# Patient Record
Sex: MALE | Race: WHITE | Employment: PART TIME | URBAN - METROPOLITAN AREA
[De-identification: names, ages, dates, MRNs, and addresses within clinical notes are randomized per-mention and may not be internally consistent; named-entity substitution may affect disease eponyms.]

---

## 2017-02-02 ENCOUNTER — APPOINTMENT (OUTPATIENT)
Dept: ULTRASOUND IMAGING | Age: 52
DRG: 219 | End: 2017-02-02
Attending: EMERGENCY MEDICINE
Payer: COMMERCIAL

## 2017-02-02 ENCOUNTER — HOSPITAL ENCOUNTER (INPATIENT)
Age: 52
LOS: 25 days | Discharge: NURSING, REHAB OR LONG-TERM CARE | DRG: 219 | End: 2017-02-27
Attending: EMERGENCY MEDICINE | Admitting: THORACIC SURGERY (CARDIOTHORACIC VASCULAR SURGERY)
Payer: COMMERCIAL

## 2017-02-02 ENCOUNTER — ANESTHESIA (OUTPATIENT)
Dept: CARDIOTHORACIC SURGERY | Age: 52
DRG: 219 | End: 2017-02-02
Payer: COMMERCIAL

## 2017-02-02 ENCOUNTER — ANESTHESIA EVENT (OUTPATIENT)
Dept: CARDIOTHORACIC SURGERY | Age: 52
DRG: 219 | End: 2017-02-02
Payer: COMMERCIAL

## 2017-02-02 ENCOUNTER — APPOINTMENT (OUTPATIENT)
Dept: CT IMAGING | Age: 52
DRG: 219 | End: 2017-02-02
Attending: EMERGENCY MEDICINE
Payer: COMMERCIAL

## 2017-02-02 ENCOUNTER — APPOINTMENT (OUTPATIENT)
Dept: GENERAL RADIOLOGY | Age: 52
DRG: 219 | End: 2017-02-02
Attending: EMERGENCY MEDICINE
Payer: COMMERCIAL

## 2017-02-02 DIAGNOSIS — I71.019 AORTIC DISSECTION, THORACIC: Primary | ICD-10-CM

## 2017-02-02 PROBLEM — I71.00 AORTIC DISSECTION (HCC): Status: ACTIVE | Noted: 2017-02-02

## 2017-02-02 LAB
ALBUMIN SERPL BCP-MCNC: 4.1 G/DL (ref 3.5–5)
ALBUMIN/GLOB SERPL: 1.2 {RATIO} (ref 1.1–2.2)
ALP SERPL-CCNC: 82 U/L (ref 45–117)
ALT SERPL-CCNC: 34 U/L (ref 12–78)
ANION GAP BLD CALC-SCNC: 12 MMOL/L (ref 5–15)
AST SERPL W P-5'-P-CCNC: 22 U/L (ref 15–37)
ATRIAL RATE: 68 BPM
BASOPHILS # BLD AUTO: 0 K/UL (ref 0–0.1)
BASOPHILS # BLD: 0 % (ref 0–1)
BILIRUB SERPL-MCNC: 0.3 MG/DL (ref 0.2–1)
BUN SERPL-MCNC: 15 MG/DL (ref 6–20)
BUN/CREAT SERPL: 13 (ref 12–20)
CALCIUM SERPL-MCNC: 8.6 MG/DL (ref 8.5–10.1)
CALCULATED P AXIS, ECG09: 16 DEGREES
CALCULATED R AXIS, ECG10: -21 DEGREES
CALCULATED T AXIS, ECG11: 9 DEGREES
CHLORIDE SERPL-SCNC: 103 MMOL/L (ref 97–108)
CK MB CFR SERPL CALC: 1.3 % (ref 0–2.5)
CK MB CFR SERPL CALC: 1.4 % (ref 0–2.5)
CK MB SERPL-MCNC: 5.5 NG/ML (ref 5–25)
CK MB SERPL-MCNC: 5.8 NG/ML (ref 5–25)
CK SERPL-CCNC: 416 U/L (ref 39–308)
CK SERPL-CCNC: 425 U/L (ref 39–308)
CO2 SERPL-SCNC: 26 MMOL/L (ref 21–32)
CREAT SERPL-MCNC: 1.17 MG/DL (ref 0.7–1.3)
DIAGNOSIS, 93000: NORMAL
EOSINOPHIL # BLD: 0.1 K/UL (ref 0–0.4)
EOSINOPHIL NFR BLD: 1 % (ref 0–7)
ERYTHROCYTE [DISTWIDTH] IN BLOOD BY AUTOMATED COUNT: 14.4 % (ref 11.5–14.5)
GLOBULIN SER CALC-MCNC: 3.5 G/DL (ref 2–4)
GLUCOSE SERPL-MCNC: 199 MG/DL (ref 65–100)
HCT VFR BLD AUTO: 42.9 % (ref 36.6–50.3)
HGB BLD-MCNC: 14.1 G/DL (ref 12.1–17)
LIPASE SERPL-CCNC: 159 U/L (ref 73–393)
LYMPHOCYTES # BLD AUTO: 24 % (ref 12–49)
LYMPHOCYTES # BLD: 2.3 K/UL (ref 0.8–3.5)
MCH RBC QN AUTO: 28.4 PG (ref 26–34)
MCHC RBC AUTO-ENTMCNC: 32.9 G/DL (ref 30–36.5)
MCV RBC AUTO: 86.3 FL (ref 80–99)
MONOCYTES # BLD: 0.6 K/UL (ref 0–1)
MONOCYTES NFR BLD AUTO: 7 % (ref 5–13)
NEUTS SEG # BLD: 6.5 K/UL (ref 1.8–8)
NEUTS SEG NFR BLD AUTO: 68 % (ref 32–75)
P-R INTERVAL, ECG05: 178 MS
PLATELET # BLD AUTO: 214 K/UL (ref 150–400)
POTASSIUM SERPL-SCNC: 4.2 MMOL/L (ref 3.5–5.1)
PROT SERPL-MCNC: 7.6 G/DL (ref 6.4–8.2)
Q-T INTERVAL, ECG07: 418 MS
QRS DURATION, ECG06: 100 MS
QTC CALCULATION (BEZET), ECG08: 444 MS
RBC # BLD AUTO: 4.97 M/UL (ref 4.1–5.7)
SODIUM SERPL-SCNC: 141 MMOL/L (ref 136–145)
TROPONIN I SERPL-MCNC: <0.04 NG/ML
TROPONIN I SERPL-MCNC: <0.04 NG/ML
VENTRICULAR RATE, ECG03: 68 BPM
WBC # BLD AUTO: 9.5 K/UL (ref 4.1–11.1)

## 2017-02-02 PROCEDURE — 77030034420 HC BN PTTY HEMOSORB ABRY -B: Performed by: THORACIC SURGERY (CARDIOTHORACIC VASCULAR SURGERY)

## 2017-02-02 PROCEDURE — 82553 CREATINE MB FRACTION: CPT | Performed by: EMERGENCY MEDICINE

## 2017-02-02 PROCEDURE — 74011250636 HC RX REV CODE- 250/636: Performed by: THORACIC SURGERY (CARDIOTHORACIC VASCULAR SURGERY)

## 2017-02-02 PROCEDURE — 77030020256 HC SOL INJ NACL 0.9%  500ML: Performed by: THORACIC SURGERY (CARDIOTHORACIC VASCULAR SURGERY)

## 2017-02-02 PROCEDURE — 96374 THER/PROPH/DIAG INJ IV PUSH: CPT

## 2017-02-02 PROCEDURE — 76010000116 HC CV SURG 5 TO 5.5 HR: Performed by: THORACIC SURGERY (CARDIOTHORACIC VASCULAR SURGERY)

## 2017-02-02 PROCEDURE — 76705 ECHO EXAM OF ABDOMEN: CPT

## 2017-02-02 PROCEDURE — 85384 FIBRINOGEN ACTIVITY: CPT | Performed by: THORACIC SURGERY (CARDIOTHORACIC VASCULAR SURGERY)

## 2017-02-02 PROCEDURE — 5A1221Z PERFORMANCE OF CARDIAC OUTPUT, CONTINUOUS: ICD-10-PCS | Performed by: THORACIC SURGERY (CARDIOTHORACIC VASCULAR SURGERY)

## 2017-02-02 PROCEDURE — 77030011255 HC DSG AQUACEL AG BMS -A: Performed by: THORACIC SURGERY (CARDIOTHORACIC VASCULAR SURGERY)

## 2017-02-02 PROCEDURE — 74011000258 HC RX REV CODE- 258: Performed by: THORACIC SURGERY (CARDIOTHORACIC VASCULAR SURGERY)

## 2017-02-02 PROCEDURE — 77030006920 HC BLD STRNL SAW STRY -B: Performed by: THORACIC SURGERY (CARDIOTHORACIC VASCULAR SURGERY)

## 2017-02-02 PROCEDURE — 77030003037 HC SUT WRE STRNOTMY AEMC -B: Performed by: THORACIC SURGERY (CARDIOTHORACIC VASCULAR SURGERY)

## 2017-02-02 PROCEDURE — 77030008771 HC TU NG SALEM SUMP -A: Performed by: ANESTHESIOLOGY

## 2017-02-02 PROCEDURE — 77030018846 HC SOL IRR STRL H20 ICUM -A: Performed by: THORACIC SURGERY (CARDIOTHORACIC VASCULAR SURGERY)

## 2017-02-02 PROCEDURE — 82550 ASSAY OF CK (CPK): CPT | Performed by: EMERGENCY MEDICINE

## 2017-02-02 PROCEDURE — 77030011256 HC DRSG MEPILEX <16IN NO BORD MOLN -A: Performed by: THORACIC SURGERY (CARDIOTHORACIC VASCULAR SURGERY)

## 2017-02-02 PROCEDURE — 77030002933 HC SUT MCRYL J&J -A: Performed by: THORACIC SURGERY (CARDIOTHORACIC VASCULAR SURGERY)

## 2017-02-02 PROCEDURE — 77030013798 HC KT TRNSDUC PRSSR EDWD -B: Performed by: THORACIC SURGERY (CARDIOTHORACIC VASCULAR SURGERY)

## 2017-02-02 PROCEDURE — 88313 SPECIAL STAINS GROUP 2: CPT | Performed by: THORACIC SURGERY (CARDIOTHORACIC VASCULAR SURGERY)

## 2017-02-02 PROCEDURE — 77030012390 HC DRN CHST BTL GTNG -B: Performed by: THORACIC SURGERY (CARDIOTHORACIC VASCULAR SURGERY)

## 2017-02-02 PROCEDURE — 77030011640 HC PAD GRND REM COVD -A: Performed by: THORACIC SURGERY (CARDIOTHORACIC VASCULAR SURGERY)

## 2017-02-02 PROCEDURE — 77030020263 HC SOL INJ SOD CL0.9% LFCR 1000ML: Performed by: THORACIC SURGERY (CARDIOTHORACIC VASCULAR SURGERY)

## 2017-02-02 PROCEDURE — 85025 COMPLETE CBC W/AUTO DIFF WBC: CPT | Performed by: EMERGENCY MEDICINE

## 2017-02-02 PROCEDURE — 74011250636 HC RX REV CODE- 250/636

## 2017-02-02 PROCEDURE — P9047 ALBUMIN (HUMAN), 25%, 50ML: HCPCS | Performed by: THORACIC SURGERY (CARDIOTHORACIC VASCULAR SURGERY)

## 2017-02-02 PROCEDURE — 74011000250 HC RX REV CODE- 250: Performed by: THORACIC SURGERY (CARDIOTHORACIC VASCULAR SURGERY)

## 2017-02-02 PROCEDURE — 77030032490 HC SLV COMPR SCD KNE COVD -B

## 2017-02-02 PROCEDURE — 85347 COAGULATION TIME ACTIVATED: CPT | Performed by: THORACIC SURGERY (CARDIOTHORACIC VASCULAR SURGERY)

## 2017-02-02 PROCEDURE — 74011636320 HC RX REV CODE- 636/320: Performed by: EMERGENCY MEDICINE

## 2017-02-02 PROCEDURE — 85390 FIBRINOLYSINS SCREEN I&R: CPT | Performed by: THORACIC SURGERY (CARDIOTHORACIC VASCULAR SURGERY)

## 2017-02-02 PROCEDURE — 85576 BLOOD PLATELET AGGREGATION: CPT | Performed by: THORACIC SURGERY (CARDIOTHORACIC VASCULAR SURGERY)

## 2017-02-02 PROCEDURE — C1768 GRAFT, VASCULAR: HCPCS | Performed by: THORACIC SURGERY (CARDIOTHORACIC VASCULAR SURGERY)

## 2017-02-02 PROCEDURE — 77030018986 HC SUT ETHBND4 J&J -B: Performed by: THORACIC SURGERY (CARDIOTHORACIC VASCULAR SURGERY)

## 2017-02-02 PROCEDURE — 96376 TX/PRO/DX INJ SAME DRUG ADON: CPT

## 2017-02-02 PROCEDURE — 77030027138 HC INCENT SPIROMETER -A

## 2017-02-02 PROCEDURE — 74011250636 HC RX REV CODE- 250/636: Performed by: EMERGENCY MEDICINE

## 2017-02-02 PROCEDURE — 86900 BLOOD TYPING SEROLOGIC ABO: CPT | Performed by: EMERGENCY MEDICINE

## 2017-02-02 PROCEDURE — 30233K1 TRANSFUSION OF NONAUTOLOGOUS FROZEN PLASMA INTO PERIPHERAL VEIN, PERCUTANEOUS APPROACH: ICD-10-PCS | Performed by: THORACIC SURGERY (CARDIOTHORACIC VASCULAR SURGERY)

## 2017-02-02 PROCEDURE — 77030008771 HC TU NG SALEM SUMP -A: Performed by: THORACIC SURGERY (CARDIOTHORACIC VASCULAR SURGERY)

## 2017-02-02 PROCEDURE — 77030010796: Performed by: THORACIC SURGERY (CARDIOTHORACIC VASCULAR SURGERY)

## 2017-02-02 PROCEDURE — 30233R1 TRANSFUSION OF NONAUTOLOGOUS PLATELETS INTO PERIPHERAL VEIN, PERCUTANEOUS APPROACH: ICD-10-PCS | Performed by: THORACIC SURGERY (CARDIOTHORACIC VASCULAR SURGERY)

## 2017-02-02 PROCEDURE — 77030019576 HC CAUT BTRY -A: Performed by: THORACIC SURGERY (CARDIOTHORACIC VASCULAR SURGERY)

## 2017-02-02 PROCEDURE — 71275 CT ANGIOGRAPHY CHEST: CPT

## 2017-02-02 PROCEDURE — 77030020061 HC IV BLD WRMR ADMIN SET 3M -B: Performed by: ANESTHESIOLOGY

## 2017-02-02 PROCEDURE — 80053 COMPREHEN METABOLIC PANEL: CPT | Performed by: EMERGENCY MEDICINE

## 2017-02-02 PROCEDURE — 77030034848: Performed by: THORACIC SURGERY (CARDIOTHORACIC VASCULAR SURGERY)

## 2017-02-02 PROCEDURE — 86920 COMPATIBILITY TEST SPIN: CPT | Performed by: EMERGENCY MEDICINE

## 2017-02-02 PROCEDURE — 77030021678 HC GLIDESCP STAT DISP VERT -B: Performed by: ANESTHESIOLOGY

## 2017-02-02 PROCEDURE — 74011250637 HC RX REV CODE- 250/637: Performed by: EMERGENCY MEDICINE

## 2017-02-02 PROCEDURE — 77030005537 HC CATH URETH BARD -A: Performed by: THORACIC SURGERY (CARDIOTHORACIC VASCULAR SURGERY)

## 2017-02-02 PROCEDURE — 77030018835 HC SOL IRR LR ICUM -A: Performed by: THORACIC SURGERY (CARDIOTHORACIC VASCULAR SURGERY)

## 2017-02-02 PROCEDURE — 4A1239Z MONITORING OF CARDIAC OUTPUT, PERCUTANEOUS APPROACH: ICD-10-PCS | Performed by: THORACIC SURGERY (CARDIOTHORACIC VASCULAR SURGERY)

## 2017-02-02 PROCEDURE — 74011636637 HC RX REV CODE- 636/637: Performed by: THORACIC SURGERY (CARDIOTHORACIC VASCULAR SURGERY)

## 2017-02-02 PROCEDURE — 77030010507 HC ADH SKN DERMBND J&J -B: Performed by: THORACIC SURGERY (CARDIOTHORACIC VASCULAR SURGERY)

## 2017-02-02 PROCEDURE — P9035 PLATELET PHERES LEUKOREDUCED: HCPCS | Performed by: THORACIC SURGERY (CARDIOTHORACIC VASCULAR SURGERY)

## 2017-02-02 PROCEDURE — 77030007016 HC CATH WGDWRE STYL EDWD -B: Performed by: THORACIC SURGERY (CARDIOTHORACIC VASCULAR SURGERY)

## 2017-02-02 PROCEDURE — 88305 TISSUE EXAM BY PATHOLOGIST: CPT | Performed by: THORACIC SURGERY (CARDIOTHORACIC VASCULAR SURGERY)

## 2017-02-02 PROCEDURE — P9059 PLASMA, FRZ BETWEEN 8-24HOUR: HCPCS | Performed by: THORACIC SURGERY (CARDIOTHORACIC VASCULAR SURGERY)

## 2017-02-02 PROCEDURE — 83690 ASSAY OF LIPASE: CPT | Performed by: EMERGENCY MEDICINE

## 2017-02-02 PROCEDURE — 77030002912 HC SUT ETHBND J&J -A: Performed by: THORACIC SURGERY (CARDIOTHORACIC VASCULAR SURGERY)

## 2017-02-02 PROCEDURE — 36415 COLL VENOUS BLD VENIPUNCTURE: CPT | Performed by: EMERGENCY MEDICINE

## 2017-02-02 PROCEDURE — 02RX0JZ REPLACEMENT OF THORACIC AORTA, ASCENDING/ARCH WITH SYNTHETIC SUBSTITUTE, OPEN APPROACH: ICD-10-PCS | Performed by: THORACIC SURGERY (CARDIOTHORACIC VASCULAR SURGERY)

## 2017-02-02 PROCEDURE — 77030018836 HC SOL IRR NACL ICUM -A: Performed by: THORACIC SURGERY (CARDIOTHORACIC VASCULAR SURGERY)

## 2017-02-02 PROCEDURE — 74011000250 HC RX REV CODE- 250

## 2017-02-02 PROCEDURE — 76060000041 HC ANESTHESIA 5 TO 5.5 HR: Performed by: THORACIC SURGERY (CARDIOTHORACIC VASCULAR SURGERY)

## 2017-02-02 PROCEDURE — 74174 CTA ABD&PLVS W/CONTRAST: CPT

## 2017-02-02 PROCEDURE — 77030010822: Performed by: THORACIC SURGERY (CARDIOTHORACIC VASCULAR SURGERY)

## 2017-02-02 PROCEDURE — 77030019579 HC CBL PACE DISP REMG -B: Performed by: THORACIC SURGERY (CARDIOTHORACIC VASCULAR SURGERY)

## 2017-02-02 PROCEDURE — 77030034351 HC SUT DEKLENE MX TELE -A: Performed by: THORACIC SURGERY (CARDIOTHORACIC VASCULAR SURGERY)

## 2017-02-02 PROCEDURE — 99285 EMERGENCY DEPT VISIT HI MDM: CPT

## 2017-02-02 PROCEDURE — 77030026438 HC STYL ET INTUB CARD -A: Performed by: ANESTHESIOLOGY

## 2017-02-02 PROCEDURE — 93005 ELECTROCARDIOGRAM TRACING: CPT

## 2017-02-02 PROCEDURE — 84484 ASSAY OF TROPONIN QUANT: CPT | Performed by: EMERGENCY MEDICINE

## 2017-02-02 PROCEDURE — 71020 XR CHEST PA LAT: CPT

## 2017-02-02 PROCEDURE — 65620000000 HC RM CCU GENERAL

## 2017-02-02 PROCEDURE — P9047 ALBUMIN (HUMAN), 25%, 50ML: HCPCS

## 2017-02-02 PROCEDURE — 96375 TX/PRO/DX INJ NEW DRUG ADDON: CPT

## 2017-02-02 PROCEDURE — 77030010506 HC ADH BIOGLU CRYO -G: Performed by: THORACIC SURGERY (CARDIOTHORACIC VASCULAR SURGERY)

## 2017-02-02 PROCEDURE — 74011000250 HC RX REV CODE- 250: Performed by: EMERGENCY MEDICINE

## 2017-02-02 PROCEDURE — 77030002973 HC SUT PLEDG CV SFT OVL TELE -B: Performed by: THORACIC SURGERY (CARDIOTHORACIC VASCULAR SURGERY)

## 2017-02-02 PROCEDURE — C9113 INJ PANTOPRAZOLE SODIUM, VIA: HCPCS | Performed by: EMERGENCY MEDICINE

## 2017-02-02 PROCEDURE — P9016 RBC LEUKOCYTES REDUCED: HCPCS | Performed by: EMERGENCY MEDICINE

## 2017-02-02 PROCEDURE — 74011000272 HC RX REV CODE- 272: Performed by: THORACIC SURGERY (CARDIOTHORACIC VASCULAR SURGERY)

## 2017-02-02 PROCEDURE — 77030008684 HC TU ET CUF COVD -B: Performed by: ANESTHESIOLOGY

## 2017-02-02 PROCEDURE — 77030020089 HC KT PERC FEM ART MEDT -C: Performed by: THORACIC SURGERY (CARDIOTHORACIC VASCULAR SURGERY)

## 2017-02-02 PROCEDURE — 4A133B3 MONITORING OF ARTERIAL PRESSURE, PULMONARY, PERCUTANEOUS APPROACH: ICD-10-PCS | Performed by: THORACIC SURGERY (CARDIOTHORACIC VASCULAR SURGERY)

## 2017-02-02 PROCEDURE — 74011000272 HC RX REV CODE- 272

## 2017-02-02 DEVICE — FELT SURG W6XL6IN THK1.65MM PTFE FOR THE REP OF SEPT DEFCT: Type: IMPLANTABLE DEVICE | Site: AORTA | Status: FUNCTIONAL

## 2017-02-02 DEVICE — GRAFT VASC PLAT 28MMX30 -- HEMSHLD: Type: IMPLANTABLE DEVICE | Site: AORTA | Status: FUNCTIONAL

## 2017-02-02 DEVICE — PUTTY BONE HEMSTAT ABSORB MTRX 2.0GRAM: Type: IMPLANTABLE DEVICE | Site: AORTA | Status: FUNCTIONAL

## 2017-02-02 RX ORDER — MAGNESIUM SULFATE HEPTAHYDRATE 40 MG/ML
2 INJECTION, SOLUTION INTRAVENOUS ONCE
Status: DISCONTINUED | OUTPATIENT
Start: 2017-02-03 | End: 2017-02-03

## 2017-02-02 RX ORDER — SUFENTANIL CITRATE 50 UG/ML
INJECTION EPIDURAL; INTRAVENOUS
Status: DISCONTINUED | OUTPATIENT
Start: 2017-02-02 | End: 2017-02-03 | Stop reason: HOSPADM

## 2017-02-02 RX ORDER — DEXTROSE 50 % IN WATER (D50W) INTRAVENOUS SYRINGE
AS NEEDED
Status: DISCONTINUED | OUTPATIENT
Start: 2017-02-02 | End: 2017-02-03 | Stop reason: HOSPADM

## 2017-02-02 RX ORDER — SODIUM CHLORIDE 9 MG/ML
250 INJECTION, SOLUTION INTRAVENOUS AS NEEDED
Status: DISCONTINUED | OUTPATIENT
Start: 2017-02-02 | End: 2017-02-03

## 2017-02-02 RX ORDER — PROTAMINE SULFATE 10 MG/ML
INJECTION, SOLUTION INTRAVENOUS AS NEEDED
Status: DISCONTINUED | OUTPATIENT
Start: 2017-02-02 | End: 2017-02-03 | Stop reason: HOSPADM

## 2017-02-02 RX ORDER — FAMOTIDINE 20 MG/1
20 TABLET, FILM COATED ORAL
Status: COMPLETED | OUTPATIENT
Start: 2017-02-02 | End: 2017-02-02

## 2017-02-02 RX ORDER — ONDANSETRON 2 MG/ML
4 INJECTION INTRAMUSCULAR; INTRAVENOUS
Status: COMPLETED | OUTPATIENT
Start: 2017-02-02 | End: 2017-02-02

## 2017-02-02 RX ORDER — CEFAZOLIN SODIUM 1 G/3ML
3 INJECTION, POWDER, FOR SOLUTION INTRAMUSCULAR; INTRAVENOUS ONCE
Status: COMPLETED | OUTPATIENT
Start: 2017-02-02 | End: 2017-02-03

## 2017-02-02 RX ORDER — DOBUTAMINE HYDROCHLORIDE 200 MG/100ML
2.5-1 INJECTION INTRAVENOUS
Status: DISCONTINUED | OUTPATIENT
Start: 2017-02-03 | End: 2017-02-03

## 2017-02-02 RX ORDER — HYDROMORPHONE HYDROCHLORIDE 1 MG/ML
1 INJECTION, SOLUTION INTRAMUSCULAR; INTRAVENOUS; SUBCUTANEOUS
Status: COMPLETED | OUTPATIENT
Start: 2017-02-02 | End: 2017-02-02

## 2017-02-02 RX ORDER — DESMOPRESSIN ACETATE 4 UG/ML
2 INJECTION, SOLUTION INTRAVENOUS; SUBCUTANEOUS ONCE
Status: DISCONTINUED | OUTPATIENT
Start: 2017-02-03 | End: 2017-02-03

## 2017-02-02 RX ORDER — SODIUM BICARBONATE 1 MEQ/ML
SYRINGE (ML) INTRAVENOUS
Status: DISCONTINUED
Start: 2017-02-02 | End: 2017-02-03

## 2017-02-02 RX ORDER — HEPARIN SODIUM 1000 [USP'U]/ML
2000 INJECTION, SOLUTION INTRAVENOUS; SUBCUTANEOUS ONCE
Status: COMPLETED | OUTPATIENT
Start: 2017-02-02 | End: 2017-02-02

## 2017-02-02 RX ORDER — SODIUM CHLORIDE 0.9 % (FLUSH) 0.9 %
10 SYRINGE (ML) INJECTION
Status: COMPLETED | OUTPATIENT
Start: 2017-02-02 | End: 2017-02-02

## 2017-02-02 RX ORDER — PROTAMINE SULFATE 10 MG/ML
250 INJECTION, SOLUTION INTRAVENOUS
Status: DISCONTINUED | OUTPATIENT
Start: 2017-02-03 | End: 2017-02-03

## 2017-02-02 RX ORDER — ROCURONIUM BROMIDE 10 MG/ML
INJECTION, SOLUTION INTRAVENOUS AS NEEDED
Status: DISCONTINUED | OUTPATIENT
Start: 2017-02-02 | End: 2017-02-03 | Stop reason: HOSPADM

## 2017-02-02 RX ORDER — SUFENTANIL CITRATE 50 UG/ML
INJECTION EPIDURAL; INTRAVENOUS AS NEEDED
Status: DISCONTINUED | OUTPATIENT
Start: 2017-02-02 | End: 2017-02-03 | Stop reason: HOSPADM

## 2017-02-02 RX ORDER — SODIUM CHLORIDE 9 MG/ML
INJECTION, SOLUTION INTRAVENOUS
Status: DISCONTINUED | OUTPATIENT
Start: 2017-02-02 | End: 2017-02-03 | Stop reason: HOSPADM

## 2017-02-02 RX ORDER — DOPAMINE HYDROCHLORIDE 320 MG/100ML
5-20 INJECTION, SOLUTION INTRAVENOUS
Status: DISCONTINUED | OUTPATIENT
Start: 2017-02-03 | End: 2017-02-03

## 2017-02-02 RX ORDER — HEPARIN SODIUM 1000 [USP'U]/ML
INJECTION, SOLUTION INTRAVENOUS; SUBCUTANEOUS AS NEEDED
Status: DISCONTINUED | OUTPATIENT
Start: 2017-02-02 | End: 2017-02-03 | Stop reason: HOSPADM

## 2017-02-02 RX ORDER — SODIUM CHLORIDE 9 MG/ML
50 INJECTION, SOLUTION INTRAVENOUS
Status: COMPLETED | OUTPATIENT
Start: 2017-02-02 | End: 2017-02-02

## 2017-02-02 RX ORDER — CEFAZOLIN SODIUM IN 0.9 % NACL 2 G/100 ML
PLASTIC BAG, INJECTION (ML) INTRAVENOUS AS NEEDED
Status: DISCONTINUED | OUTPATIENT
Start: 2017-02-02 | End: 2017-02-03 | Stop reason: HOSPADM

## 2017-02-02 RX ORDER — NITROGLYCERIN 20 MG/100ML
5-200 INJECTION INTRAVENOUS
Status: DISCONTINUED | OUTPATIENT
Start: 2017-02-03 | End: 2017-02-03

## 2017-02-02 RX ORDER — POTASSIUM CHLORIDE 29.8 MG/ML
20 INJECTION INTRAVENOUS ONCE
Status: DISCONTINUED | OUTPATIENT
Start: 2017-02-03 | End: 2017-02-03

## 2017-02-02 RX ORDER — PROPOFOL 10 MG/ML
INJECTION, EMULSION INTRAVENOUS AS NEEDED
Status: DISCONTINUED | OUTPATIENT
Start: 2017-02-02 | End: 2017-02-03 | Stop reason: HOSPADM

## 2017-02-02 RX ORDER — SUCCINYLCHOLINE CHLORIDE 20 MG/ML
INJECTION INTRAMUSCULAR; INTRAVENOUS AS NEEDED
Status: DISCONTINUED | OUTPATIENT
Start: 2017-02-02 | End: 2017-02-03 | Stop reason: HOSPADM

## 2017-02-02 RX ORDER — MIDAZOLAM HYDROCHLORIDE 1 MG/ML
INJECTION, SOLUTION INTRAMUSCULAR; INTRAVENOUS AS NEEDED
Status: DISCONTINUED | OUTPATIENT
Start: 2017-02-02 | End: 2017-02-03 | Stop reason: HOSPADM

## 2017-02-02 RX ORDER — LABETALOL HCL 20 MG/4 ML
10 SYRINGE (ML) INTRAVENOUS
Status: COMPLETED | OUTPATIENT
Start: 2017-02-02 | End: 2017-02-02

## 2017-02-02 RX ADMIN — Medication 2 G: at 20:23

## 2017-02-02 RX ADMIN — SODIUM CHLORIDE 40 MG: 9 INJECTION INTRAMUSCULAR; INTRAVENOUS; SUBCUTANEOUS at 11:50

## 2017-02-02 RX ADMIN — BACITRACIN: 50000 INJECTION, POWDER, FOR SOLUTION INTRAMUSCULAR at 21:00

## 2017-02-02 RX ADMIN — PHENYLEPHRINE HYDROCHLORIDE 60 MCG/MIN: 10 INJECTION INTRAVENOUS at 20:03

## 2017-02-02 RX ADMIN — LABETALOL HYDROCHLORIDE 10 MG: 5 INJECTION, SOLUTION INTRAVENOUS at 19:05

## 2017-02-02 RX ADMIN — ONDANSETRON HYDROCHLORIDE 4 MG: 2 INJECTION, SOLUTION INTRAMUSCULAR; INTRAVENOUS at 16:57

## 2017-02-02 RX ADMIN — ROCURONIUM BROMIDE 50 MG: 10 INJECTION, SOLUTION INTRAVENOUS at 21:34

## 2017-02-02 RX ADMIN — SUFENTANIL CITRATE 0.3 MCG/KG/HR: 50 INJECTION EPIDURAL; INTRAVENOUS at 20:00

## 2017-02-02 RX ADMIN — ALUMINUM HYDROXIDE AND MAGNESIUM HYDROXIDE 30 ML: 200; 200 SUSPENSION ORAL at 11:54

## 2017-02-02 RX ADMIN — SUFENTANIL CITRATE 10 MCG: 50 INJECTION EPIDURAL; INTRAVENOUS at 20:39

## 2017-02-02 RX ADMIN — IOPAMIDOL 100 ML: 755 INJECTION, SOLUTION INTRAVENOUS at 17:47

## 2017-02-02 RX ADMIN — ROCURONIUM BROMIDE 40 MG: 10 INJECTION, SOLUTION INTRAVENOUS at 20:02

## 2017-02-02 RX ADMIN — DEXTROSE 50 % IN WATER (D50W) INTRAVENOUS SYRINGE 25 G: at 22:50

## 2017-02-02 RX ADMIN — Medication 10 ML: at 17:48

## 2017-02-02 RX ADMIN — MIDAZOLAM HYDROCHLORIDE 2 MG: 1 INJECTION, SOLUTION INTRAMUSCULAR; INTRAVENOUS at 19:54

## 2017-02-02 RX ADMIN — HEPARIN SODIUM 50000 UNITS: 1000 INJECTION, SOLUTION INTRAVENOUS; SUBCUTANEOUS at 21:19

## 2017-02-02 RX ADMIN — ONDANSETRON HYDROCHLORIDE 4 MG: 2 INJECTION, SOLUTION INTRAMUSCULAR; INTRAVENOUS at 13:27

## 2017-02-02 RX ADMIN — AMIODARONE HYDROCHLORIDE 1 MG/MIN: 50 INJECTION, SOLUTION INTRAVENOUS at 23:15

## 2017-02-02 RX ADMIN — SUCCINYLCHOLINE CHLORIDE 180 MG: 20 INJECTION INTRAMUSCULAR; INTRAVENOUS at 19:51

## 2017-02-02 RX ADMIN — HYDROMORPHONE HYDROCHLORIDE 1 MG: 1 INJECTION, SOLUTION INTRAMUSCULAR; INTRAVENOUS; SUBCUTANEOUS at 16:48

## 2017-02-02 RX ADMIN — PROPOFOL 200 MG: 10 INJECTION, EMULSION INTRAVENOUS at 19:51

## 2017-02-02 RX ADMIN — MIDAZOLAM HYDROCHLORIDE 2 MG: 1 INJECTION, SOLUTION INTRAMUSCULAR; INTRAVENOUS at 19:51

## 2017-02-02 RX ADMIN — SODIUM CHLORIDE: 9 INJECTION, SOLUTION INTRAVENOUS at 20:25

## 2017-02-02 RX ADMIN — Medication 2 G: at 23:50

## 2017-02-02 RX ADMIN — FAMOTIDINE 20 MG: 20 TABLET ORAL at 11:50

## 2017-02-02 RX ADMIN — SUFENTANIL CITRATE 10 MCG: 50 INJECTION EPIDURAL; INTRAVENOUS at 20:46

## 2017-02-02 RX ADMIN — PROTAMINE SULFATE 450 MG: 10 INJECTION, SOLUTION INTRAVENOUS at 23:40

## 2017-02-02 RX ADMIN — SODIUM CHLORIDE 50 ML/HR: 900 INJECTION, SOLUTION INTRAVENOUS at 17:48

## 2017-02-02 RX ADMIN — SODIUM CHLORIDE 0.5 MCG/KG/HR: 900 INJECTION, SOLUTION INTRAVENOUS at 23:07

## 2017-02-02 RX ADMIN — HEPARIN SODIUM 2000 UNITS: 1000 INJECTION, SOLUTION INTRAVENOUS; SUBCUTANEOUS at 21:00

## 2017-02-02 RX ADMIN — ROCURONIUM BROMIDE 10 MG: 10 INJECTION, SOLUTION INTRAVENOUS at 19:51

## 2017-02-02 RX ADMIN — SUFENTANIL CITRATE 30 MCG: 50 INJECTION EPIDURAL; INTRAVENOUS at 19:51

## 2017-02-02 RX ADMIN — MIDAZOLAM HYDROCHLORIDE 0.5 MG: 1 INJECTION, SOLUTION INTRAMUSCULAR; INTRAVENOUS at 19:47

## 2017-02-02 NOTE — IP AVS SNAPSHOT
Current Discharge Medication List  
  
Take these medications at their scheduled times Dose & Instructions Dispensing Information Comments Morning Noon Evening Bedtime  
 amLODIPine 10 mg tablet Commonly known as:  [place] Your next dose is: Today, Tomorrow Other:  ____________ Dose:  10 mg Take 1 Tab by mouth daily for 60 days. Quantity:  30 Tab Refills:  1  
     
   
   
   
  
 aspirin 81 mg chewable tablet Your next dose is: Today, Tomorrow Other:  ____________ Dose:  81 mg Take 1 Tab by mouth daily. Quantity:  365 Tab Refills:  0  
     
   
   
   
  
 atorvastatin 20 mg tablet Commonly known as:  LIPITOR Your next dose is: Today, Tomorrow Other:  ____________ Dose:  20 mg Take 1 Tab by mouth every evening. Quantity:  30 Tab Refills:  1  
     
   
   
   
  
 bumetanide 1 mg tablet Commonly known as:  [place] Your next dose is: Today, Tomorrow Other:  ____________ Dose:  3 mg Take 3 Tabs by mouth two (2) times a day for 30 days. Quantity:  90 Tab Refills:  0  
     
   
   
   
  
 cloNIDine 0.3 mg/24 hr  
Commonly known as:  CATAPRES Your next dose is: Today, Tomorrow Other:  ____________ Dose:  1 Patch 1 Patch by TransDERmal route every seven (7) days. Quantity:  4 Patch Refills:  1  
     
   
   
   
  
 hydrALAZINE 100 mg tablet Commonly known as:  APRESOLINE Your next dose is: Today, Tomorrow Other:  ____________ Dose:  100 mg Take 1 Tab by mouth every six (6) hours. Quantity:  120 Tab Refills:  1  
     
   
   
   
  
 linagliptin 5 mg tablet Commonly known as:  Neita [place] Your next dose is: Today, Tomorrow Other:  ____________ Dose:  5 mg Take 1 Tab by mouth Daily (before breakfast). Quantity:  30 Tab Refills:  1 metoprolol tartrate 50 mg tablet Commonly known as:  LOPRESSOR Your next dose is: Today, Tomorrow Other:  ____________ Dose:  50 mg Take 1 Tab by mouth every twelve (12) hours. Quantity:  60 Tab Refills:  1  
     
   
   
   
  
 minoxidil 10 mg tablet Commonly known as:  Vonzell Sales Your next dose is: Today, Tomorrow Other:  ____________ Dose:  10 mg Take 1 Tab by mouth daily. Quantity:  30 Tab Refills:  1  
     
   
   
   
  
 pantoprazole 40 mg tablet Commonly known as:  PROTONIX Your next dose is: Today, Tomorrow Other:  ____________ Dose:  40 mg Take 1 Tab by mouth daily for 60 days. Quantity:  30 Tab Refills:  1  
     
   
   
   
  
 potassium chloride 20 mEq tablet Commonly known as:  K-DUR, KLOR-CON Your next dose is: Today, Tomorrow Other:  ____________ Dose:  40 mEq Take 2 Tabs by mouth three (3) times daily. Quantity:  90 Tab Refills:  0 Take these medications as needed Dose & Instructions Dispensing Information Comments Morning Noon Evening Bedtime  
 oxyCODONE-acetaminophen 5-325 mg per tablet Commonly known as:  PERCOCET Your next dose is: Today, Tomorrow Other:  ____________ Dose:  1 Tab Take 1 Tab by mouth every four (4) hours as needed. Max Daily Amount: 6 Tabs. Quantity:  60 Tab Refills:  0 Where to Get Your Medications Information about where to get these medications is not yet available ! Ask your nurse or doctor about these medications  
  amLODIPine 10 mg tablet  
 aspirin 81 mg chewable tablet  
 atorvastatin 20 mg tablet  
 bumetanide 1 mg tablet  
 cloNIDine 0.3 mg/24 hr  
 hydrALAZINE 100 mg tablet  
 linagliptin 5 mg tablet  
 metoprolol tartrate 50 mg tablet  
 minoxidil 10 mg tablet  
 oxyCODONE-acetaminophen 5-325 mg per tablet pantoprazole 40 mg tablet  
 potassium chloride 20 mEq tablet

## 2017-02-02 NOTE — ED NOTES
Hourly rounds made, pt reports no relief from chest pain after meds given. Pain remains 5/10. Remains on monitor, family at bedside.

## 2017-02-02 NOTE — ED PROVIDER NOTES
HPI Comments: Micah Griffiths, 45 y.o. male, presents via EMS to [facility] ED with cc of 5/10 intermittent episodes of dull pressure non-radiating central chest pain that started 1 hour PTA. The patient states that his chest pain is non-pleuritic, and his pain onset at rest. The patient also c/o associated diaphoresis and a headache. Per EMS, the patient took one dose of ASA PTA and received one dose of nitroglycerin en route with no relief. The patient notes a family history significant for an MI. The patient denies a history of GERD, recent PCP follow up, or history of similar symptoms. The patient specifically denies nausea, vomiting, abdominal pain, arm pain, jaw pain, leg swelling, SOB, or other acute complaints at this time. PCP: PROVIDER UNKNOWN    Social history significant for: - Tobacco (former), - EtOH    There are no other complaints, changes, or physical findings at this time. Written by JULIANNA Mendes, as dictated by Jamir England MD.      The history is provided by the patient. No  was used. History reviewed. No pertinent past medical history. History reviewed. No pertinent past surgical history. History reviewed. No pertinent family history. Social History     Social History    Marital status: SINGLE     Spouse name: N/A    Number of children: N/A    Years of education: N/A     Occupational History    Not on file. Social History Main Topics    Smoking status: Former Smoker    Smokeless tobacco: Not on file    Alcohol use No    Drug use: Not on file    Sexual activity: Not on file     Other Topics Concern    Not on file     Social History Narrative    No narrative on file         ALLERGIES: Review of patient's allergies indicates no known allergies. Review of Systems   Constitutional: Positive for diaphoresis. Negative for chills and fever. HENT: Negative for congestion. (-) Jaw pain   Eyes: Negative for visual disturbance. Respiratory: Negative for chest tightness. Cardiovascular: Positive for chest pain. Negative for leg swelling. Gastrointestinal: Negative for abdominal pain, nausea and vomiting. Endocrine: Negative for polyuria. Genitourinary: Negative for dysuria and frequency. Musculoskeletal: Negative for myalgias. Skin: Negative for color change. Allergic/Immunologic: Negative for immunocompromised state. Neurological: Positive for headaches. Negative for numbness. Patient Vitals for the past 12 hrs:   Temp Pulse Resp BP SpO2   02/02/17 1905 - 74 - 149/83 -   02/02/17 1530 - 69 15 135/69 99 %   02/02/17 1330 - 66 14 132/65 97 %   02/02/17 1313 - 69 20 - 99 %   02/02/17 1300 - 65 21 136/69 97 %   02/02/17 1245 - 66 16 141/71 99 %   02/02/17 1230 - 65 12 142/71 99 %   02/02/17 1215 - 61 12 154/73 98 %   02/02/17 1200 - 62 12 134/69 98 %   02/02/17 1154 - 63 16 - 98 %   02/02/17 1145 - 64 16 133/77 98 %   02/02/17 1140 - 64 11 - 97 %   02/02/17 1139 - - - 133/80 -   02/02/17 1115 - 65 14 151/73 99 %   02/02/17 1107 - 68 23 150/81 100 %   02/02/17 1106 98.1 °F (36.7 °C) 68 16 150/81 99 %       Physical Exam   Nursing note and vitals reviewed. General appearance: Alert, non-toxic, no acute distress. Eyes: Pupils equal, round and reactive to light, conjunctiva normal, anicteric sclera. HEENT: Mucous membranes moist, oropharynx is benign. Neck: No JVD. Pulmonary: Clear to auscultation bilaterally. Mild anterior chest wall TTP. Cardiac: Normal rate and regular rhythm, no murmurs, gallops, or rubs. 2+ radial pulses. 2+ DP pulses. Abdomen: Soft, nontender, nondistended, bowel sounds present. No CVA tenderness. MSK: 1+ BL LE edema. Skin: Capillary refill < 3 seconds. Neuro: Alert, answers questions appropriately, extremities NVI x4.    Written by Mechelle Vargas, ED Scribe, as dictated by Jae Schroeder MD.    MDM  Number of Diagnoses or Management Options  Aortic dissection, thoracic [facility]):   Diagnosis management comments:   DDx: Musculoskeletal chest pain, ACS, gastritis, GERD; low suspicion for PE, TAD, or mild pericarditis. Pt developed upper abd pain with radiation to back while in ED. +FHx of AAA, CT imaging pursued and in fact + for [place] Type A dissection. Thoracic surgery consulted, labetalol ordered, and plan for immediate OR for repair. Defer esmolol until central line in place; labetalol push doses for now. Amount and/or Complexity of Data Reviewed  Clinical lab tests: reviewed and ordered  Tests in the radiology section of CPT®: ordered and reviewed  Tests in the medicine section of CPT®: ordered and reviewed  Review and summarize past medical records: yes  Discuss the patient with other providers: yes (Thoracic Surgery)  Independent visualization of images, tracings, or specimens: yes    Critical Care  Total time providing critical care: 30-74 minutes    Patient Progress  Patient progress: stable    ED Course       Procedures    EKG interpretation: (Preliminary) 10:51 AM  Rhythm: normal sinus rhythm; and regular . Rate (approx.): 68; Axis: left axis deviation; P wave: normal; QRS interval: normal ; ST/T wave: Nonspecific ST and T wave; no VAMSHI or STD; TX interval 178 ms, QRS interval 100 ms,  ms, QTc 444 ms. Written by JULIANNA Rosa, as dictated by Power Braun MD.    Progress Note:  1:00 PM  The patient was re-evaluated and had one episode of vomiting. The patient states that his pain is a 2-3/10. Written by JULIANNA Garza, as dictated by Power Braun MD.    Progress Note:  3:47 PM  The patient was PO challenged without difficulty. Written by JULIANNA Garza, as dictated by Power Braun MD.    Consult Note:  6:33 PM  Power Braun MD spoke with Enrike Marks MD,  Specialty: Thoracic Surgery  Discussed pt's hx, disposition, and available diagnostic and imaging results. Reviewed care plans. Consultant agrees with plans as outlined.  Dr. Obie Almazan will evaluate the patient at bedside. Written by JULIANNA Tavarez, as dictated by Мария Quesada MD.    Progress Note:  6:47 PM  The patient's lab and imaging were discussed with the patient and his mother via phone. The patient is in understanding of the results. The patient was also informed of the plan of care and is in agreement. Written by JULIANNA Tavarez, as dictated by Мария Quesada MD.    CRITICAL CARE NOTE :  6:58 PM    IMPENDING DETERIORATION -Cardiovascular  ASSOCIATED RISK FACTORS - Bleeding and Vascular Compromise  MANAGEMENT- Bedside Assessment  INTERPRETATION -  Xrays, CT Scan, ECG and Blood Pressure  INTERVENTIONS - hemodynamic mngmt  CASE REVIEW - Medical Sub-Specialist, Nursing and Family  TREATMENT RESPONSE -Stable  PERFORMED BY - Physician    NOTES   :  I have spent 30 minutes of critical care time involved in lab review, consultations with specialist, family decision- making, bedside attention and documentation. During this entire length of time I was immediately available to the patient .   Written by JULIANNA Tavarez, as dictated by Мария Quesada MD.    LABORATORY TESTS:  Recent Results (from the past 12 hour(s))   EKG, 12 LEAD, INITIAL    Collection Time: 02/02/17 10:51 AM   Result Value Ref Range    Ventricular Rate 68 BPM    Atrial Rate 68 BPM    P-R Interval 178 ms    QRS Duration 100 ms    Q-T Interval 418 ms    QTC Calculation (Bezet) 444 ms    Calculated P Axis 16 degrees    Calculated R Axis -21 degrees    Calculated T Axis 9 degrees    Diagnosis       Normal sinus rhythm  Voltage criteria for left ventricular hypertrophy  No previous ECGs available  Confirmed by Arturo Borja ([id number]) on 2/2/2017 12:08:14 PM     CBC WITH AUTOMATED DIFF    Collection Time: 02/02/17 10:56 AM   Result Value Ref Range    WBC 9.5 4.1 - 11.1 K/uL    RBC 4.97 4.10 - 5.70 M/uL    HGB 14.1 12.1 - 17.0 g/dL    HCT 42.9 36.6 - 50.3 %    MCV 86.3 80.0 - 99.0 FL    MCH 28.4 26.0 - 34.0 PG    MCHC 32.9 30.0 - 36.5 g/dL    RDW 14.4 11.5 - 14.5 %    PLATELET 332 548 - 049 K/uL    NEUTROPHILS 68 32 - 75 %    LYMPHOCYTES 24 12 - 49 %    MONOCYTES 7 5 - 13 %    EOSINOPHILS 1 0 - 7 %    BASOPHILS 0 0 - 1 %    ABS. NEUTROPHILS 6.5 1.8 - 8.0 K/UL    ABS. LYMPHOCYTES 2.3 0.8 - 3.5 K/UL    ABS. MONOCYTES 0.6 0.0 - 1.0 K/UL    ABS. EOSINOPHILS 0.1 0.0 - 0.4 K/UL    ABS. BASOPHILS 0.0 0.0 - 0.1 K/UL   METABOLIC PANEL, COMPREHENSIVE    Collection Time: 02/02/17 10:56 AM   Result Value Ref Range    Sodium 141 136 - 145 mmol/L    Potassium 4.2 3.5 - 5.1 mmol/L    Chloride 103 97 - 108 mmol/L    CO2 26 21 - 32 mmol/L    Anion gap 12 5 - 15 mmol/L    Glucose 199 (H) 65 - 100 mg/dL    BUN 15 6 - 20 MG/DL    Creatinine 1.17 0.70 - 1.30 MG/DL    BUN/Creatinine ratio 13 12 - 20      GFR est AA >60 >60 ml/min/1.73m2    GFR est non-AA >60 >60 ml/min/1.73m2    Calcium 8.6 8.5 - 10.1 MG/DL    Bilirubin, total 0.3 0.2 - 1.0 MG/DL    ALT (SGPT) 34 12 - 78 U/L    AST (SGOT) 22 15 - 37 U/L    Alk. phosphatase 82 45 - 117 U/L    Protein, total 7.6 6.4 - 8.2 g/dL    Albumin 4.1 3.5 - 5.0 g/dL    Globulin 3.5 2.0 - 4.0 g/dL    A-G Ratio 1.2 1.1 - 2.2     CK W/ REFLX CKMB    Collection Time: 02/02/17 10:56 AM   Result Value Ref Range     (H) 39 - 308 U/L   TROPONIN I    Collection Time: 02/02/17 10:56 AM   Result Value Ref Range    Troponin-I, Qt. <0.04 <0.05 ng/mL   LIPASE    Collection Time: 02/02/17 10:56 AM   Result Value Ref Range    Lipase 159 73 - 393 U/L   CK-MB,QUANT. Collection Time: 02/02/17 10:56 AM   Result Value Ref Range    CK - MB 5.5 (H) <3.6 NG/ML    CK-MB Index 1.3 0 - 2.5     TROPONIN I    Collection Time: 02/02/17  3:06 PM   Result Value Ref Range    Troponin-I, Qt. <0.04 <0.05 ng/mL   CK W/ REFLX CKMB    Collection Time: 02/02/17  3:06 PM   Result Value Ref Range     (H) 39 - 308 U/L   CK-MB,QUANT.     Collection Time: 02/02/17  3:06 PM   Result Value Ref Range    CK - MB 5.8 (H) <3.6 NG/ML CK-MB Index 1.4 0 - 2.5         IMAGING RESULTS:  CT Results  (Last 48 hours)               02/02/17 1748  CTA CHEST W WO CONT Final result    Impression:  IMPRESSION: [place] type A aortic dissection. Consult by thoracic surgery   recommended. Narrative:  History: Chest and abdominal pain, radiating to back        CTA of the chest, abdomen, and pelvis was performed. 100 mL Optiray-350 were   injected and scanning was performed during the arterial phase of contrast   administration. Post processing was performed. 3D reconstructions were   performed. CT dose reduction was achieved through use of a standardized   protocol tailored for this examination and automatic exposure control for dose   modulation. There is an aortic dissection which extends from the aortic root into the common   iliac arteries bilaterally. There is no evidence for aortic dilatation. The   celiac artery, superior mesenteric artery, inferior mesenteric artery, and right   renal artery arise from the true lumen of the left renal artery arises from the   false lumen. The heart and pulmonary arteries appear normal. There is no significant   lymphadenopathy. A pericardial effusion is not seen. The lungs are clear. Evaluation of the cyst solid organs is limited due to phase of contrast   administration. The liver, gallbladder, spleen, adrenal glands, pancreas, and   kidneys are unremarkable. There is a right renal cyst. The bowel appears   unremarkable. The appendix appears normal. The bladder is fluid-filled. 02/02/17 1748  CTA ABDOMEN PELV W CONT Final result    Impression:  IMPRESSION: [place] type A aortic dissection. Consult by thoracic surgery   recommended. Narrative:  History: Chest and abdominal pain, radiating to back        CTA of the chest, abdomen, and pelvis was performed.   100 mL Optiray-350 were   injected and scanning was performed during the arterial phase of contrast   administration. Post processing was performed. 3D reconstructions were   performed. CT dose reduction was achieved through use of a standardized   protocol tailored for this examination and automatic exposure control for dose   modulation. There is an aortic dissection which extends from the aortic root into the common   iliac arteries bilaterally. There is no evidence for aortic dilatation. The   celiac artery, superior mesenteric artery, inferior mesenteric artery, and right   renal artery arise from the true lumen of the left renal artery arises from the   false lumen. The heart and pulmonary arteries appear normal. There is no significant   lymphadenopathy. A pericardial effusion is not seen. The lungs are clear. Evaluation of the cyst solid organs is limited due to phase of contrast   administration. The liver, gallbladder, spleen, adrenal glands, pancreas, and   kidneys are unremarkable. There is a right renal cyst. The bowel appears   unremarkable. The appendix appears normal. The bladder is fluid-filled. CXR Results  (Last 48 hours)               02/02/17 1313  XR CHEST PA LAT Final result    Impression:  Impression: No acute process. Narrative:  Exam:  2 view chest       Indication: Substernal chest pain for one hour       PA and lateral views demonstrate normal heart size. There is no acute process in   the lung fields. The osseous structures are unremarkable. History: Nausea and vomiting.     Ultrasonography of the right upper quadrant demonstrates that the liver is  echogenic. Portal vein flow is antegrade. The gallbladder is fluid filled. There  is no cholelithiasis or pericholecystic fluid collection. The common bile duct  measures 6.6 mm. There is no intrahepatic duct dilatation or mass. The right  kidney measures 10.7 cm there is normal in echotexture without hydronephrosis or  mass.  The pancreas is obscured by bowel gas.     IMPRESSION  IMPRESSION: Echogenic liver compatible with fatty infiltration or fibrosis.   Otherwise unremarkable.           MEDICATIONS GIVEN:  Medications   DOBUTamine (DOBUTREX) 500 mg/250 mL (2,000 mcg/mL) infusion (not administered)   DOPamine (INTROPIN) 800 mg/250 mL (3,200 mcg/mL) infusion (not administered)   magnesium sulfate 2 g/50 ml IVPB (premix or compounded) (not administered)   nitroglycerin (Tridil) 200 mcg/ml infusion (not administered)   potassium chloride 20 mEq in 50 ml IVPB (not administered)   aminocaproic acid (AMICAR) 15 g in 0.9% sodium chloride 150 mL infusion (not administered)   amiodarone (CORDARONE) 450 mg in dextrose 5% 250 mL infusion (not administered)   dexmedetomidine (PRECEDEX) 400 mcg in 0.9% sodium chloride 100 mL infusion (not administered)   insulin regular (NOVOLIN R, HUMULIN R) 100 Units in 0.9% sodium chloride 100 mL infusion (not administered)   PHENYLephrine (NEOSYNEPHRINE) 10,000 mcg in 0.9% sodium chloride 250 mL infusion (not administered)   PHENYLephrine (NEOSYNEPHRINE) 20,000 mcg in 0.9% sodium chloride 250 mL infusion (not administered)   PHENYLephrine (NEOSYNEPHRINE) 30,000 mcg in 0.9% sodium chloride 250 mL infusion (not administered)   niCARdipine (CARDENE) 25 mg in 0.9% sodium chloride 250 mL infusion (not administered)   amiodarone (CORDARONE) 150 mg in dextrose 5% 100 mL bolus infusion (not administered)   EPINEPHrine (ADRENALIN) 2,000 mcg in 0.9% sodium chloride 250 mL infusion (not administered)   NOREPINephrine (LEVOPHED) 4,000 mcg in dextrose 5% 250 mL infusion (not administered)   protamine injection 250 mg (not administered)   desmopressin (DDAVP) 4 mcg/mL injection 2 mcg (not administered)   cardioplegia infusion (not administered)   famotidine (PEPCID) tablet 20 mg (20 mg Oral Given 2/2/17 1150)   pantoprazole (PROTONIX) 40 mg in sodium chloride 0.9 % 10 mL injection (40 mg IntraVENous Given 2/2/17 1150)   aluminum-magnesium hydroxide (MAALOX) oral suspension 30 mL (30 mL Oral Given 2/2/17 1154)   ondansetron (ZOFRAN) injection 4 mg (4 mg IntraVENous Given 2/2/17 1327)   HYDROmorphone (PF) (DILAUDID) injection 1 mg (1 mg IntraVENous Given 2/2/17 1648)   0.9% sodium chloride infusion (0 mL/hr IntraVENous IV Completed 2/2/17 1857)   iopamidol (ISOVUE-370) 76 % injection 100 mL (100 mL IntraVENous Given 2/2/17 1747)   sodium chloride (NS) flush 10 mL (10 mL IntraVENous Given 2/2/17 1748)   ondansetron (ZOFRAN) injection 4 mg (4 mg IntraVENous Given 2/2/17 1657)       IMPRESSION:  1. Aortic dissection, thoracic ([facility] [address]        PLAN:  1. Admit to Thoracic Surgery    Admit Note:  6:58 PM  Pt is being admitted by Bindu Sharma MD. The results of their tests and reason(s) for their admission have been discussed with pt and/or available family. They convey agreement and understanding for the need to be admitted and for admission diagnosis. This note is prepared by Vikas Waters, acting as a Scribe for James Chang MD.    James Chang MD: The scribe's documentation has been prepared under my direction and personally reviewed by me in its entirety. I confirm that the notes above accurately reflects all work, treatment, procedures, and medical decision making performed by me.

## 2017-02-02 NOTE — IP AVS SNAPSHOT
[address] [facility] 
[phone] Patient: [employer] MRN: [id number] SMT:9/5/2863 You are allergic to the following No active allergies Immunizations Administered for This Admission Name Date Influenza Vaccine (Quad) PF  Deferred () Recent Documentation Height  
  
  
  
  
  
 1.829 m Unresulted Labs Order Current Status BLOOD GAS, ARTERIAL In process URINALYSIS W/MICROSCOPIC Preliminary result Emergency Contacts Name Discharge Info Relation Home Work Mobile Apple Schaefer  Other Relative [6] [phone] About your hospitalization You were admitted on:  February 2, 2017 You last received care in the:  [facility] 2 PROGRESSIVE CARE You were discharged on:  February 27, 2017 Unit phone number:  [phone] Why you were hospitalized Your primary diagnosis was:  Not on File Your diagnoses also included: Aortic Dissection (Hcc), S/P Ascending Aortic Replacement Providers Seen During Your Hospitalizations Provider Role Specialty Primary office phone Harsha Bravo. Darren Hdz MD Attending Provider Emergency Medicine [phone] Dennis Lea MD Attending Provider Cardiothoracic Surgery [phone] Your Primary Care Physician (PCP) Primary Care Physician Office Phone Office Fax Dirk Mclaughlinmelvin [phone] Follow-up Information Follow up With Details Comments Contact Info Andrea Grace MD  please call to schedule follow up appointment after discharge from [address] [place] [address] 
[phone] Current Discharge Medication List  
  
START taking these medications Dose & Instructions Dispensing Information Comments Morning Noon Evening Bedtime  
 amLODIPine 10 mg tablet Commonly known as:  Mills Del Your next dose is: Today, Tomorrow Other:  _________ Dose:  10 mg Take 1 Tab by mouth daily for 60 days. Quantity:  30 Tab Refills:  1  
     
   
   
   
  
 aspirin 81 mg chewable tablet Your next dose is: Today, Tomorrow Other:  _________ Dose:  81 mg Take 1 Tab by mouth daily. Quantity:  365 Tab Refills:  0  
     
   
   
   
  
 atorvastatin 20 mg tablet Commonly known as:  LIPITOR Your next dose is: Today, Tomorrow Other:  _________ Dose:  20 mg Take 1 Tab by mouth every evening. Quantity:  30 Tab Refills:  1  
     
   
   
   
  
 bumetanide 1 mg tablet Commonly known as:  Cosme Lm Your next dose is: Today, Tomorrow Other:  _________ Dose:  3 mg Take 3 Tabs by mouth two (2) times a day for 30 days. Quantity:  90 Tab Refills:  0  
     
   
   
   
  
 cloNIDine 0.3 mg/24 hr  
Commonly known as:  CATAPRES Your next dose is: Today, Tomorrow Other:  _________ Dose:  1 Patch 1 Patch by TransDERmal route every seven (7) days. Quantity:  4 Patch Refills:  1  
     
   
   
   
  
 hydrALAZINE 100 mg tablet Commonly known as:  APRESOLINE Your next dose is: Today, Tomorrow Other:  _________ Dose:  100 mg Take 1 Tab by mouth every six (6) hours. Quantity:  120 Tab Refills:  1  
     
   
   
   
  
 linagliptin 5 mg tablet Commonly known as:  Greenwood Talat Your next dose is: Today, Tomorrow Other:  _________ Dose:  5 mg Take 1 Tab by mouth Daily (before breakfast). Quantity:  30 Tab Refills:  1  
     
   
   
   
  
 metoprolol tartrate 50 mg tablet Commonly known as:  LOPRESSOR Your next dose is: Today, Tomorrow Other:  _________ Dose:  50 mg Take 1 Tab by mouth every twelve (12) hours. Quantity:  60 Tab Refills:  1  
     
   
   
   
  
 minoxidil 10 mg tablet Commonly known as:  Carmen Sanchez Your next dose is: Today, Tomorrow Other:  _________ Dose:  10 mg Take 1 Tab by mouth daily. Quantity:  30 Tab Refills:  1  
     
   
   
   
  
 oxyCODONE-acetaminophen 5-325 mg per tablet Commonly known as:  PERCOCET Your next dose is: Today, Tomorrow Other:  _________ Dose:  1 Tab Take 1 Tab by mouth every four (4) hours as needed. Max Daily Amount: 6 Tabs. Quantity:  60 Tab Refills:  0  
     
   
   
   
  
 pantoprazole 40 mg tablet Commonly known as:  PROTONIX Your next dose is: Today, Tomorrow Other:  _________ Dose:  40 mg Take 1 Tab by mouth daily for 60 days. Quantity:  30 Tab Refills:  1  
     
   
   
   
  
 potassium chloride 20 mEq tablet Commonly known as:  K-DUR, KLOR-CON Your next dose is: Today, Tomorrow Other:  _________ Dose:  40 mEq Take 2 Tabs by mouth three (3) times daily. Quantity:  90 Tab Refills:  0 Where to Get Your Medications Information on where to get these meds will be given to you by the nurse or doctor. ! Ask your nurse or doctor about these medications  
  amLODIPine 10 mg tablet  
 aspirin 81 mg chewable tablet  
 atorvastatin 20 mg tablet  
 bumetanide 1 mg tablet  
 cloNIDine 0.3 mg/24 hr  
 hydrALAZINE 100 mg tablet  
 linagliptin 5 mg tablet  
 metoprolol tartrate 50 mg tablet  
 minoxidil 10 mg tablet  
 oxyCODONE-acetaminophen 5-325 mg per tablet  
 pantoprazole 40 mg tablet  
 potassium chloride 20 mEq tablet Discharge Instructions Cardiac Surgery Specialist 
 
[address] [place] [address] [address] [facility] Office- [phone]  Fax- [phone]       Office- [phone]  Fax- [phone] 
_____________________________________________________________ [facility]   Bri Regaladoman CSA         BJ Homero Diaz PA-C Name:Luis Alberto Rowley Active Problems: Aortic dissection ([address] (2/2/2017) S/P ascending aortic replacement (2/3/2017) Overview: EMERGENT ASCENDING AORTIC DISSECTION REPAIR Right Femoral Artery Cannulation Discharge Date: 2/27/2017 Discharge to: [facility] INSTRUCTIONS: 
NO SMOKING OR TOBACCO PRODUCTS Follow all the instructions in your discharge book You may shower. Wash all incisions twice daily with mild soap and water. No lotions, ointments or powder. Call the office immediately for any redness, swelling, or drainage from your incision. Take your temperature daily and call for a temperature of 101 degrees or higher or for any symptoms that make you think you have and infection. Weigh yourself each morning. Call if you gain more than 5 pounds in 48 hours. Use the incentive spirometer 6-8 times a day-10 breaths each time. Use a pillow or your bear to splint your breastbone when coughing or sneezing. If you feel your breast bone clicking or popping, notify the office immediately. Walk several hundred feet several times daily. DIET 
2000 Kcal ADA diet. Check your blood sugars  And keep a log of your results. ACTIVITY 
NO DRIVINGyou will be evaluated to drive at your follow up visit. Increase your activity by walking several times a day. Stay out of bed most of the day. When sitting, keep your legs elevated. You may ride in a car, but you must get out every hour and walk around.   If you ride in a car with an airbag that can not be switched off, put the seat ALL the way back or ride in the back seat. NO LIFTING MORE THAN 5 POUND FOR 1 MONTH, THEN YOU CAN INCREASE TO NO MORE THAN 10 LBS FOR THE NEXT 2 MONTHS. AFTER 3 MONTHS, YOU WILL NO LONGER HAVE ANY LIFTING RESTRICTIONS. FOLLOW UP 
 
       4. Please call our office at [phone] appointment when discharged from [facility]. 5.  Our  will make an appointment with your cardiologist for you after your one month appointment with your surgeon. 6.  Consult you primary care physician regarding your influenza &  
pneumovax vaccines. 7.   PLEASE bring your medication bottles to each appointment. 8. Please call Cardiac Rehab at [phone] if you do not already have an appointment. 9. Please sign up for My Chart. CALL [phone] FOR ANY QUESTIONS OR PROBLEMS. Signature:___________________________________________________ Discharge Orders None [employer] Announcement We are excited to announce that we are making your provider's discharge notes available to you in [employer]. You will see these notes when they are completed and signed by the physician that discharged you from your recent hospital stay. If you have any questions or concerns about any information you see in [employer], please call the Health Information Department where you were seen or reach out to your Primary Care Provider for more information about your plan of care. Introducing [facility] SERVICES! Lizzie Blackwell introduces [employer] patient portal. Now you can access parts of your medical record, email your doctor's office, and request medication refills online. 1. In your internet browser, go to [email or web address] 2. Click on the First Time User? Click Here link in the Sign In box. You will see the New Member Sign Up page. 3. Enter your [employer] Access Code exactly as it appears below.  You will not need to use this code after youve completed the sign-up process. If you do not sign up before the expiration date, you must request a new code. · [employer] Access Code: [email or web address] Expires: 5/3/2017 11:09 AM 
 
4. Enter the last four digits of your Social Security Number (xxxx) and Date of Birth (mm/dd/yyyy) as indicated and click Submit. You will be taken to the next sign-up page. 5. Create a [employer] ID. This will be your [employer] login ID and cannot be changed, so think of one that is secure and easy to remember. 6. Create a [employer] password. You can change your password at any time. 7. Enter your Password Reset Question and Answer. This can be used at a later time if you forget your password. 8. Enter your e-mail address. You will receive e-mail notification when new information is available in [address] 9. Click Sign Up. You can now view and download portions of your medical record. 10. Click the Download Summary menu link to download a portable copy of your medical information. If you have questions, please visit the Frequently Asked Questions section of the [employer] website. Remember, [employer] is NOT to be used for urgent needs. For medical emergencies, dial 911. Now available from your iPhone and Android! General Information Please provide this summary of care documentation to your next provider. Patient Signature:  ____________________________________________________________ Date:  ____________________________________________________________  
  
[facility] Provider Signature:  ____________________________________________________________ Date:  ____________________________________________________________

## 2017-02-02 NOTE — ED NOTES
Patient resting comfortably, side rails up, call bell w/in reach, no further needs expressed at this time, aware of POC. Family at the bedside.

## 2017-02-02 NOTE — ED NOTES
Large amount of projectile vomiting noted. MD aware and to bedside. Pt assisted to clean gown and linens. Housekeeping in to clean floor. Pt reports pressure in abd feels better, now rated 3/10.

## 2017-02-02 NOTE — ED NOTES
Pt removed clothing and it is at the bedside, tech is placing an AC line at this time, primary RN aware.

## 2017-02-02 NOTE — ED NOTES
Bedside shift change report given to [address] (oncoming nurse) by Liam Dumont (offgoing nurse). Report included the following information ED Summary and MAR.

## 2017-02-03 ENCOUNTER — APPOINTMENT (OUTPATIENT)
Dept: GENERAL RADIOLOGY | Age: 52
DRG: 219 | End: 2017-02-03
Attending: PHYSICIAN ASSISTANT
Payer: COMMERCIAL

## 2017-02-03 PROBLEM — Z95.828 S/P ASCENDING AORTIC REPLACEMENT: Status: ACTIVE | Noted: 2017-02-03

## 2017-02-03 LAB
ALBUMIN SERPL BCP-MCNC: 3.1 G/DL (ref 3.5–5)
ALBUMIN SERPL BCP-MCNC: 3.4 G/DL (ref 3.5–5)
ALBUMIN/GLOB SERPL: 1.3 {RATIO} (ref 1.1–2.2)
ALBUMIN/GLOB SERPL: 1.3 {RATIO} (ref 1.1–2.2)
ALP SERPL-CCNC: 50 U/L (ref 45–117)
ALP SERPL-CCNC: 58 U/L (ref 45–117)
ALT SERPL-CCNC: 35 U/L (ref 12–78)
ALT SERPL-CCNC: 45 U/L (ref 12–78)
ANION GAP BLD CALC-SCNC: 14 MMOL/L (ref 5–15)
ANION GAP BLD CALC-SCNC: 15 MMOL/L (ref 5–15)
APTT PPP: 27.4 SEC (ref 22.1–32.5)
APTT PPP: 32.7 SEC (ref 22.1–32.5)
ARTERIAL PATENCY WRIST A: ABNORMAL
AST SERPL W P-5'-P-CCNC: 57 U/L (ref 15–37)
AST SERPL W P-5'-P-CCNC: 87 U/L (ref 15–37)
BASE DEFICIT BLDA-SCNC: 2.5 MMOL/L
BASE DEFICIT BLDV-SCNC: 5 MMOL/L
BASOPHILS # BLD AUTO: 0 K/UL (ref 0–0.1)
BASOPHILS # BLD AUTO: 0 K/UL (ref 0–0.1)
BASOPHILS # BLD: 0 % (ref 0–1)
BASOPHILS # BLD: 0 % (ref 0–1)
BDY SITE: ABNORMAL
BDY SITE: ABNORMAL
BILIRUB SERPL-MCNC: 0.4 MG/DL (ref 0.2–1)
BILIRUB SERPL-MCNC: 0.7 MG/DL (ref 0.2–1)
BLD PROD TYP BPU: NORMAL
BPU ID: NORMAL
BUN SERPL-MCNC: 22 MG/DL (ref 6–20)
BUN SERPL-MCNC: 26 MG/DL (ref 6–20)
BUN/CREAT SERPL: 13 (ref 12–20)
BUN/CREAT SERPL: 15 (ref 12–20)
CALCIUM SERPL-MCNC: 8.2 MG/DL (ref 8.5–10.1)
CALCIUM SERPL-MCNC: 8.2 MG/DL (ref 8.5–10.1)
CHLORIDE SERPL-SCNC: 105 MMOL/L (ref 97–108)
CHLORIDE SERPL-SCNC: 108 MMOL/L (ref 97–108)
CO2 SERPL-SCNC: 23 MMOL/L (ref 21–32)
CO2 SERPL-SCNC: 24 MMOL/L (ref 21–32)
CREAT SERPL-MCNC: 1.67 MG/DL (ref 0.7–1.3)
CREAT SERPL-MCNC: 1.79 MG/DL (ref 0.7–1.3)
EOSINOPHIL # BLD: 0 K/UL (ref 0–0.4)
EOSINOPHIL # BLD: 0 K/UL (ref 0–0.4)
EOSINOPHIL NFR BLD: 0 % (ref 0–7)
EOSINOPHIL NFR BLD: 0 % (ref 0–7)
EPAP/CPAP/PEEP, PAPEEP: 5
EPAP/CPAP/PEEP, PAPEEP: 5
ERYTHROCYTE [DISTWIDTH] IN BLOOD BY AUTOMATED COUNT: 14.6 % (ref 11.5–14.5)
ERYTHROCYTE [DISTWIDTH] IN BLOOD BY AUTOMATED COUNT: 14.6 % (ref 11.5–14.5)
FIO2 ON VENT: 50 %
FIO2 ON VENT: 80 %
GAS FLOW.O2 SETTING OXYMISER: 14 L/MIN
GLOBULIN SER CALC-MCNC: 2.3 G/DL (ref 2–4)
GLOBULIN SER CALC-MCNC: 2.7 G/DL (ref 2–4)
GLUCOSE BLD STRIP.AUTO-MCNC: 106 MG/DL (ref 65–100)
GLUCOSE BLD STRIP.AUTO-MCNC: 109 MG/DL (ref 65–100)
GLUCOSE BLD STRIP.AUTO-MCNC: 109 MG/DL (ref 65–100)
GLUCOSE BLD STRIP.AUTO-MCNC: 111 MG/DL (ref 65–100)
GLUCOSE BLD STRIP.AUTO-MCNC: 111 MG/DL (ref 65–100)
GLUCOSE BLD STRIP.AUTO-MCNC: 124 MG/DL (ref 65–100)
GLUCOSE BLD STRIP.AUTO-MCNC: 125 MG/DL (ref 65–100)
GLUCOSE BLD STRIP.AUTO-MCNC: 131 MG/DL (ref 65–100)
GLUCOSE BLD STRIP.AUTO-MCNC: 131 MG/DL (ref 65–100)
GLUCOSE BLD STRIP.AUTO-MCNC: 136 MG/DL (ref 65–100)
GLUCOSE BLD STRIP.AUTO-MCNC: 140 MG/DL (ref 65–100)
GLUCOSE BLD STRIP.AUTO-MCNC: 141 MG/DL (ref 65–100)
GLUCOSE BLD STRIP.AUTO-MCNC: 142 MG/DL (ref 65–100)
GLUCOSE BLD STRIP.AUTO-MCNC: 153 MG/DL (ref 65–100)
GLUCOSE BLD STRIP.AUTO-MCNC: 165 MG/DL (ref 65–100)
GLUCOSE BLD STRIP.AUTO-MCNC: 214 MG/DL (ref 65–100)
GLUCOSE BLD STRIP.AUTO-MCNC: 217 MG/DL (ref 65–100)
GLUCOSE BLD STRIP.AUTO-MCNC: 252 MG/DL (ref 65–100)
GLUCOSE BLD STRIP.AUTO-MCNC: 260 MG/DL (ref 65–100)
GLUCOSE BLD STRIP.AUTO-MCNC: 97 MG/DL (ref 65–100)
GLUCOSE BLD STRIP.AUTO-MCNC: 99 MG/DL (ref 65–100)
GLUCOSE SERPL-MCNC: 181 MG/DL (ref 65–100)
GLUCOSE SERPL-MCNC: 210 MG/DL (ref 65–100)
HCO3 BLDA-SCNC: 23 MMOL/L (ref 22–26)
HCO3 BLDV-SCNC: 22 MMOL/L (ref 23–28)
HCT VFR BLD AUTO: 31.2 % (ref 36.6–50.3)
HCT VFR BLD AUTO: 33.4 % (ref 36.6–50.3)
HCT VFR BLD AUTO: 34.4 % (ref 36.6–50.3)
HGB BLD-MCNC: 10.6 G/DL (ref 12.1–17)
HGB BLD-MCNC: 11.1 G/DL (ref 12.1–17)
HGB BLD-MCNC: 11.5 G/DL (ref 12.1–17)
LYMPHOCYTES # BLD AUTO: 6 % (ref 12–49)
LYMPHOCYTES # BLD AUTO: 8 % (ref 12–49)
LYMPHOCYTES # BLD: 1 K/UL (ref 0.8–3.5)
LYMPHOCYTES # BLD: 1.6 K/UL (ref 0.8–3.5)
MAGNESIUM SERPL-MCNC: 2.3 MG/DL (ref 1.6–2.4)
MCH RBC QN AUTO: 28.3 PG (ref 26–34)
MCH RBC QN AUTO: 28.6 PG (ref 26–34)
MCHC RBC AUTO-ENTMCNC: 33.2 G/DL (ref 30–36.5)
MCHC RBC AUTO-ENTMCNC: 33.4 G/DL (ref 30–36.5)
MCV RBC AUTO: 84.5 FL (ref 80–99)
MCV RBC AUTO: 86.1 FL (ref 80–99)
MONOCYTES # BLD: 0.9 K/UL (ref 0–1)
MONOCYTES # BLD: 1.2 K/UL (ref 0–1)
MONOCYTES NFR BLD AUTO: 5 % (ref 5–13)
MONOCYTES NFR BLD AUTO: 6 % (ref 5–13)
NEUTS SEG # BLD: 15.6 K/UL (ref 1.8–8)
NEUTS SEG # BLD: 17.5 K/UL (ref 1.8–8)
NEUTS SEG NFR BLD AUTO: 86 % (ref 32–75)
NEUTS SEG NFR BLD AUTO: 89 % (ref 32–75)
PCO2 BLDA: 43 MMHG (ref 35–45)
PCO2 BLDV: 45 MMHG (ref 41–51)
PH BLDA: 7.35 [PH] (ref 7.35–7.45)
PH BLDV: 7.3 [PH] (ref 7.32–7.42)
PHOSPHATE SERPL-MCNC: 1.9 MG/DL (ref 2.6–4.7)
PLATELET # BLD AUTO: 169 K/UL (ref 150–400)
PLATELET # BLD AUTO: 185 K/UL (ref 150–400)
PO2 BLDA: 66 MMHG (ref 80–100)
PO2 BLDV: 83 MMHG (ref 25–40)
POTASSIUM SERPL-SCNC: 4.1 MMOL/L (ref 3.5–5.1)
POTASSIUM SERPL-SCNC: 4.5 MMOL/L (ref 3.5–5.1)
PRESSURE SUPPORT SETTING VENT: 5 CM[H2O]
PROT SERPL-MCNC: 5.4 G/DL (ref 6.4–8.2)
PROT SERPL-MCNC: 6.1 G/DL (ref 6.4–8.2)
RBC # BLD AUTO: 3.88 M/UL (ref 4.1–5.7)
RBC # BLD AUTO: 4.07 M/UL (ref 4.1–5.7)
SAO2 % BLD: 92 % (ref 92–97)
SAO2 % BLDV: 95 % (ref 65–88)
SAO2% DEVICE SAO2% SENSOR NAME: ABNORMAL
SAO2% DEVICE SAO2% SENSOR NAME: ABNORMAL
SERVICE CMNT-IMP: ABNORMAL
SERVICE CMNT-IMP: NORMAL
SERVICE CMNT-IMP: NORMAL
SODIUM SERPL-SCNC: 144 MMOL/L (ref 136–145)
SODIUM SERPL-SCNC: 145 MMOL/L (ref 136–145)
SPECIMEN SITE: ABNORMAL
SPECIMEN SITE: ABNORMAL
STATUS OF UNIT,%ST: NORMAL
THERAPEUTIC RANGE,PTTT: ABNORMAL SECS (ref 58–77)
THERAPEUTIC RANGE,PTTT: NORMAL SECS (ref 58–77)
UNIT DIVISION, %UDIV: 0
VENTILATION MODE VENT: ABNORMAL
VENTILATION MODE VENT: ABNORMAL
VT SETTING VENT: 650 ML
WBC # BLD AUTO: 17.5 K/UL (ref 4.1–11.1)
WBC # BLD AUTO: 20.3 K/UL (ref 4.1–11.1)

## 2017-02-03 PROCEDURE — G8987 SELF CARE CURRENT STATUS: HCPCS

## 2017-02-03 PROCEDURE — 74011000258 HC RX REV CODE- 258: Performed by: PHYSICIAN ASSISTANT

## 2017-02-03 PROCEDURE — 97165 OT EVAL LOW COMPLEX 30 MIN: CPT

## 2017-02-03 PROCEDURE — 74011000258 HC RX REV CODE- 258

## 2017-02-03 PROCEDURE — 74011000250 HC RX REV CODE- 250: Performed by: PHYSICIAN ASSISTANT

## 2017-02-03 PROCEDURE — 74011250637 HC RX REV CODE- 250/637: Performed by: PHYSICIAN ASSISTANT

## 2017-02-03 PROCEDURE — 82803 BLOOD GASES ANY COMBINATION: CPT | Performed by: PHYSICIAN ASSISTANT

## 2017-02-03 PROCEDURE — G8988 SELF CARE GOAL STATUS: HCPCS

## 2017-02-03 PROCEDURE — 74011000258 HC RX REV CODE- 258: Performed by: THORACIC SURGERY (CARDIOTHORACIC VASCULAR SURGERY)

## 2017-02-03 PROCEDURE — 71010 XR CHEST PORT: CPT

## 2017-02-03 PROCEDURE — 83735 ASSAY OF MAGNESIUM: CPT | Performed by: THORACIC SURGERY (CARDIOTHORACIC VASCULAR SURGERY)

## 2017-02-03 PROCEDURE — 74011250636 HC RX REV CODE- 250/636: Performed by: PHYSICIAN ASSISTANT

## 2017-02-03 PROCEDURE — 85025 COMPLETE CBC W/AUTO DIFF WBC: CPT | Performed by: PHYSICIAN ASSISTANT

## 2017-02-03 PROCEDURE — G8978 MOBILITY CURRENT STATUS: HCPCS

## 2017-02-03 PROCEDURE — 84100 ASSAY OF PHOSPHORUS: CPT | Performed by: THORACIC SURGERY (CARDIOTHORACIC VASCULAR SURGERY)

## 2017-02-03 PROCEDURE — 74011250636 HC RX REV CODE- 250/636: Performed by: THORACIC SURGERY (CARDIOTHORACIC VASCULAR SURGERY)

## 2017-02-03 PROCEDURE — 74011000250 HC RX REV CODE- 250: Performed by: THORACIC SURGERY (CARDIOTHORACIC VASCULAR SURGERY)

## 2017-02-03 PROCEDURE — 74011636637 HC RX REV CODE- 636/637

## 2017-02-03 PROCEDURE — 74011636637 HC RX REV CODE- 636/637: Performed by: PHYSICIAN ASSISTANT

## 2017-02-03 PROCEDURE — 65620000000 HC RM CCU GENERAL

## 2017-02-03 PROCEDURE — 94002 VENT MGMT INPAT INIT DAY: CPT

## 2017-02-03 PROCEDURE — 80053 COMPREHEN METABOLIC PANEL: CPT | Performed by: THORACIC SURGERY (CARDIOTHORACIC VASCULAR SURGERY)

## 2017-02-03 PROCEDURE — 82962 GLUCOSE BLOOD TEST: CPT

## 2017-02-03 PROCEDURE — 74011000272 HC RX REV CODE- 272: Performed by: THORACIC SURGERY (CARDIOTHORACIC VASCULAR SURGERY)

## 2017-02-03 PROCEDURE — 74011000250 HC RX REV CODE- 250

## 2017-02-03 PROCEDURE — 74011250636 HC RX REV CODE- 250/636

## 2017-02-03 PROCEDURE — 97161 PT EVAL LOW COMPLEX 20 MIN: CPT

## 2017-02-03 PROCEDURE — 85730 THROMBOPLASTIN TIME PARTIAL: CPT | Performed by: PHYSICIAN ASSISTANT

## 2017-02-03 PROCEDURE — G8979 MOBILITY GOAL STATUS: HCPCS

## 2017-02-03 PROCEDURE — 36415 COLL VENOUS BLD VENIPUNCTURE: CPT | Performed by: PHYSICIAN ASSISTANT

## 2017-02-03 PROCEDURE — 85018 HEMOGLOBIN: CPT | Performed by: INTERNAL MEDICINE

## 2017-02-03 RX ORDER — SODIUM CHLORIDE 0.9 % (FLUSH) 0.9 %
5-10 SYRINGE (ML) INJECTION AS NEEDED
Status: DISCONTINUED | OUTPATIENT
Start: 2017-02-03 | End: 2017-02-05

## 2017-02-03 RX ORDER — MAGNESIUM SULFATE 100 %
4 CRYSTALS MISCELLANEOUS AS NEEDED
Status: DISCONTINUED | OUTPATIENT
Start: 2017-02-03 | End: 2017-02-27 | Stop reason: HOSPADM

## 2017-02-03 RX ORDER — INSULIN LISPRO 100 [IU]/ML
INJECTION, SOLUTION INTRAVENOUS; SUBCUTANEOUS
Status: DISCONTINUED | OUTPATIENT
Start: 2017-02-03 | End: 2017-02-27 | Stop reason: HOSPADM

## 2017-02-03 RX ORDER — GUAIFENESIN 100 MG/5ML
81 LIQUID (ML) ORAL DAILY
Status: DISCONTINUED | OUTPATIENT
Start: 2017-02-03 | End: 2017-02-27 | Stop reason: HOSPADM

## 2017-02-03 RX ORDER — OXYCODONE AND ACETAMINOPHEN 5; 325 MG/1; MG/1
2 TABLET ORAL
Status: DISCONTINUED | OUTPATIENT
Start: 2017-02-03 | End: 2017-02-11 | Stop reason: SDUPTHER

## 2017-02-03 RX ORDER — CALCIUM CHLORIDE INJECTION 100 MG/ML
INJECTION, SOLUTION INTRAVENOUS AS NEEDED
Status: DISCONTINUED | OUTPATIENT
Start: 2017-02-03 | End: 2017-02-03 | Stop reason: HOSPADM

## 2017-02-03 RX ORDER — CHLORHEXIDINE GLUCONATE 1.2 MG/ML
15 RINSE ORAL EVERY 12 HOURS
Status: DISCONTINUED | OUTPATIENT
Start: 2017-02-03 | End: 2017-02-09

## 2017-02-03 RX ORDER — MUPIROCIN 20 MG/G
OINTMENT TOPICAL 2 TIMES DAILY
Status: COMPLETED | OUTPATIENT
Start: 2017-02-03 | End: 2017-02-07

## 2017-02-03 RX ORDER — FUROSEMIDE 40 MG/1
40 TABLET ORAL DAILY
Status: CANCELLED | OUTPATIENT
Start: 2017-02-04

## 2017-02-03 RX ORDER — MIDAZOLAM HYDROCHLORIDE 1 MG/ML
1 INJECTION, SOLUTION INTRAMUSCULAR; INTRAVENOUS
Status: DISCONTINUED | OUTPATIENT
Start: 2017-02-03 | End: 2017-02-05

## 2017-02-03 RX ORDER — DEXTROSE 50 % IN WATER (D50W) INTRAVENOUS SYRINGE
12.5-25 AS NEEDED
Status: DISCONTINUED | OUTPATIENT
Start: 2017-02-03 | End: 2017-02-10

## 2017-02-03 RX ORDER — SODIUM CHLORIDE 0.9 % (FLUSH) 0.9 %
5-10 SYRINGE (ML) INJECTION EVERY 8 HOURS
Status: DISCONTINUED | OUTPATIENT
Start: 2017-02-03 | End: 2017-02-10

## 2017-02-03 RX ORDER — METOPROLOL TARTRATE 25 MG/1
12.5 TABLET, FILM COATED ORAL EVERY 12 HOURS
Status: CANCELLED | OUTPATIENT
Start: 2017-02-03

## 2017-02-03 RX ORDER — ACETAMINOPHEN 325 MG/1
650 TABLET ORAL
Status: DISCONTINUED | OUTPATIENT
Start: 2017-02-03 | End: 2017-02-10

## 2017-02-03 RX ORDER — NICARDIPINE HYDROCHLORIDE 0.2 MG/ML
INJECTION INTRAVENOUS
Status: DISCONTINUED | OUTPATIENT
Start: 2017-02-03 | End: 2017-02-03 | Stop reason: HOSPADM

## 2017-02-03 RX ORDER — MORPHINE SULFATE 2 MG/ML
2 INJECTION, SOLUTION INTRAMUSCULAR; INTRAVENOUS
Status: DISCONTINUED | OUTPATIENT
Start: 2017-02-03 | End: 2017-02-10

## 2017-02-03 RX ORDER — AMIODARONE HYDROCHLORIDE 200 MG/1
400 TABLET ORAL EVERY 12 HOURS
Status: DISCONTINUED | OUTPATIENT
Start: 2017-02-04 | End: 2017-02-10

## 2017-02-03 RX ORDER — SODIUM CHLORIDE 450 MG/100ML
10 INJECTION, SOLUTION INTRAVENOUS CONTINUOUS
Status: DISCONTINUED | OUTPATIENT
Start: 2017-02-03 | End: 2017-02-05

## 2017-02-03 RX ORDER — SODIUM CHLORIDE 9 MG/ML
100 INJECTION, SOLUTION INTRAVENOUS CONTINUOUS
Status: DISCONTINUED | OUTPATIENT
Start: 2017-02-03 | End: 2017-02-05

## 2017-02-03 RX ORDER — POTASSIUM CHLORIDE 750 MG/1
20 TABLET, FILM COATED, EXTENDED RELEASE ORAL DAILY
Status: CANCELLED | OUTPATIENT
Start: 2017-02-04

## 2017-02-03 RX ORDER — ALBUMIN HUMAN 50 G/1000ML
12.5 SOLUTION INTRAVENOUS
Status: DISCONTINUED | OUTPATIENT
Start: 2017-02-03 | End: 2017-02-10

## 2017-02-03 RX ORDER — ALBUTEROL SULFATE 0.83 MG/ML
1.25-2.5 SOLUTION RESPIRATORY (INHALATION)
Status: DISCONTINUED | OUTPATIENT
Start: 2017-02-03 | End: 2017-02-27 | Stop reason: HOSPADM

## 2017-02-03 RX ORDER — INSULIN LISPRO 100 [IU]/ML
INJECTION, SOLUTION INTRAVENOUS; SUBCUTANEOUS
Status: DISCONTINUED | OUTPATIENT
Start: 2017-02-03 | End: 2017-02-06

## 2017-02-03 RX ADMIN — CHLORHEXIDINE GLUCONATE 15 ML: 1.2 RINSE ORAL at 04:25

## 2017-02-03 RX ADMIN — OXYCODONE HYDROCHLORIDE AND ACETAMINOPHEN 2 TABLET: 5; 325 TABLET ORAL at 23:08

## 2017-02-03 RX ADMIN — Medication 10 ML: at 23:08

## 2017-02-03 RX ADMIN — SODIUM CHLORIDE 8.3 UNITS/HR: 900 INJECTION, SOLUTION INTRAVENOUS at 21:15

## 2017-02-03 RX ADMIN — SODIUM CHLORIDE 10 ML: 450 INJECTION, SOLUTION INTRAVENOUS at 01:40

## 2017-02-03 RX ADMIN — AMIODARONE HYDROCHLORIDE 0.5 MG/MIN: 50 INJECTION, SOLUTION INTRAVENOUS at 20:51

## 2017-02-03 RX ADMIN — ASPIRIN 81 MG 81 MG: 81 TABLET ORAL at 08:00

## 2017-02-03 RX ADMIN — OXYCODONE HYDROCHLORIDE AND ACETAMINOPHEN 2 TABLET: 5; 325 TABLET ORAL at 07:47

## 2017-02-03 RX ADMIN — MIDAZOLAM HYDROCHLORIDE 1 MG: 1 INJECTION, SOLUTION INTRAMUSCULAR; INTRAVENOUS at 01:40

## 2017-02-03 RX ADMIN — CEFAZOLIN 3 G: 1 INJECTION, POWDER, FOR SOLUTION INTRAMUSCULAR; INTRAVENOUS; PARENTERAL at 00:00

## 2017-02-03 RX ADMIN — SODIUM CHLORIDE 5.9 UNITS/HR: 900 INJECTION, SOLUTION INTRAVENOUS at 14:13

## 2017-02-03 RX ADMIN — NICARDIPINE HYDROCHLORIDE 2.5 MG/HR: 0.2 INJECTION INTRAVENOUS at 00:14

## 2017-02-03 RX ADMIN — CEFAZOLIN 3 G: 1 INJECTION, POWDER, FOR SOLUTION INTRAMUSCULAR; INTRAVENOUS; PARENTERAL at 14:53

## 2017-02-03 RX ADMIN — CEFAZOLIN 3 G: 1 INJECTION, POWDER, FOR SOLUTION INTRAMUSCULAR; INTRAVENOUS; PARENTERAL at 07:34

## 2017-02-03 RX ADMIN — Medication 2 MG: at 01:41

## 2017-02-03 RX ADMIN — OXYCODONE HYDROCHLORIDE AND ACETAMINOPHEN 2 TABLET: 5; 325 TABLET ORAL at 14:07

## 2017-02-03 RX ADMIN — OXYCODONE HYDROCHLORIDE AND ACETAMINOPHEN 2 TABLET: 5; 325 TABLET ORAL at 19:11

## 2017-02-03 RX ADMIN — SODIUM CHLORIDE 7.8 UNITS/HR: 900 INJECTION, SOLUTION INTRAVENOUS at 08:35

## 2017-02-03 RX ADMIN — Medication 10 MG/HR: at 02:46

## 2017-02-03 RX ADMIN — Medication 10 ML: at 16:15

## 2017-02-03 RX ADMIN — SODIUM CHLORIDE 100 ML/HR: 900 INJECTION, SOLUTION INTRAVENOUS at 11:00

## 2017-02-03 RX ADMIN — MUPIROCIN: 20 OINTMENT TOPICAL at 09:05

## 2017-02-03 RX ADMIN — Medication 2 MG: at 10:02

## 2017-02-03 RX ADMIN — Medication 10 ML: at 06:27

## 2017-02-03 RX ADMIN — Medication 7.5 MG/HR: at 21:16

## 2017-02-03 RX ADMIN — SODIUM CHLORIDE 10.9 UNITS/HR: 900 INJECTION, SOLUTION INTRAVENOUS at 19:01

## 2017-02-03 RX ADMIN — CALCIUM CHLORIDE INJECTION 500 MG: 100 INJECTION, SOLUTION INTRAVENOUS at 00:00

## 2017-02-03 RX ADMIN — CEFAZOLIN 3 G: 1 INJECTION, POWDER, FOR SOLUTION INTRAMUSCULAR; INTRAVENOUS; PARENTERAL at 20:35

## 2017-02-03 RX ADMIN — MUPIROCIN 0.25 G: 20 OINTMENT TOPICAL at 17:48

## 2017-02-03 RX ADMIN — CHLORHEXIDINE GLUCONATE 15 ML: 1.2 RINSE ORAL at 14:19

## 2017-02-03 RX ADMIN — AMIODARONE HYDROCHLORIDE 0.5 MG/MIN: 50 INJECTION, SOLUTION INTRAVENOUS at 05:30

## 2017-02-03 RX ADMIN — SODIUM CHLORIDE 14.1 UNITS/HR: 900 INJECTION, SOLUTION INTRAVENOUS at 04:17

## 2017-02-03 RX ADMIN — Medication 1 PAD: at 00:00

## 2017-02-03 RX ADMIN — Medication 5 MG/HR: at 19:35

## 2017-02-03 RX ADMIN — SODIUM CHLORIDE 100 ML/HR: 900 INJECTION, SOLUTION INTRAVENOUS at 15:56

## 2017-02-03 NOTE — INTERDISCIPLINARY ROUNDS
Interdisciplinary team rounds were held 2/3/2017 with the following team members:Care Management, Diabetes Treatment Specialist, Nursing, Nutrition, Palliative Care, Physical Therapy, Physician and Respiratory Therapy. Plan of care discussed. See clinical pathway and/or care plan for interventions and desired outcomes.

## 2017-02-03 NOTE — PROGRESS NOTES
1600 remained in chair, pt doing incentive every 2 hrs/up to 1700 ml now, with multiple visitors in and out, want to be in chair till dinner  1700 pt used urinal and only void 50 ml seun, will bladder scan after tray  1830 pt stood up and able to urinate 200 ml of yellow urine  1900  Handoff report ,sbar, pt status ,bedside report given to Mars Barbara, pt medicated with 2 percocet with incisional pain and endorsed

## 2017-02-03 NOTE — PROGRESS NOTES
Pressure Ulcer Prevention In basket Alert Received for Zi < 14 (moderate risk).      Suggested Care Plan/Interventions for Nursing  1. Complete Zi Pressure Ulcer Risk Scale and use sub scores to identify appropriate interventions. 2. Perform Assessment: skin, changes in LOC, visual cues for pain, monitor skin under medical devices  3. Respond to Reduced Sensory Perception: changes in LOC, check visual cues for pain, float heels, suspension boots, pressure redistribution bed/mattress/chair cushion, turning and reposition approximately every 2 hours (pillows & wedges), pad between skin to skin, turn & reposition  4. Manage Moisture: absorbent under pads, internal / external urinary device, internal /  external fecal device, minimize layers, contain wound drainage, access need for specialty bed, limit adult briefs, maintain skin hydration (lotion/cream), moisture barrier, offer toileting every hour  5. Promote Activity: increase time out of bed, chair cushion, PT/OT evaluation, trapeze to reposition, pressure redistribution bed/mattress/chair  6. Address Reduced Mobility: float heels / suspension boot, HOB 30 degrees or less, pressure redistribution bed/mattress/cushion, PT / OT evaluation, turn and reposition approximately every 2 hours (pillows & wedges)  7. Promote Nutrition: document food / fluid / supplement intake, encourage/assist with meals as needed  8. Reduce Friction and Shear: transferring/repositioning devices (lift/draw sheet), lift team/ patient mobility team, feet elevated on foot rest, minimize layers, foam dressing / transparent film / skin sealants, protective barrier creams and emollients, transfer aides (board, Michael lift, ceiling lift, stand assist), HOB 30 degrees or less, trapeze to reposition.   Wound Care Team

## 2017-02-03 NOTE — PROGRESS NOTES
0720 recd bedside report from [facility].  Patient drowsy easily aroused with verbal stimuli, engaging with bedside reporting, 100 % APaced in monitor, R radial delisa with good waveform correlates with manual cuff, map 60 to 65 with systolic 409 to 037, pt complain of pain 6/10 medicated with 2 percocet, tolerated sips of water  0800 pt instructed to used IS, did 5 x up to 1250  1000 pt remain in bed more alert tolerated clear liquid, passing flatus, pain addressed  1200 hemodynamic stable, nsr 60s to 70s,dcd lines as charted, gardner dcd, monitor closely sites  1400 oob in chair by pt, tolerated, diet advanced to cardiac, lunch setup

## 2017-02-03 NOTE — OP NOTES
[address], [address]   OP NOTE       Name:  Renay Kerns   MR#:  [id number]   :  1965   Account #:  [de-identified]    Surgery Date:  2017   Date of Adm:  2017       PREOPERATIVE DIAGNOSIS: Acute Stokes A, DeBakey type 2   ascending aortic dissection extending down to the level of the   aortic bifurcation. POSTOPERATIVE DIAGNOSIS: Acute Tyrel A, DeBakey type 2   ascending aortic dissection extending down to the level of the   aortic bifurcation. PROCEDURES PERFORMED:     1. Emergent ascending aortic dissection repair. 2. Replacement of ascending aorta with a 28 mm Hemashield tube   graft (CPT CODE: 72128). SURGEON: Joselin Roy MD    ASSISTANT: Branden Lewis PA-C    ESTIMATED BLOOD LOSS: 200 mL. SPECIMENS REMOVED: Dissection. COMPLICATIONS: None. ANESTHESIA:  General endotracheal anesthesia. TOTAL CARDIOPULMONARY BYPASS TIME: 110 minutes. TOTAL CROSS-CLAMP TIME: 93 minutes. DESCRIPTION OF PROCEDURE: The patient crackles, very pleasant,   58-year-old gentleman who presented to the emergency room with   chest pain. He eventually at CT scan, which showed an acute Stokes   A, DeBakey type 2 ascending aortic dissection that extended all the   way down to the level of the aortic bifurcation. He was then brought to   the operating room for ascending aortic dissection repair. The patient   was brought to the operating room and had a right radial A line placed   without complication. Had a [place]-Anastasiya catheter placed without   complication. Underwent general endotracheal anesthesia without   complications. His chest, abdomen and lower extremities prepped and   draped in the usual sterile fashion. A midline incision made over the patient's sternum. Cautery   dissection was used to dissect down to the sternal bone.  The sternal   bone was opened with a sternal saw and the left pleural space was   entered. Next, we lifted up the pericardium, identified a very obvious   dissection. We did a right groin dissection, heparinized and cannulated   with a 21-Faroese peripheral arterial cannula and hooked up to   cardiopulmonary bypass circuit. I then placed a 2-stage venous   cannula, a retrograde cardioplegia catheter and an LV vent after we   went on bypass. After going on bypass, we placed a cross-clamp at   the most distal extent in the ascending aorta, brought the pump flow   down, placed a cross-clamp pump, brought the pump flow back up   again and gave 1000 mL of retrograde cardioplegia. The heart went   quiescent at approximately 400 mL. I then resected the dissected aorta, reconstructed the aortic valve   using glue and kiran, sized it to a 28 mm Hemashield graft using a 4-0   Prolene suture to tie this in, added glutaraldehyde glue similarly at the   distal anastomosis. I used felt and glue to reconstruct the aorta and   tied it using a 4-0 Prolene suture and again applied glue. I then brought   the head of bed down, brought the pump down, removed the cross-  clamp, brought the pump flow back up again. Once the temperature   reached 36.2, we successfully came off cardiopulmonary bypass,   placed A and V wires as well as Cyril drains. Wires were used to   close the sternum and Vicryl sutures used to close the subcutaneous   tissue. Overall, the patient tolerated the procedure well. The patient was   transported to the intensive care unit in stable condition. I was present   for the entire procedure.         MD MALCOM Senior / Maxi.Adela   D:  02/03/2017   14:46   T:  02/03/2017   15:16   Job #:  [id number]

## 2017-02-03 NOTE — DIABETES MGMT
DTC Cardiac Surgery Progress Note    Recommendations/ Comments:  Pt arrived to CCU at 0047 on 2/3/17. Consider continuing insulin gtt for at least 48hrs post-op and eating 50% solid foods then,  1) transition off gtt per [employer] Protocol   2) continue accu-checks and humalog correctional insulin ac & hs until pt has 3 consecutive BG <150mg/dl off gtt then discontinue  Insulin gtt should not be stopped until after 0047 on 2/5/17 to complete 48hr post-op time frame. Pt could receive transition as early as 2247 on 2/4/17 if all criteria met per protocol. However, although protocol states insulin gtt may be stopped 48hr post-op, may consider continuing insulin gtt until morning and not stopping overnight. Chart reviewed on [employer]. Patient is 45 y.o. male s/p Cardiac Surgery. POD 0. No known Hx Type 2 Diabetes per H&P. A1c:   No results found for: HBA1C, HGBE8, DMT6KCFK      Recent Glucose Results:   Lab Results   Component Value Date/Time     (H) 02/03/2017 04:39 AM     (H) 02/03/2017 01:01 AM    GLUCPOC 97 02/03/2017 12:59 PM    GLUCPOC 131 (H) 02/03/2017 11:53 AM    GLUCPOC 136 (H) 02/03/2017 10:48 AM        Lab Results   Component Value Date/Time    Creatinine 1.79 02/03/2017 04:39 AM       Active Orders   Diet    DIET CARDIAC Regular; 2 GM NA (House Low NA)        PO intake: No data found. Currently on insulin gtt. Will continue to follow as needed. Thank you.     Guerline Lopez, [facility]  Office: [phone]

## 2017-02-03 NOTE — BRIEF OP NOTE
BRIEF OP NOTE  Pre-Op Diagnosis: aneursym    Post-Op Diagnosis: ascending aortic dissection      Procedure: EMERGENT ASCENDING AORTIC DISSECTION REPAIR             Right Femoral Artery Cannulation    Surgeon: Glen Patino MD    Assistant(s): George Chavez PA-C    Anesthesia: General     Infusions: Amiodarone, precedex, insulin, cardene     Estimated Blood Loss: 1000cc    Cell Saver:  1125cc    Specimens:   ID Type Source Tests Collected by Time Destination   1 : aorta Preservative Aorta  Man Bustillo MD 2/2/2017 2223 Pathology       Drains and pacing wires: 2 atrial wires, 1 bipolar ventricular wire, 2 damion drains    Complications: none    Findings: See full operative note. Implants:   Implant Name Type Inv.  Item Serial No.  Lot No. LRB No. Used Action   GRAFT VASC PLAT [id number] -- [id number] - SN/A   GRAFT VASC PLAT [id number] -- [facility] N/A CARLOS PERKINS [employer] CARDIOVASCLR [id number] N/A 1 Implanted

## 2017-02-03 NOTE — PROGRESS NOTES
Problem: Self Care Deficits Care Plan (Adult)  Goal: *Acute Goals and Plan of Care (Insert Text)  Occupational Therapy Goals  Initiated 2/3/2017  1. Patient will perform ADLs standing 5 mins without fatigue or LOB with independence within 7 day(s). 2. Patient will perform lower body ADLs with independence within 7 day(s). 3. Patient will perform bathing with independence within 7 day(s). 4. Patient will perform toilet transfers with independence within 7 day(s). 5. Patient will perform all aspects of toileting with independence within 7 day(s). 6. Patient will participate in cardiac/sternal upper extremity therapeutic exercise/activities to increase independence with ADLs with independence for 5 minutes within 7 day(s). OCCUPATIONAL THERAPY EVALUATION  Patient: Katharine Babcock (42 y.o. male)  Date: 2/3/2017  Primary Diagnosis: aneursym  Aortic dissection (HCC)  Procedure(s) (LRB):  EMERGENT ASCENDING AORTIC DISSECTION REPAIR, WITH EXTRACORPOREAL CIRCULATION. CATRINA DONE BY DR Arlene Johnson (N/A) 1 Day Post-Op   Precautions:   Sternal      ASSESSMENT :  Based on the objective data described below, the patient presents with slightly impaired functional mobility due to lines/leaqs, decreased strength and decrease independence ADL s/p emergent aortic dissection repair. Pt educated on 3/3 sternal precautions and pt with excellent adherence throughout session. Pt was CGA for bed mobility and transfer to chair, verbal cues for safety with stand to sit. Pt is limited by lines/leads but expect him to progress quickly. Likely will only need OP cardiac rehab at discharge. All VSS post activity with pt weaned to room air. Patient will benefit from skilled intervention to address the above impairments.   Patients rehabilitation potential is considered to be Good  Factors which may influence rehabilitation potential include:   [X]             None noted  [ ]             Mental ability/status  [ ]             Medical condition  [ ]             Home/family situation and support systems  [ ]             Safety awareness  [ ]             Pain tolerance/management  [ ]             Other:        PLAN :  Recommendations and Planned Interventions:  [X]               Self Care Training                  [X]        Therapeutic Activities  [X]               Functional Mobility Training    [ ]        Cognitive Retraining  [X]               Therapeutic Exercises           [X]        Endurance Activities  [X]               Balance Training                   [ ]        Neuromuscular Re-Education  [ ]               Visual/Perceptual Training     [X]   Home Safety Training  [X]               Patient Education                 [X]        Family Training/Education  [ ]               Other (comment):     Frequency/Duration: Patient will be followed by occupational therapy 5 times a week to address goals. 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       OBJECTIVE DATA SUMMARY:   HISTORY:   Past Medical History   Diagnosis Date    Ill-defined condition         sleep apnea   History reviewed. No pertinent past surgical history. 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    Expanded or extensive additional review of patient history:      Home Situation  Home Environment: Private residence  # Steps to Enter: 5  Rails to Enter: Yes  Hand Rails : Right  One/Two Story Residence: One story  Living Alone: No (mother)  Support Systems: Family member(s)  Patient Expects to be Discharged to[de-identified] Private residence  Current DME Used/Available at Home: CPAP  [X]  Right hand dominant             [ ]  Left hand dominant     EXAMINATION OF PERFORMANCE DEFICITS:  Cognitive/Behavioral Status:  Neurologic State: Drowsy  Orientation Level: Oriented X4  Cognition: Appropriate decision making; Follows commands; Appropriate for age attention/concentration; Appropriate safety awareness           Skin: all lines/leads intat  Edema: none noted  Vision/Perceptual:       Intact                            Range of Motion:     AROM: Generally decreased, functional                       Strength:     Strength: Generally decreased, functional              Coordination:  Coordination: Within functional limits  Fine Motor Skills-Upper: Left Intact; Right Intact    Gross Motor Skills-Upper: Left Intact; Right Intact  Tone & Sensation:     Tone: Normal  Sensation: Intact                       Balance:  Sitting: Intact  Standing: Impaired  Standing - Static: Good  Standing - Dynamic : Fair     Functional Mobility and Transfers for ADLs:  Bed Mobility:  Rolling: Contact guard assistance  Supine to Sit: Contact guard assistance  Scooting: Contact guard assistance     Transfers:  Sit to Stand: Contact guard assistance  Stand to Sit: Contact guard assistance  Bed to Chair: Contact guard assistance;Assist x2     ADL Assessment:  Feeding: Independent     Oral Facial Hygiene/Grooming: Contact guard assistance     Bathing: Moderate assistance     Upper Body Dressing: Minimum assistance     Lower Body Dressing: Moderate assistance     Toileting: Total assistance                 ADL Intervention and task modifications:           Pt educated on role of OT and required verbal cues for safety and proper hand placement during ADLs/functional transfers. Educated pt on 3/3 sternal precautions with pt demonstrating good adherence with transfers.                      Functional Measure:  Barthel Index:      Bathin  Bladder: 5  Bowels: 5  Groomin  Dressin  Feeding: 10  Mobility: 0  Stairs: 0  Toilet Use: 5  Transfer (Bed to Chair and Back): 10  Total: 45         Barthel and G-code impairment scale:  Percentage of impairment CH  0% CI  1-19% CJ  20-39% CK  40-59% CL  60-79% CM  80-99% CN  100%   Barthel Score 0-100 100 99-80 79-60 59-40 20-39 1-19    0   Barthel Score 0-20 20 17-19 13-16 9-12 5-8 1-4 0      The Barthel ADL Index: Guidelines  1. The index should be used as a record of what a patient does, not as a record of what a patient could do. 2. The main aim is to establish degree of independence from any help, physical or verbal, however minor and for whatever reason. 3. The need for supervision renders the patient not independent. 4. A patient's performance should be established using the best available evidence. Asking the patient, friends/relatives and nurses are the usual sources, but direct observation and common sense are also important. However direct testing is not needed. 5. Usually the patient's performance over the preceding 24-48 hours is important, but occasionally longer periods will be relevant. 6. Middle categories imply that the patient supplies over 50 per cent of the effort. 7. Use of aids to be independent is allowed. Deangelo Brian., Barthel, D.W. (9649). Functional evaluation: the Barthel Index. [address] [facility] (14)2. Niru Drake javier MOSES Novak, Henri Negron., Leonard Ricardo., [place], [address] [facility] (1999). Measuring the change indisability after inpatient rehabilitation; comparison of the responsiveness of the Barthel Index and Functional [place] Measure. Journal of Neurology, Neurosurgery, and Psychiatry, 66(4), 013-120. Deric Snowden, N.J.A, JENNIFER Chan.J.JUSTIN, & Darryl Mckeon MKATHIE. (2004.) Assessment of post-stroke quality of life in cost-effectiveness studies: The usefulness of the Barthel Index and the EuroQoL-5D. Quality of Life Research, 13, 509-95            G codes: In compliance with CMSs Claims Based Outcome Reporting, the following G-code set was chosen for this patient based on their primary functional limitation being treated:      The outcome measure chosen to determine the severity of the functional limitation was the barthel index with a score of 45/100 which was correlated with the impairment scale. · Self Care:               - CURRENT STATUS:    CK - 40%-59% impaired, limited or restricted               - GOAL STATUS:           CI - 1%-19% impaired, limited or restricted               - D/C STATUS:                       ---------------To be determined---------------      Occupational Therapy Evaluation Charge Determination   History Examination Decision-Making   LOW Complexity : Brief history review  LOW Complexity : 1-3 performance deficits relating to physical, cognitive , or psychosocial skils that result in activity limitations and / or participation restrictions  LOW Complexity : No comorbidities that affect functional and no verbal or physical assistance needed to complete eval tasks       Based on the above components, the patient evaluation is determined to be of the following complexity level: LOW   Pain:  Pain Scale 1: (P) Numeric (0 - 10)  Pain Intensity 1: (P) 2  Pain Location 1: Incisional     Pain Description 1: Aching  Pain Intervention(s) 1: Position  Activity Tolerance:   VSS on RA  Please refer to the flowsheet for vital signs taken during this treatment. After treatment:   [X] Patient left in no apparent distress sitting up in chair  [ ] Patient left in no apparent distress in bed  [X] Call bell left within reach  [X] Nursing notified  [ ] Caregiver present  [ ] Bed alarm activated      COMMUNICATION/EDUCATION:   The patients plan of care was discussed with: Physical Therapist and Registered Nurse. [ ] Home safety education was provided and the patient/caregiver indicated understanding. [X] Patient/family have participated as able in goal setting and plan of care. [X] Patient/family agree to work toward stated goals and plan of care. [ ] Patient understands intent and goals of therapy, but is neutral about his/her participation. [ ] Patient is unable to participate in goal setting and plan of care.   This patients plan of care is appropriate for delegation to BE.      Thank you for this referral.  Lucas Clifton, OT  Time Calculation: 13 mins

## 2017-02-03 NOTE — PROGRESS NOTES
Attended Interdisciplinary rounds in Critical Care Unit, where patient care was discussed. Visit by: Romie Jaime.  Lucrecia Diamond MA, [address]

## 2017-02-03 NOTE — ANESTHESIA PREPROCEDURE EVALUATION
Anesthetic History   No history of anesthetic complications            Review of Systems / Medical History  Patient summary reviewed, nursing notes reviewed and pertinent labs reviewed    Pulmonary        Sleep apnea: CPAP           Neuro/Psych   Within defined limits           Cardiovascular    Hypertension              Exercise tolerance: >4 METS  Comments: Type A aortic dissection   GI/Hepatic/Renal  Within defined limits              Endo/Other        Morbid obesity     Other Findings              Physical Exam    Airway  Mallampati: III  TM Distance: 4 - 6 cm  Neck ROM: normal range of motion   Mouth opening: Normal     Cardiovascular    Rhythm: regular  Rate: normal         Dental    Dentition: Lower dentition intact and Upper dentition intact     Pulmonary  Breath sounds clear to auscultation               Abdominal  GI exam deferred       Other Findings            Anesthetic Plan    ASA: 4, emergent  Anesthesia type: general    Monitoring Plan: Arterial line, BIS, CVP, [place] and CATIRNA    Post procedure ventilation   Induction: Intravenous  Anesthetic plan and risks discussed with: Patient

## 2017-02-03 NOTE — PROGRESS NOTES
Cardiac Surgery ICU Progress Note    Admit Date: 2017    POD: 1 Day Post-Op      Problems:  Active Problems: Aortic dissection (HCC) (2017)      S/P ascending aortic replacement (2/3/2017)      Overview: EMERGENT ASCENDING AORTIC DISSECTION REPAIR       Right Femoral Artery Cannulation        Procedure:  Procedure(s):  EMERGENT ASCENDING AORTIC DISSECTION REPAIR, WITH EXTRACORPOREAL CIRCULATION. CATRINA DONE BY DR SCHNEIDER      Summary:     Stable hemodynamics in sinus rhythm without ectopy on no support. Minimal discomfort. Cr 1.8 baseline 1.1 UOP 500cc/8 hrs. Plan/Recommendations/Medical Decision Makin.DC central lines and gardner  2. OOB to chair  3. Keep in unit today  4. NS 100cc/hr  Meds: aspirin/statin  B-Blocker held for bradycardia  ACE held for hypotension  Anticipated acute blood loss anemia  Increase activity  Increase I/S effort and frequency  Sternal precautions  Stimulate bowel movement  Patient seen and reviewed with  Dr. Blue Whittaker MD       Objective:     Vitals:    Visit Vitals    /59    Pulse 68    Temp 99.3 °F (37.4 °C)    Resp 16    Ht 6' (1.829 m)    Wt 270 lb (122.5 kg)    SpO2 96%    BMI 36.62 kg/m2       Temp (24hrs), Av.3 °F (36.8 °C), Min:96.4 °F (35.8 °C), Max:99.6 °F (37.6 °C)      Hemodynamics:   CO: CO (l/min): 5.7 l/min   CI: CI (l/min/m2): 2.4 l/min/m2   CVP: CVP (mmHg): 17 mmHg (17 1100)   SVR:     PAP Systolic: PAP Systolic: 35 (83/64/35 4287)   PAP Diastolic: PAP Diastolic: 20 (/ 9125)   PVR:     SV02: SVO2 (%): 55 % (17 1000)   SCV02:      CXR Results  (Last 48 hours)               17 0121  XR CHEST PORT Final result    Impression:  IMPRESSION:    Appropriately positioned lines and tubes. Shallow volumes but grossly clear   lungs. Narrative:  PORTABLE CHEST RADIOGRAPH/S: 2/3/2017 1:21 AM       INDICATION: Postop ascending aortic replacement. COMPARISON: 2017. CTA chest 2017.        TECHNIQUE: Portable frontal supine radiograph/s of the chest.       FINDINGS:    An ET tube, NG tube, right IJ pulmonary arterial catheter, right IJ central line   are in appropriate position. The lungs are hypoinflated, but grossly clear. 02/02/17 1313  XR CHEST PA LAT Final result    Impression:  Impression: No acute process. Narrative:  Exam:  2 view chest       Indication: Substernal chest pain for one hour       PA and lateral views demonstrate normal heart size. There is no acute process in   the lung fields. The osseous structures are unremarkable. CT Output: 300/8 hrs    Labs: Recent Labs      02/03/17   1048   02/03/17   0439   WBC   --    --   17.5*   HGB   --    --   11.5*   HCT   --    --   34.4*   PLT   --    --   185   NA   --    --   145   K   --    --   4.1   BUN   --    --   26*   CREA   --    --   1.79*   GLU   --    --   181*   GLUCPOC  136*   < >   --     < > = values in this interval not displayed.        Extubation Date / Time: 2/3 0510    Ventilator:  Ventilator Volumes  Vt Set (ml): 650 ml (02/03/17 0102)  Vt Exhaled (Machine Breath) (ml): 674 ml (02/03/17 0102)  Vt Spont (ml): 780 ml (02/03/17 0420)  Ve Observed (l/min): 9.14 l/min (02/03/17 0420)    Oxygen Therapy:  Oxygen Therapy  O2 Sat (%): 96 % (02/03/17 1100)  Pulse via Oximetry: 67 beats per minute (02/03/17 1100)  O2 Device: Nasal cannula (02/03/17 0730)  O2 Flow Rate (L/min): 6 l/min (02/03/17 0730)  FIO2 (%): 50 % (02/03/17 0420)      Admission Weight: Last Weight   Weight: 270 lb (122.5 kg) Weight: 270 lb (122.5 kg)     Intake / Output / Drain:  Current Shift: 02/03 0701 - 02/03 1900  In: 2849 [P.O.:1480; I.V.:50]  Out: 225 [Urine:125; Drains:100]  Last 24 hrs.:   Intake/Output Summary (Last 24 hours) at 02/03/17 1145  Last data filed at 02/03/17 1100   Gross per 24 hour   Intake          5286.49 ml   Output             2421 ml   Net          2865.49 ml         EXAM:      General: Feels OK      Chest:  Stable sternum      Incisions: dry and intact    Lungs:    Rhonchi bilaterally. Heart:  Regular rate and rhythm, S1, S2 normal, no murmur, no click,      Abdomen:   Soft, non-tender. Bowel sounds present. Extremities:  mild edema     Neurologic:  Gross motor and sensory apparatus intact.        Signed By: GAETANO Colon

## 2017-02-03 NOTE — PROGRESS NOTES
0015 Report received from Shania Gravely on Mr Jacques Solitario, s/p repair of Type A dissection. 0045 Patient arrived on unit placed on monitor. 0104 Initial chest X-ray done. 0109 MVO2 drawn for calibration of SVO2 awaiting results. 0130 OGT @ 52 at lip hooked to low intermittent suction. 0141 1 mg versed for extreme agitation, 2 RNs having to hold patient down while RN's trying to turn patient and remove leads from patient's back. 2 mg morphine for pain. 1 [place] in RA attempted to reposition patient and jiggle catheter, without success, Anesthesia Dr. Jeovany Gonsalves notified. 0230 [place] repositioned by Dr. Jeovany Gonsalves (anesthesia). 12 Patient's brother and sister-in-law at the bedside. 8257 Patient FiO2 decreased to .50 placed on spontaneous. 0450 ABG drawn and sent. 0510 Patient extubated to 6 L NC.    0700  Bedside report given to Greg Pérez (oncoming nurse) by Savana Oh (offgoing nurse). Report included the following information SBAR, Kardex, OR Summary, Procedure Summary, Intake/Output, MAR, Accordion, Recent Results, Med Rec Status, Cardiac Rhythm A Paced 70 and Alarm Parameters .

## 2017-02-03 NOTE — PROGRESS NOTES
Problem: Mobility Impaired (Adult and Pediatric)  Goal: *Acute Goals and Plan of Care (Insert Text)  Physical Therapy Goals  Initiated 2/3/2017  1. Patient will move from supine to sit and sit to supine , scoot up and down and roll side to side in bed with modified independence within 7 days. 2. Patient will perform sit to/from stand with modified independence within 7 days. 3. Patient will ambulate 250 feet with least restrictive assistive device and modified independence within 7 days. 4. Patient will ascend/descend 5 stairs with 1 handrail(s) with modified independence within 7 days. 5. Patient will perform cardiac exercises per protocol with independence within 7 days. 6. Patient will verbally and functionally recall 3/3 sternal precautions within 7 days. PHYSICAL THERAPY EVALUATION  Patient: Jeanne Enriquez (82 y.o. male)  Date: 2/3/2017  Primary Diagnosis: aneursym  Aortic dissection (HCC)  Procedure(s) (LRB):  EMERGENT ASCENDING AORTIC DISSECTION REPAIR, WITH EXTRACORPOREAL CIRCULATION. CATRINA DONE BY DR Presley Meeks (N/A) 1 Day Post-Op   Precautions:   Sternal      ASSESSMENT :  Based on the objective data described below, the patient presents with impaired functional mobility secondary to generalized weakness, sternal precautions, impaired gait mechanics, and overall decreased activity tolerance/endurance on POD 1 following AA dissection repair. Prior to admission, pt reports independence w/ ambulation, ADLs, and denies history of falls. Today, educated pt regarding sternal precautions with pt voicing understanding and demonstrating excellent adherence throughout. Pt assumed seated position EOB w/ CGA, exhibiting intact sitting balance once EOB. Pt sit<>stand w/ CGA and required CGA during initial static stance secondary to minor posterior LOB (CGA for balance check). Pt ambulated 2ft from EOB to bedside chair w/ CGAx2. Pt with lack of arm swing throughout ambulation, clenching cardiac bear.  Slow, shuffled gait overall. All VSS on 2 LPM O2 with O2 sats 99% at completion of ambulation therefore removed supplemental O2 with O2 sats remaining 95% at seated rest on RA. Ambulation trial limited this date secondary to lines/leads/attachments however will plan to continue progress ambulation and overall functional mobility during next therapy session. Recommend pt return home at discharge with likely no further therapy needs. Patient will benefit from skilled intervention to address the above impairments. Patients rehabilitation potential is considered to be Good  Factors which may influence rehabilitation potential include:   [X]         None noted  [ ]         Mental ability/status  [ ]         Medical condition  [ ]         Home/family situation and support systems  [ ]         Safety awareness  [ ]         Pain tolerance/management  [ ]         Other:        PLAN :  Recommendations and Planned Interventions:  [X]           Bed Mobility Training             [ ]    Neuromuscular Re-Education  [X]           Transfer Training                   [ ]    Orthotic/Prosthetic Training  [X]           Gait Training                         [ ]    Modalities  [X]           Therapeutic Exercises           [ ]    Edema Management/Control  [X]           Therapeutic Activities            [X]    Patient and Family Training/Education  [X]           Other (comment): stair climbing      Frequency/Duration: Patient will be followed by physical therapy  daily to address goals. Discharge Recommendations: None  Further Equipment Recommendations for Discharge: None       SUBJECTIVE:   Patient stated I am ready to get up. I'll feel better in the chair.       OBJECTIVE DATA SUMMARY:   HISTORY:    Past Medical History   Diagnosis Date    Ill-defined condition         sleep apnea   History reviewed. No pertinent past surgical history. Prior Level of Function/Home Situation: Independent w/ ambulation, ADLs, and denies history of falls. Denies home O2 use. 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 Personal factors and/or comorbidities impacting plan of care:      Home Situation  Home Environment: Private residence  # Steps to Enter: 5  Rails to Enter: Yes  Hand Rails : Right  One/Two Story Residence: One story  Living Alone: No (mother)  Support Systems: Family member(s)  Patient Expects to be Discharged to[de-identified] Private residence  Current DME Used/Available at Home: CPAP     EXAMINATION/PRESENTATION/DECISION MAKING:   Critical Behavior:  Neurologic State: Drowsy  Orientation Level: Oriented X4  Cognition: Appropriate decision making, Follows commands, Appropriate for age attention/concentration, Appropriate safety awareness     Skin:  Dressing clean, dry, intact   Strength:    Strength: Generally decreased, functional                    Tone & Sensation:   Tone: Normal              Sensation: Intact               Range Of Motion:  AROM: Generally decreased, functional                       Coordination:  Coordination: Within functional limits     Functional Mobility:  Bed Mobility:  Rolling: Contact guard assistance  Supine to Sit: Contact guard assistance     Scooting: Contact guard assistance  Transfers:  Sit to Stand: Contact guard assistance  Stand to Sit: Contact guard assistance        Bed to Chair: Contact guard assistance;Assist x2              Balance:   Sitting: Intact  Standing: Impaired  Standing - Static: Good  Standing - Dynamic : Fair  Ambulation/Gait Training:  Distance (ft): 2 Feet (ft)  Assistive Device: Gait belt  Ambulation - Level of Assistance: Contact guard assistance;Assist x2        Gait Abnormalities: Decreased step clearance        Base of Support: Widened     Speed/Alva: Pace decreased (<100 feet/min)  Step Length: Left shortened;Right shortened              Pt ambulated 2ft from EOB to bedside chair w/ CGAx2, exhibiting lack of arm swing with pt clenching cardiac bear throughout mobility.  Slow, shuffled gait overall. Functional Measure:  Barthel Index:      Bathin  Bladder: 5  Bowels: 5  Groomin  Dressin  Feeding: 10  Mobility: 0  Stairs: 0  Toilet Use: 5  Transfer (Bed to Chair and Back): 10  Total: 45         Barthel and G-code impairment scale:  Percentage of impairment CH  0% CI  1-19% CJ  20-39% CK  40-59% CL  60-79% CM  80-99% CN  100%   Barthel Score 0-100 100 99-80 79-60 59-40 20-39 1-19    0   Barthel Score 0-20 20 17-19 13-16 9-12 5-8 1-4 0      The Barthel ADL Index: Guidelines  1. The index should be used as a record of what a patient does, not as a record of what a patient could do. 2. The main aim is to establish degree of independence from any help, physical or verbal, however minor and for whatever reason. 3. The need for supervision renders the patient not independent. 4. A patient's performance should be established using the best available evidence. Asking the patient, friends/relatives and nurses are the usual sources, but direct observation and common sense are also important. However direct testing is not needed. 5. Usually the patient's performance over the preceding 24-48 hours is important, but occasionally longer periods will be relevant. 6. Middle categories imply that the patient supplies over 50 per cent of the effort. 7. Use of aids to be independent is allowed. Eev Fang., Barthel, D.W. ([id number]). Functional evaluation: the Barthel Index. [address] [facility] (14)2. Sandria Cogan [place], J.JLaura BOUDREAUX Marilyn Grain.[facility], [address] [place] [address] (). Measuring the change indisability after inpatient rehabilitation; comparison of the responsiveness of the Barthel Index and Functional [place] Measure. Journal of Neurology, Neurosurgery, and Psychiatry, 66(4), 401-354. Matilda Tom, N.[place].TESSIE, ANN Chan, & Ruth Ag, M.A. (2004.) Assessment of post-stroke quality of life in cost-effectiveness studies: The usefulness of the Barthel Index and the EuroQoL-5D. Quality of Life Research, 12, 645-47         G codes: In compliance with CMSs Claims Based Outcome Reporting, the following G-code set was chosen for this patient based on their primary functional limitation being treated: The outcome measure chosen to determine the severity of the functional limitation was the Barthel Index with a score of 45/100 which was correlated with the impairment scale. · Mobility - Walking and Moving Around:               - CURRENT STATUS:    CK - 40%-59% impaired, limited or restricted               - GOAL STATUS:           CI - 1%-19% impaired, limited or restricted               - D/C STATUS:                       ---------------To be determined---------------      Physical Therapy Evaluation Charge Determination   History Examination Presentation Decision-Making   MEDIUM  Complexity : 1-2 comorbidities / personal factors will impact the outcome/ POC  MEDIUM Complexity : 3 Standardized tests and measures addressing body structure, function, activity limitation and / or participation in recreation  MEDIUM Complexity : Evolving with changing characteristics  MEDIUM Complexity : FOTO score of 26-74      Based on the above components, the patient evaluation is determined to be of the following complexity level: MEDIUM     Pain:  Pain Scale 1: Numeric (0 - 10)  Pain Intensity 1: 5  Pain Location 1: Incisional     Pain Description 1: Aching  Pain Intervention(s) 1: Position  Activity Tolerance:   VSS throughout on 2 LPM O2 via NC  Please refer to the flowsheet for vital signs taken during this treatment.   After treatment:   [X]         Patient left in no apparent distress sitting up in chair  [ ]         Patient left in no apparent distress in bed  [X]         Call bell left within reach  [X]         Nursing notified  [ ]         Caregiver present  [ ]         Bed alarm activated      COMMUNICATION/EDUCATION:   The patients plan of care was discussed with: Occupational Therapist and Registered Nurse.  [X]         Fall prevention education was provided and the patient/caregiver indicated understanding. [X]         Patient/family have participated as able in goal setting and plan of care. [X]         Patient/family agree to work toward stated goals and plan of care. [ ]         Patient understands intent and goals of therapy, but is neutral about his/her participation. [ ]         Patient is unable to participate in goal setting and plan of care.      Thank you for this referral.  Janeth Tubbs, PT, DPT   Time Calculation: 15 mins

## 2017-02-03 NOTE — ANESTHESIA PROCEDURE NOTES
Central Line and Pulmonary Artery Catheter Placement    Start time: 2/2/2017 8:12 PM  End time: 2/2/2017 8:21 PM  Performed by: Daphne Garcia  Authorized by: Tracy WILLAMS     Indications: vascular access, central pressure monitoring and need for vasopressors  Preanesthetic Checklist: patient identified, risks and benefits discussed, anesthesia consent, site marked, patient being monitored and timeout performed      Pre-procedure: All elements of maximal sterile barrier technique followed?  Yes    Maximal barrier precautions followed, 2% Chlorhexidine for cutaneous antisepsis, Hand hygiene performed prior to catheter insertion and Ultrasound guidance    Sterile Ultrasound Technique followed?: Yes          Procedure:   Prep:  Chlorhexidine  Location:  Internal jugular  Orientation:  Right  Patient position:  Flat  Catheter type:  Triple lumen  Catheter size:  7 Fr  Catheter length:  16 cm  Number of attempts:  1  Successful placement: Yes      Assessment:   Post-procedure:  Catheter secured and sterile dressing applied  Assessment:  Blood return through all ports and free fluid flow  Insertion:  Uncomplicated  Patient tolerance:  Patient tolerated the procedure well with no immediate complications  9 Fr Cordis and 8 Fr CCO PAC, and 7 Fr triple lumen

## 2017-02-03 NOTE — H&P
CSS   History and Physical    Subjective:      Fabiola Mcrae is a 45 y.o. male who was referred from the ED for Acute Ascending Dissection. Pts chief complaint is chest pain. Patient describes 5/10 mid sternal non radiating chest pain that started approx 10 AM. There was associated diaphoresis and headache. He denies associated back pain. The patients father had a history of AAA and CAD so patient called EMS and while in ED CT Scan showed acute Type I Aortic dissection. Cardiac Testing  CTA Chest  CTA of the chest, abdomen, and pelvis was performed. 100 mL Optiray-350 were  injected and scanning was performed during the arterial phase of contrast  administration. Post processing was performed. 3D reconstructions were  performed. CT dose reduction was achieved through use of a standardized  protocol tailored for this examination and automatic exposure control for dose  modulation.      There is an aortic dissection which extends from the aortic root into the common  iliac arteries bilaterally. There is no evidence for aortic dilatation. The  celiac artery, superior mesenteric artery, inferior mesenteric artery, and right  renal artery arise from the true lumen of the left renal artery arises from the  false lumen.     The heart and pulmonary arteries appear normal. There is no significant  lymphadenopathy. A pericardial effusion is not seen. The lungs are clear.     Evaluation of the cyst solid organs is limited due to phase of contrast  administration. The liver, gallbladder, spleen, adrenal glands, pancreas, and  kidneys are unremarkable. There is a right renal cyst. The bowel appears  unremarkable. The appendix appears normal. The bladder is fluid-filled.     IMPRESSION  IMPRESSION: Tyrel type A aortic dissection. Consult by thoracic surgery  recommended. Past Medical History   Diagnosis Date    Ill-defined condition      sleep apnea     History reviewed. No pertinent past surgical history.    Social History   Substance Use Topics    Smoking status: Former Smoker    Smokeless tobacco: Not on file    Alcohol use No      History reviewed. No pertinent family history. Prior to Admission medications    Not on File       No Known Allergies    Non drinker    Review of Systems:   Consititutional: Denies fever or chills. Eyes:  Denies use of glasses or vision problems(cataracts). ENT:  Denies hearing or swallowing difficulty. CV: Denies CP, claudication, +HTN. Resp: Denies dyspnea, productive cough, +ARLEN with CPAP. : Denies dialysis or kidney problems. GI: Denies ulcers, esophageal strictures, liver problems, +GERD. M/S: Denies joint or bone problems, or implanted artificial hardware. Skin: Denies varicose veins, edema. Neuro: Denies strokes, or TIAs. Psych: Denies anxiety or depression. Endocrine: Denies thyroid problems or diabetes. Heme/Lymphatic: Denies easy bruising or lymphedema. Objective:     VS: 155/84    Physical Exam:    General appearance: alert, cooperative, no distress  Head: normocephalic, without obvious abnormality; atraumatic  Eyes: conjunctivae/corneas clear; EOM's intact. Nose: nares normal; no drainage. Neck: no carotid bruit and no JVD  Lungs: clear to auscultation bilaterally  Heart: regular rate and rhythm; no murmur  Abdomen: soft, non-tender; bowel sounds normal  Extremities: moves all extremities; no weakness. Skin: Skin color normal; No varicose veins or edema.   Neurologic: Grossly normal      Labs:   Recent Labs      02/02/17   1056   WBC  9.5   HGB  14.1   HCT  42.9   PLT  214   NA  141   K  4.2   BUN  15   CREA  1.17   GLU  199*       Assessment:     Acute Type I Aortic Dissection    Plan:     Emergent Type I Aortic Dissection Repair, [place]    Signed By: Blaine Henriquez PA-C     February 2, 2017    Saw patient, agree with above  Risk of morbidity and mortality - high  Medical decision making - high complexity

## 2017-02-03 NOTE — ANESTHESIA POSTPROCEDURE EVALUATION
Post-Anesthesia Evaluation and Assessment    Patient: Adam Huang MRN: [id number]  SSN: [id number]    YOB: 1965  Age: 45 y.o. Sex: male       Cardiovascular Function/Vital Signs  Visit Vitals    /59    Pulse 68    Temp 37.4 °C (99.3 °F)    Resp 16    Ht 6' (1.829 m)    Wt 122.5 kg (270 lb)    SpO2 96%    BMI 36.62 kg/m2       Patient is status post general anesthesia for Procedure(s):  EMERGENT ASCENDING AORTIC DISSECTION REPAIR, WITH EXTRACORPOREAL CIRCULATION. CATRINA DONE BY DR SCHNEIDER. Nausea/Vomiting: None    Postoperative hydration reviewed and adequate. Pain:  Pain Scale 1: Numeric (0 - 10) (02/03/17 0900)  Pain Intensity 1: 5 (02/03/17 0900)   Managed    Neurological Status:   Neuro  Neurologic State: Drowsy (02/03/17 0730)  Orientation Level: Oriented X4 (02/03/17 0730)  Cognition: Appropriate decision making; Follows commands; Appropriate for age attention/concentration; Appropriate safety awareness (02/03/17 0730)  Speech: Clear (02/03/17 0730)  Assessment L Pupil: Round;Sluggish (02/03/17 0130)  Size L Pupil (mm): 2 (02/03/17 0130)  Assessment R Pupil: Round;Sluggish (02/03/17 0130)  Size R Pupil (mm): 2 (02/03/17 0130)  LUE Motor Response: Spontaneous  (02/03/17 0730)  LLE Motor Response: Spontaneous  (02/03/17 0730)  RUE Motor Response: Spontaneous  (02/03/17 0730)  RLE Motor Response: Spontaneous  (02/03/17 0730)   At baseline    Mental Status and Level of Consciousness: Arousable    Pulmonary Status:   O2 Device: Nasal cannula (02/03/17 0730)   Adequate oxygenation and airway patent    Complications related to anesthesia: None    Post-anesthesia assessment completed.  No concerns    Signed By: Anabela Peraza MD     February 3, 2017

## 2017-02-03 NOTE — CARDIO/PULMONARY
C/P rehab note- chart reviewed. S/P Emergent Ascending Aortic Dissection Repair day 1. Former smoker. Met with pt sitting up in chair-nurse attending to his IV. Given theCardiac Surgery Post Operative Instructions\" booklet. Reviewed use of the incentive spirometer, cough and deep breathing while splinting the incision and leg exercises. Encouragement provided to use the incentive spirometer regularly(hourly) and to increase effort. Also encouraged to read the post-op booklet. Will continue to review the post op booklet as pt progresses in the recovery process. Verbalized understanding. Pt very talkative and had been medicated for pain-did remember no push pull and no lifting over 5 pounds per his physical therapy instruction. Will continue to follow for teaching on post op instructions ans sternal precautions.

## 2017-02-03 NOTE — PROGRESS NOTES
[facility]  Pulmonary, Critical Care, and Sleep Medicine    Name: Daren Lee MRN: [id number]   : 1965 Hospital: [facility]   Date: 2/3/2017        Critical Care Initial Patient Consult    IMPRESSION:   · S/p repair of ascending aortic dissection, had RFA cannulation. · Presented with chest pain yesterday  · Mild renal insufficiency with Cr of 1.79  · Anemia  · ARLEN  · Obesity  · Ex Smoker  · Occasional Etoh      RECOMMENDATIONS:   · Wean oxygen  · Monitor Cr  · Per cardiac surgery  · Incentive spirometry  · Will assist while in the ICU     Subjective/History: This patient has been seen and evaluated at the request of Dr. Susana Alvarez for above. Patient is a 45 y.o. male has mid post op chest pain. No abdominal pain, no leg pain. No other acute complaints. Feels breathing is comfortable. Started having symptoms yesterday. He denies any other acute complaints. Works as a . Events in ER and post er were reviewed. ROS of 10 systems is otherwise negative. Past Medical History   Diagnosis Date    Ill-defined condition      sleep apnea      History reviewed. No pertinent past surgical history.    Prior to Admission medications    Not on File     Current Facility-Administered Medications   Medication Dose Route Frequency    sodium chloride (NS) flush 5-10 mL  5-10 mL IntraVENous Q8H    mupirocin (BACTROBAN) 2 % ointment   Both Nostrils BID    ceFAZolin (ANCEF) 3 g in 0.9%  ml IVPB  3 g IntraVENous Q6H    [START ON 2017] amiodarone (CORDARONE) tablet 400 mg  400 mg Oral Q12H    niCARdipine (CARDENE) 25 mg in 0.9% sodium chloride 250 mL infusion  5-15 mg/hr IntraVENous TITRATE    PHENYLephrine (NEOSYNEPHRINE) 30,000 mcg in 0.9% sodium chloride 250 mL infusion   mcg/min IntraVENous TITRATE    aspirin chewable tablet 81 mg  81 mg Oral DAILY    insulin regular (NOVOLIN R, HUMULIN R) 100 Units in 0.9% sodium chloride 100 mL infusion  1-50 Units/hr IntraVENous TITRATE    insulin lispro (HUMALOG) injection   SubCUTAneous TIDAC    insulin lispro (HUMALOG) injection   SubCUTAneous AC&HS    0.45% sodium chloride infusion 10 mL  10 mL IntraVENous CONTINUOUS    chlorhexidine (PERIDEX) 0.12 % mouthwash 15 mL  15 mL Oral Q12H    amiodarone (CORDARONE) 450 mg in dextrose 5% 250 mL infusion  0.5-1 mg/min IntraVENous TITRATE    dexmedetomidine (PRECEDEX) 400 mcg in 0.9% sodium chloride 100 mL infusion  0.2-0.7 mcg/kg/hr IntraVENous TITRATE    insulin regular (NOVOLIN R, HUMULIN R) 100 Units in 0.9% sodium chloride 100 mL infusion  1-10 Units/hr IntraVENous TITRATE     No Known Allergies   Social History   Substance Use Topics    Smoking status: Former Smoker    Smokeless tobacco: Not on file    Alcohol use No      History reviewed. No pertinent family history. Review of Systems:  A comprehensive review of systems was negative.     Objective:   Vital Signs:    Visit Vitals    /50    Pulse 74    Temp 99.3 °F (37.4 °C)    Resp 18    Ht 6' (1.829 m)    Wt 122.5 kg (270 lb)    SpO2 95%    BMI 36.62 kg/m2       O2 Device: Nasal cannula   O2 Flow Rate (L/min): 6 l/min   Temp (24hrs), Av.1 °F (36.7 °C), Min:96.4 °F (35.8 °C), Max:99.5 °F (37.5 °C)       Intake/Output:   Last shift:      701 - 1900  In: -   Out: 110 [Urine:50; Drains:60]  Last 3 shifts: 1901 -  07  In: 3756.5 [I.V.:1469.5]  Out: 2196 [Urine:473; Drains:298]    Intake/Output Summary (Last 24 hours) at 17 08  Last data filed at 17 0800   Gross per 24 hour   Intake          3756.49 ml   Output             2306 ml   Net          1450.49 ml     Hemodynamics:   PAP: PAP Systolic: 29 (25/91/33 8202) CO: CO (l/min): 6.5 l/min (17 0803)   Wedge:   CI: CI (l/min/m2): 2.8 l/min/m2 (17 0803)   CVP:  CVP (mmHg): 10 mmHg (17 0700) SVR:       PVR:       Ventilator Settings:  Mode Rate Tidal Volume Pressure FiO2 PEEP   Spontaneous   650 ml  5 cm H2O 50 % 5 cm H20     Peak airway pressure: 11 cm H2O    Minute ventilation: 9.14 l/min      Physical Exam:    General:  Alert, cooperative, no distress, appears stated age. Obese gentleman. Head:  Normocephalic, without obvious abnormality, atraumatic. Eyes:  Conjunctivae/corneas clear. PERRL, EOMs intact. Nose: Nares normal. Septum midline. Mucosa normal. No drainage or sinus tenderness. Throat: Lips, mucosa, and tongue normal. Teeth and gums normal.   Neck: Supple, symmetrical, trachea midline, no adenopathy, thyroid: no enlargment/tenderness/nodules, no carotid bruit and no JVD. Back:   Symmetric, no curvature. ROM normal.   Lungs:   Clear to auscultation bilaterally. Chest wall:  No tenderness or deformity. Sternal dressing is intact. Heart:  Regular rate and rhythm, S1, S2 normal, no murmur, click, rub or gallop. Abdomen:   Soft, non-tender. Bowel sounds normal. No masses,  No organomegaly. Extremities: Extremities normal, atraumatic, has chronic venous insufficiency changes. Pulses: 2+ and symmetric all extremities.    Skin: Skin color, texture, turgor normal. No rashes or lesions   Lymph nodes: Cervical, supraclavicular, and axillary nodes normal.   Neurologic: Grossly nonfocal       Data:     Recent Results (from the past 24 hour(s))   EKG, 12 LEAD, INITIAL    Collection Time: 02/02/17 10:51 AM   Result Value Ref Range    Ventricular Rate 68 BPM    Atrial Rate 68 BPM    P-R Interval 178 ms    QRS Duration 100 ms    Q-T Interval 418 ms    QTC Calculation (Bezet) 444 ms    Calculated P Axis 16 degrees    Calculated R Axis -21 degrees    Calculated T Axis 9 degrees    Diagnosis       Normal sinus rhythm  Voltage criteria for left ventricular hypertrophy  No previous ECGs available  Confirmed by [place] ([id number]) on 2/2/2017 12:08:14 PM     CBC WITH AUTOMATED DIFF    Collection Time: 02/02/17 10:56 AM   Result Value Ref Range    WBC 9.5 4.1 - 11.1 K/uL    RBC 4.97 4.10 - 5.70 M/uL    HGB 14.1 12.1 - 17.0 g/dL    HCT 42.9 36.6 - 50.3 %    MCV 86.3 80.0 - 99.0 FL    MCH 28.4 26.0 - 34.0 PG    MCHC 32.9 30.0 - 36.5 g/dL    RDW 14.4 11.5 - 14.5 %    PLATELET 107 998 - 861 K/uL    NEUTROPHILS 68 32 - 75 %    LYMPHOCYTES 24 12 - 49 %    MONOCYTES 7 5 - 13 %    EOSINOPHILS 1 0 - 7 %    BASOPHILS 0 0 - 1 %    ABS. NEUTROPHILS 6.5 1.8 - 8.0 K/UL    ABS. LYMPHOCYTES 2.3 0.8 - 3.5 K/UL    ABS. MONOCYTES 0.6 0.0 - 1.0 K/UL    ABS. EOSINOPHILS 0.1 0.0 - 0.4 K/UL    ABS. BASOPHILS 0.0 0.0 - 0.1 K/UL   METABOLIC PANEL, COMPREHENSIVE    Collection Time: 02/02/17 10:56 AM   Result Value Ref Range    Sodium 141 136 - 145 mmol/L    Potassium 4.2 3.5 - 5.1 mmol/L    Chloride 103 97 - 108 mmol/L    CO2 26 21 - 32 mmol/L    Anion gap 12 5 - 15 mmol/L    Glucose 199 (H) 65 - 100 mg/dL    BUN 15 6 - 20 MG/DL    Creatinine 1.17 0.70 - 1.30 MG/DL    BUN/Creatinine ratio 13 12 - 20      GFR est AA >60 >60 ml/min/1.73m2    GFR est non-AA >60 >60 ml/min/1.73m2    Calcium 8.6 8.5 - 10.1 MG/DL    Bilirubin, total 0.3 0.2 - 1.0 MG/DL    ALT (SGPT) 34 12 - 78 U/L    AST (SGOT) 22 15 - 37 U/L    Alk. phosphatase 82 45 - 117 U/L    Protein, total 7.6 6.4 - 8.2 g/dL    Albumin 4.1 3.5 - 5.0 g/dL    Globulin 3.5 2.0 - 4.0 g/dL    A-G Ratio 1.2 1.1 - 2.2     CK W/ REFLX CKMB    Collection Time: 02/02/17 10:56 AM   Result Value Ref Range     (H) 39 - 308 U/L   TROPONIN I    Collection Time: 02/02/17 10:56 AM   Result Value Ref Range    Troponin-I, Qt. <0.04 <0.05 ng/mL   LIPASE    Collection Time: 02/02/17 10:56 AM   Result Value Ref Range    Lipase 159 73 - 393 U/L   CK-MB,QUANT.     Collection Time: 02/02/17 10:56 AM   Result Value Ref Range    CK - MB 5.5 (H) <3.6 NG/ML    CK-MB Index 1.3 0 - 2.5     TROPONIN I    Collection Time: 02/02/17  3:06 PM   Result Value Ref Range    Troponin-I, Qt. <0.04 <0.05 ng/mL   CK W/ REFLX CKMB    Collection Time: 02/02/17  3:06 PM   Result Value Ref Range  (H) 39 - 308 U/L   CK-MB,QUANT.     Collection Time: 02/02/17  3:06 PM   Result Value Ref Range    CK - MB 5.8 (H) <3.6 NG/ML    CK-MB Index 1.4 0 - 2.5     TYPE & SCREEN    Collection Time: 02/02/17  6:50 PM   Result Value Ref Range    Crossmatch Expiration 02/05/2017     ABO/Rh(D) A POSITIVE     Antibody screen NEG     Unit number [id number]     Blood component type RC LR AS1     Unit division 00     Status of unit ALLOCATED     Crossmatch result Compatible     Unit number [id number]     Blood component type RC LR AS1     Unit division 00     Status of unit ALLOCATED     Crossmatch result Compatible     Unit number [id number]     Blood component type RC LR AS1     Unit division 00     Status of unit ALLOCATED     Crossmatch result Compatible     Unit number [id number]     Blood component type RC LR AS1     Unit division 00     Status of unit ALLOCATED     Crossmatch result Compatible    PLATELETS, ALLOCATE    Collection Time: 02/02/17  8:30 PM   Result Value Ref Range    Unit number [id number]     Blood component type PLTPH LR 3     Unit division 00     Status of unit ISSUED     Unit number [id number]     Blood component type PLTPH LR,1     Unit division 00     Status of unit ISSUED    PLATELETS, ALLOCATE    Collection Time: 02/02/17  9:45 PM   Result Value Ref Range    Unit number [id number]     Blood component type PLTPH LR,2     Unit division 00     Status of unit ALLOCATED     Unit number [id number]     Blood component type PLTPH LR,2     Unit division 00     Status of unit ALLOCATED    FFP, ALLOCATE    Collection Time: 02/02/17 11:15 PM   Result Value Ref Range    Unit number [id number]     Blood component type FP24H,THAW     Unit division 00     Status of unit ISSUED     Unit number [id number]     Blood component type FP24H,THAW     Unit division 00     Status of unit ISSUED    CBC WITH AUTOMATED DIFF    Collection Time: 02/03/17 12:59 AM   Result Value Ref Range    WBC 20.3 (H) 4.1 - 11.1 K/uL    RBC 3.88 (L) 4.10 - 5.70 M/uL    HGB 11.1 (L) 12.1 - 17.0 g/dL    HCT 33.4 (L) 36.6 - 50.3 %    MCV 86.1 80.0 - 99.0 FL    MCH 28.6 26.0 - 34.0 PG    MCHC 33.2 30.0 - 36.5 g/dL    RDW 14.6 (H) 11.5 - 14.5 %    PLATELET 724 726 - 726 K/uL    NEUTROPHILS 86 (H) 32 - 75 %    LYMPHOCYTES 8 (L) 12 - 49 %    MONOCYTES 6 5 - 13 %    EOSINOPHILS 0 0 - 7 %    BASOPHILS 0 0 - 1 %    ABS. NEUTROPHILS 17.5 (H) 1.8 - 8.0 K/UL    ABS. LYMPHOCYTES 1.6 0.8 - 3.5 K/UL    ABS. MONOCYTES 1.2 (H) 0.0 - 1.0 K/UL    ABS. EOSINOPHILS 0.0 0.0 - 0.4 K/UL    ABS. BASOPHILS 0.0 0.0 - 0.1 K/UL   PTT    Collection Time: 02/03/17 12:59 AM   Result Value Ref Range    aPTT 27.4 22.1 - 32.5 sec    aPTT, therapeutic range     58.0 - 64.8 SECS   METABOLIC PANEL, COMPREHENSIVE    Collection Time: 02/03/17  1:01 AM   Result Value Ref Range    Sodium 144 136 - 145 mmol/L    Potassium 4.5 3.5 - 5.1 mmol/L    Chloride 105 97 - 108 mmol/L    CO2 24 21 - 32 mmol/L    Anion gap 15 5 - 15 mmol/L    Glucose 210 (H) 65 - 100 mg/dL    BUN 22 (H) 6 - 20 MG/DL    Creatinine 1.67 (H) 0.70 - 1.30 MG/DL    BUN/Creatinine ratio 13 12 - 20      GFR est AA 53 (L) >60 ml/min/1.73m2    GFR est non-AA 43 (L) >60 ml/min/1.73m2    Calcium 8.2 (L) 8.5 - 10.1 MG/DL    Bilirubin, total 0.4 0.2 - 1.0 MG/DL    ALT (SGPT) 35 12 - 78 U/L    AST (SGOT) 57 (H) 15 - 37 U/L    Alk.  phosphatase 50 45 - 117 U/L    Protein, total 5.4 (L) 6.4 - 8.2 g/dL    Albumin 3.1 (L) 3.5 - 5.0 g/dL    Globulin 2.3 2.0 - 4.0 g/dL    A-G Ratio 1.3 1.1 - 2.2     GLUCOSE, POC    Collection Time: 02/03/17  1:06 AM   Result Value Ref Range    Glucose (POC) 252 (H) 65 - 100 mg/dL    Performed by Le Daniel    VENOUS BLOOD GAS    Collection Time: 02/03/17  1:09 AM   Result Value Ref Range    VENOUS PH 7.30 (L) 7.32 - 7.42      VENOUS PCO2 45 41 - 51 mmHg    VENOUS PO2 83 (H) 25 - 40 mmHg    VENOUS O2 SATURATION 95 (H) 65 - 88 %    VENOUS BICARBONATE 22 (L) 23 - 28 mmol/L VENOUS BASE DEFICIT 5.0 mmol/L    O2 METHOD VENTILATOR      FIO2 80 %    MODE SIMV      Tidal volume 650      SET RATE 14      EPAP/CPAP/PEEP 5.0      Sample source ARTERIAL      SITE DRAWN FROM ARTERIAL LINE     GLUCOSE, POC    Collection Time: 02/03/17  2:06 AM   Result Value Ref Range    Glucose (POC) 214 (H) 65 - 100 mg/dL    Performed by Lester Barrett    GLUCOSE, POC    Collection Time: 02/03/17  3:12 AM   Result Value Ref Range    Glucose (POC) 260 (H) 65 - 100 mg/dL    Performed by Lester Barrett    GLUCOSE, POC    Collection Time: 02/03/17  4:16 AM   Result Value Ref Range    Glucose (POC) 217 (H) 65 - 100 mg/dL    Performed by Lester Barrett    PTT    Collection Time: 02/03/17  4:36 AM   Result Value Ref Range    aPTT 32.7 (H) 22.1 - 32.5 sec    aPTT, therapeutic range     58.0 - 13.9 SECS   METABOLIC PANEL, COMPREHENSIVE    Collection Time: 02/03/17  4:39 AM   Result Value Ref Range    Sodium 145 136 - 145 mmol/L    Potassium 4.1 3.5 - 5.1 mmol/L    Chloride 108 97 - 108 mmol/L    CO2 23 21 - 32 mmol/L    Anion gap 14 5 - 15 mmol/L    Glucose 181 (H) 65 - 100 mg/dL    BUN 26 (H) 6 - 20 MG/DL    Creatinine 1.79 (H) 0.70 - 1.30 MG/DL    BUN/Creatinine ratio 15 12 - 20      GFR est AA 49 (L) >60 ml/min/1.73m2    GFR est non-AA 40 (L) >60 ml/min/1.73m2    Calcium 8.2 (L) 8.5 - 10.1 MG/DL    Bilirubin, total 0.7 0.2 - 1.0 MG/DL    ALT (SGPT) 45 12 - 78 U/L    AST (SGOT) 87 (H) 15 - 37 U/L    Alk.  phosphatase 58 45 - 117 U/L    Protein, total 6.1 (L) 6.4 - 8.2 g/dL    Albumin 3.4 (L) 3.5 - 5.0 g/dL    Globulin 2.7 2.0 - 4.0 g/dL    A-G Ratio 1.3 1.1 - 2.2     CBC WITH AUTOMATED DIFF    Collection Time: 02/03/17  4:39 AM   Result Value Ref Range    WBC 17.5 (H) 4.1 - 11.1 K/uL    RBC 4.07 (L) 4.10 - 5.70 M/uL    HGB 11.5 (L) 12.1 - 17.0 g/dL    HCT 34.4 (L) 36.6 - 50.3 %    MCV 84.5 80.0 - 99.0 FL    MCH 28.3 26.0 - 34.0 PG    MCHC 33.4 30.0 - 36.5 g/dL    RDW 14.6 (H) 11.5 - 14.5 %    PLATELET 086 583  235 K/uL    NEUTROPHILS 89 (H) 32 - 75 %    LYMPHOCYTES 6 (L) 12 - 49 %    MONOCYTES 5 5 - 13 %    EOSINOPHILS 0 0 - 7 %    BASOPHILS 0 0 - 1 %    ABS. NEUTROPHILS 15.6 (H) 1.8 - 8.0 K/UL    ABS. LYMPHOCYTES 1.0 0.8 - 3.5 K/UL    ABS. MONOCYTES 0.9 0.0 - 1.0 K/UL    ABS. EOSINOPHILS 0.0 0.0 - 0.4 K/UL    ABS. BASOPHILS 0.0 0.0 - 0.1 K/UL   MAGNESIUM    Collection Time: 02/03/17  4:39 AM   Result Value Ref Range    Magnesium 2.3 1.6 - 2.4 mg/dL   PHOSPHORUS    Collection Time: 02/03/17  4:39 AM   Result Value Ref Range    Phosphorus 1.9 (L) 2.6 - 4.7 MG/DL   BLOOD GAS, ARTERIAL    Collection Time: 02/03/17  4:50 AM   Result Value Ref Range    pH 7.35 7.35 - 7.45      PCO2 43 35.0 - 45.0 mmHg    PO2 66 (L) 80 - 100 mmHg    O2 SAT 92 92 - 97 %    BICARBONATE 23 22 - 26 mmol/L    BASE DEFICIT 2.5 mmol/L    O2 METHOD VENTILATOR      FIO2 50 %    MODE CPAP      PRESSURE SUPPORT 5.0      EPAP/CPAP/PEEP 5.0      Sample source ARTERIAL      SITE DRAWN FROM ARTERIAL LINE      LICHA'S TEST N/A     GLUCOSE, POC    Collection Time: 02/03/17  5:20 AM   Result Value Ref Range    Glucose (POC) 165 (H) 65 - 100 mg/dL    Performed by Tuan Hernandez    GLUCOSE, POC    Collection Time: 02/03/17  6:24 AM   Result Value Ref Range    Glucose (POC) 141 (H) 65 - 100 mg/dL    Performed by Tuan Hernandez    GLUCOSE, POC    Collection Time: 02/03/17  7:26 AM   Result Value Ref Range    Glucose (POC) 131 (H) 65 - 100 mg/dL    Performed by Kadeem [facility]              Telemetry:Paced    Imaging:  I have personally reviewed the patients radiographs and have reviewed the reports:  2-3-17: FINDINGS:   An ET tube, NG tube, right IJ pulmonary arterial catheter, right IJ central line  are in appropriate position. The lungs are hypoinflated, but grossly clear.      IMPRESSION:   Appropriately positioned lines and tubes. Shallow volumes but grossly clear  lungs.        Lester Gomez MD

## 2017-02-03 NOTE — PERIOP NOTES
TRANSFER - OUT REPORT:    Verbal report given to MARIO [facility] RN on [employer]  being transferred to CCU for routine post - op       Report consisted of patients Situation, Background, Assessment and   Recommendations(SBAR). Information from the following report(s) SBAR, OR Summary, Procedure Summary and MAR was reviewed with the receiving nurse. Lines:   Triple Lumen 02/02/17 Right Internal jugular (Active)       Pack Broody Triple 02/02/17 Right Neck (Active)       Peripheral IV 02/02/17 Left Antecubital (Active)   Site Assessment Clean, dry, & intact 2/2/2017 10:56 AM   Phlebitis Assessment 0 2/2/2017 10:56 AM   Infiltration Assessment 0 2/2/2017 10:56 AM   Dressing Status Clean, dry, & intact 2/2/2017 10:56 AM   Dressing Type Transparent 2/2/2017 10:56 AM   Hub Color/Line Status Pink;Flushed 2/2/2017 10:56 AM       Peripheral IV 02/02/17 Right Antecubital (Active)   Site Assessment Clean, dry, & intact 2/2/2017  6:58 PM   Phlebitis Assessment 0 2/2/2017  6:58 PM   Infiltration Assessment 0 2/2/2017  6:58 PM   Dressing Status Clean, dry, & intact 2/2/2017  6:58 PM   Dressing Type Transparent 2/2/2017  6:58 PM   Hub Color/Line Status Green;Flushed 2/2/2017  6:58 PM   Action Taken Dressing reinforced;Blood drawn 2/2/2017  6:58 PM       Arterial Line 02/02/17 Right Radial artery (Active)        Opportunity for questions and clarification was provided.       Patient transported with:   Monitor  O2 @ 10 liters  Registered Nurse   CRNA  Perfussion  PA

## 2017-02-03 NOTE — ANESTHESIA PROCEDURE NOTES
Arterial Line Placement    Start time: 2/2/2017 7:33 PM  End time: 2/2/2017 7:37 PM  Performed by: Daphne Garcia  Authorized by: Tracy WILLAMS     Pre-Procedure  Indications:  Arterial pressure monitoring  Preanesthetic Checklist: patient identified, risks and benefits discussed, patient being monitored and patient being monitored      Procedure:   Prep:  Chlorhexidine  Seldinger Technique?: Yes    Orientation:  Right  Location:  Radial artery  Catheter size:  20 G  Number of attempts:  1  Cont Cardiac Output Sensor: No      Assessment:   Post-procedure:  Line secured and sterile dressing applied  Patient Tolerance:  Patient tolerated the procedure well with no immediate complications

## 2017-02-04 ENCOUNTER — APPOINTMENT (OUTPATIENT)
Dept: GENERAL RADIOLOGY | Age: 52
DRG: 219 | End: 2017-02-04
Attending: PHYSICIAN ASSISTANT
Payer: COMMERCIAL

## 2017-02-04 LAB
ALBUMIN SERPL BCP-MCNC: 3.1 G/DL (ref 3.5–5)
ALBUMIN/GLOB SERPL: 1.1 {RATIO} (ref 1.1–2.2)
ALP SERPL-CCNC: 50 U/L (ref 45–117)
ALT SERPL-CCNC: 34 U/L (ref 12–78)
ANION GAP BLD CALC-SCNC: 10 MMOL/L (ref 5–15)
APTT PPP: 30.6 SEC (ref 22.1–32.5)
AST SERPL W P-5'-P-CCNC: 62 U/L (ref 15–37)
BASOPHILS # BLD AUTO: 0 K/UL
BASOPHILS # BLD: 0 %
BILIRUB SERPL-MCNC: 0.3 MG/DL (ref 0.2–1)
BUN SERPL-MCNC: 42 MG/DL (ref 6–20)
BUN/CREAT SERPL: 21 (ref 12–20)
CALCIUM SERPL-MCNC: 8 MG/DL (ref 8.5–10.1)
CHLORIDE SERPL-SCNC: 104 MMOL/L (ref 97–108)
CO2 SERPL-SCNC: 25 MMOL/L (ref 21–32)
CREAT SERPL-MCNC: 1.98 MG/DL (ref 0.7–1.3)
EOSINOPHIL # BLD: 0 K/UL
EOSINOPHIL NFR BLD: 0 %
ERYTHROCYTE [DISTWIDTH] IN BLOOD BY AUTOMATED COUNT: 14.7 % (ref 11.5–14.5)
GLOBULIN SER CALC-MCNC: 2.9 G/DL (ref 2–4)
GLUCOSE BLD STRIP.AUTO-MCNC: 103 MG/DL (ref 65–100)
GLUCOSE BLD STRIP.AUTO-MCNC: 107 MG/DL (ref 65–100)
GLUCOSE BLD STRIP.AUTO-MCNC: 108 MG/DL (ref 65–100)
GLUCOSE BLD STRIP.AUTO-MCNC: 109 MG/DL (ref 65–100)
GLUCOSE BLD STRIP.AUTO-MCNC: 111 MG/DL (ref 65–100)
GLUCOSE BLD STRIP.AUTO-MCNC: 121 MG/DL (ref 65–100)
GLUCOSE BLD STRIP.AUTO-MCNC: 127 MG/DL (ref 65–100)
GLUCOSE BLD STRIP.AUTO-MCNC: 141 MG/DL (ref 65–100)
GLUCOSE BLD STRIP.AUTO-MCNC: 154 MG/DL (ref 65–100)
GLUCOSE BLD STRIP.AUTO-MCNC: 160 MG/DL (ref 65–100)
GLUCOSE BLD STRIP.AUTO-MCNC: 167 MG/DL (ref 65–100)
GLUCOSE BLD STRIP.AUTO-MCNC: 184 MG/DL (ref 65–100)
GLUCOSE BLD STRIP.AUTO-MCNC: 187 MG/DL (ref 65–100)
GLUCOSE BLD STRIP.AUTO-MCNC: 73 MG/DL (ref 65–100)
GLUCOSE BLD STRIP.AUTO-MCNC: 86 MG/DL (ref 65–100)
GLUCOSE BLD STRIP.AUTO-MCNC: 92 MG/DL (ref 65–100)
GLUCOSE BLD STRIP.AUTO-MCNC: 94 MG/DL (ref 65–100)
GLUCOSE BLD STRIP.AUTO-MCNC: 95 MG/DL (ref 65–100)
GLUCOSE SERPL-MCNC: 95 MG/DL (ref 65–100)
HCT VFR BLD AUTO: 30.4 % (ref 36.6–50.3)
HGB BLD-MCNC: 10.3 G/DL (ref 12.1–17)
INR PPP: 1.1 (ref 0.9–1.1)
LYMPHOCYTES # BLD AUTO: 10 %
LYMPHOCYTES # BLD: 2.2 K/UL
MAGNESIUM SERPL-MCNC: 2.4 MG/DL (ref 1.6–2.4)
MCH RBC QN AUTO: 28.7 PG (ref 26–34)
MCHC RBC AUTO-ENTMCNC: 33.9 G/DL (ref 30–36.5)
MCV RBC AUTO: 84.7 FL (ref 80–99)
MONOCYTES # BLD: 1.3 K/UL
MONOCYTES NFR BLD AUTO: 6 %
NEUTS BAND NFR BLD MANUAL: 4 %
NEUTS SEG # BLD: 18 K/UL
NEUTS SEG NFR BLD AUTO: 80 %
PLATELET # BLD AUTO: 167 K/UL (ref 150–400)
POTASSIUM SERPL-SCNC: 3.7 MMOL/L (ref 3.5–5.1)
PROT SERPL-MCNC: 6 G/DL (ref 6.4–8.2)
PROTHROMBIN TIME: 11.4 SEC (ref 9–11.1)
RBC # BLD AUTO: 3.59 M/UL (ref 4.1–5.7)
RBC MORPH BLD: ABNORMAL
SERVICE CMNT-IMP: ABNORMAL
SERVICE CMNT-IMP: NORMAL
SODIUM SERPL-SCNC: 139 MMOL/L (ref 136–145)
THERAPEUTIC RANGE,PTTT: NORMAL SECS (ref 58–77)
WBC # BLD AUTO: 21.5 K/UL (ref 4.1–11.1)

## 2017-02-04 PROCEDURE — 74011000258 HC RX REV CODE- 258: Performed by: PHYSICIAN ASSISTANT

## 2017-02-04 PROCEDURE — C9113 INJ PANTOPRAZOLE SODIUM, VIA: HCPCS | Performed by: SPECIALIST

## 2017-02-04 PROCEDURE — 74011250636 HC RX REV CODE- 250/636: Performed by: PHYSICIAN ASSISTANT

## 2017-02-04 PROCEDURE — 74011000250 HC RX REV CODE- 250: Performed by: PHYSICIAN ASSISTANT

## 2017-02-04 PROCEDURE — 74011000258 HC RX REV CODE- 258: Performed by: THORACIC SURGERY (CARDIOTHORACIC VASCULAR SURGERY)

## 2017-02-04 PROCEDURE — 97116 GAIT TRAINING THERAPY: CPT

## 2017-02-04 PROCEDURE — 65620000000 HC RM CCU GENERAL

## 2017-02-04 PROCEDURE — 85025 COMPLETE CBC W/AUTO DIFF WBC: CPT | Performed by: PHYSICIAN ASSISTANT

## 2017-02-04 PROCEDURE — 85610 PROTHROMBIN TIME: CPT | Performed by: PHYSICIAN ASSISTANT

## 2017-02-04 PROCEDURE — 74011000250 HC RX REV CODE- 250: Performed by: THORACIC SURGERY (CARDIOTHORACIC VASCULAR SURGERY)

## 2017-02-04 PROCEDURE — 85730 THROMBOPLASTIN TIME PARTIAL: CPT | Performed by: PHYSICIAN ASSISTANT

## 2017-02-04 PROCEDURE — 51798 US URINE CAPACITY MEASURE: CPT

## 2017-02-04 PROCEDURE — 74011250636 HC RX REV CODE- 250/636: Performed by: THORACIC SURGERY (CARDIOTHORACIC VASCULAR SURGERY)

## 2017-02-04 PROCEDURE — 74011250637 HC RX REV CODE- 250/637: Performed by: PHYSICIAN ASSISTANT

## 2017-02-04 PROCEDURE — 74011000258 HC RX REV CODE- 258: Performed by: SPECIALIST

## 2017-02-04 PROCEDURE — 71010 XR CHEST PORT: CPT

## 2017-02-04 PROCEDURE — 74011250636 HC RX REV CODE- 250/636: Performed by: SPECIALIST

## 2017-02-04 PROCEDURE — 77030011943

## 2017-02-04 PROCEDURE — 36415 COLL VENOUS BLD VENIPUNCTURE: CPT | Performed by: PHYSICIAN ASSISTANT

## 2017-02-04 PROCEDURE — 77010033678 HC OXYGEN DAILY

## 2017-02-04 PROCEDURE — 74011000250 HC RX REV CODE- 250: Performed by: SPECIALIST

## 2017-02-04 PROCEDURE — 83735 ASSAY OF MAGNESIUM: CPT | Performed by: PHYSICIAN ASSISTANT

## 2017-02-04 PROCEDURE — 80053 COMPREHEN METABOLIC PANEL: CPT | Performed by: PHYSICIAN ASSISTANT

## 2017-02-04 PROCEDURE — 82962 GLUCOSE BLOOD TEST: CPT

## 2017-02-04 PROCEDURE — 74011636637 HC RX REV CODE- 636/637: Performed by: PHYSICIAN ASSISTANT

## 2017-02-04 RX ORDER — AMLODIPINE BESYLATE 5 MG/1
10 TABLET ORAL DAILY
Status: DISCONTINUED | OUTPATIENT
Start: 2017-02-04 | End: 2017-02-05

## 2017-02-04 RX ORDER — HYDRALAZINE HYDROCHLORIDE 10 MG/1
10 TABLET, FILM COATED ORAL EVERY 6 HOURS
Status: DISCONTINUED | OUTPATIENT
Start: 2017-02-04 | End: 2017-02-05

## 2017-02-04 RX ORDER — HYDRALAZINE HYDROCHLORIDE 20 MG/ML
10 INJECTION INTRAMUSCULAR; INTRAVENOUS
Status: DISCONTINUED | OUTPATIENT
Start: 2017-02-04 | End: 2017-02-10

## 2017-02-04 RX ORDER — POTASSIUM CHLORIDE 29.8 MG/ML
20 INJECTION INTRAVENOUS
Status: COMPLETED | OUTPATIENT
Start: 2017-02-04 | End: 2017-02-15

## 2017-02-04 RX ORDER — HYDRALAZINE HYDROCHLORIDE 10 MG/1
10 TABLET, FILM COATED ORAL 4 TIMES DAILY
Status: DISCONTINUED | OUTPATIENT
Start: 2017-02-04 | End: 2017-02-04

## 2017-02-04 RX ORDER — METOPROLOL TARTRATE 25 MG/1
25 TABLET, FILM COATED ORAL EVERY 12 HOURS
Status: DISCONTINUED | OUTPATIENT
Start: 2017-02-04 | End: 2017-02-06

## 2017-02-04 RX ADMIN — Medication 10 ML: at 13:54

## 2017-02-04 RX ADMIN — Medication 7.5 MG/HR: at 00:04

## 2017-02-04 RX ADMIN — HYDRALAZINE HYDROCHLORIDE 10 MG: 20 INJECTION INTRAMUSCULAR; INTRAVENOUS at 21:11

## 2017-02-04 RX ADMIN — POTASSIUM CHLORIDE 20 MEQ: 400 INJECTION, SOLUTION INTRAVENOUS at 07:41

## 2017-02-04 RX ADMIN — MUPIROCIN: 20 OINTMENT TOPICAL at 21:05

## 2017-02-04 RX ADMIN — CEFAZOLIN 3 G: 1 INJECTION, POWDER, FOR SOLUTION INTRAMUSCULAR; INTRAVENOUS; PARENTERAL at 13:54

## 2017-02-04 RX ADMIN — DEXTROSE MONOHYDRATE 5.5 G: 25 INJECTION, SOLUTION INTRAVENOUS at 21:39

## 2017-02-04 RX ADMIN — ASPIRIN 81 MG 81 MG: 81 TABLET ORAL at 08:40

## 2017-02-04 RX ADMIN — SODIUM CHLORIDE 20 UNITS/HR: 900 INJECTION, SOLUTION INTRAVENOUS at 19:08

## 2017-02-04 RX ADMIN — Medication 10 ML: at 05:30

## 2017-02-04 RX ADMIN — SODIUM CHLORIDE 8 MG/HR: 900 INJECTION, SOLUTION INTRAVENOUS at 01:41

## 2017-02-04 RX ADMIN — Medication 13 MG/HR: at 16:11

## 2017-02-04 RX ADMIN — CEFAZOLIN 3 G: 1 INJECTION, POWDER, FOR SOLUTION INTRAMUSCULAR; INTRAVENOUS; PARENTERAL at 02:57

## 2017-02-04 RX ADMIN — CHLORHEXIDINE GLUCONATE 15 ML: 1.2 RINSE ORAL at 00:30

## 2017-02-04 RX ADMIN — Medication 15 MG/HR: at 08:59

## 2017-02-04 RX ADMIN — SODIUM CHLORIDE 100 ML/HR: 900 INJECTION, SOLUTION INTRAVENOUS at 10:18

## 2017-02-04 RX ADMIN — CHLORHEXIDINE GLUCONATE 15 ML: 1.2 RINSE ORAL at 12:11

## 2017-02-04 RX ADMIN — MUPIROCIN: 20 OINTMENT TOPICAL at 09:01

## 2017-02-04 RX ADMIN — AMIODARONE HYDROCHLORIDE 400 MG: 200 TABLET ORAL at 08:40

## 2017-02-04 RX ADMIN — Medication 10 ML: at 22:19

## 2017-02-04 RX ADMIN — METOPROLOL TARTRATE 25 MG: 25 TABLET ORAL at 12:03

## 2017-02-04 RX ADMIN — OXYCODONE HYDROCHLORIDE AND ACETAMINOPHEN 1 TABLET: 5; 325 TABLET ORAL at 17:43

## 2017-02-04 RX ADMIN — SODIUM CHLORIDE 8 MG/HR: 900 INJECTION, SOLUTION INTRAVENOUS at 19:58

## 2017-02-04 RX ADMIN — Medication 15 MG/HR: at 20:01

## 2017-02-04 RX ADMIN — AMLODIPINE BESYLATE 10 MG: 5 TABLET ORAL at 12:02

## 2017-02-04 RX ADMIN — SODIUM CHLORIDE 10 ML/HR: 900 INJECTION, SOLUTION INTRAVENOUS at 00:06

## 2017-02-04 RX ADMIN — SODIUM CHLORIDE 8 MG/HR: 900 INJECTION, SOLUTION INTRAVENOUS at 15:21

## 2017-02-04 RX ADMIN — SODIUM CHLORIDE 40 MG: 9 INJECTION INTRAMUSCULAR; INTRAVENOUS; SUBCUTANEOUS at 01:40

## 2017-02-04 RX ADMIN — Medication 2 MG: at 02:54

## 2017-02-04 RX ADMIN — HYDRALAZINE HYDROCHLORIDE 10 MG: 20 INJECTION INTRAMUSCULAR; INTRAVENOUS at 08:53

## 2017-02-04 RX ADMIN — Medication 15 MG/HR: at 12:00

## 2017-02-04 RX ADMIN — Medication 15 MG/HR: at 01:55

## 2017-02-04 RX ADMIN — CEFAZOLIN 3 G: 1 INJECTION, POWDER, FOR SOLUTION INTRAMUSCULAR; INTRAVENOUS; PARENTERAL at 08:38

## 2017-02-04 RX ADMIN — SODIUM CHLORIDE 8 MG/HR: 900 INJECTION, SOLUTION INTRAVENOUS at 09:59

## 2017-02-04 RX ADMIN — OXYCODONE HYDROCHLORIDE AND ACETAMINOPHEN 2 TABLET: 5; 325 TABLET ORAL at 12:02

## 2017-02-04 RX ADMIN — POTASSIUM CHLORIDE 20 MEQ: 400 INJECTION, SOLUTION INTRAVENOUS at 08:55

## 2017-02-04 RX ADMIN — HYDRALAZINE HYDROCHLORIDE 10 MG: 20 INJECTION INTRAMUSCULAR; INTRAVENOUS at 06:34

## 2017-02-04 RX ADMIN — Medication 2 MG: at 04:39

## 2017-02-04 RX ADMIN — Medication 15 MG/HR: at 04:40

## 2017-02-04 RX ADMIN — Medication 13 MG/HR: at 23:38

## 2017-02-04 RX ADMIN — METOPROLOL TARTRATE 25 MG: 25 TABLET ORAL at 21:04

## 2017-02-04 RX ADMIN — AMIODARONE HYDROCHLORIDE 400 MG: 200 TABLET ORAL at 21:03

## 2017-02-04 RX ADMIN — HYDRALAZINE HYDROCHLORIDE 10 MG: 10 TABLET, FILM COATED ORAL at 17:12

## 2017-02-04 RX ADMIN — SODIUM CHLORIDE 8 MG/HR: 900 INJECTION, SOLUTION INTRAVENOUS at 04:50

## 2017-02-04 RX ADMIN — HYDRALAZINE HYDROCHLORIDE 10 MG: 20 INJECTION INTRAMUSCULAR; INTRAVENOUS at 11:06

## 2017-02-04 RX ADMIN — SODIUM CHLORIDE 22.3 UNITS/HR: 900 INJECTION, SOLUTION INTRAVENOUS at 12:08

## 2017-02-04 RX ADMIN — HYDRALAZINE HYDROCHLORIDE 10 MG: 10 TABLET, FILM COATED ORAL at 12:03

## 2017-02-04 RX ADMIN — Medication 2 MG: at 09:20

## 2017-02-04 RX ADMIN — SODIUM CHLORIDE 100 ML/HR: 900 INJECTION, SOLUTION INTRAVENOUS at 19:43

## 2017-02-04 NOTE — PROGRESS NOTES
[facility]  Pulmonary, Critical Care, and Sleep Medicine    Name: Jw Machado MRN: [id number]   : 1965 Hospital: [facility]   Date: 2017        Critical Care Patient Consult    IMPRESSION:   · S/p repair of ascending aortic dissection, had RFA cannulation. · Presented with GI bleed in ER, now had additional bleeding from lower gi tract. · Presented with chest pain yesterday  · Mild renal insufficiency with Cr of 1.79  · Anemia  · ARLEN  · Obesity  · Ex Smoker  · Occasional Etoh      RECOMMENDATIONS:   · Wean oxygen  · Monitor Cr  · Per cardiac surgery  · Incentive spirometry  · Will assist while in the ICU     Subjective/History:   He is feeling better. No acute complaints. No back pain, has expected chest pain. This patient has been seen and evaluated at the request of Dr. Jade Liu for above. Patient is a 45 y.o. male has mid post op chest pain. No abdominal pain, no leg pain. No other acute complaints. Feels breathing is comfortable. Started having symptoms yesterday. He denies any other acute complaints. Works as a . Events in ER and post er were reviewed. ROS of 10 systems is otherwise negative. Past Medical History   Diagnosis Date    Ill-defined condition      sleep apnea      History reviewed. No pertinent past surgical history.    Prior to Admission medications    Not on File     Current Facility-Administered Medications   Medication Dose Route Frequency    niCARdipine (CARDENE) 50 mg in 0.9% sodium chloride 250 mL infusion  0-15 mg/hr IntraVENous TITRATE    metoprolol tartrate (LOPRESSOR) tablet 25 mg  25 mg Oral Q12H    amLODIPine (NORVASC) tablet 10 mg  10 mg Oral DAILY    hydrALAZINE (APRESOLINE) tablet 10 mg  10 mg Oral Q6H    sodium chloride (NS) flush 5-10 mL  5-10 mL IntraVENous Q8H    mupirocin (BACTROBAN) 2 % ointment   Both Nostrils BID    ceFAZolin (ANCEF) 3 g in 0.9%  ml IVPB  3 g IntraVENous Q6H    amiodarone (CORDARONE) tablet 400 mg  400 mg Oral Q12H    aspirin chewable tablet 81 mg  81 mg Oral DAILY    insulin regular (NOVOLIN R, HUMULIN R) 100 Units in 0.9% sodium chloride 100 mL infusion  1-50 Units/hr IntraVENous TITRATE    insulin lispro (HUMALOG) injection   SubCUTAneous TIDAC    insulin lispro (HUMALOG) injection   SubCUTAneous AC&HS    0.45% sodium chloride infusion 10 mL  10 mL IntraVENous CONTINUOUS    chlorhexidine (PERIDEX) 0.12 % mouthwash 15 mL  15 mL Oral Q12H    0.9% sodium chloride infusion  100 mL/hr IntraVENous CONTINUOUS    pantoprazole (PROTONIX) 40 mg in 0.9% sodium chloride (MBP/ADV) 50 mL  8 mg/hr IntraVENous CONTINUOUS    amiodarone (CORDARONE) 450 mg in dextrose 5% 250 mL infusion  0.5-1 mg/min IntraVENous TITRATE     No Known Allergies   Social History   Substance Use Topics    Smoking status: Former Smoker    Smokeless tobacco: Not on file    Alcohol use No      History reviewed. No pertinent family history. Review of Systems:  A comprehensive review of systems was negative. Objective:   Vital Signs:    Visit Vitals    BP (!) 120/31    Pulse 74    Temp 97.7 °F (36.5 °C)    Resp 15    Ht 6' (1.829 m)    Wt 122.5 kg (270 lb)    SpO2 95%    BMI 36.62 kg/m2       O2 Device: Nasal cannula   O2 Flow Rate (L/min): 2 l/min   Temp (24hrs), Av.3 °F (36.8 °C), Min:97.7 °F (36.5 °C), Max:98.9 °F (37.2 °C)       Intake/Output:   Last shift:      701 - 1900  In: 1687.9 [P.O.:800;  I.V.:887.9]  Out: 150 [Drains:150]  Last 3 shifts: 1901 -  0700  In: 9659.6 [P.O.:2280; I.V.:5092.6]  Out: 2360 [Urine:1743; Drains:888]    Intake/Output Summary (Last 24 hours) at 17 1333  Last data filed at 17 1302   Gross per 24 hour   Intake          5624.05 ml   Output             1685 ml   Net          3939.05 ml     Hemodynamics:   PAP: PAP Systolic: 32 ( 7156) CO: CO (l/min): 6 l/min (17 1200)   Wedge:   CI: CI (l/min/m2): 2.5 l/min/m2 (02/03/17 1200)   CVP:  CVP (mmHg): 17 mmHg (02/03/17 1200) SVR:       PVR:       Ventilator Settings:  Mode Rate Tidal Volume Pressure FiO2 PEEP   Spontaneous   650 ml  5 cm H2O 50 % 5 cm H20     Peak airway pressure: 11 cm H2O    Minute ventilation: 9.14 l/min      Physical Exam:    General:  Alert, cooperative, no distress, appears stated age. Obese gentleman. Sitting up in chair. No distress. Head:  Normocephalic, without obvious abnormality, atraumatic. Eyes:  Conjunctivae/corneas clear. PERRL, EOMs intact. Nose: Nares normal. Septum midline. Mucosa normal. No drainage or sinus tenderness. Throat: Lips, mucosa, and tongue normal. Teeth and gums normal.   Neck: Supple, symmetrical, trachea midline, no adenopathy, thyroid: no enlargment/tenderness/nodules, no carotid bruit and no JVD. Back:   Symmetric, no curvature. ROM normal.   Lungs:   Clear to auscultation bilaterally. Decreased BS in bases. Chest wall:  No tenderness or deformity. Sternal dressing is intact. Heart:  Regular rate and rhythm, S1, S2 normal, no murmur, click, rub or gallop. Abdomen:   Soft, non-tender. Bowel sounds normal. No masses,  No organomegaly. Extremities: Extremities normal, atraumatic, has chronic venous insufficiency changes. Pulses: 2+ and symmetric all extremities.    Skin: Skin color, texture, turgor normal. No rashes or lesions   Lymph nodes: Cervical, supraclavicular, and axillary nodes normal.   Neurologic: Grossly nonfocal       Data:     Recent Results (from the past 24 hour(s))   GLUCOSE, POC    Collection Time: 02/03/17  2:11 PM   Result Value Ref Range    Glucose (POC) 99 65 - 100 mg/dL    Performed by Livan Mcqueen, POC    Collection Time: 02/03/17  3:22 PM   Result Value Ref Range    Glucose (POC) 109 (H) 65 - 100 mg/dL    Performed by Richy Jacobs    GLUCOSE, POC    Collection Time: 02/03/17  4:30 PM   Result Value Ref Range    Glucose (POC) 140 (H) 65 - 100 mg/dL    Performed by Alexia Duarte    GLUCOSE, POC    Collection Time: 02/03/17  5:44 PM   Result Value Ref Range    Glucose (POC) 142 (H) 65 - 100 mg/dL    Performed by Uche Jacobs    GLUCOSE, POC    Collection Time: 02/03/17  6:56 PM   Result Value Ref Range    Glucose (POC) 124 (H) 65 - 100 mg/dL    Performed by Uche Jacobs    GLUCOSE, POC    Collection Time: 02/03/17  8:03 PM   Result Value Ref Range    Glucose (POC) 111 (H) 65 - 100 mg/dL    Performed by [employer]    GLUCOSE, POC    Collection Time: 02/03/17  9:14 PM   Result Value Ref Range    Glucose (POC) 109 (H) 65 - 100 mg/dL    Performed by Sharan Master    HGB & HCT    Collection Time: 02/03/17  9:21 PM   Result Value Ref Range    HGB 10.6 (L) 12.1 - 17.0 g/dL    HCT 31.2 (L) 36.6 - 50.3 %   GLUCOSE, POC    Collection Time: 02/03/17 10:17 PM   Result Value Ref Range    Glucose (POC) 111 (H) 65 - 100 mg/dL    Performed by Soraida Lobo    GLUCOSE, POC    Collection Time: 02/03/17 11:16 PM   Result Value Ref Range    Glucose (POC) 106 (H) 65 - 100 mg/dL    Performed by [employer]    GLUCOSE, POC    Collection Time: 02/04/17  1:15 AM   Result Value Ref Range    Glucose (POC) 107 (H) 65 - 100 mg/dL    Performed by [employer]    GLUCOSE, POC    Collection Time: 02/04/17  3:12 AM   Result Value Ref Range    Glucose (POC) 127 (H) 65 - 100 mg/dL    Performed by Sharan Master    METABOLIC PANEL, COMPREHENSIVE    Collection Time: 02/04/17  5:03 AM   Result Value Ref Range    Sodium 139 136 - 145 mmol/L    Potassium 3.7 3.5 - 5.1 mmol/L    Chloride 104 97 - 108 mmol/L    CO2 25 21 - 32 mmol/L    Anion gap 10 5 - 15 mmol/L    Glucose 95 65 - 100 mg/dL    BUN 42 (H) 6 - 20 MG/DL    Creatinine 1.98 (H) 0.70 - 1.30 MG/DL    BUN/Creatinine ratio 21 (H) 12 - 20      GFR est AA 43 (L) >60 ml/min/1.73m2    GFR est non-AA 36 (L) >60 ml/min/1.73m2    Calcium 8.0 (L) 8.5 - 10.1 MG/DL    Bilirubin, total 0.3 0.2 - 1.0 MG/DL    ALT (SGPT) 34 12 - 78 U/L    AST (SGOT) 62 (H) 15 - 37 U/L    Alk. phosphatase 50 45 - 117 U/L    Protein, total 6.0 (L) 6.4 - 8.2 g/dL    Albumin 3.1 (L) 3.5 - 5.0 g/dL    Globulin 2.9 2.0 - 4.0 g/dL    A-G Ratio 1.1 1.1 - 2.2     MAGNESIUM    Collection Time: 02/04/17  5:03 AM   Result Value Ref Range    Magnesium 2.4 1.6 - 2.4 mg/dL   CBC WITH AUTOMATED DIFF    Collection Time: 02/04/17  5:03 AM   Result Value Ref Range    WBC 21.5 (H) 4.1 - 11.1 K/uL    RBC 3.59 (L) 4.10 - 5.70 M/uL    HGB 10.3 (L) 12.1 - 17.0 g/dL    HCT 30.4 (L) 36.6 - 50.3 %    MCV 84.7 80.0 - 99.0 FL    MCH 28.7 26.0 - 34.0 PG    MCHC 33.9 30.0 - 36.5 g/dL    RDW 14.7 (H) 11.5 - 14.5 %    PLATELET 467 972 - 411 K/uL    NEUTROPHILS 80 %    BAND NEUTROPHILS 4 %    LYMPHOCYTES 10 %    MONOCYTES 6 %    EOSINOPHILS 0 %    BASOPHILS 0 %    ABS. NEUTROPHILS 18.0 K/UL    ABS. LYMPHOCYTES 2.2 K/UL    ABS. MONOCYTES 1.3 K/UL    ABS. EOSINOPHILS 0.0 K/UL    ABS.  BASOPHILS 0.0 K/UL    RBC COMMENTS NORMOCYTIC, NORMOCHROMIC     PROTHROMBIN TIME + INR    Collection Time: 02/04/17  5:03 AM   Result Value Ref Range    INR 1.1 0.9 - 1.1      Prothrombin time 11.4 (H) 9.0 - 11.1 sec   PTT    Collection Time: 02/04/17  5:03 AM   Result Value Ref Range    aPTT 30.6 22.1 - 32.5 sec    aPTT, therapeutic range     58.0 - 77.0 SECS   GLUCOSE, POC    Collection Time: 02/04/17  5:16 AM   Result Value Ref Range    Glucose (POC) 92 65 - 100 mg/dL    Performed by Sharan Capellan    GLUCOSE, POC    Collection Time: 02/04/17  6:23 AM   Result Value Ref Range    Glucose (POC) 95 65 - 100 mg/dL    Performed by Sharan Capellan    GLUCOSE, POC    Collection Time: 02/04/17  7:40 AM   Result Value Ref Range    Glucose (POC) 121 (H) 65 - 100 mg/dL    Performed by Sharan Capellan    GLUCOSE, POC    Collection Time: 02/04/17  8:44 AM   Result Value Ref Range    Glucose (POC) 94 65 - 100 mg/dL    Performed by Leighann Peacock, POC    Collection Time: 02/04/17 9:52 AM   Result Value Ref Range    Glucose (POC) 141 (H) 65 - 100 mg/dL    Performed by [address] [place] [address], POC    Collection Time: 02/04/17 11:01 AM   Result Value Ref Range    Glucose (POC) 187 (H) 65 - 100 mg/dL    Performed by Rosmery Coleman, POC    Collection Time: 02/04/17 12:07 PM   Result Value Ref Range    Glucose (POC) 184 (H) 65 - 100 mg/dL    Performed by Rosmery Coleman, POC    Collection Time: 02/04/17  1:06 PM   Result Value Ref Range    Glucose (POC) 154 (H) 65 - 100 mg/dL    Performed by Gianna Coughlin              Telemetry:Paced    Imaging:  I have personally reviewed the patients radiographs and have reviewed the reports:  2-3-17: FINDINGS:   An ET tube, NG tube, right IJ pulmonary arterial catheter, right IJ central line  are in appropriate position. The lungs are hypoinflated, but grossly clear.      IMPRESSION:   Appropriately positioned lines and tubes. Shallow volumes but grossly clear  lungs.        Naila Small MD

## 2017-02-04 NOTE — CONSULTS
[address]   [address] [place] [address]       Name:  Jesus Manuel Pop   MR#:  [id number]   :  1965   Account #:  [de-identified]    Date of Consultation:  2017   Date of Adm:  2017       CHIEF COMPLAINT: Melena and rectal bleeding without pain, status   post repair of aortic dissection type A. HISTORY OF PRESENT ILLNESS: The patient was admitted 2 days   ago hypertensive with chest pain, and was determined to have   extensive dissection of the aorta, with involvement from the root of the   aorta down to the aortic bifurcation. The patient underwent repair early   in the a.m. yesterday, was stable until last evening, when he had a   unknown amount of melena and blood. There was no significant drop   in his hemoglobin. The BUN/creatinine ratio is slightly elevated at 21. There was no compromise of his vital signs. He denied any nausea,   vomiting or abdominal pain. The patient did state that at the time of his   admission 2 days ago, he had diarrhea with blood, diarrhea is unusual   for him to have. The patient took 1 aspirin 2 days ago, but does not   take any NSAIDs, or for that matter, any medications with any   regularity. The patient has never had any peptic ulcer disease. He has   never had a colonoscopy. There is no family history of colon cancer or   colon polyps. Since his bleeding episode last night, he has had breakfast and he   continues to have no abdominal, nausea or vomiting. His vital signs   have remained stable. PAST MEDICAL HISTORY: Positive for sleep apnea, possibly   undiagnosed diabetes and aortic dissection. SOCIAL HISTORY: He is a former smoker, rare use of alcohol. He is   single. SURGERIES: None. FAMILY HISTORY: No history of pancreatic cancer, colon cancer or   colon polyps. REVIEW OF SYSTEMS: Complete review of systems is as noted   above, otherwise is negative.      PHYSICAL EXAMINATION   VITAL SIGNS: Temperature is 98.7, pulse is 87, respirations 22, blood   pressure is 161/51. GENERAL: The patient is moderate to morbidly obese, oriented x3,   sitting in chair, quite comfortable, reading his cell phone. HEAD, EYES,   EARS, NOSE AND THROAT: Sclerae are anicteric. Conjunctivae are   pale. Moist mucous membranes. NECK: Supple. There is no adenopathy in the neck or the groin. CHEST: Clear to auscultation and percussion. HEART: Reveals a regular rate and rhythm. ABDOMEN: Obese, nontender, no hepatosplenomegaly. EXTREMITIES: Obese, with some questionable peripheral erythema. LABORATORY DATA: WBC is increased at 21,500, with a normal   differential, platelets 554,908, hemoglobin is 10.3. The PT/INR is 1.1. CMP reveals a BUN of 42, creatinine of 1.98, BUN/creatinine ratio 21,   calcium is decreased at 8.0. GFR is 36. Liver function tests are normal.   Albumin is reduced at 3.1, globulin is 2.9. AST is mildly elevated to 62. Lipase on admission was 159, CO2 was 25. DIAGNOSTIC DATA: A CT scan prior to his surgery demonstrated his   aortic dissection. Celiac artery, superior mesenteric artery, superior   mesenteric artery and right renal artery all arise from the true lumen. The left renal arises from the false lumen. Evaluation of the solid   organs was limited due to the face of contrast administration. The liver   appeared to be unremarkable. Ultrasound done earlier suggested a   fatty liver or fibrosis, bile duct was normal.    IMPRESSION AND PLAN   1. Aortic dissection, now status post repair. 2. Gastrointestinal bleeding. Uncertain as to the significance. Certainly   one concern is mesenteric vascular compromise, although his lack of   symptoms with pain is against this. Another concern is that this might represent   coincidental peptic ulcer disease.  As discussed, the easiest thing to do   is go ahead and rule out ulcer disease with an EGD, and since he may   not tolerate a full colonoscopy prep, we could give him several enemas   and an attempt to do a flexible sigmoidoscopy to rule out any evidence   of vascular compromise in the lower colon. Plan to proceed with   endoscopy in the .. MD Niya Gonzalez / Igor Lundy   D:  02/04/2017   11:28   T:  02/04/2017   12:10   Job #:  [id number]

## 2017-02-04 NOTE — PROGRESS NOTES
1900 Report received from Brissa Brown, [address] Patient BP >120 notified Dr Royal Marin to obtain orders for prn antihypertensives, to restart nicardipine gtt. 2022 Pulmonary on call paged for CPAP orders   2027 Patient states that he uses home CPAP of 20. Notified Dr Giuseppe Kidd. Per Dr Giuseppe Kidd keep patient on O2 overnight, keep sats >88. If patient becomes bradycardic notify MD for orders. Have patient's family bring in his CPAP to verify in AM.  2051 Patient up with assistance of 1 to [facility]. Patient had large liquid melena mixed with bright red blood stool. Dr Giuseppe Kidd paged. 2103 Dr Giuseppe Kidd returned page to get stat H & H, call GI and place GI consult. 2324 Dr J Luis Marie from GI returned page, to start protonix gtt at 8cc/h, give 40mg protonix bolus, to notify CT surgery of issue. Give CT Surgeon his number if there are any questions or issues Dr Tha Argueta [phone].  9950 Protonix gtt and bolus started per order. 0522 Patient straight cathed 825 of urine out. 0530 Patient up OOB to chair. 3354 Patient  BP increasing. Dr Royal Marin paged. 0600 Per Dr Royal Marin hydralazine 2mg ivp every 2 hours as needed  0700 Bedshift change report given to Lindsey Escalera (oncoming nurse) by Shira Mireles (offgoing nurse). Report included the following information SBAR, Kardex, ED Summary, OR Summary, Procedure Summary, Intake/Output, MAR, Accordion, Recent Results, Med Rec Status, Cardiac Rhythm SR and Alarm Parameters .

## 2017-02-04 NOTE — PROGRESS NOTES
CM was informed by RN staff that patient will probably not be able to discharge until next week. Patient is currently on CCU. The [facility] contact is [place] [phone].      Thanks,  JAILENE Thomas Weekend  Ex [phone]

## 2017-02-04 NOTE — PROGRESS NOTES
0700  Bedside and verbal report from MARIO Hernández RN.  0800  Patient up in the chair; denies complaints. On cardene at max rate 15 mg/hr for BP control, but BP remains 140-150/systolic.  8512  Hydralazine 10 mg IV given for BP per prn order. 0900  Took diet well. Grunting at intervals, but denies complaints of pain when questioned. 0920  BP remains 150-160/systolic. Morphine 2 mg IV given for comfort. 1000  Dr. Augusto Peña and [place]. Allison Clark, [address] here to see patient. Will start po antihypertensives. 1100  Dr. Vinnie Kimble here to see patient; MD also discussed patient's case with Dr. Augusto Peña and [place]. Allison Clark. PA. Will put patient on clear liquid diet for the rest of today. Plan for EGD and possible limited colonoscopy in A.M.  1106  Medicated with hydralazine 10 mg IV for BP per prn order. Patient continues up in the chair. Denies complaints at this time. 1202  Medicated with percocet 5-325 mg x 2 tabs po for pain per patient request.  1300  Continues up in the chair. Took liquid diet well. Continues to require 17-23 units/hr of insulin per glucostabilizer protocol. 1400  Stood by chair and attempted to void, but was unsuccessful. Will bladder scan when he is back in the bed. 0  Walked in hallway with PT, then back to bed. Bladder scan reveals >433 ml.   1500  Dr. Augusto Peña notified that patient unable to void. Order to straight cath patient now. 1530  Straight cath performed - 450 ml seun urine returned. 1600  Resting at intervals. Continue to titrate cardene for BP control. 1743  Medicated with percocet 5-325 mg x 1 tab po per patient request.   3615  BP remains 130-140/systolic. Cardene increased to 15 mg/hr. Patient eating liquid dinner. States pain is \"better. \"  1900  Bedside and Verbal shift change report given to Chinyere Kebede RN (oncoming nurse) by Prince Dov RN (offgoing nurse).  Report included the following information SBAR, Kardex, Procedure Summary, Intake/Output, MAR, Accordion, Recent Results, Med Rec Status, Cardiac Rhythm NSR and Alarm Parameters .

## 2017-02-04 NOTE — PROGRESS NOTES
Cardiac Surgery ICU Progress Note    Admit Date: 2017    POD: 2 Days Post-Op      Problems:  Active Problems: Aortic dissection (HCC) (2017)      S/P ascending aortic replacement (2/3/2017)      Overview: EMERGENT ASCENDING AORTIC DISSECTION REPAIR       Right Femoral Artery Cannulation        Procedure:  Procedure(s):  EMERGENT ASCENDING AORTIC DISSECTION REPAIR, WITH EXTRACORPOREAL CIRCULATION. CATRINA DONE BY DR SCHNEIDER      Summary:     Stable hemodynamics in sinus rhythm without ectopy on no support. Cardene 15. IV Hydralazine ordered. Melena liquid stool last night with bright red blood x 1. Patient states he noted bright red blood in stool before surgery. Protonix infusion per GI. INR 1.1. PT 11.4 PTT 30. . Not NPO. Cr 1.98 BUN 42 up from 1.8 BUN 26. UOP 50cc/hr. OOB to chair. Feels well. Plan/Recommendations/Medical Decision Makin.GI to see today  2. Fluid support  3. Adjust HTN meds  5. Clear liquids/ EGD tomorrow  6. PO HTN meds  Anticipated acute blood loss anemia  Increase activity  Increase I/S effort and frequency  Sternal precautions  Stimulate bowel movement  Patient seen and reviewed with  Dr. Blue Whittaker MD       Objective:     Vitals:    Visit Vitals    /61    Pulse 79    Temp 98 °F (36.7 °C)    Resp 14    Ht 6' (1.829 m)    Wt 270 lb (122.5 kg)    SpO2 96%    BMI 36.62 kg/m2       Temp (24hrs), Av.8 °F (37.1 °C), Min:98 °F (36.7 °C), Max:99.6 °F (37.6 °C)      Hemodynamics:   CO: CO (l/min): 6 l/min   CI: CI (l/min/m2): 2.5 l/min/m2   CVP: CVP (mmHg): 17 mmHg (17 1200)   SVR:     PAP Systolic: PAP Systolic: 32 (22/06 1331)   PAP Diastolic: PAP Diastolic: 19 (58/12/06 0427)   PVR:     SV02: SVO2 (%): 53 % (17 1200)   SCV02:      CXR Results  (Last 48 hours)               17 0456  XR CHEST PORT Final result    Impression:  IMPRESSION: Interval line and tube removal as above. Otherwise, stable chest   x-ray appearance.                Narrative: EXAM:  XR CHEST PORT       INDICATION:  post op       COMPARISON:  2/3/2017       FINDINGS: A portable AP radiograph of the chest was obtained at 0430 hours. There is interval removal of the endotracheal tube and NG tube. The PA line has   been removed in the interval. A right IJ line is again shown terminating in the   superior vena cava. Median sternotomy wires are again demonstrated. There is   no consolidation or pulmonary edema demonstrated. The cardiac and mediastinal   contours are stable. 02/03/17 0121  XR CHEST PORT Final result    Impression:  IMPRESSION:    Appropriately positioned lines and tubes. Shallow volumes but grossly clear   lungs. Narrative:  PORTABLE CHEST RADIOGRAPH/S: 2/3/2017 1:21 AM       INDICATION: Postop ascending aortic replacement. COMPARISON: 2/2/2017. CTA chest 2/2/2017. TECHNIQUE: Portable frontal supine radiograph/s of the chest.       FINDINGS:    An ET tube, NG tube, right IJ pulmonary arterial catheter, right IJ central line   are in appropriate position. The lungs are hypoinflated, but grossly clear. 02/02/17 1313  XR CHEST PA LAT Final result    Impression:  Impression: No acute process. Narrative:  Exam:  2 view chest       Indication: Substernal chest pain for one hour       PA and lateral views demonstrate normal heart size. There is no acute process in   the lung fields. The osseous structures are unremarkable. CT Output: 400/12 hrs    Labs: Recent Labs      02/04/17   0740   02/04/17   0503   WBC   --    --   21.5*   HGB   --    --   10.3*   HCT   --    --   30.4*   PLT   --    --   167   NA   --    --   139   K   --    --   3.7   BUN   --    --   42*   CREA   --    --   1.98*   GLU   --    --   95   GLUCPOC  121*   < >   --    INR   --    --   1.1    < > = values in this interval not displayed.        Ventilator:  Ventilator Volumes  Vt Set (ml): 650 ml (02/03/17 0102)  Vt Exhaled (Machine Breath) (ml): 674 ml (02/03/17 0102)  Vt Spont (ml): 780 ml (02/03/17 0420)  Ve Observed (l/min): 9.14 l/min (02/03/17 0420)    Oxygen Therapy:  Oxygen Therapy  O2 Sat (%): 96 % (02/04/17 0800)  Pulse via Oximetry: 79 beats per minute (02/04/17 0800)  O2 Device: Nasal cannula (02/04/17 0600)  O2 Flow Rate (L/min): 2 l/min (02/04/17 0600)  FIO2 (%): 50 % (02/03/17 0420)      Admission Weight: Last Weight   Weight: 270 lb (122.5 kg) Weight: 270 lb (122.5 kg)     Intake / Output / Drain:  Current Shift: 02/04 0701 - 02/04 1900  In: -   Out: 100 [Drains:100]  Last 24 hrs.:   Intake/Output Summary (Last 24 hours) at 02/04/17 0851  Last data filed at 02/04/17 8121   Gross per 24 hour   Intake          5729.66 ml   Output             1850 ml   Net          3879.66 ml         EXAM:      General: OOB to chair, feels good      Chest:  Stable sternum      Incisions: dry and intact    Lungs:    Rhonchi bilaterally. Heart:  Regular rate and rhythm, S1, S2 normal, no murmur, no click,      Abdomen:   Soft, non-tender. Bowel sounds present. Extremities:   edema     Neurologic:  Gross motor and sensory apparatus intact.        Signed By: GAETANO Carcamo

## 2017-02-04 NOTE — PROGRESS NOTES
Problem: Mobility Impaired (Adult and Pediatric)  Goal: *Acute Goals and Plan of Care (Insert Text)  Physical Therapy Goals  Initiated 2/3/2017  1. Patient will move from supine to sit and sit to supine , scoot up and down and roll side to side in bed with modified independence within 7 days. 2. Patient will perform sit to/from stand with modified independence within 7 days. 3. Patient will ambulate 250 feet with least restrictive assistive device and modified independence within 7 days. 4. Patient will ascend/descend 5 stairs with 1 handrail(s) with modified independence within 7 days. 5. Patient will perform cardiac exercises per protocol with independence within 7 days. 6. Patient will verbally and functionally recall 3/3 sternal precautions within 7 days. PHYSICAL THERAPY TREATMENT  Patient: Mika Andrade (89 y.o. male)  Date: 2/4/2017  Diagnosis: aneursym  Aortic dissection (HCC) <principal problem not specified>  Procedure(s) (LRB):  EMERGENT ASCENDING AORTIC DISSECTION REPAIR, WITH EXTRACORPOREAL CIRCULATION. CATRINA DONE BY DR SCHNEIDER (N/A) 2 Days Post-Op  Precautions: Sternal      ASSESSMENT:  Pt received seated in bedside chair on 2 LPM O2 via NC and agreeable to participation with therapy. Pt able to verbalize and adhere to all sternal precautions throughout mobility. Pt required stand-by assist during sit>>stand transfer and ambulated 220ft total. Initial ambulation trial occurred w/ cardiac cart and stand-by assist with pt exhibiting decreased gait speed with steady gait overall. Progressed pt to ambulation without support of AD/cardiac cart with pt requiring CGA secondary to shuffled gait with increased trunk sway. Fair gait stability overall without support of cardiac cart. O2 sats 91-92% on 2 LPM O2 via NC during ambulation trial with all other VSS.  Continue to recommend pt return home w/ no further therapy needs however eventual OP Cardiac Rehab, per MD.     Progression toward goals:  [X] Improving appropriately and progressing toward goals  [ ]    Improving slowly and progressing toward goals  [ ]    Not making progress toward goals and plan of care will be adjusted       PLAN:  Patient continues to benefit from skilled intervention to address the above impairments. Continue treatment per established plan of care. Discharge Recommendations:  None  Further Equipment Recommendations for Discharge:  None       SUBJECTIVE:   Patient stated I've been up for awhile.       OBJECTIVE DATA SUMMARY:   Critical Behavior:  Neurologic State: Alert  Orientation Level: Oriented X4  Cognition: Appropriate for age attention/concentration, Follows commands     Functional Mobility Training:  Bed Mobility:        Sit to Supine: Minimum assistance  Scooting: Supervision        Transfers:  Sit to Stand: Stand-by asssistance  Stand to Sit: Stand-by asssistance                             Balance:  Sitting: Intact  Standing: Impaired  Standing - Static: Good  Standing - Dynamic : Fair  Ambulation/Gait Training:  Distance (ft): 220 Feet (ft)  Assistive Device: Gait belt  Ambulation - Level of Assistance: Contact guard assistance        Gait Abnormalities: Decreased step clearance;Shuffling gait;Trunk sway increased        Base of Support: Widened     Speed/Alva: Shuffled  Step Length: Left shortened;Right shortened                             Pt ambulated 110ft w/ cardiac cart and stand-by assist, exhibiting decreased gait speed however steady gait overall. Progressed pt to ambulation of 110ft w/o AD use and CGA, with pt exhibiting shuffled gait pattern with increase in trunk sway. Pain:  Pain Scale 1: Numeric (0 - 10)  Pain Intensity 1: 0  Pain Location 1: Chest  Pain Orientation 1: Mid  Pain Description 1: Aching; Sore  Pain Intervention(s) 1: Medication (see MAR)  Activity Tolerance:   VSS throughout on 2 LPM O2 via NC   Please refer to the flowsheet for vital signs taken during this treatment.   After treatment: [ ]    Patient left in no apparent distress sitting up in chair  [X]    Patient left in no apparent distress in bed  [X]    Call bell left within reach  [X]    Nursing notified  [ ]    Caregiver present  [ ]    Bed alarm activated      COMMUNICATION/COLLABORATION:   The patients plan of care was discussed with: Registered Nurse     Janeth Tubbs, PT, DPT   Time Calculation: 14 mins

## 2017-02-05 LAB
ANION GAP BLD CALC-SCNC: 11 MMOL/L (ref 5–15)
BUN SERPL-MCNC: 56 MG/DL (ref 6–20)
BUN/CREAT SERPL: 32 (ref 12–20)
CALCIUM SERPL-MCNC: 7.5 MG/DL (ref 8.5–10.1)
CHLORIDE SERPL-SCNC: 106 MMOL/L (ref 97–108)
CO2 SERPL-SCNC: 22 MMOL/L (ref 21–32)
CREAT SERPL-MCNC: 1.74 MG/DL (ref 0.7–1.3)
ERYTHROCYTE [DISTWIDTH] IN BLOOD BY AUTOMATED COUNT: 15.2 % (ref 11.5–14.5)
GLUCOSE BLD STRIP.AUTO-MCNC: 101 MG/DL (ref 65–100)
GLUCOSE BLD STRIP.AUTO-MCNC: 103 MG/DL (ref 65–100)
GLUCOSE BLD STRIP.AUTO-MCNC: 106 MG/DL (ref 65–100)
GLUCOSE BLD STRIP.AUTO-MCNC: 106 MG/DL (ref 65–100)
GLUCOSE BLD STRIP.AUTO-MCNC: 109 MG/DL (ref 65–100)
GLUCOSE BLD STRIP.AUTO-MCNC: 158 MG/DL (ref 65–100)
GLUCOSE BLD STRIP.AUTO-MCNC: 84 MG/DL (ref 65–100)
GLUCOSE BLD STRIP.AUTO-MCNC: 90 MG/DL (ref 65–100)
GLUCOSE BLD STRIP.AUTO-MCNC: 91 MG/DL (ref 65–100)
GLUCOSE BLD STRIP.AUTO-MCNC: 93 MG/DL (ref 65–100)
GLUCOSE BLD STRIP.AUTO-MCNC: 94 MG/DL (ref 65–100)
GLUCOSE BLD STRIP.AUTO-MCNC: 95 MG/DL (ref 65–100)
GLUCOSE BLD STRIP.AUTO-MCNC: 96 MG/DL (ref 65–100)
GLUCOSE BLD STRIP.AUTO-MCNC: 96 MG/DL (ref 65–100)
GLUCOSE BLD STRIP.AUTO-MCNC: 99 MG/DL (ref 65–100)
GLUCOSE BLD STRIP.AUTO-MCNC: 99 MG/DL (ref 65–100)
GLUCOSE SERPL-MCNC: 95 MG/DL (ref 65–100)
HCT VFR BLD AUTO: 27.1 % (ref 36.6–50.3)
HGB BLD-MCNC: 9.1 G/DL (ref 12.1–17)
MAGNESIUM SERPL-MCNC: 2.3 MG/DL (ref 1.6–2.4)
MCH RBC QN AUTO: 28.2 PG (ref 26–34)
MCHC RBC AUTO-ENTMCNC: 33.6 G/DL (ref 30–36.5)
MCV RBC AUTO: 83.9 FL (ref 80–99)
PLATELET # BLD AUTO: 138 K/UL (ref 150–400)
POTASSIUM SERPL-SCNC: 4 MMOL/L (ref 3.5–5.1)
RBC # BLD AUTO: 3.23 M/UL (ref 4.1–5.7)
SERVICE CMNT-IMP: ABNORMAL
SERVICE CMNT-IMP: NORMAL
SODIUM SERPL-SCNC: 139 MMOL/L (ref 136–145)
WBC # BLD AUTO: 18.1 K/UL (ref 4.1–11.1)

## 2017-02-05 PROCEDURE — 74011250637 HC RX REV CODE- 250/637: Performed by: THORACIC SURGERY (CARDIOTHORACIC VASCULAR SURGERY)

## 2017-02-05 PROCEDURE — 97116 GAIT TRAINING THERAPY: CPT | Performed by: PHYSICAL THERAPIST

## 2017-02-05 PROCEDURE — 0DB68ZX EXCISION OF STOMACH, VIA NATURAL OR ARTIFICIAL OPENING ENDOSCOPIC, DIAGNOSTIC: ICD-10-PCS | Performed by: SPECIALIST

## 2017-02-05 PROCEDURE — 74011636637 HC RX REV CODE- 636/637: Performed by: THORACIC SURGERY (CARDIOTHORACIC VASCULAR SURGERY)

## 2017-02-05 PROCEDURE — 77030019988 HC FCPS ENDOSC DISP BSC -B: Performed by: SPECIALIST

## 2017-02-05 PROCEDURE — 76040000019: Performed by: SPECIALIST

## 2017-02-05 PROCEDURE — 74011250636 HC RX REV CODE- 250/636: Performed by: THORACIC SURGERY (CARDIOTHORACIC VASCULAR SURGERY)

## 2017-02-05 PROCEDURE — 82962 GLUCOSE BLOOD TEST: CPT

## 2017-02-05 PROCEDURE — 80048 BASIC METABOLIC PNL TOTAL CA: CPT | Performed by: THORACIC SURGERY (CARDIOTHORACIC VASCULAR SURGERY)

## 2017-02-05 PROCEDURE — 88342 IMHCHEM/IMCYTCHM 1ST ANTB: CPT | Performed by: SPECIALIST

## 2017-02-05 PROCEDURE — C9113 INJ PANTOPRAZOLE SODIUM, VIA: HCPCS | Performed by: SPECIALIST

## 2017-02-05 PROCEDURE — 77010033678 HC OXYGEN DAILY

## 2017-02-05 PROCEDURE — 88305 TISSUE EXAM BY PATHOLOGIST: CPT | Performed by: SPECIALIST

## 2017-02-05 PROCEDURE — 74011000250 HC RX REV CODE- 250: Performed by: THORACIC SURGERY (CARDIOTHORACIC VASCULAR SURGERY)

## 2017-02-05 PROCEDURE — 74011250636 HC RX REV CODE- 250/636: Performed by: SPECIALIST

## 2017-02-05 PROCEDURE — 74011636637 HC RX REV CODE- 636/637: Performed by: PHYSICIAN ASSISTANT

## 2017-02-05 PROCEDURE — 74011000258 HC RX REV CODE- 258: Performed by: SPECIALIST

## 2017-02-05 PROCEDURE — 74011250637 HC RX REV CODE- 250/637: Performed by: PHYSICIAN ASSISTANT

## 2017-02-05 PROCEDURE — 74011250636 HC RX REV CODE- 250/636

## 2017-02-05 PROCEDURE — 65620000000 HC RM CCU GENERAL

## 2017-02-05 PROCEDURE — 74011000258 HC RX REV CODE- 258: Performed by: PHYSICIAN ASSISTANT

## 2017-02-05 PROCEDURE — 88312 SPECIAL STAINS GROUP 1: CPT | Performed by: SPECIALIST

## 2017-02-05 PROCEDURE — 51798 US URINE CAPACITY MEASURE: CPT

## 2017-02-05 PROCEDURE — 0DJD8ZZ INSPECTION OF LOWER INTESTINAL TRACT, VIA NATURAL OR ARTIFICIAL OPENING ENDOSCOPIC: ICD-10-PCS | Performed by: SPECIALIST

## 2017-02-05 PROCEDURE — 83735 ASSAY OF MAGNESIUM: CPT | Performed by: THORACIC SURGERY (CARDIOTHORACIC VASCULAR SURGERY)

## 2017-02-05 PROCEDURE — 76060000031 HC ANESTHESIA FIRST 0.5 HR: Performed by: SPECIALIST

## 2017-02-05 PROCEDURE — 36415 COLL VENOUS BLD VENIPUNCTURE: CPT | Performed by: THORACIC SURGERY (CARDIOTHORACIC VASCULAR SURGERY)

## 2017-02-05 PROCEDURE — 85027 COMPLETE CBC AUTOMATED: CPT | Performed by: THORACIC SURGERY (CARDIOTHORACIC VASCULAR SURGERY)

## 2017-02-05 PROCEDURE — 0DB58ZX EXCISION OF ESOPHAGUS, VIA NATURAL OR ARTIFICIAL OPENING ENDOSCOPIC, DIAGNOSTIC: ICD-10-PCS | Performed by: SPECIALIST

## 2017-02-05 PROCEDURE — 74011000258 HC RX REV CODE- 258: Performed by: THORACIC SURGERY (CARDIOTHORACIC VASCULAR SURGERY)

## 2017-02-05 RX ORDER — NALOXONE HYDROCHLORIDE 0.4 MG/ML
0.4 INJECTION, SOLUTION INTRAMUSCULAR; INTRAVENOUS; SUBCUTANEOUS
Status: ACTIVE | OUTPATIENT
Start: 2017-02-05 | End: 2017-02-05

## 2017-02-05 RX ORDER — MIDAZOLAM HYDROCHLORIDE 1 MG/ML
.25-1 INJECTION, SOLUTION INTRAMUSCULAR; INTRAVENOUS
Status: DISCONTINUED | OUTPATIENT
Start: 2017-02-05 | End: 2017-02-05

## 2017-02-05 RX ORDER — AMLODIPINE BESYLATE 5 MG/1
10 TABLET ORAL EVERY 12 HOURS
Status: DISCONTINUED | OUTPATIENT
Start: 2017-02-05 | End: 2017-02-06

## 2017-02-05 RX ORDER — FENTANYL CITRATE 50 UG/ML
INJECTION, SOLUTION INTRAMUSCULAR; INTRAVENOUS
Status: DISCONTINUED
Start: 2017-02-05 | End: 2017-02-05

## 2017-02-05 RX ORDER — INSULIN GLARGINE 100 [IU]/ML
33 INJECTION, SOLUTION SUBCUTANEOUS
Status: COMPLETED | OUTPATIENT
Start: 2017-02-05 | End: 2017-02-05

## 2017-02-05 RX ORDER — EPINEPHRINE 0.1 MG/ML
1 INJECTION INTRACARDIAC; INTRAVENOUS
Status: ACTIVE | OUTPATIENT
Start: 2017-02-05 | End: 2017-02-05

## 2017-02-05 RX ORDER — SODIUM CHLORIDE 0.9 % (FLUSH) 0.9 %
5-10 SYRINGE (ML) INJECTION AS NEEDED
Status: DISCONTINUED | OUTPATIENT
Start: 2017-02-05 | End: 2017-02-05

## 2017-02-05 RX ORDER — LORAZEPAM 2 MG/ML
2 INJECTION INTRAMUSCULAR AS NEEDED
Status: DISCONTINUED | OUTPATIENT
Start: 2017-02-05 | End: 2017-02-05

## 2017-02-05 RX ORDER — SODIUM CHLORIDE 9 MG/ML
50 INJECTION, SOLUTION INTRAVENOUS CONTINUOUS
Status: DISCONTINUED | OUTPATIENT
Start: 2017-02-05 | End: 2017-02-05

## 2017-02-05 RX ORDER — ATROPINE SULFATE 0.1 MG/ML
0.5 INJECTION INTRAVENOUS
Status: ACTIVE | OUTPATIENT
Start: 2017-02-05 | End: 2017-02-05

## 2017-02-05 RX ORDER — MIDAZOLAM HYDROCHLORIDE 1 MG/ML
INJECTION, SOLUTION INTRAMUSCULAR; INTRAVENOUS
Status: DISCONTINUED
Start: 2017-02-05 | End: 2017-02-05

## 2017-02-05 RX ORDER — SODIUM CHLORIDE 0.9 % (FLUSH) 0.9 %
5-10 SYRINGE (ML) INJECTION EVERY 8 HOURS
Status: COMPLETED | OUTPATIENT
Start: 2017-02-05 | End: 2017-02-05

## 2017-02-05 RX ORDER — DEXTROMETHORPHAN/PSEUDOEPHED 2.5-7.5/.8
1.2 DROPS ORAL
Status: DISCONTINUED | OUTPATIENT
Start: 2017-02-05 | End: 2017-02-05 | Stop reason: ALTCHOICE

## 2017-02-05 RX ORDER — HYDRALAZINE HYDROCHLORIDE 25 MG/1
25 TABLET, FILM COATED ORAL EVERY 6 HOURS
Status: DISCONTINUED | OUTPATIENT
Start: 2017-02-05 | End: 2017-02-06

## 2017-02-05 RX ORDER — FENTANYL CITRATE 50 UG/ML
200 INJECTION, SOLUTION INTRAMUSCULAR; INTRAVENOUS
Status: ACTIVE | OUTPATIENT
Start: 2017-02-05 | End: 2017-02-05

## 2017-02-05 RX ORDER — FLUMAZENIL 0.1 MG/ML
0.2 INJECTION INTRAVENOUS
Status: ACTIVE | OUTPATIENT
Start: 2017-02-05 | End: 2017-02-05

## 2017-02-05 RX ADMIN — SODIUM CHLORIDE 50 ML/HR: 900 INJECTION, SOLUTION INTRAVENOUS at 10:40

## 2017-02-05 RX ADMIN — ASPIRIN 81 MG 81 MG: 81 TABLET ORAL at 08:54

## 2017-02-05 RX ADMIN — MIDAZOLAM HYDROCHLORIDE 2 MG: 1 INJECTION, SOLUTION INTRAMUSCULAR; INTRAVENOUS at 09:42

## 2017-02-05 RX ADMIN — HYDRALAZINE HYDROCHLORIDE 10 MG: 20 INJECTION INTRAMUSCULAR; INTRAVENOUS at 07:38

## 2017-02-05 RX ADMIN — INSULIN LISPRO 4 UNITS: 100 INJECTION, SOLUTION INTRAVENOUS; SUBCUTANEOUS at 21:00

## 2017-02-05 RX ADMIN — FENTANYL CITRATE 50 MCG: 50 INJECTION, SOLUTION INTRAMUSCULAR; INTRAVENOUS at 09:42

## 2017-02-05 RX ADMIN — HYDRALAZINE HYDROCHLORIDE 10 MG: 20 INJECTION INTRAMUSCULAR; INTRAVENOUS at 15:24

## 2017-02-05 RX ADMIN — HYDRALAZINE HYDROCHLORIDE 10 MG: 10 TABLET, FILM COATED ORAL at 06:20

## 2017-02-05 RX ADMIN — METOPROLOL TARTRATE 25 MG: 25 TABLET ORAL at 19:29

## 2017-02-05 RX ADMIN — HYDRALAZINE HYDROCHLORIDE 10 MG: 20 INJECTION INTRAMUSCULAR; INTRAVENOUS at 04:41

## 2017-02-05 RX ADMIN — CHLORHEXIDINE GLUCONATE 15 ML: 1.2 RINSE ORAL at 01:01

## 2017-02-05 RX ADMIN — MUPIROCIN 1 G: 20 OINTMENT TOPICAL at 22:08

## 2017-02-05 RX ADMIN — HYDRALAZINE HYDROCHLORIDE 10 MG: 20 INJECTION INTRAMUSCULAR; INTRAVENOUS at 21:01

## 2017-02-05 RX ADMIN — Medication 12 MG/HR: at 04:11

## 2017-02-05 RX ADMIN — Medication 10 MG/HR: at 22:15

## 2017-02-05 RX ADMIN — MIDAZOLAM HYDROCHLORIDE 1 MG: 1 INJECTION, SOLUTION INTRAMUSCULAR; INTRAVENOUS at 09:45

## 2017-02-05 RX ADMIN — MUPIROCIN: 20 OINTMENT TOPICAL at 08:53

## 2017-02-05 RX ADMIN — Medication 10 ML: at 22:09

## 2017-02-05 RX ADMIN — CHLORHEXIDINE GLUCONATE 15 ML: 1.2 RINSE ORAL at 14:10

## 2017-02-05 RX ADMIN — SODIUM CHLORIDE 8 MG/HR: 900 INJECTION, SOLUTION INTRAVENOUS at 22:32

## 2017-02-05 RX ADMIN — SODIUM CHLORIDE 8 MG/HR: 900 INJECTION, SOLUTION INTRAVENOUS at 01:05

## 2017-02-05 RX ADMIN — Medication 10 ML: at 10:40

## 2017-02-05 RX ADMIN — Medication 15 MG/HR: at 08:47

## 2017-02-05 RX ADMIN — SODIUM CHLORIDE 500 ML: 450 INJECTION, SOLUTION INTRAVENOUS at 10:03

## 2017-02-05 RX ADMIN — AMLODIPINE BESYLATE 10 MG: 5 TABLET ORAL at 08:54

## 2017-02-05 RX ADMIN — SODIUM CHLORIDE 8 MG/HR: 900 INJECTION, SOLUTION INTRAVENOUS at 10:39

## 2017-02-05 RX ADMIN — HYDRALAZINE HYDROCHLORIDE 25 MG: 25 TABLET, FILM COATED ORAL at 17:46

## 2017-02-05 RX ADMIN — Medication 10 ML: at 05:39

## 2017-02-05 RX ADMIN — SODIUM CHLORIDE 8 MG/HR: 900 INJECTION, SOLUTION INTRAVENOUS at 15:17

## 2017-02-05 RX ADMIN — AMLODIPINE BESYLATE 10 MG: 5 TABLET ORAL at 19:28

## 2017-02-05 RX ADMIN — AMIODARONE HYDROCHLORIDE 400 MG: 200 TABLET ORAL at 19:29

## 2017-02-05 RX ADMIN — HYDRALAZINE HYDROCHLORIDE 10 MG: 10 TABLET, FILM COATED ORAL at 01:01

## 2017-02-05 RX ADMIN — Medication 15 MG/HR: at 12:13

## 2017-02-05 RX ADMIN — FENTANYL CITRATE 25 MCG: 50 INJECTION, SOLUTION INTRAMUSCULAR; INTRAVENOUS at 09:51

## 2017-02-05 RX ADMIN — AMIODARONE HYDROCHLORIDE 400 MG: 200 TABLET ORAL at 08:53

## 2017-02-05 RX ADMIN — HYDRALAZINE HYDROCHLORIDE 10 MG: 10 TABLET, FILM COATED ORAL at 11:56

## 2017-02-05 RX ADMIN — METOPROLOL TARTRATE 25 MG: 25 TABLET ORAL at 08:54

## 2017-02-05 RX ADMIN — INSULIN GLARGINE 33 UNITS: 100 INJECTION, SOLUTION SUBCUTANEOUS at 15:59

## 2017-02-05 RX ADMIN — Medication 12 MG/HR: at 15:56

## 2017-02-05 RX ADMIN — Medication 10 ML: at 14:10

## 2017-02-05 RX ADMIN — SODIUM CHLORIDE 8 MG/HR: 900 INJECTION, SOLUTION INTRAVENOUS at 06:19

## 2017-02-05 NOTE — ROUTINE PROCESS
sbarTRANSFER - OUT REPORT:    Verbal report given to Saadia on Rondel Leyden  being transferred to [phone] for routine progression of care       Report consisted of patients Situation, Background, Assessment and   Recommendations(SBAR). Information from the following report(s) Procedure Summary was reviewed with the receiving nurse. Opportunity for questions and clarification was provided.       Patient transported with:

## 2017-02-05 NOTE — PROCEDURES
EGD Procedure Note    Indications:  Melena/hematochezia   Referring Physician: PROVIDER UNKNOWN   Anesthesia/Sedation:Versed 3mg and Fentanyl 75 mcg  Endoscopist:  Dr. Madie Wakefield  Assistant:  Endoscopy Technician-1: Angela Angel  Endoscopy RN-1: Antione Sutherland    Preoperative diagnosis: melana    Postoperative diagnosis: hiatial hernia. kedar lesions,  schatzki's ring with erosions, diverticulosis      Procedure in Detail:  Informed consent was obtained for the procedure, including sedation. Risks of perforation, hemorrhage, adverse drug reaction, and aspiration were discussed. The patient was placed in the left lateral decubitus position. Based on the pre-procedure assessment, including review of the patient's medical history, medications, allergies, and review of systems, he had been deemed to be an appropriate candidate for moderate sedation; he was therefore sedated with the medications listed above. The patient was monitored continuously with ECG tracing, pulse oximetry, blood pressure monitoring, and direct observations. An Olympus video endoscope was inserted into the mouth and advanced under direct vision to into the esophagus, then stomach and duodenum. A careful inspection was made as the gastroscope was withdrawn, including a retroflexed view of the proximal stomach; findings and interventions are described below. Findings:No blood in UGI tract   Esophagus:Schatzki's ring- wide caliber not dilated;  - several associated tiny  Erosions - Bx  Stomach: Small hiatal hernia with prominent Kedar lesions - bx and stain for H.pylori  Duodenum/jejunum: normal    Therapies:  See above    Specimens: see above           EBL: None    Complications:   None; patient tolerated the procedure well.            Recommendations:  Protonix, Carafate - not convinced this is source of bleeding    Adriana Mireles MD                 Flexible Sigmoidoscopy Procedure Note    Indications: Hematochezia/melena  Referring Physician: PROVIDER UNKNOWN   Anesthesia/Sedation:same as above  Endoscopist:  Dr. Jose Martin Pickering  Assistant:  Endoscopy Technician-1: Sheela Goldstein  Endoscopy RN-1: Huseyin Mathur    Preoperative diagnosis: melana    Postoperative diagnosis: hiatial hernia. rodney lesions,  schatzki's ring with erosions, diverticulosis, external hemorrhoids - nonbleeding      Procedure in Detail:  Informed consent was obtained for the procedure, including sedation. Risks of perforation, hemorrhage, adverse drug reaction, and aspiration were discussed. The patient was placed in the left lateral decubitus position. Based on the pre-procedure assessment, including review of the patient's medical history, medications, allergies, and review of systems, he had been deemed to be an appropriate candidate for moderate sedation; he was therefore sedated with the medications listed above. The patient was monitored continuously with ECG tracing, pulse oximetry, blood pressure monitoring, and direct observations. A rectal examination was performed. The [id number] was inserted into the rectum and advanced under direct vision to the distal sigmoid colon. The quality of the colonic preparation was poor. A careful inspection was made as the colonoscope was withdrawn; findings and interventions are described below. Findings:small scattered streaks of blood amongst brown stool  Rectum: Grade 1 external hemorrhoid(s); Sigmoid: advanced only to distal sigmoid further advance prevented by formed stool, (+) diverticula    Specimens:     none    EBL: None    Complications: None; patient tolerated the procedure well. Recommendations:     - Repeat colonoscopy this admission once recovered from surgery    - Source of bleed not clear, will follow, favor LGI bleeding source such as mild ischemia, diverticular, neoplasia or AVM.  Bleeding scan rather than CTA for further significant bleeding due to renal disease    - Protonix, Carafate    Signed By: Fox Sampson MD                        February 5, 2017

## 2017-02-05 NOTE — PROGRESS NOTES
0730  Received verbal report from Jermaine Alevism, assumed care of patient. Labs and orders reviewed close observation maintained. Radha Puente 0740  Hydralazine 10 mg adminsitered IV,  Cardene drip increased to 15. BP in the 160's Close observation main tained. 0845  Endoscopy team at bedside. Patient assisted back into bed with two person assist.  Patient tolerated well and signed consent for EGD and Partial colonoscopy. 0930  Gastroenterologist at bedside for procedure. 1200  Patient resting comfortably, no signs of acute distress noted. Dr. Royal Marin at bedside and updated on patients condition. Orders received. 1525  Hydralazine 10 mg administered IV, Cardne decreased to 12 mg,  Close observation maintained. BP at 144/42    1800  Patient continues in chair resting comfortably no signs of acute distress noted vss.

## 2017-02-05 NOTE — PROGRESS NOTES
Problem: Mobility Impaired (Adult and Pediatric)  Goal: *Acute Goals and Plan of Care (Insert Text)  Physical Therapy Goals  Initiated 2/3/2017  1. Patient will move from supine to sit and sit to supine , scoot up and down and roll side to side in bed with modified independence within 7 days. 2. Patient will perform sit to/from stand with modified independence within 7 days. 3. Patient will ambulate 250 feet with least restrictive assistive device and modified independence within 7 days. 4. Patient will ascend/descend 5 stairs with 1 handrail(s) with modified independence within 7 days. 5. Patient will perform cardiac exercises per protocol with independence within 7 days. 6. Patient will verbally and functionally recall 3/3 sternal precautions within 7 days. PHYSICAL THERAPY TREATMENT  Patient: Frederick Sheehan (53 y.o. male)  Date: 2/5/2017  Diagnosis: aneursym  Aortic dissection (HCC)  melana <principal problem not specified>  Procedure(s) (LRB):  ESOPHAGOGASTRODUODENOSCOPY (EGD) (N/A)  SIGMOIDOSCOPY FLEXIBLE (N/A)  ESOPHAGOGASTRODUODENAL (EGD) BIOPSY (N/A) Day of Surgery  Precautions: Sternal      ASSESSMENT:  Patient continues to make progress with mobility, activity tolerance. Bed mobility with CGA. Sit to stand with CGA. Patient ambulated 80' with cardiac cart and CGA. Patient ambulates slowly, but is steady. Patient up in chair after ambulating. Patient able to recall 3/3 sternal precautions. Recommend hhPT at discharge. Progression toward goals:  [X]    Improving appropriately and progressing toward goals  [ ]    Improving slowly and progressing toward goals  [ ]    Not making progress toward goals and plan of care will be adjusted       PLAN:  Patient continues to benefit from skilled intervention to address the above impairments. Continue treatment per established plan of care.   Discharge Recommendations:  Home Health  Further Equipment Recommendations for Discharge:  Likely none SUBJECTIVE:   Patient stated I can walk.       OBJECTIVE DATA SUMMARY:   Critical Behavior:  Neurologic State: Alert  Orientation Level: Oriented X4  Cognition: Appropriate decision making, Appropriate for age attention/concentration, Appropriate safety awareness     Functional Mobility Training:  Bed Mobility:  Rolling: Contact guard assistance  Supine to Sit: Contact guard assistance     Scooting: Supervision        Transfers:  Sit to Stand: Contact guard assistance  Stand to Sit: Stand-by asssistance        Bed to Chair: Contact guard assistance                    Balance:  Sitting: Intact  Standing: Impaired  Standing - Static: Good  Standing - Dynamic : Fair  Ambulation/Gait Training:  Distance (ft): 160 Feet (ft)  Assistive Device: Gait belt (cardiac cart)  Ambulation - Level of Assistance: Contact guard assistance        Gait Abnormalities: Decreased step clearance              Speed/Alva: Pace decreased (<100 feet/min)  Step Length: Left shortened;Right shortened                                         Pain:  Pain Scale 1: Numeric (0 - 10)  Pain Intensity 1: 0  Pain Location 1: Chest  Pain Orientation 1: Mid  Pain Description 1: Aching; Sore  Pain Intervention(s) 1: Medication (see MAR)  Activity Tolerance:   improving  Please refer to the flowsheet for vital signs taken during this treatment.   After treatment:   [X]    Patient left in no apparent distress sitting up in chair  [ ]    Patient left in no apparent distress in bed  [X]    Call bell left within reach  [X]    Nursing notified  [X]    Caregiver present  [ ]    Bed alarm activated      COMMUNICATION/COLLABORATION:   The patients plan of care was discussed with: Registered Nurse     Ayah Parson, PT   Time Calculation: 21 mins

## 2017-02-05 NOTE — PROGRESS NOTES
1915:  Bedside report from [facility]. VSS. No complaints of pain or SOB. Family in [address] at this time. 11cc D50 given and insulin drip on standby. 2200:   after D50  Requiring Insulin rate to return to 8. 2u/hr. 0530:  First enema done with deep red output in large amount. 0620:  Second enema finished. Not output yet. 6289:  Pt sitting up on BSC, having large amount deep red output with clots. No complaint of pain in abd. BP up to 160's sys. Cardene gtt increased to 15mg. 0700: Bedside report given to [address] [address] [address] [address].

## 2017-02-05 NOTE — PROGRESS NOTES
[facility]  Pulmonary, Critical Care, and Sleep Medicine    Name: Saray Almazan MRN: [id number]   : 1965 Hospital: [facility]   Date: 2017        Critical Care Patient Consult  Seen about 730am.     IMPRESSION:   · S/p repair of ascending aortic dissection, had RFA cannulation. · Presented with GI bleed in ER, now had additional bleeding from lower gi tract, When seen this am was being prepped for Endoscopy. · Presented with chest pain  · Mild renal insufficiency with Cr of 1.74.  · Anemia, has bee pretty stable. · ARLEN  · Obesity  · Ex Smoker  · Occasional Etoh      RECOMMENDATIONS:   · Wean oxygen  · Monitor Cr  · Per cardiac surgery  · Incentive spirometry  · Will assist while in the ICU     Subjective/History:   No acute changes noted. Was sitting up this am, on NC oxygen. He is feeling better. No acute complaints. No back pain, has expected chest pain. This patient has been seen and evaluated at the request of Dr. Keenan Mcclendon for above. Patient is a 45 y.o. male has mid post op chest pain. No abdominal pain, no leg pain. No other acute complaints. Feels breathing is comfortable. Started having symptoms yesterday. He denies any other acute complaints. Works as a . Events in ER and post er were reviewed. ROS of 10 systems is otherwise negative. Past Medical History   Diagnosis Date    Ill-defined condition      sleep apnea      History reviewed. No pertinent past surgical history.    Prior to Admission medications    Not on File     Current Facility-Administered Medications   Medication Dose Route Frequency    fentaNYL citrate (PF) 50 mcg/mL injection        midazolam (VERSED) 1 mg/mL injection        0.9% sodium chloride infusion  50 mL/hr IntraVENous CONTINUOUS    sodium chloride (NS) flush 5-10 mL  5-10 mL IntraVENous Q8H    niCARdipine (CARDENE) 50 mg in 0.9% sodium chloride 250 mL infusion  0-15 mg/hr IntraVENous TITRATE    metoprolol tartrate (LOPRESSOR) tablet 25 mg  25 mg Oral Q12H    amLODIPine (NORVASC) tablet 10 mg  10 mg Oral DAILY    hydrALAZINE (APRESOLINE) tablet 10 mg  10 mg Oral Q6H    sodium chloride (NS) flush 5-10 mL  5-10 mL IntraVENous Q8H    mupirocin (BACTROBAN) 2 % ointment   Both Nostrils BID    amiodarone (CORDARONE) tablet 400 mg  400 mg Oral Q12H    aspirin chewable tablet 81 mg  81 mg Oral DAILY    insulin regular (NOVOLIN R, HUMULIN R) 100 Units in 0.9% sodium chloride 100 mL infusion  1-50 Units/hr IntraVENous TITRATE    insulin lispro (HUMALOG) injection   SubCUTAneous TIDAC    insulin lispro (HUMALOG) injection   SubCUTAneous AC&HS    0.45% sodium chloride infusion 10 mL  10 mL IntraVENous CONTINUOUS    chlorhexidine (PERIDEX) 0.12 % mouthwash 15 mL  15 mL Oral Q12H    0.9% sodium chloride infusion  100 mL/hr IntraVENous CONTINUOUS    pantoprazole (PROTONIX) 40 mg in 0.9% sodium chloride (MBP/ADV) 50 mL  8 mg/hr IntraVENous CONTINUOUS    amiodarone (CORDARONE) 450 mg in dextrose 5% 250 mL infusion  0.5-1 mg/min IntraVENous TITRATE     No Known Allergies   Social History   Substance Use Topics    Smoking status: Former Smoker    Smokeless tobacco: Not on file    Alcohol use No      History reviewed. No pertinent family history. Review of Systems:  A comprehensive review of systems was negative. Objective:   Vital Signs:    Visit Vitals    /59    Pulse 80    Temp 97.7 °F (36.5 °C)    Resp 21    Ht 6' (1.829 m)    Wt 122.5 kg (270 lb)    SpO2 94%    BMI 36.62 kg/m2       O2 Device: Nasal cannula   O2 Flow Rate (L/min): 2 l/min   Temp (24hrs), Av.9 °F (36.6 °C), Min:97.6 °F (36.4 °C), Max:98.6 °F (37 °C)       Intake/Output:   Last shift:      701 - 1900  In: 389.7 [I.V.:389.7]  Out: 250 [Urine:250]  Last 3 shifts: 1901 -  0700  In: 9723.3 [P.O.:2000;  I.V.:7723.3]  Out: 6969 [XMWQI:9915; Drains:850]    Intake/Output Summary (Last 24 hours) at 02/05/17 0958  Last data filed at 02/05/17 0900   Gross per 24 hour   Intake          6243.59 ml   Output             1350 ml   Net          4893.59 ml     Hemodynamics:   PAP: PAP Systolic: 32 (81/22/87 4262) CO: CO (l/min): 6 l/min (02/03/17 1200)   Wedge:   CI: CI (l/min/m2): 2.5 l/min/m2 (02/03/17 1200)   CVP:  CVP (mmHg): 17 mmHg (02/03/17 1200) SVR:       PVR:       Ventilator Settings:  Mode Rate Tidal Volume Pressure FiO2 PEEP   Spontaneous   650 ml  5 cm H2O 50 % 5 cm H20     Peak airway pressure: 11 cm H2O    Minute ventilation: 9.14 l/min      Physical Exam:    General:  Alert, cooperative, no distress, appears stated age. Obese gentleman. Sitting up in chair. No distress. Head:  Normocephalic, without obvious abnormality, atraumatic. Eyes:  Conjunctivae/corneas clear. PERRL, EOMs intact. Nose: Nares normal. Septum midline. Mucosa normal. No drainage or sinus tenderness. Throat: Lips, mucosa, and tongue normal. Teeth and gums normal.   Neck: Supple, symmetrical, trachea midline, no adenopathy, thyroid: no enlargment/tenderness/nodules, no carotid bruit and no JVD. Back:   Symmetric, no curvature. ROM normal.   Lungs:   Clear to auscultation bilaterally. Decreased BS in bases. Chest wall:  No tenderness or deformity. Sternal dressing is intact. Heart:  Regular rate and rhythm, S1, S2 normal, no murmur, click, rub or gallop. Abdomen:   Soft, non-tender. Bowel sounds normal. No masses,  No organomegaly. Extremities: Extremities normal, atraumatic, has chronic venous insufficiency changes. Pulses: 2+ and symmetric all extremities.    Skin: Skin color, texture, turgor normal. No rashes or lesions   Lymph nodes: Cervical, supraclavicular, and axillary nodes normal.   Neurologic: Grossly nonfocal       Data:     Recent Results (from the past 24 hour(s))   GLUCOSE, POC    Collection Time: 02/04/17 11:01 AM   Result Value Ref Range    Glucose (POC) 187 (H) 65 - 100 mg/dL    Performed by Rosmery Coleman, POC    Collection Time: 02/04/17 12:07 PM   Result Value Ref Range    Glucose (POC) 184 (H) 65 - 100 mg/dL    Performed by Rosmery Coleman, POC    Collection Time: 02/04/17  1:06 PM   Result Value Ref Range    Glucose (POC) 154 (H) 65 - 100 mg/dL    Performed by Rosmery Coleman, POC    Collection Time: 02/04/17  2:07 PM   Result Value Ref Range    Glucose (POC) 167 (H) 65 - 100 mg/dL    Performed by Rosmery Coleman, POC    Collection Time: 02/04/17  3:19 PM   Result Value Ref Range    Glucose (POC) 108 (H) 65 - 100 mg/dL    Performed by Rosmery Coleman, POC    Collection Time: 02/04/17  4:16 PM   Result Value Ref Range    Glucose (POC) 95 65 - 100 mg/dL    Performed by Rosmery Coleman, POC    Collection Time: 02/04/17  5:08 PM   Result Value Ref Range    Glucose (POC) 86 65 - 100 mg/dL    Performed by Rosmery Coleman, POC    Collection Time: 02/04/17  6:04 PM   Result Value Ref Range    Glucose (POC) 109 (H) 65 - 100 mg/dL    Performed by Rosmery Coleman, POC    Collection Time: 02/04/17  7:08 PM   Result Value Ref Range    Glucose (POC) 160 (H) 65 - 100 mg/dL    Performed by Rosmery Coleman, POC    Collection Time: 02/04/17  8:24 PM   Result Value Ref Range    Glucose (POC) 111 (H) 65 - 100 mg/dL    Performed by [address] [place] [address], POC    Collection Time: 02/04/17  9:36 PM   Result Value Ref Range    Glucose (POC) 73 65 - 100 mg/dL    Performed by [address] [place] [address], POC    Collection Time: 02/04/17  9:55 PM   Result Value Ref Range    Glucose (POC) 103 (H) 65 - 100 mg/dL    Performed by [address] [place] [address], POC    Collection Time: 02/04/17 11:00 PM   Result Value Ref Range    Glucose (POC) 95 65 - 100 mg/dL    Performed by Artemio Nelson    GLUCOSE, POC    Collection Time: 02/05/17 12:08 AM   Result Value Ref Range    Glucose (POC) 84 65 - 100 mg/dL Performed by Heavenly Kohler    GLUCOSE, POC    Collection Time: 02/05/17  1:11 AM   Result Value Ref Range    Glucose (POC) 91 65 - 100 mg/dL    Performed by [address] [place] [address], POC    Collection Time: 02/05/17  2:15 AM   Result Value Ref Range    Glucose (POC) 94 65 - 100 mg/dL    Performed by Seferino Reis    GLUCOSE, POC    Collection Time: 02/05/17  3:13 AM   Result Value Ref Range    Glucose (POC) 93 65 - 100 mg/dL    Performed by [address] [place] [address], POC    Collection Time: 02/05/17  4:18 AM   Result Value Ref Range    Glucose (POC) 96 65 - 100 mg/dL    Performed by Heavenly Kohler    CBC W/O DIFF    Collection Time: 02/05/17  4:38 AM   Result Value Ref Range    WBC 18.1 (H) 4.1 - 11.1 K/uL    RBC 3.23 (L) 4.10 - 5.70 M/uL    HGB 9.1 (L) 12.1 - 17.0 g/dL    HCT 27.1 (L) 36.6 - 50.3 %    MCV 83.9 80.0 - 99.0 FL    MCH 28.2 26.0 - 34.0 PG    MCHC 33.6 30.0 - 36.5 g/dL    RDW 15.2 (H) 11.5 - 14.5 %    PLATELET 908 (L) 214 - 368 K/uL   METABOLIC PANEL, BASIC    Collection Time: 02/05/17  4:38 AM   Result Value Ref Range    Sodium 139 136 - 145 mmol/L    Potassium 4.0 3.5 - 5.1 mmol/L    Chloride 106 97 - 108 mmol/L    CO2 22 21 - 32 mmol/L    Anion gap 11 5 - 15 mmol/L    Glucose 95 65 - 100 mg/dL    BUN 56 (H) 6 - 20 MG/DL    Creatinine 1.74 (H) 0.70 - 1.30 MG/DL    BUN/Creatinine ratio 32 (H) 12 - 20      GFR est AA 50 (L) >60 ml/min/1.73m2    GFR est non-AA 41 (L) >60 ml/min/1.73m2    Calcium 7.5 (L) 8.5 - 10.1 MG/DL   MAGNESIUM    Collection Time: 02/05/17  4:38 AM   Result Value Ref Range    Magnesium 2.3 1.6 - 2.4 mg/dL   GLUCOSE, POC    Collection Time: 02/05/17  5:23 AM   Result Value Ref Range    Glucose (POC) 99 65 - 100 mg/dL    Performed by [facility], POC    Collection Time: 02/05/17  6:26 AM   Result Value Ref Range    Glucose (POC) 109 (H) 65 - 100 mg/dL    Performed by Seferino Reis    GLUCOSE, POC    Collection Time: 02/05/17  7:31 AM   Result Value Ref Range Glucose (POC) 96 65 - 100 mg/dL    Performed by Devendra Posey    GLUCOSE, POC    Collection Time: 02/05/17  8:51 AM   Result Value Ref Range    Glucose (POC) 106 (H) 65 - 100 mg/dL    Performed by Devendra Posey              Telemetry:Paced    Imaging:  I have personally reviewed the patients radiographs and have reviewed the reports:  2-3-17: FINDINGS:   An ET tube, NG tube, right IJ pulmonary arterial catheter, right IJ central line  are in appropriate position. The lungs are hypoinflated, but grossly clear.      IMPRESSION:   Appropriately positioned lines and tubes. Shallow volumes but grossly clear  lungs.        Swapnil Gentile MD

## 2017-02-05 NOTE — PROGRESS NOTES
Endoscopy shows no gut ischemia though exam limited by incomplete prep in colon. He's hungry. /42. Sinus rhythm 81/min. Skin sat 94%. Still has considerable peripheral edema. No rhonchi, no rales. Periphery is warm. Pulses intact. Hct 27. WBC 18.  Plat 138. K 4.0.  BUN 56. Creat 1.7. Chest tubes drained 320 ml/24h. Clear liquid diet. Increase oral antihypertensives.

## 2017-02-06 ENCOUNTER — APPOINTMENT (OUTPATIENT)
Dept: GENERAL RADIOLOGY | Age: 52
DRG: 219 | End: 2017-02-06
Attending: THORACIC SURGERY (CARDIOTHORACIC VASCULAR SURGERY)
Payer: COMMERCIAL

## 2017-02-06 ENCOUNTER — APPOINTMENT (OUTPATIENT)
Dept: ULTRASOUND IMAGING | Age: 52
DRG: 219 | End: 2017-02-06
Attending: INTERNAL MEDICINE
Payer: COMMERCIAL

## 2017-02-06 LAB
ABO + RH BLD: NORMAL
ALBUMIN SERPL BCP-MCNC: 2.9 G/DL (ref 3.5–5)
ALBUMIN/GLOB SERPL: 1 {RATIO} (ref 1.1–2.2)
ALP SERPL-CCNC: 63 U/L (ref 45–117)
ALT SERPL-CCNC: 15 U/L (ref 12–78)
ANION GAP BLD CALC-SCNC: 12 MMOL/L (ref 5–15)
ANION GAP BLD CALC-SCNC: 18 MMOL/L (ref 5–15)
APPEARANCE UR: CLEAR
APPEARANCE UR: NORMAL
ARTERIAL PATENCY WRIST A: ABNORMAL
ARTERIAL PATENCY WRIST A: YES
AST SERPL W P-5'-P-CCNC: 49 U/L (ref 15–37)
BACTERIA URNS QL MICRO: NEGATIVE /HPF
BACTERIA URNS QL MICRO: NORMAL /HPF
BASE DEFICIT BLDA-SCNC: 10.6 MMOL/L
BASE DEFICIT BLDA-SCNC: 4.7 MMOL/L
BDY SITE: ABNORMAL
BDY SITE: ABNORMAL
BILIRUB SERPL-MCNC: 0.8 MG/DL (ref 0.2–1)
BILIRUB UR QL CFM: NEGATIVE
BILIRUB UR QL: NORMAL
BLD PROD TYP BPU: NORMAL
BLOOD GROUP ANTIBODIES SERPL: NORMAL
BPU ID: NORMAL
BREATHS.SPONTANEOUS ON VENT: 18
BUN SERPL-MCNC: 80 MG/DL (ref 6–20)
BUN SERPL-MCNC: 89 MG/DL (ref 6–20)
BUN/CREAT SERPL: 24 (ref 12–20)
BUN/CREAT SERPL: 34 (ref 12–20)
CALCIUM SERPL-MCNC: 7.8 MG/DL (ref 8.5–10.1)
CALCIUM SERPL-MCNC: 8 MG/DL (ref 8.5–10.1)
CHLORIDE SERPL-SCNC: 102 MMOL/L (ref 97–108)
CHLORIDE SERPL-SCNC: 102 MMOL/L (ref 97–108)
CK SERPL-CCNC: 795 U/L (ref 39–308)
CO2 SERPL-SCNC: 14 MMOL/L (ref 21–32)
CO2 SERPL-SCNC: 19 MMOL/L (ref 21–32)
COLOR UR: NORMAL
COLOR UR: NORMAL
CREAT SERPL-MCNC: 2.33 MG/DL (ref 0.7–1.3)
CREAT SERPL-MCNC: 3.64 MG/DL (ref 0.7–1.3)
CREAT UR-MCNC: 308 MG/DL
CROSSMATCH RESULT,%XM: NORMAL
EOSINOPHIL #/AREA URNS HPF: NEGATIVE /[HPF]
EPITH CASTS URNS QL MICRO: NORMAL /LPF
EPITH CASTS URNS QL MICRO: NORMAL /LPF
EST. AVERAGE GLUCOSE BLD GHB EST-MCNC: 151 MG/DL
GAS FLOW.O2 O2 DELIVERY SYS: 4 L/MIN
GAS FLOW.O2 O2 DELIVERY SYS: 4 L/MIN
GLOBULIN SER CALC-MCNC: 3 G/DL (ref 2–4)
GLUCOSE BLD STRIP.AUTO-MCNC: 166 MG/DL (ref 65–100)
GLUCOSE BLD STRIP.AUTO-MCNC: 194 MG/DL (ref 65–100)
GLUCOSE BLD STRIP.AUTO-MCNC: 293 MG/DL (ref 65–100)
GLUCOSE BLD STRIP.AUTO-MCNC: 296 MG/DL (ref 65–100)
GLUCOSE SERPL-MCNC: 216 MG/DL (ref 65–100)
GLUCOSE SERPL-MCNC: 240 MG/DL (ref 65–100)
GLUCOSE UR STRIP.AUTO-MCNC: NEGATIVE MG/DL
GLUCOSE UR STRIP.AUTO-MCNC: NORMAL MG/DL
HBA1C MFR BLD: 6.9 % (ref 4.2–6.3)
HCO3 BLDA-SCNC: 13 MMOL/L (ref 22–26)
HCO3 BLDA-SCNC: 18 MMOL/L (ref 22–26)
HGB UR QL STRIP: NEGATIVE
HGB UR QL STRIP: NORMAL
KETONES UR QL STRIP.AUTO: NEGATIVE MG/DL
KETONES UR QL STRIP.AUTO: NORMAL MG/DL
LACTATE SERPL-SCNC: 6.3 MMOL/L (ref 0.4–2)
LEUKOCYTE ESTERASE UR QL STRIP.AUTO: NEGATIVE
LEUKOCYTE ESTERASE UR QL STRIP.AUTO: NORMAL
MAGNESIUM SERPL-MCNC: 3 MG/DL (ref 1.6–2.4)
NITRITE UR QL STRIP.AUTO: NEGATIVE
NITRITE UR QL STRIP.AUTO: NORMAL
PCO2 BLDA: 23 MMHG (ref 35–45)
PCO2 BLDA: 27 MMHG (ref 35–45)
PH BLDA: 7.35 [PH] (ref 7.35–7.45)
PH BLDA: 7.44 [PH] (ref 7.35–7.45)
PH UR STRIP: 5 [PH] (ref 5–8)
PH UR STRIP: NORMAL [PH]
PO2 BLDA: 75 MMHG (ref 80–100)
PO2 BLDA: 78 MMHG (ref 80–100)
POTASSIUM SERPL-SCNC: 4.4 MMOL/L (ref 3.5–5.1)
POTASSIUM SERPL-SCNC: 4.8 MMOL/L (ref 3.5–5.1)
PROT SERPL-MCNC: 5.9 G/DL (ref 6.4–8.2)
PROT UR STRIP-MCNC: NEGATIVE MG/DL
PROT UR STRIP-MCNC: NORMAL MG/DL
PROT UR-MCNC: 33 MG/DL (ref 0–11.9)
PROT/CREAT UR-RTO: 0.1
RBC #/AREA URNS HPF: NORMAL /HPF
RBC #/AREA URNS HPF: NORMAL /HPF (ref 0–5)
SAO2 % BLD: 95 % (ref 92–97)
SAO2 % BLD: 96 % (ref 92–97)
SAO2% DEVICE SAO2% SENSOR NAME: ABNORMAL
SAO2% DEVICE SAO2% SENSOR NAME: ABNORMAL
SERVICE CMNT-IMP: ABNORMAL
SODIUM SERPL-SCNC: 133 MMOL/L (ref 136–145)
SODIUM SERPL-SCNC: 134 MMOL/L (ref 136–145)
SODIUM UR-SCNC: 10 MMOL/L
SP GR UR REFRACTOMETRY: 1.02 (ref 1–1.03)
SP GR UR REFRACTOMETRY: NORMAL
SP GR UR REFRACTOMETRY: NORMAL
SPECIMEN EXP DATE BLD: NORMAL
SPECIMEN SITE: ABNORMAL
SPECIMEN SITE: ABNORMAL
STATUS OF UNIT,%ST: NORMAL
UNIT DIVISION, %UDIV: 0
UROBILINOGEN UR QL STRIP.AUTO: 0.2 EU/DL (ref 0.2–1)
UROBILINOGEN UR QL STRIP.AUTO: NORMAL EU/DL
WBC URNS QL MICRO: NORMAL /HPF
WBC URNS QL MICRO: NORMAL /HPF (ref 0–4)

## 2017-02-06 PROCEDURE — 83036 HEMOGLOBIN GLYCOSYLATED A1C: CPT | Performed by: INTERNAL MEDICINE

## 2017-02-06 PROCEDURE — 97110 THERAPEUTIC EXERCISES: CPT

## 2017-02-06 PROCEDURE — 74011250637 HC RX REV CODE- 250/637: Performed by: THORACIC SURGERY (CARDIOTHORACIC VASCULAR SURGERY)

## 2017-02-06 PROCEDURE — 71010 XR CHEST PORT: CPT

## 2017-02-06 PROCEDURE — 97116 GAIT TRAINING THERAPY: CPT

## 2017-02-06 PROCEDURE — 83735 ASSAY OF MAGNESIUM: CPT | Performed by: THORACIC SURGERY (CARDIOTHORACIC VASCULAR SURGERY)

## 2017-02-06 PROCEDURE — 74011250636 HC RX REV CODE- 250/636: Performed by: SPECIALIST

## 2017-02-06 PROCEDURE — 74011000258 HC RX REV CODE- 258: Performed by: THORACIC SURGERY (CARDIOTHORACIC VASCULAR SURGERY)

## 2017-02-06 PROCEDURE — 74011636637 HC RX REV CODE- 636/637: Performed by: PHYSICIAN ASSISTANT

## 2017-02-06 PROCEDURE — 36600 WITHDRAWAL OF ARTERIAL BLOOD: CPT | Performed by: THORACIC SURGERY (CARDIOTHORACIC VASCULAR SURGERY)

## 2017-02-06 PROCEDURE — 74011636637 HC RX REV CODE- 636/637: Performed by: INTERNAL MEDICINE

## 2017-02-06 PROCEDURE — 74011000250 HC RX REV CODE- 250: Performed by: INTERNAL MEDICINE

## 2017-02-06 PROCEDURE — 93308 TTE F-UP OR LMTD: CPT

## 2017-02-06 PROCEDURE — 74011000250 HC RX REV CODE- 250: Performed by: THORACIC SURGERY (CARDIOTHORACIC VASCULAR SURGERY)

## 2017-02-06 PROCEDURE — C9113 INJ PANTOPRAZOLE SODIUM, VIA: HCPCS | Performed by: INTERNAL MEDICINE

## 2017-02-06 PROCEDURE — 81001 URINALYSIS AUTO W/SCOPE: CPT | Performed by: INTERNAL MEDICINE

## 2017-02-06 PROCEDURE — 74011250637 HC RX REV CODE- 250/637: Performed by: INTERNAL MEDICINE

## 2017-02-06 PROCEDURE — 74011000250 HC RX REV CODE- 250

## 2017-02-06 PROCEDURE — 36600 WITHDRAWAL OF ARTERIAL BLOOD: CPT | Performed by: INTERNAL MEDICINE

## 2017-02-06 PROCEDURE — 74011250636 HC RX REV CODE- 250/636: Performed by: INTERNAL MEDICINE

## 2017-02-06 PROCEDURE — 83605 ASSAY OF LACTIC ACID: CPT | Performed by: THORACIC SURGERY (CARDIOTHORACIC VASCULAR SURGERY)

## 2017-02-06 PROCEDURE — 74011250637 HC RX REV CODE- 250/637: Performed by: PHYSICIAN ASSISTANT

## 2017-02-06 PROCEDURE — 65620000000 HC RM CCU GENERAL

## 2017-02-06 PROCEDURE — 74011000258 HC RX REV CODE- 258: Performed by: SPECIALIST

## 2017-02-06 PROCEDURE — 84156 ASSAY OF PROTEIN URINE: CPT | Performed by: INTERNAL MEDICINE

## 2017-02-06 PROCEDURE — 84300 ASSAY OF URINE SODIUM: CPT | Performed by: INTERNAL MEDICINE

## 2017-02-06 PROCEDURE — 80048 BASIC METABOLIC PNL TOTAL CA: CPT | Performed by: THORACIC SURGERY (CARDIOTHORACIC VASCULAR SURGERY)

## 2017-02-06 PROCEDURE — 36415 COLL VENOUS BLD VENIPUNCTURE: CPT | Performed by: THORACIC SURGERY (CARDIOTHORACIC VASCULAR SURGERY)

## 2017-02-06 PROCEDURE — 82962 GLUCOSE BLOOD TEST: CPT

## 2017-02-06 PROCEDURE — 87205 SMEAR GRAM STAIN: CPT | Performed by: INTERNAL MEDICINE

## 2017-02-06 PROCEDURE — 76770 US EXAM ABDO BACK WALL COMP: CPT

## 2017-02-06 PROCEDURE — 82803 BLOOD GASES ANY COMBINATION: CPT | Performed by: THORACIC SURGERY (CARDIOTHORACIC VASCULAR SURGERY)

## 2017-02-06 PROCEDURE — 82550 ASSAY OF CK (CPK): CPT | Performed by: INTERNAL MEDICINE

## 2017-02-06 PROCEDURE — C9113 INJ PANTOPRAZOLE SODIUM, VIA: HCPCS | Performed by: SPECIALIST

## 2017-02-06 PROCEDURE — 74011250636 HC RX REV CODE- 250/636: Performed by: THORACIC SURGERY (CARDIOTHORACIC VASCULAR SURGERY)

## 2017-02-06 PROCEDURE — 80053 COMPREHEN METABOLIC PANEL: CPT | Performed by: THORACIC SURGERY (CARDIOTHORACIC VASCULAR SURGERY)

## 2017-02-06 RX ORDER — AMLODIPINE BESYLATE 5 MG/1
5 TABLET ORAL EVERY 12 HOURS
Status: DISCONTINUED | OUTPATIENT
Start: 2017-02-06 | End: 2017-02-07

## 2017-02-06 RX ORDER — SODIUM BICARBONATE 1 MEQ/ML
SYRINGE (ML) INTRAVENOUS
Status: COMPLETED
Start: 2017-02-06 | End: 2017-02-06

## 2017-02-06 RX ORDER — BUMETANIDE 0.25 MG/ML
4 INJECTION INTRAMUSCULAR; INTRAVENOUS ONCE
Status: COMPLETED | OUTPATIENT
Start: 2017-02-06 | End: 2017-02-06

## 2017-02-06 RX ORDER — BUMETANIDE 0.25 MG/ML
2 INJECTION INTRAMUSCULAR; INTRAVENOUS ONCE
Status: COMPLETED | OUTPATIENT
Start: 2017-02-06 | End: 2017-02-06

## 2017-02-06 RX ORDER — METOPROLOL TARTRATE 50 MG/1
50 TABLET ORAL EVERY 12 HOURS
Status: DISCONTINUED | OUTPATIENT
Start: 2017-02-06 | End: 2017-02-08

## 2017-02-06 RX ORDER — SODIUM BICARBONATE 1 MEQ/ML
100 SYRINGE (ML) INTRAVENOUS ONCE
Status: COMPLETED | OUTPATIENT
Start: 2017-02-06 | End: 2017-02-06

## 2017-02-06 RX ORDER — HYDRALAZINE HYDROCHLORIDE 25 MG/1
50 TABLET, FILM COATED ORAL EVERY 6 HOURS
Status: DISCONTINUED | OUTPATIENT
Start: 2017-02-06 | End: 2017-02-08

## 2017-02-06 RX ORDER — INSULIN GLARGINE 100 [IU]/ML
40 INJECTION, SOLUTION SUBCUTANEOUS DAILY
Status: DISCONTINUED | OUTPATIENT
Start: 2017-02-06 | End: 2017-02-09 | Stop reason: CLARIF

## 2017-02-06 RX ADMIN — MUPIROCIN: 20 OINTMENT TOPICAL at 20:20

## 2017-02-06 RX ADMIN — Medication 6 MG/HR: at 23:38

## 2017-02-06 RX ADMIN — MUPIROCIN: 20 OINTMENT TOPICAL at 08:51

## 2017-02-06 RX ADMIN — HYDRALAZINE HYDROCHLORIDE 50 MG: 25 TABLET, FILM COATED ORAL at 11:33

## 2017-02-06 RX ADMIN — AMIODARONE HYDROCHLORIDE 400 MG: 200 TABLET ORAL at 20:13

## 2017-02-06 RX ADMIN — OXYCODONE HYDROCHLORIDE AND ACETAMINOPHEN 2 TABLET: 5; 325 TABLET ORAL at 17:14

## 2017-02-06 RX ADMIN — HYDRALAZINE HYDROCHLORIDE 25 MG: 25 TABLET, FILM COATED ORAL at 00:03

## 2017-02-06 RX ADMIN — Medication 10 ML: at 22:15

## 2017-02-06 RX ADMIN — HYDRALAZINE HYDROCHLORIDE 50 MG: 25 TABLET, FILM COATED ORAL at 17:12

## 2017-02-06 RX ADMIN — CHLORHEXIDINE GLUCONATE 15 ML: 1.2 RINSE ORAL at 13:31

## 2017-02-06 RX ADMIN — Medication 10 MG/HR: at 02:54

## 2017-02-06 RX ADMIN — HYDRALAZINE HYDROCHLORIDE 25 MG: 25 TABLET, FILM COATED ORAL at 06:00

## 2017-02-06 RX ADMIN — INSULIN LISPRO 9 UNITS: 100 INJECTION, SOLUTION INTRAVENOUS; SUBCUTANEOUS at 11:16

## 2017-02-06 RX ADMIN — SODIUM BICARBONATE 50 MEQ: 84 INJECTION, SOLUTION INTRAVENOUS at 13:32

## 2017-02-06 RX ADMIN — BUMETANIDE 4 MG: 0.25 INJECTION INTRAMUSCULAR; INTRAVENOUS at 13:49

## 2017-02-06 RX ADMIN — SODIUM CHLORIDE 40 MG: 9 INJECTION INTRAMUSCULAR; INTRAVENOUS; SUBCUTANEOUS at 20:16

## 2017-02-06 RX ADMIN — SODIUM CHLORIDE 8 MG/HR: 900 INJECTION, SOLUTION INTRAVENOUS at 02:38

## 2017-02-06 RX ADMIN — CHLORHEXIDINE GLUCONATE 15 ML: 1.2 RINSE ORAL at 00:06

## 2017-02-06 RX ADMIN — Medication 15 MG/HR: at 11:30

## 2017-02-06 RX ADMIN — INSULIN GLARGINE 40 UNITS: 100 INJECTION, SOLUTION SUBCUTANEOUS at 10:42

## 2017-02-06 RX ADMIN — METOPROLOL TARTRATE 50 MG: 50 TABLET ORAL at 08:04

## 2017-02-06 RX ADMIN — HYDRALAZINE HYDROCHLORIDE 10 MG: 20 INJECTION INTRAMUSCULAR; INTRAVENOUS at 02:45

## 2017-02-06 RX ADMIN — INSULIN LISPRO 9 UNITS: 100 INJECTION, SOLUTION INTRAVENOUS; SUBCUTANEOUS at 08:09

## 2017-02-06 RX ADMIN — ASPIRIN 81 MG 81 MG: 81 TABLET ORAL at 08:55

## 2017-02-06 RX ADMIN — Medication 10 ML: at 06:30

## 2017-02-06 RX ADMIN — AMLODIPINE BESYLATE 5 MG: 5 TABLET ORAL at 20:13

## 2017-02-06 RX ADMIN — METOPROLOL TARTRATE 50 MG: 50 TABLET ORAL at 20:13

## 2017-02-06 RX ADMIN — AMLODIPINE BESYLATE 10 MG: 5 TABLET ORAL at 08:05

## 2017-02-06 RX ADMIN — INSULIN LISPRO 4 UNITS: 100 INJECTION, SOLUTION INTRAVENOUS; SUBCUTANEOUS at 16:08

## 2017-02-06 RX ADMIN — Medication 15 MG/HR: at 07:21

## 2017-02-06 RX ADMIN — BUMETANIDE 2 MG: 0.25 INJECTION INTRAMUSCULAR; INTRAVENOUS at 10:43

## 2017-02-06 RX ADMIN — AMIODARONE HYDROCHLORIDE 400 MG: 200 TABLET ORAL at 08:05

## 2017-02-06 RX ADMIN — Medication 50 MEQ: at 13:32

## 2017-02-06 RX ADMIN — HYDRALAZINE HYDROCHLORIDE 10 MG: 20 INJECTION INTRAMUSCULAR; INTRAVENOUS at 06:58

## 2017-02-06 RX ADMIN — SODIUM CHLORIDE 8 MG/HR: 900 INJECTION, SOLUTION INTRAVENOUS at 07:23

## 2017-02-06 RX ADMIN — INSULIN LISPRO 4 UNITS: 100 INJECTION, SOLUTION INTRAVENOUS; SUBCUTANEOUS at 22:21

## 2017-02-06 RX ADMIN — Medication 10 ML: at 13:34

## 2017-02-06 RX ADMIN — WATER: 1000 INJECTION, SOLUTION INTRAVENOUS at 15:55

## 2017-02-06 NOTE — CONSULTS
Consultation Note    NAME: Mika Andrade   :  1965   MRN:  [id number]     Date/Time:  2017 10:12 AM    I have been asked to see this patient by Dr. Júnior Medina  for advice/opinion re: FELICE. Assessment :    Plan:  FELICE  HTN  Anemia  Mild hyponatremia  Mild metabolic acidosis  ascending aortic dissection /3 Creatinine on the rise (1.7 to 2.3); , CTA on ; net positive by 14 liters; no post op hypotension    Will check FeNa, urinalysis, urine eos; recheck cpk    Still on Cardene, on amlodipine 5 bid, hydralazine 50 qid, metoprolol 50 bid    I think he would benefit from an attempt at diuresis (will assess response to bumex)        Subjective:   CHIEF COMPLAINT:  felice    HISTORY OF PRESENT ILLNESS:     Mulugeta Palma is a 45 y.o.   male who has a history of obesity and sleep apnea. No known dm or htn (I suspect he had both prior to admission). He is s/p ascending aortic dissection. Currently oob in chair. Denies complaint. Not very talkative. Denied ONTIVEROS. Not sob. No cp. No n/v/d. Tolerating fluids. Legs quite swollen. Past Medical History   Diagnosis Date    Ill-defined condition      sleep apnea      History reviewed. No pertinent past surgical history. Social History   Substance Use Topics    Smoking status: Former Smoker    Smokeless tobacco: Not on file    Alcohol use No      History reviewed. No pertinent family history.    No Known Allergies   Prior to Admission medications    Not on File     REVIEW OF SYSTEMS:     []  Unable to obtain reliable ROS due to  [] mental status  [] sedated   [] intubated   [x] Total of 12 systems reviewed as follows:  Constitutional: negative fever, negative chills, negative weight loss  Eyes:   negative visual changes  ENT:   negative sore throat, tongue or lip swelling  Respiratory:  negative cough, negative dyspnea  Cards:  negative for chest pain, palpitations, +++ lower extremity edema  GI:   negative for nausea, vomiting, diarrhea, and abdominal pain  :  negative for frequency, dysuria  Integument:  negative for rash and pruritus  Heme:  negative for easy bruising and gum/nose bleeding  Musculoskel: negative for myalgias,  back pain and muscle weakness  Neuro:  negative for headaches, dizziness, vertigo  Psych:  negative for feelings of anxiety, depression   Travel?: none    Objective:   VITALS:    Visit Vitals    /41 (BP 1 Location: Right arm, BP Patient Position: At rest)    Pulse (!) 58    Temp 97.7 °F (36.5 °C)    Resp 21    Ht 6' (1.829 m)    Wt 122.5 kg (270 lb)    SpO2 93%    BMI 36.62 kg/m2     PHYSICAL EXAM:  Gen:  []  WD []  WN  [] cachectic []  thin [x]  obese []  disheveled             [x]  ill apearing  []   Critical  []   Chronic    [x]  No acute distress    HEENT:   [x] NC/AT/PERRLA/EOMI    [] pink conjunctivae      [] pale conjunctivae                  PERRL  [] yes  [] no      [] moist mucosa    [] dry mucosa    hearing intact to voice [x] yes  [] No                 NECK:   supple [x] yes  [] no        masses [] yes  [x] No               thyroid  []  non tender  []  tender    RESP:   [x] CTA bilaterally/no wheezing/rhonchi/a few basilar rales/crackles    [] rhonchi bilaterally - no dullness  [] wheezing   [] rhonchi   [] crackles     use of accessory muscles [] yes [] no    CARD:   [x]  regular rate and rhythm/No murmurs/rubs/gallops    murmur  [] yes ()  [] no      Rubs  [] yes  [] no       Gallops [] yes  [] no    Rate []  regular  []  irregular        carotid bruits  [] Right  []  Left                 LE edema +++ [x] yes  [] no           JVP  []  yes   [x]  no    ABD:    [x] soft/non distended/non tender/+bowel sounds/no HSM    []  Rigid    tenderness [] yes [] no   Liver enlargement  []  yes []  no                Spleen enlargement  []  yes []  no     distended []  yes [] no     bowel sound  [] hypoactive   [] hyperactive    LYMPH:    Neck []  yes [x]  no       Axillae []  yes []  no    SKIN:   Rashes []  yes   [x]  no Ulcers []  yes   []  no               [] tight to palpitation    skin turgor [x]  good  [] poor  [] decreased               Cyanosis/clubbing []  yes []  no    NEUR:   [x] cranial nerves II-XII grossly intact       [] Cranial nerves deficit                 []  facial droop    []  slurred speech   [] aphasic     [] Strength normal     []  weakness  []  LUE  []   RUE/ []  LLE  []   RLE    follows commands  [x]  yes []  no           PSYCH:   insight [] poor [x] good   Alert and Oriented to  [x] person  [x] place  [x]  time                    [] depressed [] anxious [] agitated  [] lethargic [] stuporous  [] sedated     LAB DATA REVIEWED:    Recent Labs      02/05/17   0438  02/04/17   0503   WBC  18.1*  21.5*   HGB  9.1*  10.3*   HCT  27.1*  30.4*   PLT  138*  167     Recent Labs      02/06/17   0341 02/05/17   0438  02/04/17   0503   NA  133*  139  139   K  4.4  4.0  3.7   CL  102  106  104   CO2  19*  22  25   BUN  80*  56*  42*   CREA  2.33*  1.74*  1.98*   GLU  240*  95  95   CA  8.0*  7.5*  8.0*   MG   --   2.3  2.4     Recent Labs      02/06/17   0341  02/04/17   0503   SGOT  49*  62*   ALT  15  34   AP  63  50   TBILI  0.8  0.3   ALB  2.9*  3.1*   GLOB  3.0  2.9     Recent Labs      02/04/17   0503   INR  1.1   PTP  11.4*   APTT  30.6      No results for input(s): FE, TIBC, PSAT, FERR in the last 72 hours. No results for input(s): PH, PCO2, PO2 in the last 72 hours. No results for input(s): CPK, CKMB in the last 72 hours. No lab exists for component: TROPONINI  Lab Results   Component Value Date/Time    Glucose (POC) 293 02/06/2017 08:05 AM    Glucose (POC) 158 02/05/2017 08:53 PM    Glucose (POC) 101 02/05/2017 05:42 PM    Glucose (POC) 106 02/05/2017 04:20 PM    Glucose (POC) 99 02/05/2017 03:13 PM       Procedures: see electronic medical records for all procedures/Xrays and details which were not copied into this note but were reviewed prior to creation of Plan. ________________________________________________________________________       ___________________________________________________  Consulting Physician:  Sueanne Rias, MD

## 2017-02-06 NOTE — PROGRESS NOTES
..Primary Nurse Nena Castro RN and Willie Hopson RN performed a dual skin assessment on this patient. Skin is intact with the exception of sternal incision. Zi score is 17.

## 2017-02-06 NOTE — DIABETES MGMT
[facility] Cardiac Surgery Progress Note    Recommendations/ Comments:  Pt off insulin gtt yesterday- transitioned with lantus 33units. BG this am 293. Pt reviewed in rounds. Plan to check an a1c, add lantus 40units daily. Pt may also benefit from prandial humalog. Will monitor po intake and BG. Chart reviewed on [employer]. Patient is 45 y.o. male s/p Cardiac Surgery. POD 3. No known Hx Type 2 Diabetes per H&P. A1c:   No results found for: HBA1C, HGBE8, DBK0AVWW, HKR9MYTG      Recent Glucose Results:   Lab Results   Component Value Date/Time     (H) 02/06/2017 01:27 PM     (H) 02/06/2017 03:41 AM    GLUCPOC 296 (H) 02/06/2017 11:15 AM    GLUCPOC 293 (H) 02/06/2017 08:05 AM    GLUCPOC 158 (H) 02/05/2017 08:53 PM        Lab Results   Component Value Date/Time    Creatinine 3.64 02/06/2017 01:27 PM       Active Orders   Diet    DIET CLEAR LIQUID        PO intake:   Patient Vitals for the past 72 hrs:   % Diet Eaten   02/04/17 1800 100 %   02/04/17 1302 90 %   02/04/17 0923 90 %   02/03/17 1830 100 %       Current DM medications. humalog correction    Will continue to follow as needed. Thank you.   Roberto Arenas RN, [address]

## 2017-02-06 NOTE — INTERDISCIPLINARY ROUNDS
Interdisciplinary team rounds were held with the following team members:Case Management, Diabetes Treatment Specialist, Nursing, Nutrition, Occupational Therapy, Physical Therapy,Pharmacy, Physician and Respiratory Therapy. Plan of care discussed. See clinical pathway and/or care plan for interventions and desired outcomes.     Goals: check A1c, adjust insulin dosage, continue cardene gtt

## 2017-02-06 NOTE — PROGRESS NOTES
Gastroenterology Progress Note    2/6/2017    Admit Date: 2/2/2017    Subjective: Follow up for:  1)  GIB        Patient is on Clear Liquid. Pain: Patient complains of abdominal pain yes. The pain is located throughout the abd. Not affected by liquid diet. No N/V. Started this AM. The pain is described aching, and is mild in intensity. Bowel Movements: None  +Flatus    There is no bleeding x yesterday    Has more pulmonary edema and renal insufficiency.     Current Facility-Administered Medications   Medication Dose Route Frequency    metoprolol tartrate (LOPRESSOR) tablet 50 mg  50 mg Oral Q12H    hydrALAZINE (APRESOLINE) tablet 50 mg  50 mg Oral Q6H    amLODIPine (NORVASC) tablet 5 mg  5 mg Oral Q12H    hydrALAZINE (APRESOLINE) 20 mg/mL injection 10 mg  10 mg IntraVENous Q2H PRN    niCARdipine (CARDENE) 50 mg in 0.9% sodium chloride 250 mL infusion  0-15 mg/hr IntraVENous TITRATE    sodium chloride (NS) flush 5-10 mL  5-10 mL IntraVENous Q8H    acetaminophen (TYLENOL) tablet 650 mg  650 mg Oral Q4H PRN    oxyCODONE-acetaminophen (PERCOCET) 5-325 mg per tablet 2 Tab  2 Tab Oral Q4H PRN    morphine injection 2 mg  2 mg IntraVENous Q2H PRN    mupirocin (BACTROBAN) 2 % ointment   Both Nostrils BID    amiodarone (CORDARONE) tablet 400 mg  400 mg Oral Q12H    albuterol (PROVENTIL VENTOLIN) nebulizer solution 1.25-2.5 mg  1.25-2.5 mg Nebulization Q4H PRN    aspirin chewable tablet 81 mg  81 mg Oral DAILY    ELECTROLYTE REPLACEMENT PROTOCOL  1 Each Other PRN    insulin regular (NOVOLIN R, HUMULIN R) 100 Units in 0.9% sodium chloride 100 mL infusion  1-50 Units/hr IntraVENous TITRATE    glucose chewable tablet 16 g  4 Tab Oral PRN    dextrose (D50W) injection syrg 12.5-25 g  12.5-25 g IntraVENous PRN    glucagon (GLUCAGEN) injection 1 mg  1 mg IntraMUSCular PRN    insulin lispro (HUMALOG) injection   SubCUTAneous TIDAC    insulin lispro (HUMALOG) injection   SubCUTAneous AC&HS  albumin human 5% (BUMINATE) solution 12.5 g  12.5 g IntraVENous Q1H PRN    calcium gluconate 2 g in 0.9% sodium chloride 100 mL IVPB  2 g IntraVENous PRN    chlorhexidine (PERIDEX) 0.12 % mouthwash 15 mL  15 mL Oral Q12H    pantoprazole (PROTONIX) 40 mg in 0.9% sodium chloride (MBP/ADV) 50 mL  8 mg/hr IntraVENous CONTINUOUS        Objective:     Blood pressure 130/41, pulse (!) 58, temperature 97.7 °F (36.5 °C), resp. rate 21, height 6' (1.829 m), weight 122.5 kg (270 lb), SpO2 93 %. 02/06 0701 - 02/06 1900  In: 255 [I.V.:255]  Out: -     02/04 1901 - 02/06 0700  In: 6296.3 [P.O.:1700;  I.V.:4596.3]  Out: 1610 [Urine:1250; Drains:360]    EXAM:  GENERAL: obese, sitting in chair, dosing off to sleep, easily arousable, NAD, HEENT: NCAT, HEART: RRR LUNGS NC in place, faint bilateral crackles, decreased breath sounds, ABDOMEN:  Post operative, obese, hypoactive bowel sounds, non tender NEURO:A&Ox3, answers questions appropriatesly and EXTREMITY:3+ pitting edema    Data Review    Recent Results (from the past 24 hour(s))   GLUCOSE, POC    Collection Time: 02/05/17 10:51 AM   Result Value Ref Range    Glucose (POC) 103 (H) 65 - 100 mg/dL    Performed by [facility]    GLUCOSE, POC    Collection Time: 02/05/17 12:54 PM   Result Value Ref Range    Glucose (POC) 90 65 - 100 mg/dL    Performed by [employer]    GLUCOSE, POC    Collection Time: 02/05/17  2:08 PM   Result Value Ref Range    Glucose (POC) 95 65 - 100 mg/dL    Performed by [employer]    GLUCOSE, POC    Collection Time: 02/05/17  3:13 PM   Result Value Ref Range    Glucose (POC) 99 65 - 100 mg/dL    Performed by [employer]    GLUCOSE, POC    Collection Time: 02/05/17  4:20 PM   Result Value Ref Range    Glucose (POC) 106 (H) 65 - 100 mg/dL    Performed by Marin Garcia    GLUCOSE, POC    Collection Time: 02/05/17  5:42 PM   Result Value Ref Range    Glucose (POC) 101 (H) 65 - 100 mg/dL    Performed by Beau Hernandez, POC    Collection Time: 02/05/17  8:53 PM   Result Value Ref Range    Glucose (POC) 158 (H) 65 - 100 mg/dL    Performed by Finesse Diaz    METABOLIC PANEL, COMPREHENSIVE    Collection Time: 02/06/17  3:41 AM   Result Value Ref Range    Sodium 133 (L) 136 - 145 mmol/L    Potassium 4.4 3.5 - 5.1 mmol/L    Chloride 102 97 - 108 mmol/L    CO2 19 (L) 21 - 32 mmol/L    Anion gap 12 5 - 15 mmol/L    Glucose 240 (H) 65 - 100 mg/dL    BUN 80 (H) 6 - 20 MG/DL    Creatinine 2.33 (H) 0.70 - 1.30 MG/DL    BUN/Creatinine ratio 34 (H) 12 - 20      GFR est AA 36 (L) >60 ml/min/1.73m2    GFR est non-AA 30 (L) >60 ml/min/1.73m2    Calcium 8.0 (L) 8.5 - 10.1 MG/DL    Bilirubin, total 0.8 0.2 - 1.0 MG/DL    ALT (SGPT) 15 12 - 78 U/L    AST (SGOT) 49 (H) 15 - 37 U/L    Alk. phosphatase 63 45 - 117 U/L    Protein, total 5.9 (L) 6.4 - 8.2 g/dL    Albumin 2.9 (L) 3.5 - 5.0 g/dL    Globulin 3.0 2.0 - 4.0 g/dL    A-G Ratio 1.0 (L) 1.1 - 2.2     GLUCOSE, POC    Collection Time: 02/06/17  8:05 AM   Result Value Ref Range    Glucose (POC) 293 (H) 65 - 100 mg/dL    Performed by Finesse Diaz      Recent Labs      02/05/17   0438  02/04/17   0503   WBC  18.1*  21.5*   HGB  9.1*  10.3*   HCT  27.1*  30.4*   PLT  138*  167     Recent Labs      02/06/17   0341  02/05/17   0438  02/04/17   0503   NA  133*  139  139   K  4.4  4.0  3.7   CL  102  106  104   CO2  19*  22  25   BUN  80*  56*  42*   CREA  2.33*  1.74*  1.98*   GLU  240*  95  95   CA  8.0*  7.5*  8.0*   MG   --   2.3  2.4     Recent Labs      02/06/17   0341  02/04/17   0503   SGOT  49*  62*   ALT  15  34   AP  63  50   TBILI  0.8  0.3   TP  5.9*  6.0*   ALB  2.9*  3.1*   GLOB  3.0  2.9     Recent Labs      02/04/17   0503   INR  1.1   PTP  11.4*   APTT  30.6      No results for input(s): FE, TIBC, PSAT, FERR in the last 72 hours. No results found for: FOL, RBCF   No results for input(s): PH, PCO2, PO2 in the last 72 hours. No results for input(s): CPK, CKNDX, TROIQ in the last 72 hours.     No lab exists for component: CPKMB  No results found for: CHOL, CHOLX, CHLST, CHOLV, HDL, LDL, DLDL, LDLC, DLDLP, TGL, TGLX, TRIGL, TRIGP, CHHD, CHHDX  Lab Results   Component Value Date/Time    Glucose (POC) 293 02/06/2017 08:05 AM    Glucose (POC) 158 02/05/2017 08:53 PM    Glucose (POC) 101 02/05/2017 05:42 PM    Glucose (POC) 106 02/05/2017 04:20 PM    Glucose (POC) 99 02/05/2017 03:13 PM     No results found for: COLOR, APPRN, SPGRU, REFSG, KACIE, PROTU, GLUCU, KETU, BILU, UROU, KIRBY, LEUKU, GLUKE, EPSU, BACTU, WBCU, RBCU, CASTS, UCRY        Assessment:     Active Problems: Aortic dissection (HCC) (2/2/2017)      S/P ascending aortic replacement (2/3/2017)      Overview: EMERGENT ASCENDING AORTIC DISSECTION REPAIR       Right Femoral Artery Cannulation        Plan: Will need a repeat colonoscopy this admission. No active bleeding. Prefer to wait until pulmonary status improves. Continue clear liquids. Check daily CBC. If rebleeds, recommend STAT bleeding scan.       GAETANO Faith  02/06/17.  9:56 AM

## 2017-02-06 NOTE — PROGRESS NOTES
Spiritual Care Assessment/Progress Notes    Saray Almazan [id number]  [id number]    1965  45 y.o.  male    Patient Telephone Number: [phone] (home)   Yazdanism Affiliation: No Moravian   Language: English   Extended Emergency Contact Information  Primary Emergency Contact: [address] Phone: [phone]  Relation: Other Relative   Patient Active Problem List    Diagnosis Date Noted    S/P ascending aortic replacement 02/03/2017    Aortic dissection ([facility] [address] 02/02/2017        Date: 2/6/2017       Level of Yazdanism/Spiritual Activity:  []         Involved in cheyanne tradition/spiritual practice    []         Not involved in cheyanne tradition/spiritual practice  []         Spiritually oriented    []         Claims no spiritual orientation    []         seeking spiritual identity  []         Feels alienated from Uatsdin practice/tradition  []         Feels angry about Uatsdin practice/tradition  []         Spirituality/Uatsdin traditiona resource for coping at this time.   [x]         Not able to assess due to medical condition    Services Provided Today:  []         crisis intervention    []         reading Scriptures  []         spiritual assessment    []         prayer  []         empathic listening/emotional support  []         rites and rituals (cite in comments)  []         life review     []         Uatsdin support  []         theological development   []         advocacy  []         ethical dialog     []         blessing  []         bereavement support    []         support to family  []         anticipatory grief support   []         help with AMD  []         spiritual guidance    []         meditation      Spiritual Care Needs  []         Emotional Support  []         Spiritual/Yazdanism Care  []         Loss/Adjustment  []         Advocacy/Referral                /Ethics  []         No needs expressed at               this time  [x]         Other: (note in comments)  Spiritual Care Plan  []         Follow up visits with               pt/family  []         Provide materials  []         Schedule sacraments  []         Contact Community               Clergy  []         Follow up as needed  [x]         Other: (note in               comments)     Comments:  attempted a spiritual assessment. Pt was in an out of sleep even during the initial conversation.  will return when it is more appropriate. Advised of  availability. Lacy Green M.S.   Spiritual Care Department  If need arise please call AGUSTÍN ([phone])

## 2017-02-06 NOTE — PROGRESS NOTES
9433  Received verbal report from off going shift assumed care of patient labs and orders reviewed close observation maintained. Patient sitting up in chair, slightly drowsy,  Remains on cardene drip at 15 mg per hour. 2410  Interdisciplinary rounds done, orders received. Rafael Rizo at bedside, orders received. 1230  Patient became extremely weak and and could not help PT to get himself back to the chair,  Dr. Larisa Rodriguez was called to the bedside and assisted patient to the chair. Saturation at 91%. The ED called to assist patient to the chair. Patient VSS but very lethargic. Heart rate in the 50's and BP at 114/34,  Cardene decreased to 7 mg, ABG ordered    [address] and notified , orders received to chris LOZOYA at [de-identified], PA will be on his way to see patient

## 2017-02-06 NOTE — PROGRESS NOTES
Problem: Mobility Impaired (Adult and Pediatric)  Goal: *Acute Goals and Plan of Care (Insert Text)  Physical Therapy Goals  Initiated 2/3/2017  1. Patient will move from supine to sit and sit to supine , scoot up and down and roll side to side in bed with modified independence within 7 days. 2. Patient will perform sit to/from stand with modified independence within 7 days. 3. Patient will ambulate 250 feet with least restrictive assistive device and modified independence within 7 days. 4. Patient will ascend/descend 5 stairs with 1 handrail(s) with modified independence within 7 days. 5. Patient will perform cardiac exercises per protocol with independence within 7 days. 6. Patient will verbally and functionally recall 3/3 sternal precautions within 7 days. PHYSICAL THERAPY TREATMENT  Patient: [employer] (89 y.o. male)  Date: 2/6/2017  Diagnosis: aneursym  Aortic dissection (HCC)  melana <principal problem not specified>  Procedure(s) (LRB):  ESOPHAGOGASTRODUODENOSCOPY (EGD) (N/A)  SIGMOIDOSCOPY FLEXIBLE (N/A)  ESOPHAGOGASTRODUODENAL (EGD) BIOPSY (N/A) 1 Day Post-Op  Precautions: Sternal      ASSESSMENT:  Patient received sitting in bedside chair and agreeable to PT intervention. Patient cleared by nursing for mobility VSS on 2 L/min O2 prior to mobility. Patient reporting fatigue, but agreeable to mobilize. Patient performed cardiac exercises as described below with instruction and VCs for proper performance. Patient performed sit<>stand from bedside chair with min-CGA x 1-2, requiring minimal VCs for adherence to sternal precautions. Patient reporting dizziness in standing with VSS, however requesting to sit. Patient returned to sitting and agreeable to mobilize following short rest period. Patient ambulated 61' total with initially CGA x 1 while pushing cardiac cart.  Patient demonstrated slow gait speed, widened YOBANI, and increased trunk sway throughout gait, requiring min A x 1 for management of cardiac cart. Patient with BLE knee buckling ~ 8' from EOB, requiring total A and additional assist from Dr. Callum Young and nursing staff. Patient was transferred into bedside chair with total A x 3. VSS. Patient unable to assist with chair>bed transfer (lift team not available), therefore additional nursing staff present to transfer patient with total A x 3 back to bed. Patient was left supine in bed with all needs met and nursing present. Patient with good progress in acute PT up until this point, therefore if patient able to continue to progress mobility, recommend HHPT. Progression toward goals:  [ ]      Improving appropriately and progressing toward goals  [X]      Improving slowly and progressing toward goals  [ ]      Not making progress toward goals and plan of care will be adjusted       PLAN:  Patient continues to benefit from skilled intervention to address the above impairments. Continue treatment per established plan of care. Discharge Recommendations:  Home Health and To Be Determined  Further Equipment Recommendations for Discharge:  TBD       SUBJECTIVE:   Patient stated Not good.    The patient stated 3/3 sternal precautions. Reviewed all 3 with patient. OBJECTIVE DATA SUMMARY:   Patient mobilized on continuous portable monitor/telemetry. Critical Behavior:  Neurologic State: Drowsy  Orientation Level: Oriented X4  Cognition: Appropriate decision making, Appropriate for age attention/concentration, Appropriate safety awareness     Functional Mobility Training:  Bed Mobility:  Log       Sit to Supine:  Total assistance  Scooting: Stand-by asssistance (to scoot bottom towards edge of chair; total A in bed)        Transfers:  Sit to Stand: Minimum assistance;Contact guard assistance  Stand to Sit: Contact guard assistance;Minimum assistance (with initial sit<>stand; total A post-ambulation)        Bed to Chair: Total assistance (post-ambulation)                    Balance:  Sitting: Intact  Standing: Impaired  Standing - Static: Good (initially; poor during latter half of ambulation)  Standing - Dynamic : Fair (initially; poor during latter half of ambulation)  Ambulation/Gait Training:  Distance (ft): 60 Feet (ft)  Assistive Device: Gait belt (cardiac cart)  Ambulation - Level of Assistance: Contact guard assistance (initially; max-total A during latter half of ambulation)        Gait Abnormalities: Decreased step clearance;Trunk sway increased (BLE knee buckling with increased distance ambulated)        Base of Support: Widened     Speed/Alva: Pace decreased (<100 feet/min)  Step Length: Left shortened;Right shortened  Therapeutic Exercises:   Patient instructed on the benefits and demonstrated cardiac exercises while sitting with VCs/SBA. Instructed and indicated understanding on how to progress reps and sets against gravity, working up to 5 lbs and so on based on surgeon clearance for more weight in prep for functional activity. Can use household items for weights.      CARDIAC  EXERCISE   Sets   Reps   Active Active Assist   Passive Self ROM   Comments   Shoulder flexion 1 5 [X]                                            [ ]                                            [ ]                                            [ ]                                            BUE   Shoulder abduction 1  5 [X]                                            [ ]                                            [ ]                                            [ ]                                            BUE   Scapular elevation 1 5 [X]                                            [ ]                                            [ ]                                            [ ]                                            BUE   Scapular retraction 1 5 [X]                                            [ ]                                            [ ]                                            [ ] BUE   Trunk rotation     [ ]                                            [ ]                                            [ ]                                            [ ]                                                Trunk sidebending 1 5 [X]                                            [ ]                                            [ ]                                            [ ]                                            [facility]         [ ]                                            [ ]                                            [ ]                                            [ ]                                                      Pain:  Pain Scale 1: Numeric (0 - 10)  Pain Intensity 1: 0              Activity Tolerance:   Poor - pt with significant weakness in LEs during gait; VSS prior to and post-activity on 2 L/min O2  Please refer to the flowsheet for vital signs taken during this treatment.   After treatment:   [ ] Patient left in no apparent distress sitting up in chair  [X] Patient left in no apparent distress in bed  [X] Call bell left within reach  [X] Nursing notified  [X] Caregiver present  [ ] Bed alarm activated      COMMUNICATION/COLLABORATION:   The patients plan of care was discussed with: Physical Therapist, Occupational Therapist, Registered Nurse and Physician     Brandon Piedra, PT, DPT   Time Calculation: 35 mins

## 2017-02-06 NOTE — PROGRESS NOTES
[facility]  Pulmonary, Critical Care, and Sleep Medicine    Name: Apurva Meza MRN: [id number]   : 1965 Hospital: [facility]   Date: 2017        Critical Care Patient Consult      IMPRESSION:   · S/p repair of ascending aortic dissection, had RFA cannulation. · FELICE/ CKD  · Abnormal CXR- look like more edema on right, normal WBC  · Presented with GI bleed in ER, now had additional bleeding from lower gi tract, 2/5 incomplete prep but colonoscopy without gut ischemia  · Presented with chest pain  · Mild renal insufficiency with Cr of 1.74.  · Anemia, has bee pretty stable. · ARLEN  · Obesity  · Ex Smoker  · Occasional Etoh      RECOMMENDATIONS:   · Pulmonary toilet  · CXR in AM  · Adjust meds  · Renal consult  · Renal ultrasound  · Wean oxygen  · Monitor Cr  · Diet per cardiac surgery  · Incentive spirometry  · Will assist while in the ICU     Subjective/History:   No acute changes noted. Was sitting up this am, on NC oxygen. Hungry. No gross bleeeding He is feeling better. No acute complaints. No back pain, has expected chest pain. This patient has been seen and evaluated at the request of Dr. Zion Valenzuela for above. Patient is a 45 y.o. male has mid post op chest pain. No abdominal pain, no leg pain. No other acute complaints. Feels breathing is comfortable. Started having symptoms yesterday. He denies any other acute complaints. Works as a . Events in ER and post er were reviewed. ROS of 10 systems is otherwise negative. Past Medical History   Diagnosis Date    Ill-defined condition      sleep apnea      History reviewed. No pertinent past surgical history.    Prior to Admission medications    Not on File     Current Facility-Administered Medications   Medication Dose Route Frequency    metoprolol tartrate (LOPRESSOR) tablet 50 mg  50 mg Oral Q12H    amLODIPine (NORVASC) tablet 10 mg  10 mg Oral Q12H    hydrALAZINE (APRESOLINE) tablet 25 mg  25 mg Oral Q6H    niCARdipine (CARDENE) 50 mg in 0.9% sodium chloride 250 mL infusion  0-15 mg/hr IntraVENous TITRATE    sodium chloride (NS) flush 5-10 mL  5-10 mL IntraVENous Q8H    mupirocin (BACTROBAN) 2 % ointment   Both Nostrils BID    amiodarone (CORDARONE) tablet 400 mg  400 mg Oral Q12H    aspirin chewable tablet 81 mg  81 mg Oral DAILY    insulin regular (NOVOLIN R, HUMULIN R) 100 Units in 0.9% sodium chloride 100 mL infusion  1-50 Units/hr IntraVENous TITRATE    insulin lispro (HUMALOG) injection   SubCUTAneous TIDAC    insulin lispro (HUMALOG) injection   SubCUTAneous AC&HS    chlorhexidine (PERIDEX) 0.12 % mouthwash 15 mL  15 mL Oral Q12H    pantoprazole (PROTONIX) 40 mg in 0.9% sodium chloride (MBP/ADV) 50 mL  8 mg/hr IntraVENous CONTINUOUS     No Known Allergies   Social History   Substance Use Topics    Smoking status: Former Smoker    Smokeless tobacco: Not on file    Alcohol use No      History reviewed. No pertinent family history. Review of Systems:  A comprehensive review of systems was negative. Objective:   Vital Signs:    Visit Vitals    /49    Pulse 73    Temp 97.7 °F (36.5 °C)    Resp 18    Ht 6' (1.829 m)    Wt 122.5 kg (270 lb)    SpO2 93%    BMI 36.62 kg/m2       O2 Device: Nasal cannula   O2 Flow Rate (L/min): 2 l/min   Temp (24hrs), Av.2 °F (36.8 °C), Min:97.7 °F (36.5 °C), Max:98.7 °F (37.1 °C)       Intake/Output:   Last shift:         Last 3 shifts:  1901 -  0700  In: 6296.3 [P.O.:1700;  I.V.:4596.3]  Out: 1610 [Urine:1250; Drains:360]    Intake/Output Summary (Last 24 hours) at 17 0830  Last data filed at 17 0600   Gross per 24 hour   Intake          3252.99 ml   Output              830 ml   Net          2422.99 ml     Hemodynamics:   PAP: PAP Systolic: 32 (71/97/82 4990) CO: CO (l/min): 6 l/min (17 1200)   Wedge:   CI: CI (l/min/m2): 2.5 l/min/m2 (17 1200)   CVP:  CVP (mmHg): 17 mmHg (02/03/17 1200) SVR:       PVR:       Ventilator Settings:  Mode Rate Tidal Volume Pressure FiO2 PEEP   Spontaneous   650 ml  5 cm H2O 50 % 5 cm H20     Peak airway pressure: 11 cm H2O    Minute ventilation: 9.14 l/min      Physical Exam:    General:  Alert, cooperative, no distress, appears stated age. Obese gentleman. Sitting up in chair. No distress. Head:  Normocephalic, without obvious abnormality, atraumatic. Eyes:  Conjunctivae/corneas clear. PERRL, EOMs intact. Nose: Nares normal. Septum midline. Mucosa normal. No drainage or sinus tenderness. Throat: Lips, mucosa, and tongue normal. Teeth and gums normal.   Neck: Supple, symmetrical, trachea midline, no adenopathy, thyroid: no enlargment/tenderness/nodules, no carotid bruit and no JVD. Back:   Symmetric, no curvature. ROM normal.   Lungs:   Clear to auscultation bilaterally. Decreased BS in bases. Chest wall:  No tenderness or deformity. Sternal dressing is intact. Heart:  Regular rate and rhythm, S1, S2 normal, no murmur, click, rub or gallop. Abdomen:   Soft, non-tender. Bowel sounds normal. No masses,  No organomegaly. Extremities: Extremities normal, atraumatic, has chronic venous insufficiency changes. Pulses: 2+ and symmetric all extremities.    Skin: Skin color, texture, turgor normal. No rashes or lesions   Lymph nodes: Cervical, supraclavicular, and axillary nodes normal.   Neurologic: Grossly nonfocal       Data:     Recent Results (from the past 24 hour(s))   GLUCOSE, POC    Collection Time: 02/05/17  8:51 AM   Result Value Ref Range    Glucose (POC) 106 (H) 65 - 100 mg/dL    Performed by Valeria Victor    GLUCOSE, POC    Collection Time: 02/05/17 10:51 AM   Result Value Ref Range    Glucose (POC) 103 (H) 65 - 100 mg/dL    Performed by Valeria Feeling    GLUCOSE, POC    Collection Time: 02/05/17 12:54 PM   Result Value Ref Range    Glucose (POC) 90 65 - 100 mg/dL    Performed by Bo Hernandez 71, POC    Collection Time: 02/05/17  2:08 PM   Result Value Ref Range    Glucose (POC) 95 65 - 100 mg/dL    Performed by Devendra Posey    GLUCOSE, POC    Collection Time: 02/05/17  3:13 PM   Result Value Ref Range    Glucose (POC) 99 65 - 100 mg/dL    Performed by Devendra Posey    GLUCOSE, POC    Collection Time: 02/05/17  4:20 PM   Result Value Ref Range    Glucose (POC) 106 (H) 65 - 100 mg/dL    Performed by Devendra Posey    GLUCOSE, POC    Collection Time: 02/05/17  5:42 PM   Result Value Ref Range    Glucose (POC) 101 (H) 65 - 100 mg/dL    Performed by Devendra Posey    GLUCOSE, POC    Collection Time: 02/05/17  8:53 PM   Result Value Ref Range    Glucose (POC) 158 (H) 65 - 100 mg/dL    Performed by Lester Barrett    METABOLIC PANEL, COMPREHENSIVE    Collection Time: 02/06/17  3:41 AM   Result Value Ref Range    Sodium 133 (L) 136 - 145 mmol/L    Potassium 4.4 3.5 - 5.1 mmol/L    Chloride 102 97 - 108 mmol/L    CO2 19 (L) 21 - 32 mmol/L    Anion gap 12 5 - 15 mmol/L    Glucose 240 (H) 65 - 100 mg/dL    BUN 80 (H) 6 - 20 MG/DL    Creatinine 2.33 (H) 0.70 - 1.30 MG/DL    BUN/Creatinine ratio 34 (H) 12 - 20      GFR est AA 36 (L) >60 ml/min/1.73m2    GFR est non-AA 30 (L) >60 ml/min/1.73m2    Calcium 8.0 (L) 8.5 - 10.1 MG/DL    Bilirubin, total 0.8 0.2 - 1.0 MG/DL    ALT (SGPT) 15 12 - 78 U/L    AST (SGOT) 49 (H) 15 - 37 U/L    Alk. phosphatase 63 45 - 117 U/L    Protein, total 5.9 (L) 6.4 - 8.2 g/dL    Albumin 2.9 (L) 3.5 - 5.0 g/dL    Globulin 3.0 2.0 - 4.0 g/dL    A-G Ratio 1.0 (L) 1.1 - 2.2     GLUCOSE, POC    Collection Time: 02/06/17  8:05 AM   Result Value Ref Range    Glucose (POC) 293 (H) 65 - 100 mg/dL    Performed by Lester Barrett              Telemetry:Paced    Imaging:  I have personally reviewed the patients radiographs and have reviewed the reports:  2-3-17: FINDINGS:   An ET tube, NG tube, right IJ pulmonary arterial catheter, right IJ central line  are in appropriate position. The lungs are hypoinflated, but grossly clear.    IMPRESSION:   Appropriately positioned lines and tubes. Shallow volumes but grossly clear  lungs.        Maxwell Quiroga MD

## 2017-02-06 NOTE — PROGRESS NOTES
Cardiac Surgery ICU Progress Note    Admit Date: 2017    POD: 1 Day Post-Op      Problems:  Active Problems: Aortic dissection (HCC) (2017)      S/P ascending aortic replacement (2/3/2017)      Overview: EMERGENT ASCENDING AORTIC DISSECTION REPAIR       Right Femoral Artery Cannulation        Procedure:  Procedure(s):  ESOPHAGOGASTRODUODENOSCOPY (EGD)  SIGMOIDOSCOPY FLEXIBLE  ESOPHAGOGASTRODUODENAL (EGD) BIOPSY      Summary:     Stable hemodynamics in sinus rhythm without ectopy on Cardene/ lopressor and hydralazine. OOB to chair. Eating better. Lungs: rhonchi. New infiltrates on CXR. Wet. AFEB. WBC 18 down going on the 5th. Moving everything. Cr up 2.3 BUN 80 /24 hrs with 3440cc intake. Plan/Recommendations/Medical Decision Makin. Cr 2.3 from 1.7. BUN 80. Extension of dissection? No pain. Fluid support  2. Renal Consult  3. CXR: new right infiltrates?, wet. Labs. 4. Increase Hydralazine and Lopressor for HTN  5. DC CTs  6. Renal consult: Renal ultrasound needed? ACE held for renal  Anticipated acute blood loss anemia  Increase activity  Increase I/S effort and frequency  Sternal precautions  Stimulate bowel movement  Patient seen and reviewed with  Dr. Favio David MD       Objective:     Vitals:    Visit Vitals    /49    Pulse 73    Temp 97.7 °F (36.5 °C)    Resp 18    Ht 6' (1.829 m)    Wt 270 lb (122.5 kg)    SpO2 93%    BMI 36.62 kg/m2       Temp (24hrs), Av.2 °F (36.8 °C), Min:97.7 °F (36.5 °C), Max:98.7 °F (37.1 °C)      Hemodynamics:   CO: CO (l/min): 6 l/min   CI: CI (l/min/m2): 2.5 l/min/m2   CVP: CVP (mmHg): 17 mmHg (17 1200)   SVR:     PAP Systolic: PAP Systolic: 32 (49/24/43 2668)   PAP Diastolic: PAP Diastolic: 19 (10/96/ 5655)   PVR:     SV02: SVO2 (%): 53 % (17)   SCV02:      CXR Results  (Last 48 hours)               02/06/17 0517  XR CHEST PORT Final result    Impression:  IMPRESSION: New right lung infiltrates. Narrative:  EXAM: Portable CXR.  0503 hours. COMPARISON: February 4, 2017. INDICATION: Chest tube. Aortic dissection repair. The lungs show new right upper and lower infiltrates . Heart is stable in size. There is no overt pulmonary edema. There is no evident pneumothorax, apparent   adenopathy or sizable pleural effusion. Right IJ CVL is stable. CT Output: 130/24 hrs    Labs: Recent Labs      02/06/17   0805  02/06/17   0341   02/05/17   0438   02/04/17   0503   WBC   --    --    --   18.1*   --   21.5*   HGB   --    --    --   9.1*   --   10.3*   HCT   --    --    --   27.1*   --   30.4*   PLT   --    --    --   138*   --   167   NA   --   133*   --   139   --   139   K   --   4.4   --   4.0   --   3.7   BUN   --   80*   --   56*   --   42*   CREA   --   2.33*   --   1.74*   --   1.98*   GLU   --   240*   --   95   --   95   GLUCPOC  293*   --    < >   --    < >   --    INR   --    --    --    --    --   1.1    < > = values in this interval not displayed.        Ventilator:  Ventilator Volumes  Vt Set (ml): 650 ml (02/03/17 0102)  Vt Exhaled (Machine Breath) (ml): 674 ml (02/03/17 0102)  Vt Spont (ml): 780 ml (02/03/17 0420)  Ve Observed (l/min): 9.14 l/min (02/03/17 0420)    Oxygen Therapy:  Oxygen Therapy  O2 Sat (%): 93 % (02/06/17 0800)  Pulse via Oximetry: 73 beats per minute (02/06/17 0800)  O2 Device: Nasal cannula (02/06/17 0400)  O2 Flow Rate (L/min): 2 l/min (02/06/17 0400)  FIO2 (%): 50 % (02/03/17 0420)      Admission Weight: Last Weight   Weight: 270 lb (122.5 kg) Weight: 270 lb (122.5 kg)     Intake / Output / Drain:  Current Shift:    Last 24 hrs.:   Intake/Output Summary (Last 24 hours) at 02/06/17 0842  Last data filed at 02/06/17 0600   Gross per 24 hour   Intake          3252.99 ml   Output              830 ml   Net          2422.99 ml         EXAM:      General: ambulated well      Chest:  Stable sternum      Incisions: dry and intact    Lungs:    Rhonchi bilaterally. Heart:  Regular rate and rhythm, S1, S2 normal, no murmur, no click,      Abdomen:   Soft, non-tender. Bowel sounds present. Extremities:  mild edema     Neurologic:  Gross motor and sensory apparatus intact.        Signed By: GAETANO Harvey

## 2017-02-06 NOTE — PROGRESS NOTES
Occupational Therapy  Pt with significant weakness in LEs during PT session; assisted therapist and nurse with safety and pt comfort measures--VSS. Pt returned to bed to rest.  Will defer OT treatment and continue to follow.

## 2017-02-06 NOTE — PROGRESS NOTES
Weak when upright  Javi to 50s   on Cardene  Max help to keep upright and put into bed  ABG: acidosis, compensated  Two amps bicarb given  Patient remains awake/ alert but drowsy  Stat Echo: no tamponade, aortic valve, good function  Lactic Acid pending  Repeat ABG  Bumex  Kidney ultrasound

## 2017-02-06 NOTE — PROGRESS NOTES
9751  Received verbal report from off going shift assumed care of patient labs and orders reviewed close observation maintained. Patient sitting up in chair, slightly drowsy,  Remains on cardene drip at 15 mg per hour. 6026  Interdisciplinary rounds done, orders received. [facility]  Dr. Brandi Ramírez at bedside, orders received. 1230  Patient became extremely weak and and could not help PT to get himself back to the chair,  Dr. Priscilla Mcdaniel was called to the bedside and assisted patient to the chair. Saturation at 91%. The ED called to assist patient to the chair. Patient VSS but very lethargic. Heart rate in the 50's and BP at 114/34,  Cardene decreased to 7 mg, ABG ordered    [address] and notified , orders received to pace AAI at 80, PA will be on his way to see patient    1313  Serum labs and urine obtained and sent;  1334  Portable chest X Ray done at bedside, Chemistry and Lactic acid drawn and sent,  Cardene drip turned off.    1345  Echocardiogram done at bedside. Ultrasound called for kidneys. 1622  RT at bedside for ABG,  Patient remains in bed on 4 liters NC, Continue drowsy but oriented x4. Sodium Bicarb at 75 ml's per hours. 1657  Kidney Ultra sound done at bedside     1819  Dr. Brandi Ramírez called and notified of patient low urine out put via insertion of gardner catheter. Bladder scan just showed 15 ml's. Patient updated on course of events. Orders received to leave the gardner catheter in and MD will review chart.

## 2017-02-06 NOTE — PROGRESS NOTES
[id number] Shift summary note. Uneventful night. Patient continues to have SBP >120 despite po meds, prn hydralazine and cardene at 15mg. Patient with good use of IS. Up to chair with minimal assist.    Bedside shift change report given to Fuentes Michelle (oncoming nurse) by Ania Olivares (offgoing nurse). Report included the following information SBAR, Kardex, ED Summary, OR Summary, Procedure Summary, Intake/Output, MAR, Accordion, Recent Results, Med Rec Status, Cardiac Rhythm SR and Alarm Parameters .

## 2017-02-07 ENCOUNTER — APPOINTMENT (OUTPATIENT)
Dept: GENERAL RADIOLOGY | Age: 52
DRG: 219 | End: 2017-02-07
Attending: RADIOLOGY
Payer: COMMERCIAL

## 2017-02-07 ENCOUNTER — APPOINTMENT (OUTPATIENT)
Dept: GENERAL RADIOLOGY | Age: 52
DRG: 219 | End: 2017-02-07
Attending: INTERNAL MEDICINE
Payer: COMMERCIAL

## 2017-02-07 ENCOUNTER — APPOINTMENT (OUTPATIENT)
Dept: INTERVENTIONAL RADIOLOGY/VASCULAR | Age: 52
DRG: 219 | End: 2017-02-07
Attending: INTERNAL MEDICINE
Payer: COMMERCIAL

## 2017-02-07 LAB
ALBUMIN SERPL BCP-MCNC: 2.9 G/DL (ref 3.5–5)
ALBUMIN/GLOB SERPL: 1 {RATIO} (ref 1.1–2.2)
ALP SERPL-CCNC: 91 U/L (ref 45–117)
ALT SERPL-CCNC: 331 U/L (ref 12–78)
ANION GAP BLD CALC-SCNC: 12 MMOL/L (ref 5–15)
AST SERPL W P-5'-P-CCNC: 835 U/L (ref 15–37)
BASOPHILS # BLD AUTO: 0 K/UL
BASOPHILS # BLD: 0 %
BILIRUB SERPL-MCNC: 0.9 MG/DL (ref 0.2–1)
BUN SERPL-MCNC: 108 MG/DL (ref 6–20)
BUN/CREAT SERPL: 29 (ref 12–20)
CALCIUM SERPL-MCNC: 7.7 MG/DL (ref 8.5–10.1)
CHLORIDE SERPL-SCNC: 100 MMOL/L (ref 97–108)
CO2 SERPL-SCNC: 23 MMOL/L (ref 21–32)
CREAT SERPL-MCNC: 3.73 MG/DL (ref 0.7–1.3)
DIFFERENTIAL METHOD BLD: ABNORMAL
EOSINOPHIL # BLD: 0 K/UL
EOSINOPHIL NFR BLD: 0 %
ERYTHROCYTE [DISTWIDTH] IN BLOOD BY AUTOMATED COUNT: 14.8 % (ref 11.5–14.5)
EST. AVERAGE GLUCOSE BLD GHB EST-MCNC: 157 MG/DL
GLOBULIN SER CALC-MCNC: 3 G/DL (ref 2–4)
GLUCOSE BLD STRIP.AUTO-MCNC: 119 MG/DL (ref 65–100)
GLUCOSE BLD STRIP.AUTO-MCNC: 155 MG/DL (ref 65–100)
GLUCOSE BLD STRIP.AUTO-MCNC: 158 MG/DL (ref 65–100)
GLUCOSE BLD STRIP.AUTO-MCNC: 217 MG/DL (ref 65–100)
GLUCOSE SERPL-MCNC: 124 MG/DL (ref 65–100)
HBA1C MFR BLD: 7.1 % (ref 4.2–6.3)
HCT VFR BLD AUTO: 25 % (ref 36.6–50.3)
HGB BLD-MCNC: 8.8 G/DL (ref 12.1–17)
LYMPHOCYTES # BLD AUTO: 11 %
LYMPHOCYTES # BLD: 2.8 K/UL
MAGNESIUM SERPL-MCNC: 3 MG/DL (ref 1.6–2.4)
MCH RBC QN AUTO: 28.9 PG (ref 26–34)
MCHC RBC AUTO-ENTMCNC: 35.2 G/DL (ref 30–36.5)
MCV RBC AUTO: 82 FL (ref 80–99)
METAMYELOCYTES NFR BLD MANUAL: 2 %
MONOCYTES # BLD: 1.6 K/UL
MONOCYTES NFR BLD AUTO: 6 %
MYELOCYTES NFR BLD MANUAL: 6 %
NEUTS BAND NFR BLD MANUAL: 4 %
NEUTS SEG # BLD: 19.4 K/UL
NEUTS SEG NFR BLD AUTO: 71 %
NRBC # BLD: 0.33 K/UL (ref 0–0.01)
NRBC BLD-RTO: 1.3 PER 100 WBC
PHOSPHATE SERPL-MCNC: 5.4 MG/DL (ref 2.6–4.7)
PLATELET # BLD AUTO: 137 K/UL (ref 150–400)
POTASSIUM SERPL-SCNC: 4.3 MMOL/L (ref 3.5–5.1)
PROT SERPL-MCNC: 5.9 G/DL (ref 6.4–8.2)
RBC # BLD AUTO: 3.05 M/UL (ref 4.1–5.7)
RBC MORPH BLD: ABNORMAL
SERVICE CMNT-IMP: ABNORMAL
SODIUM SERPL-SCNC: 135 MMOL/L (ref 136–145)
WBC # BLD AUTO: 25.9 K/UL (ref 4.1–11.1)
WBC NRBC COR # BLD: ABNORMAL 10*3/UL

## 2017-02-07 PROCEDURE — 65620000000 HC RM CCU GENERAL

## 2017-02-07 PROCEDURE — 02HV33Z INSERTION OF INFUSION DEVICE INTO SUPERIOR VENA CAVA, PERCUTANEOUS APPROACH: ICD-10-PCS | Performed by: RADIOLOGY

## 2017-02-07 PROCEDURE — 82962 GLUCOSE BLOOD TEST: CPT

## 2017-02-07 PROCEDURE — 83735 ASSAY OF MAGNESIUM: CPT | Performed by: INTERNAL MEDICINE

## 2017-02-07 PROCEDURE — 77030018719 HC DRSG PTCH ANTIMIC J&J -A

## 2017-02-07 PROCEDURE — 84100 ASSAY OF PHOSPHORUS: CPT | Performed by: INTERNAL MEDICINE

## 2017-02-07 PROCEDURE — B246ZZ4 ULTRASONOGRAPHY OF RIGHT AND LEFT HEART, TRANSESOPHAGEAL: ICD-10-PCS | Performed by: ANESTHESIOLOGY

## 2017-02-07 PROCEDURE — 74011636637 HC RX REV CODE- 636/637: Performed by: INTERNAL MEDICINE

## 2017-02-07 PROCEDURE — 87340 HEPATITIS B SURFACE AG IA: CPT | Performed by: INTERNAL MEDICINE

## 2017-02-07 PROCEDURE — 97110 THERAPEUTIC EXERCISES: CPT | Performed by: OCCUPATIONAL THERAPIST

## 2017-02-07 PROCEDURE — 74011000250 HC RX REV CODE- 250: Performed by: THORACIC SURGERY (CARDIOTHORACIC VASCULAR SURGERY)

## 2017-02-07 PROCEDURE — 97530 THERAPEUTIC ACTIVITIES: CPT | Performed by: OCCUPATIONAL THERAPIST

## 2017-02-07 PROCEDURE — 86706 HEP B SURFACE ANTIBODY: CPT | Performed by: INTERNAL MEDICINE

## 2017-02-07 PROCEDURE — 76937 US GUIDE VASCULAR ACCESS: CPT

## 2017-02-07 PROCEDURE — 97530 THERAPEUTIC ACTIVITIES: CPT

## 2017-02-07 PROCEDURE — 90935 HEMODIALYSIS ONE EVALUATION: CPT

## 2017-02-07 PROCEDURE — 80053 COMPREHEN METABOLIC PANEL: CPT | Performed by: INTERNAL MEDICINE

## 2017-02-07 PROCEDURE — 77010033678 HC OXYGEN DAILY

## 2017-02-07 PROCEDURE — 86705 HEP B CORE ANTIBODY IGM: CPT | Performed by: INTERNAL MEDICINE

## 2017-02-07 PROCEDURE — 74011250636 HC RX REV CODE- 250/636: Performed by: RADIOLOGY

## 2017-02-07 PROCEDURE — C9113 INJ PANTOPRAZOLE SODIUM, VIA: HCPCS | Performed by: INTERNAL MEDICINE

## 2017-02-07 PROCEDURE — 36415 COLL VENOUS BLD VENIPUNCTURE: CPT | Performed by: INTERNAL MEDICINE

## 2017-02-07 PROCEDURE — 74011250636 HC RX REV CODE- 250/636: Performed by: INTERNAL MEDICINE

## 2017-02-07 PROCEDURE — 74011636637 HC RX REV CODE- 636/637: Performed by: PHYSICIAN ASSISTANT

## 2017-02-07 PROCEDURE — 83036 HEMOGLOBIN GLYCOSYLATED A1C: CPT | Performed by: INTERNAL MEDICINE

## 2017-02-07 PROCEDURE — 74011000258 HC RX REV CODE- 258: Performed by: THORACIC SURGERY (CARDIOTHORACIC VASCULAR SURGERY)

## 2017-02-07 PROCEDURE — 74011000250 HC RX REV CODE- 250: Performed by: INTERNAL MEDICINE

## 2017-02-07 PROCEDURE — 74011000250 HC RX REV CODE- 250: Performed by: RADIOLOGY

## 2017-02-07 PROCEDURE — C1892 INTRO/SHEATH,FIXED,PEEL-AWAY: HCPCS

## 2017-02-07 PROCEDURE — 74011250637 HC RX REV CODE- 250/637: Performed by: INTERNAL MEDICINE

## 2017-02-07 PROCEDURE — 74011250637 HC RX REV CODE- 250/637: Performed by: PHYSICIAN ASSISTANT

## 2017-02-07 PROCEDURE — 71010 XR CHEST PORT: CPT

## 2017-02-07 PROCEDURE — C1752 CATH,HEMODIALYSIS,SHORT-TERM: HCPCS

## 2017-02-07 PROCEDURE — 85025 COMPLETE CBC W/AUTO DIFF WBC: CPT | Performed by: PHYSICIAN ASSISTANT

## 2017-02-07 PROCEDURE — 5A1D60Z PERFORMANCE OF URINARY FILTRATION, MULTIPLE: ICD-10-PCS | Performed by: THORACIC SURGERY (CARDIOTHORACIC VASCULAR SURGERY)

## 2017-02-07 RX ORDER — HEPARIN 100 UNIT/ML
500 SYRINGE INTRAVENOUS ONCE
Status: COMPLETED | OUTPATIENT
Start: 2017-02-07 | End: 2017-02-07

## 2017-02-07 RX ORDER — LIDOCAINE HYDROCHLORIDE 20 MG/ML
10 INJECTION, SOLUTION INFILTRATION; PERINEURAL ONCE
Status: COMPLETED | OUTPATIENT
Start: 2017-02-07 | End: 2017-02-07

## 2017-02-07 RX ORDER — AMLODIPINE BESYLATE 5 MG/1
10 TABLET ORAL DAILY
Status: DISCONTINUED | OUTPATIENT
Start: 2017-02-08 | End: 2017-02-27 | Stop reason: HOSPADM

## 2017-02-07 RX ADMIN — AMLODIPINE BESYLATE 5 MG: 5 TABLET ORAL at 08:41

## 2017-02-07 RX ADMIN — SODIUM CHLORIDE, PRESERVATIVE FREE 500 UNITS: 5 INJECTION INTRAVENOUS at 14:53

## 2017-02-07 RX ADMIN — WATER: 1000 INJECTION, SOLUTION INTRAVENOUS at 04:47

## 2017-02-07 RX ADMIN — AMIODARONE HYDROCHLORIDE 400 MG: 200 TABLET ORAL at 08:40

## 2017-02-07 RX ADMIN — HYDRALAZINE HYDROCHLORIDE 50 MG: 25 TABLET, FILM COATED ORAL at 23:32

## 2017-02-07 RX ADMIN — Medication 7 MG/HR: at 10:41

## 2017-02-07 RX ADMIN — AMIODARONE HYDROCHLORIDE 400 MG: 200 TABLET ORAL at 21:41

## 2017-02-07 RX ADMIN — Medication 10 ML: at 21:41

## 2017-02-07 RX ADMIN — INSULIN LISPRO 4 UNITS: 100 INJECTION, SOLUTION INTRAVENOUS; SUBCUTANEOUS at 08:39

## 2017-02-07 RX ADMIN — SODIUM BICARBONATE 2 ML: 0.2 INJECTION, SOLUTION INTRAVENOUS at 14:53

## 2017-02-07 RX ADMIN — Medication 10 ML: at 05:34

## 2017-02-07 RX ADMIN — INSULIN LISPRO 2 UNITS: 100 INJECTION, SOLUTION INTRAVENOUS; SUBCUTANEOUS at 17:42

## 2017-02-07 RX ADMIN — METOPROLOL TARTRATE 50 MG: 50 TABLET ORAL at 21:41

## 2017-02-07 RX ADMIN — LIDOCAINE HYDROCHLORIDE 200 MG: 20 INJECTION, SOLUTION INFILTRATION; PERINEURAL at 14:53

## 2017-02-07 RX ADMIN — MUPIROCIN: 20 OINTMENT TOPICAL at 21:42

## 2017-02-07 RX ADMIN — CHLORHEXIDINE GLUCONATE 15 ML: 1.2 RINSE ORAL at 15:00

## 2017-02-07 RX ADMIN — METOPROLOL TARTRATE 50 MG: 50 TABLET ORAL at 08:40

## 2017-02-07 RX ADMIN — HYDRALAZINE HYDROCHLORIDE 50 MG: 25 TABLET, FILM COATED ORAL at 00:43

## 2017-02-07 RX ADMIN — Medication 10 ML: at 14:00

## 2017-02-07 RX ADMIN — HYDRALAZINE HYDROCHLORIDE 50 MG: 25 TABLET, FILM COATED ORAL at 05:34

## 2017-02-07 RX ADMIN — CHLORHEXIDINE GLUCONATE 15 ML: 1.2 RINSE ORAL at 00:44

## 2017-02-07 RX ADMIN — OXYCODONE HYDROCHLORIDE AND ACETAMINOPHEN 2 TABLET: 5; 325 TABLET ORAL at 00:51

## 2017-02-07 RX ADMIN — INSULIN GLARGINE 40 UNITS: 100 INJECTION, SOLUTION SUBCUTANEOUS at 08:43

## 2017-02-07 RX ADMIN — MUPIROCIN: 20 OINTMENT TOPICAL at 09:00

## 2017-02-07 RX ADMIN — INSULIN LISPRO 2 UNITS: 100 INJECTION, SOLUTION INTRAVENOUS; SUBCUTANEOUS at 22:03

## 2017-02-07 RX ADMIN — OXYCODONE HYDROCHLORIDE AND ACETAMINOPHEN 2 TABLET: 5; 325 TABLET ORAL at 07:41

## 2017-02-07 RX ADMIN — CHLORHEXIDINE GLUCONATE 15 ML: 1.2 RINSE ORAL at 23:41

## 2017-02-07 RX ADMIN — ASPIRIN 81 MG 81 MG: 81 TABLET ORAL at 08:40

## 2017-02-07 RX ADMIN — HYDRALAZINE HYDROCHLORIDE 50 MG: 25 TABLET, FILM COATED ORAL at 18:00

## 2017-02-07 RX ADMIN — SODIUM CHLORIDE 40 MG: 9 INJECTION INTRAMUSCULAR; INTRAVENOUS; SUBCUTANEOUS at 08:40

## 2017-02-07 RX ADMIN — SODIUM CHLORIDE 40 MG: 9 INJECTION INTRAMUSCULAR; INTRAVENOUS; SUBCUTANEOUS at 21:42

## 2017-02-07 RX ADMIN — INSULIN LISPRO 6 UNITS: 100 INJECTION, SOLUTION INTRAVENOUS; SUBCUTANEOUS at 13:00

## 2017-02-07 NOTE — PROGRESS NOTES
Problem: Self Care Deficits Care Plan (Adult)  Goal: *Acute Goals and Plan of Care (Insert Text)  Occupational Therapy Goals  Initiated 2/3/2017  1. Patient will perform ADLs standing 5 mins without fatigue or LOB with independence within 7 day(s). 2. Patient will perform lower body ADLs with independence within 7 day(s). 3. Patient will perform bathing with independence within 7 day(s). 4. Patient will perform toilet transfers with independence within 7 day(s). 5. Patient will perform all aspects of toileting with independence within 7 day(s). 6. Patient will participate in cardiac/sternal upper extremity therapeutic exercise/activities to increase independence with ADLs with independence for 5 minutes within 7 day(s). OCCUPATIONAL THERAPY TREATMENT  Patient: Ramos Miguel (31 y.o. male)  Date: 2/7/2017  Diagnosis: aneursym  Aortic dissection (HCC)  melana <principal problem not specified>  Procedure(s) (LRB):  ESOPHAGOGASTRODUODENOSCOPY (EGD) (N/A)  SIGMOIDOSCOPY FLEXIBLE (N/A)  ESOPHAGOGASTRODUODENAL (EGD) BIOPSY (N/A) 2 Days Post-Op  Precautions: Sternal      ASSESSMENT:  Pt performed bed mobility with minimal/moderate assistance today. Pt sat EOB and reported feeling lightheaded. Nursing was present managing lines; O2 sats were lowest at  88% briefly on 6 L nasal cannula. BP was monitored. Pt tolerated standing > a minute and needed to sit as he was feeling dizzy. BP had decreased somewhat systolically (<78) and pt side stepped towards the [facility] with assistance. Pt was returned to bed with all lines and leads intact. He reported feeling better in supine-/49. 91% on 6L. Jarod Rojas Pt is for catheter placement in preparation for dialysis today. Pt performed cardiac exercises at bed level according to the cardiac rehab booklet. Slow progress with mobility and adls-fair to poor tolerance for activity today.     Progression toward goals:  [ ]       Improving appropriately and progressing toward goals  [X]       Improving slowly and progressing slowly toward goals  [ ]       Not making progress toward goals and plan of care will be adjusted       PLAN:  Patient continues to benefit from skilled intervention to address the above impairments. Continue treatment per established plan of care. Discharge Recommendations:  Rehab  Further Equipment Recommendations for Discharge:  tbd       SUBJECTIVE:   Patient stated I feel lightheaded.    (while seated EOB and felt dizzy in standing)      OBJECTIVE DATA SUMMARY:   Cognitive/Behavioral Status:  Neurologic State: Appropriate for age;Drowsy  Orientation Level: Oriented X4  Cognition: Appropriate safety awareness; Follows commands              Functional Mobility and Transfers for ADLs:  Bed Mobility:  Rolling: Minimum assistance; Additional time  Supine to Sit: Minimum assistance; Additional time  Sit to Supine: Moderate assistance;Minimum assistance; Additional time;Assist x2  Scooting: Stand-by asssistance;Contact guard assistance; Additional time     Transfers:  Sit to Stand: Minimum assistance  Stand to Sit: Contact guard assistance     Balance:  Sitting: Intact  Standing: Impaired  Standing - Static: Good;Constant support  Standing - Dynamic : Fair     ADL Intervention:     Pt able to state 3/3 sternal precautions.   Reinforced relating to adls routine, reaching into closet, cupboards, toileting etc.                                                  Therapeutic Exercises: Cardiac Exercises    EXERCISE   Sets   Reps   Active Active Assist   Passive   Comments   B shoulder flex/ext 1 5 [X]           [ ]           [ ]               B scap adduction at shoulder level 1 5 [X]           [ ]           [ ]           Gentle with focus on scap adduction   B shoulder shrugs 1 5 [X]           [ ]           [ ]               Gentle trunk twists 1 5 [X]           [ ]           [ ]           To L and R         [ ]           [ ]           [ ]                     [ ]           [ ] [ ]                     [ ]           [ ]           [ ]                     [ ]           [ ]           [ ]                     [ ]           [ ]           [ ]                     [ ]           [ ]           [ ]                     [ ]           [ ]           [ ]                  Related all to sternal precautions and adls     Pain:  Pain Scale 1: Numeric (0 - 10)  Pain Intensity 1: 0              Activity Tolerance:   Poor to fair,  Lightheadedness and dizziness reported in sitting and standing. Systolic BP decreased <45, O2 sats with mild desat. On 6 L nasal cannula (lowest 88 briefly per monitor)  Please refer to the flowsheet for vital signs taken during this treatment.   After treatment:   [ ] Patient left in no apparent distress sitting up in chair  [X] Patient left in no apparent distress in bed  [X] Call bell left within reach  [X] Nursing notified  [ ] Caregiver present  [ ] Bed alarm activated      COMMUNICATION/COLLABORATION:   The patients plan of care was discussed with: Physical Therapist and Registered Nurse     CHRIS Kong/L  Time Calculation: 27 mins

## 2017-02-07 NOTE — PROGRESS NOTES
Cardiac Surgery ICU Progress Note    Admit Date: 2017    POD: 2 Days Post-Op      Problems:  Active Problems: Aortic dissection (HCC) (2017)      S/P ascending aortic replacement (2/3/2017)      Overview: EMERGENT ASCENDING AORTIC DISSECTION REPAIR       Right Femoral Artery Cannulation        Procedure:  Procedure(s):  ESOPHAGOGASTRODUODENOSCOPY (EGD)  SIGMOIDOSCOPY FLEXIBLE  ESOPHAGOGASTRODUODENAL (EGD) BIOPSY      Summary:     Stable hemodynamics in sinus rhythm without ectopy on no support. 's. / 24 hrs. Cr up to 3.7 with . LFTs up.  minimal. CXR: fluffy wet. WBC up to 25. Central line 11days old. Plan/Recommendations/Medical Decision Making:     Dialysis anticipated  today    Anticipated acute blood loss anemia  Increase activity  Increase I/S effort and frequency  Sternal precautions  Stimulate bowel movement  Patient seen and reviewed with  Dr. Blue Whittaker MD       Objective:     Vitals:    Visit Vitals    /50    Pulse 69    Temp 98.3 °F (36.8 °C)    Resp 12    Ht 6' (1.829 m)    Wt 270 lb (122.5 kg)    SpO2 94%    BMI 36.62 kg/m2       Temp (24hrs), Av.1 °F (36.7 °C), Min:97.7 °F (36.5 °C), Max:98.6 °F (37 °C)      Hemodynamics:   CO: CO (l/min): 6 l/min   CI: CI (l/min/m2): 2.5 l/min/m2   CVP: CVP (mmHg): 17 mmHg (17 1200)   SVR:     PAP Systolic: PAP Systolic: 32 (39/87/95 9937)   PAP Diastolic: PAP Diastolic: 19 (31/14/15 4692)   PVR:     SV02: SVO2 (%): 53 % (17 1200)   SCV02:      CXR Results  (Last 48 hours)               17 0507  XR CHEST PORT Final result    Impression:  IMPRESSION: No significant change. Narrative:  INDICATION:  Follow-up pulmonary infiltrates. EXAM: CXR Portable. FINDINGS: Portable chest shows no significant change including CVL since   yesterday. There is no apparent pneumothorax. Lungs show unchanged right upper   and lower lung infiltrates. Heart size is large.  There is prior median   sternotomy. There is no overt pulmonary edema. 02/06/17 1344  XR CHEST PORT Final result    Impression:  IMPRESSION:   1. Persistent infiltrates in right mid zone and right lower lung zone could be   due to pneumonia or aspiration. The overall density these appears to have   slightly decreased. Narrative:  EXAM:  XR CHEST PORT       INDICATION:  decompensation, follow-up infiltrates       COMPARISON:  February 6, 2017 at [address] [facility] [address]: A portable AP radiograph of the chest was obtained at 1335 hours. The   patient is on a cardiac monitor. The right IJ catheter overlies the SVC. There   is no change in the cardiomegaly. There appears be a mediastinal drain present   in this patient status post median sternotomy. Mild interstitial edema pattern is unchanged. The patchy airspace opacities in   the right midlung zone and right lower lung zone suggesting pneumonia or   aspiration are again noted. These do appear to minimally decreased in density. 02/06/17 0517  XR CHEST PORT Final result    Impression:  IMPRESSION: New right lung infiltrates. Narrative:  EXAM: Portable CXR.  0503 hours. COMPARISON: February 4, 2017. INDICATION: Chest tube. Aortic dissection repair. The lungs show new right upper and lower infiltrates . Heart is stable in size. There is no overt pulmonary edema. There is no evident pneumothorax, apparent   adenopathy or sizable pleural effusion. Right IJ CVL is stable.                  CT Output: minimal    Labs: Recent Labs      02/07/17   0822  02/07/17   0411  02/07/17   0410   WBC   --   25.9*   --    HGB   --   8.8*   --    HCT   --   25.0*   --    PLT   --   137*   --    NA   --    --   135*   K   --    --   4.3   BUN   --    --   108*   CREA   --    --   3.73*   GLU   --    --   124*   GLUCPOC  155*   --    --        Ventilator:  Ventilator Volumes  Vt Set (ml): 650 ml (02/03/17 0102)  Vt Exhaled (Machine Breath) (ml): 674 ml (02/03/17 0102)  Vt Spont (ml): 780 ml (02/03/17 0420)  Ve Observed (l/min): 9.14 l/min (02/03/17 0420)    Oxygen Therapy:  Oxygen Therapy  O2 Sat (%): 94 % (02/07/17 0800)  Pulse via Oximetry: 67 beats per minute (02/07/17 0800)  O2 Device: Nasal cannula (02/07/17 0400)  O2 Flow Rate (L/min): 5 l/min (02/07/17 0400)  FIO2 (%): 50 % (02/03/17 0420)      Admission Weight: Last Weight   Weight: 270 lb (122.5 kg) Weight: 270 lb (122.5 kg)     Intake / Output / Drain:  Current Shift: 02/07 0701 - 02/07 1900  In: 104.3 [I.V.:104.3]  Out: 75 [Urine:75]  Last 24 hrs.:   Intake/Output Summary (Last 24 hours) at 02/07/17 0850  Last data filed at 02/07/17 0800   Gross per 24 hour   Intake          1767.15 ml   Output              816 ml   Net           951.15 ml         EXAM:      General: Never complains. Oliguria. Chest:  Stable sternum      Incisions: dry and intact    Lungs:    Rhonchi bilaterally. Heart:  Regular rate and rhythm, S1, S2 normal, no murmur, no click,      Abdomen:   Soft, non-tender. Bowel sounds present. Extremities:  edema     Neurologic:  Gross motor and sensory apparatus intact.        Signed By: GAETANO Caldera

## 2017-02-07 NOTE — PROGRESS NOTES
Gastroenterology Daily Progress Note (Dr. Juan Carlos Dodd)    Admit Date: 2/2/2017       Subjective:         Patient resting comfortably this am.  No report of GI bleeding. Hgb 8.8. MEDS: reviewed in [facility] care     Objective:     Visit Vitals    /47    Pulse 68    Temp 97.9 °F (36.6 °C)    Resp 22    Ht 6' (1.829 m)    Wt 122.5 kg (270 lb)    SpO2 91%    BMI 36.62 kg/m2   Blood pressure 123/47, pulse 68, temperature 97.9 °F (36.6 °C), resp. rate 22, height 6' (1.829 m), weight 122.5 kg (270 lb), SpO2 91 %. 02/05 1901 - 02/07 0700  In: 2978.7 [P.O.:450; I.V.:2528.7]  Out: 1271 [Urine:1120; Drains:150]      Intake/Output Summary (Last 24 hours) at 02/07/17 0709  Last data filed at 02/07/17 0700   Gross per 24 hour   Intake          1662.82 ml   Output              741 ml   Net           921.82 ml     Physical Exam:       General: Obese WM seated upright in bed resting comfortably in NAD  Chest:  CTA, No rhonchi, rales or rubs. ; Chest tube with sanguinous drainage  Heart: S1, S2, RRR  GI: Soft, obese, NT/ND + BS  Extremities: 1+ edema in LE b/l, no cyanosis      Labs:   Results for Jaime Moran (MRN [id number]) as of 2/7/2017 07:09   Ref.  Range 2/3/2017 04:39 2/3/2017 21:21 2/4/2017 05:03 2/5/2017 04:38 2/7/2017 04:11   HGB Latest Ref Range: 12.1 - 17.0 g/dL 11.5 (L) 10.6 (L) 10.3 (L) 9.1 (L) 8.8 (L)   HCT Latest Ref Range: 36.6 - 50.3 % 34.4 (L) 31.2 (L) 30.4 (L) 27.1 (L) 25.0 (L)       Recent Results (from the past 24 hour(s))   GLUCOSE, POC    Collection Time: 02/06/17  8:05 AM   Result Value Ref Range    Glucose (POC) 293 (H) 65 - 100 mg/dL    Performed by Win SANTANA, POC    Collection Time: 02/06/17 11:15 AM   Result Value Ref Range    Glucose (POC) 296 (H) 65 - 100 mg/dL    Performed by Jeancarlos Roberson W/MICROSCOPIC    Collection Time: 02/06/17  1:03 PM   Result Value Ref Range    Color DARK YELLOW      Appearance CLEAR CLEAR      Specific gravity 1.019 1.003 - 1.030      pH (UA) 5.0 5.0 - 8.0      Protein NEGATIVE  NEG mg/dL    Glucose NEGATIVE  NEG mg/dL    Ketone NEGATIVE  NEG mg/dL    Blood NEGATIVE  NEG      Urobilinogen 0.2 0.2 - 1.0 EU/dL    Nitrites NEGATIVE  NEG      Leukocyte Esterase NEGATIVE  NEG      WBC 0-4 0 - 4 /hpf    RBC 0-5 0 - 5 /hpf    Epithelial cells FEW FEW /lpf    Bacteria NEGATIVE  NEG /hpf   HEMOGLOBIN A1C WITH EAG    Collection Time: 02/06/17  1:03 PM   Result Value Ref Range    Hemoglobin A1c 6.9 (H) 4.2 - 6.3 %    Est. average glucose 151 mg/dL   URINALYSIS W/MICROSCOPIC    Collection Time: 02/06/17  1:03 PM   Result Value Ref Range    Color PENDING     Appearance PENDING     Specific gravity PENDING     Specific gravity PENDING     pH (UA) PENDING     Protein PENDING mg/dL    Glucose PENDING mg/dL    Ketone PENDING mg/dL    Bilirubin PENDING     Blood PENDING     Urobilinogen PENDING EU/dL    Nitrites PENDING     Leukocyte Esterase PENDING     WBC DUPLICATE REQUEST /hpf    RBC DUPLICATE REQUEST /hpf    Epithelial cells DUPLICATE REQUEST /lpf    Bacteria DUPLICATE REQUEST /hpf   EOSINOPHILS, URINE    Collection Time: 02/06/17  1:03 PM   Result Value Ref Range    Eosinophils,urine NEGATIVE      SODIUM, UR, RANDOM    Collection Time: 02/06/17  1:03 PM   Result Value Ref Range    Sodium urine, random 10 MMOL/L   PROTEIN/CREATININE RATIO, URINE    Collection Time: 02/06/17  1:03 PM   Result Value Ref Range    Protein, urine random 33 (H) 0.0 - 11.9 mg/dL    Creatinine, urine 308.00 mg/dL    Protein/Creat.  urine Ratio 0.1     CK    Collection Time: 02/06/17  1:03 PM   Result Value Ref Range     (H) 39 - 308 U/L   BLOOD GAS, ARTERIAL    Collection Time: 02/06/17  1:03 PM   Result Value Ref Range    pH 7.35 7.35 - 7.45      PCO2 23 (L) 35.0 - 45.0 mmHg    PO2 78 (L) 80 - 100 mmHg    O2 SAT 95 92 - 97 %    BICARBONATE 13 (L) 22 - 26 mmol/L    BASE DEFICIT 10.6 mmol/L    O2 METHOD NASAL O2      O2 FLOW RATE 4.00 L/min    Sample source ARTERIAL SITE LEFT RADIAL      LICHA'S TEST YES     BILIRUBIN, CONFIRM    Collection Time: 02/06/17  1:03 PM   Result Value Ref Range    Bilirubin UA, confirm NEGATIVE  NEG     METABOLIC PANEL, BASIC    Collection Time: 02/06/17  1:27 PM   Result Value Ref Range    Sodium 134 (L) 136 - 145 mmol/L    Potassium 4.8 3.5 - 5.1 mmol/L    Chloride 102 97 - 108 mmol/L    CO2 14 (LL) 21 - 32 mmol/L    Anion gap 18 (H) 5 - 15 mmol/L    Glucose 216 (H) 65 - 100 mg/dL    BUN 89 (H) 6 - 20 MG/DL    Creatinine 3.64 (H) 0.70 - 1.30 MG/DL    BUN/Creatinine ratio 24 (H) 12 - 20      GFR est AA 21 (L) >60 ml/min/1.73m2    GFR est non-AA 18 (L) >60 ml/min/1.73m2    Calcium 7.8 (L) 8.5 - 10.1 MG/DL   MAGNESIUM    Collection Time: 02/06/17  1:27 PM   Result Value Ref Range    Magnesium 3.0 (H) 1.6 - 2.4 mg/dL   LACTIC ACID, PLASMA    Collection Time: 02/06/17  1:27 PM   Result Value Ref Range    Lactic acid 6.3 (HH) 0.4 - 2.0 MMOL/L   GLUCOSE, POC    Collection Time: 02/06/17  4:06 PM   Result Value Ref Range    Glucose (POC) 194 (H) 65 - 100 mg/dL    Performed by Dagmar Barry    BLOOD GAS, ARTERIAL    Collection Time: 02/06/17  5:00 PM   Result Value Ref Range    pH 7.44 7.35 - 7.45      PCO2 27 (L) 35.0 - 45.0 mmHg    PO2 75 (L) 80 - 100 mmHg    O2 SAT 96 92 - 97 %    BICARBONATE 18 (L) 22 - 26 mmol/L    BASE DEFICIT 4.7 mmol/L    O2 METHOD NASAL O2      O2 FLOW RATE 4.00 L/min    SPONTANEOUS RATE 18.0      Sample source ARTERIAL      SITE RIGHT BRACHIAL      LICHA'S TEST N/A     GLUCOSE, POC    Collection Time: 02/06/17 10:17 PM   Result Value Ref Range    Glucose (POC) 166 (H) 65 - 100 mg/dL    Performed by Peri Diaz    METABOLIC PANEL, COMPREHENSIVE    Collection Time: 02/07/17  4:10 AM   Result Value Ref Range    Sodium 135 (L) 136 - 145 mmol/L    Potassium 4.3 3.5 - 5.1 mmol/L    Chloride 100 97 - 108 mmol/L    CO2 23 21 - 32 mmol/L    Anion gap 12 5 - 15 mmol/L    Glucose 124 (H) 65 - 100 mg/dL     (H) 6 - 20 MG/DL Creatinine 3.73 (H) 0.70 - 1.30 MG/DL    BUN/Creatinine ratio 29 (H) 12 - 20      GFR est AA 21 (L) >60 ml/min/1.73m2    GFR est non-AA 17 (L) >60 ml/min/1.73m2    Calcium 7.7 (L) 8.5 - 10.1 MG/DL    Bilirubin, total 0.9 0.2 - 1.0 MG/DL    ALT (SGPT) 331 (H) 12 - 78 U/L    AST (SGOT) 835 (H) 15 - 37 U/L    Alk. phosphatase 91 45 - 117 U/L    Protein, total 5.9 (L) 6.4 - 8.2 g/dL    Albumin 2.9 (L) 3.5 - 5.0 g/dL    Globulin 3.0 2.0 - 4.0 g/dL    A-G Ratio 1.0 (L) 1.1 - 2.2     MAGNESIUM    Collection Time: 02/07/17  4:10 AM   Result Value Ref Range    Magnesium 3.0 (H) 1.6 - 2.4 mg/dL   PHOSPHORUS    Collection Time: 02/07/17  4:10 AM   Result Value Ref Range    Phosphorus 5.4 (H) 2.6 - 4.7 MG/DL   CBC WITH AUTOMATED DIFF    Collection Time: 02/07/17  4:11 AM   Result Value Ref Range    WBC 25.9 (H) 4.1 - 11.1 K/uL    RBC 3.05 (L) 4.10 - 5.70 M/uL    HGB 8.8 (L) 12.1 - 17.0 g/dL    HCT 25.0 (L) 36.6 - 50.3 %    MCV 82.0 80.0 - 99.0 FL    MCH 28.9 26.0 - 34.0 PG    MCHC 35.2 30.0 - 36.5 g/dL    RDW 14.8 (H) 11.5 - 14.5 %    PLATELET 929 (L) 859 - 400 K/uL    NEUTROPHILS 71 %    BAND NEUTROPHILS 4 %    LYMPHOCYTES 11 %    MONOCYTES 6 %    EOSINOPHILS 0 %    BASOPHILS 0 %    METAMYELOCYTES 2 %    MYELOCYTES 6 %    ABS. NEUTROPHILS 19.4 K/UL    ABS. LYMPHOCYTES 2.8 K/UL    ABS. MONOCYTES 1.6 K/UL    ABS. EOSINOPHILS 0.0 K/UL    ABS.  BASOPHILS 0.0 K/UL    RBC COMMENTS ANISOCYTOSIS  2+        RBC COMMENTS MACROCYTOSIS  PRESENT        RBC COMMENTS BASOPHILIC STIPPLING  PRESENT        DF MANUAL     NUCLEATED RBC    Collection Time: 02/07/17  4:11 AM   Result Value Ref Range    NRBC 1.3 (H) 0  WBC    ABSOLUTE NRBC 0.33 (H) 0.00 - 0.01 K/uL    WBC CORRECTED FOR NR ADJUSTED FOR NUCLEATED RBC'S     HEMOGLOBIN A1C WITH EAG    Collection Time: 02/07/17  4:14 AM   Result Value Ref Range    Hemoglobin A1c 7.1 (H) 4.2 - 6.3 %    Est. average glucose 157 mg/dL     Recent Labs      02/07/17   0410  02/06/17   1327  02/06/17 0341  02/05/17   0438   NA  135*  134*  133*  139   K  4.3  4.8  4.4  4.0   CL  100  102  102  106   CO2  23  14*  19*  22   BUN  108*  89*  80*  56*   CREA  3.73*  3.64*  2.33*  1.74*   GLU  124*  216*  240*  95   CA  7.7*  7.8*  8.0*  7.5*   MG  3.0*  3.0*   --   2.3   PHOS  5.4*   --    --    --      Recent Labs      02/07/17   0410  02/06/17 0341   SGOT  835*  49*   AP  91  63   TP  5.9*  5.9*   ALB  2.9*  2.9*   GLOB  3.0  3.0       Impression:    S/P Aortic dissection repair  GI blood loss anemia  Esophagitis and gastritis on EGD  FELICE         Plan:  Patient with rise in creatining and BUN with somewhat stable H/H now with recent EGD and Flex Sig for which would for now avoid bowel prep and invasive testing such as colonoscopy given other comorbidities.   -continue pt on PIP BID for now  -follow H/H and transfuse prn  -following along and for active GI bleeding may consider a colonoscopy       Mau Young MD    2/7/2017  3500  [address] [place] [address], [address] [place] [address]  [phone] [address] [place] [address] [place]: [phone]

## 2017-02-07 NOTE — PROGRESS NOTES
Nutrition Assessment:    RECOMMENDATIONS:   Continue advancing diet per GI    DIETITIANS INTERVENTIONS/PLAN:   Advance diet as tolerated  Monitor appetite/PO intake    ASSESSMENT:   Pt admitted with aortic dissection. PMH: undiagnosed DM. Chart reviewed, case discussed during CCU rounds. Pt is s/p aortic dissection repair. GI is following for a GI bleed, s/p EGD which found esophagitis + gastritis. HD treatment today. Phos 5.4, K+ WNL. Pt on clears currently. Will defer diet advancement to GI. Noted pt was eating well prior to surgery. SUBJECTIVE/OBJECTIVE:     Diet Order: Clear liquids  % Eaten:  Patient Vitals for the past 72 hrs:   % Diet Eaten   02/04/17 1800 100 %     Pertinent Medications:lantus, humalog, protonix, KCl. Chemistries:  Lab Results   Component Value Date/Time    Sodium 135 02/07/2017 04:10 AM    Potassium 4.3 02/07/2017 04:10 AM    Chloride 100 02/07/2017 04:10 AM    CO2 23 02/07/2017 04:10 AM    Anion gap 12 02/07/2017 04:10 AM    Glucose 124 02/07/2017 04:10 AM     02/07/2017 04:10 AM    Creatinine 3.73 02/07/2017 04:10 AM    BUN/Creatinine ratio 29 02/07/2017 04:10 AM    GFR est AA 21 02/07/2017 04:10 AM    GFR est non-AA 17 02/07/2017 04:10 AM    Calcium 7.7 02/07/2017 04:10 AM    AST (SGOT) 835 02/07/2017 04:10 AM    Alk. phosphatase 91 02/07/2017 04:10 AM    Protein, total 5.9 02/07/2017 04:10 AM    Albumin 2.9 02/07/2017 04:10 AM    Globulin 3.0 02/07/2017 04:10 AM    A-G Ratio 1.0 02/07/2017 04:10 AM    ALT (SGPT) 331 02/07/2017 04:10 AM      Anthropometrics: Height: 6' (182.9 cm) Weight: 122.5 kg (270 lb)    IBW (%IBW):   ( ) UBW (%UBW):   (  %)    BMI: Body mass index is 36.62 kg/(m^2). This BMI is indicative of:  []Underweight   []Normal   []Overweight   [x] Obesity   [] Extreme Obesity (BMI>40)  Estimated Nutrition Needs (Based on): 8064 Kcals/day (MSJ 2113 x 1.3 (-500 for obesity)) , 97 g (1.2gPro/kg IBW) Protein  Carbohydrate:  At Least 130 g/day  Fluids: 2200 mL/day    Last BM: 2/5   []Active     []Hyperactive  [x]Hypoactive       [] Absent   BS  Skin:    [] Intact   [x] Incision  [] Breakdown   [] DTI   [] Tears/Excoriation/Abrasion  [x]Edema(+3-BLE) [] Other: Wt Readings from Last 30 Encounters:   02/02/17 122.5 kg (270 lb)      NUTRITION DIAGNOSES:   Problem:  Altered GI function      Etiology: related to GI bleed     Signs/Symptoms: as evidenced by pt on clears, GI workup. NUTRITION INTERVENTIONS:  Meals/Snacks: Other (advance diet per GI)                  GOAL:   Pt will advance to solid diet and consume >50% of meals in 2-4 days.      Cultural, Christian, or Ethnic Dietary Needs: None     LEARNING NEEDS (Diet, Food/Nutrient-Drug Interaction):    [x] None Identified   [] Identified and Education Provided/Documented   [] Identified and Pt declined/was not appropriate      [x] Interdisciplinary Care Plan Reviewed/Documented    [x] Participated in Discharge Planning: UTD   [x] Interdisciplinary Rounds     NUTRITION RISK:    [x] High              [x] Moderate           []  Low  []  Minimal/Uncompromised    PT SEEN FOR:    []  MD Consult: []Calorie Count      []Diabetic Diet Education        []Diet Education     []Electrolyte Management     []General Nutrition Management and Supplements     []Management of Tube Feeding     []TPN Recommendations    []  RN Referral:  []MST score >=2     []Enteral/Parenteral Nutrition PTA     []Pregnant: Gestational DM or Multigestation   []  Low BMI  []  DTR Referral   CAROLINA Null, [address]  Pager [phone]  Weekend Pager [phone]

## 2017-02-07 NOTE — PROGRESS NOTES
1315 Patient working with PT tolerated moderately. Shortness of breath and tiredness noted quickly. 1432 Dialysis catheter placed by interventional radiology. 1500 Radiology called for portable chest x-ray. 1800 Patient sitting up in bed eating liquid dinner tray. Tolerating well. 1900 Dialysis nurse at bedside for treatment. Patient aware of dialysis start. Report given to SONIA Paul [facility]

## 2017-02-07 NOTE — CDMP QUERY
Dr. Melly Walter M :  Please clarify if this patient is being treated/managed for:    =>Acute respiratory distress in the setting of 92%4L--90%6L, \"sob\" cxr req Increase I/S effort and frequency, pulm toilet, O2, ICU  =>Other Explanation of clinical findings  =>Unable to Determine (no explanation of clinical findings)    The medical record reflects the following clinical findings, treatment, and risk factors:    Risk Factors: 51m adm w/ ascending aortic dissection s/p repair  Clinical Indicators:  92%4L--90%6L, 2/7 cxr--Lungs show unchanged right upper  and lower lung infiltrates. 2/7 pn \"sob, swollen\"  Treatment:  Increase I/S effort and frequency, pulm toilet, O2, ICU     Please clarify and document your clinical opinion in the progress notes and discharge summary including the definitive and/or presumptive diagnosis, (suspected or probable), related to the above clinical findings. Please include clinical findings supporting your diagnosis.     Thank Deborah Colmenares

## 2017-02-07 NOTE — PROCEDURES
Chris Cid, [address]   TRANSESOPHAGEAL ECHO       Name:  Day Coombs   MR#:  [id number]   :  1965   Account #:  [de-identified]        Date of Adm:  2017       DEMOGRAPHIC INFORMATION: The patient is a 50-year-old   gentleman who presents to Dr. Garret Trujillo for an aortic dissection   repair. DESCRIPTION OF PROCEDURE: The multiplane transesophageal   echo probe was easily placed following the induction of general   endotracheal anesthesia. The echocardiographic modalities employed   include 2-dimensional transesophageal echo, color flow Doppler,   pulsed-wave Doppler and continuous wave Doppler. ECHOCARDIOGRAM EXAMINATION   AORTA: The ascending aorta is aneurysmal measuring approximately   5 cm in maximal diameter. The aortic arch and descending aorta are   normal in size. There is an aortic dissection. The proximal aspect of   the dissection flap is seen just proximally to the sinotubular junction   and extends throughout the ascending aorta, aortic arch and   descending aorta. I was unable to find the entry point into the   dissection flap. VALVES   AORTIC VALVE: The aortic annulus measures 2.4 cm. There is no   aortic stenosis. There is a mild to moderate aortic insufficiency with an   eccentric aortic insufficiency jet. The aortic leaflet morphology and   motion is normal.     MITRAL VALVE: The mitral annulus is normal. There is no stenosis. There is mild mitral regurgitation with normal leaflet morphology and   motion. TRICUSPID VALVE: The tricuspid annulus is normal. There is no   stenosis. There is mild tricuspid regurgitation with normal leaflet   morphology and motion. ATRIA: The right and left atria are both normal in size. There is no   evidence of smoke, thrombus or tumor. The left atrial appendage is   free of clot.  The interatrial septum is normal.      VENTRICLES: Interventricular septum is normal.     RIGHT VENTRICLE: The right ventricular cavity size is normal. There   is no hypertrophy or thrombus, and there is normal right ventricular   systolic function. LEFT VENTRICLE: The left ventricular cavity size is normal. There is   mild left ventricular hypertrophy. There is no evidence of thrombus,   and there is normal left ventricular systolic function. Ejection fraction is   approximately 60%. REGIONAL FUNCTION: There are no regional wall motion   abnormalities. The pericardium reveals a very mild pericardial effusion   and no evidence of tamponade. Pleura are normal.      POST INTERVENTION FOLLOWUP STUDY: The intervention   performed is a type A aortic dissection repair with an   ascending aortic tube graft and there is a trace residual aortic valve   insufficiency.  There are no other changes on the post-intervention   echo exam.        MD MARI Hunt / Rolan   D:  02/06/2017   22:04   T:  02/07/2017   11:41   Job #:  [id number]

## 2017-02-07 NOTE — PROGRESS NOTES
[facility]  Pulmonary, Critical Care, and Sleep Medicine    Name: Virginia Smith MRN: [id number]   : 1965 Hospital: [facility]   Date: 2017            IMPRESSION:   · S/p repair of ascending aortic dissection, had RFA cannulation. · FELICE/ CKD  · Abnormal CXR- look like more edema on right, normal WBC  · Presented with GI bleed in ER, now had additional bleeding from lower gi tract, 2/5 incomplete prep but colonoscopy without gut ischemia  · Presented with chest pain  · Mild renal insufficiency with Cr of 1.74.  · Anemia, has bee pretty stable. · ARLEN  · Obesity  · Ex Smoker  · Occasional Etoh      RECOMMENDATIONS:   · O2  · Pulmonary toilet  · Afebrile but WBC much higher  · Low threshold for abx  · Renal fx worse  · Diet per cardiac surgery  · Incentive spirometry  · Will assist while in the ICU     Subjective/History:     No acute changes noted. Sitting up this am, on [place] oxygen. Hungry. No gross bleeeding He is feeling better. No acute complaints. No back pain, has expected chest pain. ROS of 10 systems is otherwise negative.        Current Facility-Administered Medications   Medication Dose Route Frequency    metoprolol tartrate (LOPRESSOR) tablet 50 mg  50 mg Oral Q12H    hydrALAZINE (APRESOLINE) tablet 50 mg  50 mg Oral Q6H    amLODIPine (NORVASC) tablet 5 mg  5 mg Oral Q12H    influenza vaccine - (36mos+)(PF) (FLUZONE/FLUARIX/FLULAVAL QUAD) injection 0.5 mL  0.5 mL IntraMUSCular PRIOR TO DISCHARGE    insulin glargine (LANTUS) injection 40 Units  40 Units SubCUTAneous DAILY    pantoprazole (PROTONIX) 40 mg in sodium chloride 0.9 % 10 mL injection  40 mg IntraVENous Q12H    sodium bicarbonate (8.4%) 150 mEq in sterile water 1,000 mL infusion   IntraVENous CONTINUOUS    niCARdipine (CARDENE) 50 mg in 0.9% sodium chloride 250 mL infusion  0-15 mg/hr IntraVENous TITRATE    sodium chloride (NS) flush 5-10 mL  5-10 mL IntraVENous Q8H    mupirocin (BACTROBAN) 2 % ointment   Both Nostrils BID    amiodarone (CORDARONE) tablet 400 mg  400 mg Oral Q12H    aspirin chewable tablet 81 mg  81 mg Oral DAILY    insulin lispro (HUMALOG) injection   SubCUTAneous AC&HS    chlorhexidine (PERIDEX) 0.12 % mouthwash 15 mL  15 mL Oral Q12H         Review of Systems:  A comprehensive review of systems was negative. Objective:   Vital Signs:    Visit Vitals    /47    Pulse 68    Temp 97.9 °F (36.6 °C)    Resp 22    Ht 6' (1.829 m)    Wt 122.5 kg (270 lb)    SpO2 91%    BMI 36.62 kg/m2       O2 Device: Nasal cannula   O2 Flow Rate (L/min): 5 l/min   Temp (24hrs), Av °F (36.7 °C), Min:97.7 °F (36.5 °C), Max:98.6 °F (37 °C)       Intake/Output:   Last shift:         Last 3 shifts:  1901 -  07  In: 2978.7 [P.O.:450; I.V.:2528.7]  Out: 1271 [Urine:1120; Drains:150]    Intake/Output Summary (Last 24 hours) at 17 0752  Last data filed at 17 0700   Gross per 24 hour   Intake          1662.82 ml   Output              741 ml   Net           921.82 ml     Hemodynamics:   PAP: PAP Systolic: 32 (24/54/39 8305) CO: CO (l/min): 6 l/min (17 1200)   Wedge:   CI: CI (l/min/m2): 2.5 l/min/m2 (17 1200)   CVP:  CVP (mmHg): 17 mmHg (17 1200) SVR:       PVR:       Ventilator Settings:  Mode Rate Tidal Volume Pressure FiO2 PEEP   Spontaneous   650 ml  5 cm H2O 50 % 5 cm H20     Peak airway pressure: 11 cm H2O    Minute ventilation: 9.14 l/min      Physical Exam:    General:  Alert, cooperative, no distress, appears stated age. Obese gentleman. Sitting up in chair. No distress. Head:  Normocephalic, without obvious abnormality, atraumatic. Eyes:  Conjunctivae/corneas clear. PERRL, EOMs intact. Nose: Nares normal. Septum midline. Mucosa normal. No drainage or sinus tenderness.    Throat: Lips, mucosa, and tongue normal. Teeth and gums normal.   Neck: Supple, symmetrical, trachea midline, no adenopathy, thyroid: no enlargment/tenderness/nodules, no carotid bruit and no JVD. Back:   Symmetric, no curvature. ROM normal.   Lungs:   Clear to auscultation bilaterally. Decreased BS in bases. Chest wall:  No tenderness or deformity. Sternal dressing is intact. Heart:  Regular rate and rhythm, S1, S2 normal, no murmur, click, rub or gallop. Abdomen:   Soft, non-tender. Bowel sounds normal. No masses,  No organomegaly. Extremities: Extremities normal, atraumatic, has chronic venous insufficiency changes. Pulses: 2+ and symmetric all extremities.    Skin: Skin color, texture, turgor normal. No rashes or lesions   Lymph nodes: Cervical, supraclavicular, and axillary nodes normal.   Neurologic: Grossly nonfocal       Data:     Recent Results (from the past 24 hour(s))   GLUCOSE, POC    Collection Time: 02/06/17  8:05 AM   Result Value Ref Range    Glucose (POC) 293 (H) 65 - 100 mg/dL    Performed by Alejo Guallpa    GLUCOSE, POC    Collection Time: 02/06/17 11:15 AM   Result Value Ref Range    Glucose (POC) 296 (H) 65 - 100 mg/dL    Performed by Froylan Quigley W/MICROSCOPIC    Collection Time: 02/06/17  1:03 PM   Result Value Ref Range    Color DARK YELLOW      Appearance CLEAR CLEAR      Specific gravity 1.019 1.003 - 1.030      pH (UA) 5.0 5.0 - 8.0      Protein NEGATIVE  NEG mg/dL    Glucose NEGATIVE  NEG mg/dL    Ketone NEGATIVE  NEG mg/dL    Blood NEGATIVE  NEG      Urobilinogen 0.2 0.2 - 1.0 EU/dL    Nitrites NEGATIVE  NEG      Leukocyte Esterase NEGATIVE  NEG      WBC 0-4 0 - 4 /hpf    RBC 0-5 0 - 5 /hpf    Epithelial cells FEW FEW /lpf    Bacteria NEGATIVE  NEG /hpf   HEMOGLOBIN A1C WITH EAG    Collection Time: 02/06/17  1:03 PM   Result Value Ref Range    Hemoglobin A1c 6.9 (H) 4.2 - 6.3 %    Est. average glucose 151 mg/dL   URINALYSIS W/MICROSCOPIC    Collection Time: 02/06/17  1:03 PM   Result Value Ref Range    Color PENDING     Appearance PENDING     Specific gravity PENDING     Specific gravity PENDING     pH (UA) PENDING     Protein PENDING mg/dL    Glucose PENDING mg/dL    Ketone PENDING mg/dL    Bilirubin PENDING     Blood PENDING     Urobilinogen PENDING EU/dL    Nitrites PENDING     Leukocyte Esterase PENDING     WBC DUPLICATE REQUEST /hpf    RBC DUPLICATE REQUEST /hpf    Epithelial cells DUPLICATE REQUEST /lpf    Bacteria DUPLICATE REQUEST /hpf   EOSINOPHILS, URINE    Collection Time: 02/06/17  1:03 PM   Result Value Ref Range    Eosinophils,urine NEGATIVE      SODIUM, UR, RANDOM    Collection Time: 02/06/17  1:03 PM   Result Value Ref Range    Sodium urine, random 10 MMOL/L   PROTEIN/CREATININE RATIO, URINE    Collection Time: 02/06/17  1:03 PM   Result Value Ref Range    Protein, urine random 33 (H) 0.0 - 11.9 mg/dL    Creatinine, urine 308.00 mg/dL    Protein/Creat.  urine Ratio 0.1     CK    Collection Time: 02/06/17  1:03 PM   Result Value Ref Range     (H) 39 - 308 U/L   BLOOD GAS, ARTERIAL    Collection Time: 02/06/17  1:03 PM   Result Value Ref Range    pH 7.35 7.35 - 7.45      PCO2 23 (L) 35.0 - 45.0 mmHg    PO2 78 (L) 80 - 100 mmHg    O2 SAT 95 92 - 97 %    BICARBONATE 13 (L) 22 - 26 mmol/L    BASE DEFICIT 10.6 mmol/L    O2 METHOD NASAL O2      O2 FLOW RATE 4.00 L/min    Sample source ARTERIAL      SITE LEFT RADIAL      LICHA'S TEST YES     BILIRUBIN, CONFIRM    Collection Time: 02/06/17  1:03 PM   Result Value Ref Range    Bilirubin UA, confirm NEGATIVE  NEG     METABOLIC PANEL, BASIC    Collection Time: 02/06/17  1:27 PM   Result Value Ref Range    Sodium 134 (L) 136 - 145 mmol/L    Potassium 4.8 3.5 - 5.1 mmol/L    Chloride 102 97 - 108 mmol/L    CO2 14 (LL) 21 - 32 mmol/L    Anion gap 18 (H) 5 - 15 mmol/L    Glucose 216 (H) 65 - 100 mg/dL    BUN 89 (H) 6 - 20 MG/DL    Creatinine 3.64 (H) 0.70 - 1.30 MG/DL    BUN/Creatinine ratio 24 (H) 12 - 20      GFR est AA 21 (L) >60 ml/min/1.73m2    GFR est non-AA 18 (L) >60 ml/min/1.73m2    Calcium 7.8 (L) 8.5 - 10.1 MG/DL   MAGNESIUM Collection Time: 02/06/17  1:27 PM   Result Value Ref Range    Magnesium 3.0 (H) 1.6 - 2.4 mg/dL   LACTIC ACID, PLASMA    Collection Time: 02/06/17  1:27 PM   Result Value Ref Range    Lactic acid 6.3 (HH) 0.4 - 2.0 MMOL/L   GLUCOSE, POC    Collection Time: 02/06/17  4:06 PM   Result Value Ref Range    Glucose (POC) 194 (H) 65 - 100 mg/dL    Performed by Ann Marie Gomez    BLOOD GAS, ARTERIAL    Collection Time: 02/06/17  5:00 PM   Result Value Ref Range    pH 7.44 7.35 - 7.45      PCO2 27 (L) 35.0 - 45.0 mmHg    PO2 75 (L) 80 - 100 mmHg    O2 SAT 96 92 - 97 %    BICARBONATE 18 (L) 22 - 26 mmol/L    BASE DEFICIT 4.7 mmol/L    O2 METHOD NASAL O2      O2 FLOW RATE 4.00 L/min    SPONTANEOUS RATE 18.0      Sample source ARTERIAL      SITE RIGHT BRACHIAL      LICHA'S TEST N/A     GLUCOSE, POC    Collection Time: 02/06/17 10:17 PM   Result Value Ref Range    Glucose (POC) 166 (H) 65 - 100 mg/dL    Performed by Margret Bullard Emily    METABOLIC PANEL, COMPREHENSIVE    Collection Time: 02/07/17  4:10 AM   Result Value Ref Range    Sodium 135 (L) 136 - 145 mmol/L    Potassium 4.3 3.5 - 5.1 mmol/L    Chloride 100 97 - 108 mmol/L    CO2 23 21 - 32 mmol/L    Anion gap 12 5 - 15 mmol/L    Glucose 124 (H) 65 - 100 mg/dL     (H) 6 - 20 MG/DL    Creatinine 3.73 (H) 0.70 - 1.30 MG/DL    BUN/Creatinine ratio 29 (H) 12 - 20      GFR est AA 21 (L) >60 ml/min/1.73m2    GFR est non-AA 17 (L) >60 ml/min/1.73m2    Calcium 7.7 (L) 8.5 - 10.1 MG/DL    Bilirubin, total 0.9 0.2 - 1.0 MG/DL    ALT (SGPT) 331 (H) 12 - 78 U/L    AST (SGOT) 835 (H) 15 - 37 U/L    Alk.  phosphatase 91 45 - 117 U/L    Protein, total 5.9 (L) 6.4 - 8.2 g/dL    Albumin 2.9 (L) 3.5 - 5.0 g/dL    Globulin 3.0 2.0 - 4.0 g/dL    A-G Ratio 1.0 (L) 1.1 - 2.2     MAGNESIUM    Collection Time: 02/07/17  4:10 AM   Result Value Ref Range    Magnesium 3.0 (H) 1.6 - 2.4 mg/dL   PHOSPHORUS    Collection Time: 02/07/17  4:10 AM   Result Value Ref Range    Phosphorus 5.4 (H) 2.6 - 4.7 MG/DL CBC WITH AUTOMATED DIFF    Collection Time: 02/07/17  4:11 AM   Result Value Ref Range    WBC 25.9 (H) 4.1 - 11.1 K/uL    RBC 3.05 (L) 4.10 - 5.70 M/uL    HGB 8.8 (L) 12.1 - 17.0 g/dL    HCT 25.0 (L) 36.6 - 50.3 %    MCV 82.0 80.0 - 99.0 FL    MCH 28.9 26.0 - 34.0 PG    MCHC 35.2 30.0 - 36.5 g/dL    RDW 14.8 (H) 11.5 - 14.5 %    PLATELET 254 (L) 547 - 400 K/uL    NEUTROPHILS 71 %    BAND NEUTROPHILS 4 %    LYMPHOCYTES 11 %    MONOCYTES 6 %    EOSINOPHILS 0 %    BASOPHILS 0 %    METAMYELOCYTES 2 %    MYELOCYTES 6 %    ABS. NEUTROPHILS 19.4 K/UL    ABS. LYMPHOCYTES 2.8 K/UL    ABS. MONOCYTES 1.6 K/UL    ABS. EOSINOPHILS 0.0 K/UL    ABS.  BASOPHILS 0.0 K/UL    RBC COMMENTS ANISOCYTOSIS  2+        RBC COMMENTS MACROCYTOSIS  PRESENT        RBC COMMENTS BASOPHILIC STIPPLING  PRESENT        DF MANUAL     NUCLEATED RBC    Collection Time: 02/07/17  4:11 AM   Result Value Ref Range    NRBC 1.3 (H) 0  WBC    ABSOLUTE NRBC 0.33 (H) 0.00 - 0.01 K/uL    WBC CORRECTED FOR NR ADJUSTED FOR NUCLEATED RBC'S     HEMOGLOBIN A1C WITH EAG    Collection Time: 02/07/17  4:14 AM   Result Value Ref Range    Hemoglobin A1c 7.1 (H) 4.2 - 6.3 %    Est. average glucose 157 mg/dL             Telemetry:Paced    Imaging:  I have personally reviewed the patients radiographs and have reviewed the reports:  CXR: right ASDZ unchanged       Shanel Sorenson MD

## 2017-02-07 NOTE — PROGRESS NOTES
Interdisciplinary team rounds were held 2/7/2017 with the following team members:Care Management, Diabetes Treatment Specialist, Nursing, Nutrition, Pastoral Care, Pharmacy, Physical Therapy, Physician, Respiratory Therapy and Clinical Coordinator. Plan of care discussed. Goal: Adjust medications and IV fluids continue to monitor and support. See MD orders and progress notes for further  interventions and desired outcomes.

## 2017-02-07 NOTE — PROGRESS NOTES
1900- Bedside and verbal report received from [id number] [address] [address]. Shift overview: Pt rested well overnight, medicated for pain once. Cardene titrated throughout night for BP. Pt using IS frequently, up to 1250mL. 0740- Bedside and verbal report given to [facility].

## 2017-02-07 NOTE — PROCEDURES
PROCEDURE:Tacho catheter placement at bedside. INDICATION:dialysis. ANESTHESIA:local.  COMPLICATION:NONE. SPECIMENS REMOVED:none. BLOOD LOSS:NONE. /ASSISTANT:SERGEY Nash RECOMMENDATIONS:may use. CONSENT OBTAINED:YES.  NOTES:none.

## 2017-02-07 NOTE — PROGRESS NOTES
0800 Assessment completed patient on 5LNC. OX 4 moves all extremities. Young catheter in place. Patient has 3-4+ edema throughout. Shortness of breath with activity. 36 Dr. Daren Avery at bedside new orders received for placement of Tacho and for dialysis.

## 2017-02-07 NOTE — PROGRESS NOTES
Attended Interdisciplinary rounds in Critical Care Unit, where patient care was discussed. Visit by: Victor Manuel Torres. Vladimir Bryan.  Sudeep Osborne MA, [address]

## 2017-02-07 NOTE — PROGRESS NOTES
Problem: Mobility Impaired (Adult and Pediatric)  Goal: *Acute Goals and Plan of Care (Insert Text)  Physical Therapy Goals  Initiated 2/3/2017  1. Patient will move from supine to sit and sit to supine , scoot up and down and roll side to side in bed with modified independence within 7 days. 2. Patient will perform sit to/from stand with modified independence within 7 days. 3. Patient will ambulate 250 feet with least restrictive assistive device and modified independence within 7 days. 4. Patient will ascend/descend 5 stairs with 1 handrail(s) with modified independence within 7 days. 5. Patient will perform cardiac exercises per protocol with independence within 7 days. 6. Patient will verbally and functionally recall 3/3 sternal precautions within 7 days. PHYSICAL THERAPY TREATMENT  Patient: Ulysses Mena (40 y.o. male)  Date: 2/7/2017  Diagnosis: aneursym  Aortic dissection (HCC)  melana <principal problem not specified>  Procedure(s) (LRB):  ESOPHAGOGASTRODUODENOSCOPY (EGD) (N/A)  SIGMOIDOSCOPY FLEXIBLE (N/A)  ESOPHAGOGASTRODUODENAL (EGD) BIOPSY (N/A) 2 Days Post-Op  Precautions: Sternal      ASSESSMENT:  Patient received supine in bed and agreeable to PT intervention. Patient cleared by nursing for mobility. PT with plan to have patient ambulate with chair follow this date due to events of yesterday and patient reporting feeling better. Patient transferred to EOB with min A x 1 with overall good-fair adherence to sternal precautions. Patient performed transfers with min-CGA x 1-2 from EOB. Patient only able to maintain standing ~ 90 seconds prior to needing to return to seated position. Patient with decline in BP from sitting>standing with complaints of dizziness. Patient ambulated short side steps at EOB towards HOB (~2') with cardiac cart and CGA prior to returning to sitting on EOB.  Patient reporting that he would only be able to transfer to bedside chair at present due to poor activity tolerance, however nursing reporting that patient needs to return to bed for Tacho catheter placement. Patient was assisted back into bed with min-mod A x 1-2 and left with all needs met and nursing and OT present. OT instructed patient through cardiac exercises once returned supine in bed. Discharge recommendations TBD based on progress in acute PT (HHPT vs rehab pending progress). Progression toward goals:  [ ]      Improving appropriately and progressing toward goals  [X]      Improving slowly and progressing toward goals  [ ]      Not making progress toward goals and plan of care will be adjusted       PLAN:  Patient continues to benefit from skilled intervention to address the above impairments. Continue treatment per established plan of care. Discharge Recommendations: To Be Determined  Further Equipment Recommendations for Discharge:  TBD       SUBJECTIVE:   Patient stated I need to sit now.    The patient stated 3/3 sternal precautions. Reviewed all 3 with patient. OBJECTIVE DATA SUMMARY:   Patient mobilized on continuous portable monitor/telemetry. Critical Behavior:  Neurologic State: Appropriate for age, Drowsy  Orientation Level: Oriented X4  Cognition: Appropriate safety awareness, Follows commands     Functional Mobility Training:  Bed Mobility:  Log Rolling: Minimum assistance; Additional time  Supine to Sit: Minimum assistance; Additional time  Sit to Supine: Moderate assistance;Minimum assistance; Additional time;Assist x2  Scooting: Stand-by asssistance;Contact guard assistance; Additional time        Transfers:  Sit to Stand: Minimum assistance  Stand to Sit: Contact guard assistance                             Balance:  Sitting: Intact  Standing: Impaired  Standing - Static: Good;Constant support  Standing - Dynamic : Fair  Ambulation/Gait Training:   Patient ambulated short side steps at EOB towards HOB (~2') with cardiac cart and CGA.      Therapeutic Exercises: Patient performed cardiac exercises with OT following being assisted back into bed by PT. Patient instructed on the benefits and demonstrated cardiac exercises while supine. Instructed and indicated understanding on how to progress reps and sets against gravity, working up to 5 lbs and so on based on surgeon clearance for more weight in prep for functional activity. Can use household items for weights.      CARDIAC  EXERCISE   Sets   Reps   Active Active Assist   Passive Self ROM   Comments   Shoulder flexion     [ ]                                            [ ]                                            [ ]                                            [ ]                                                Shoulder abduction     [ ]                                            [ ]                                            [ ]                                            [ ]                                                Scapular elevation     [ ]                                            [ ]                                            [ ]                                            [ ]                                                Scapular retraction     [ ]                                            [ ]                                            [ ]                                            [ ]                                                Trunk rotation     [ ]                                            [ ]                                            [ ]                                            [ ]                                                Harriett Schulz     [ ]                                            [ ]                                            [ ]                                            [ ]                                                      [ ]                                            [ ]                                            [ ]                                            [ ] Pain:  Pain Scale 1: Numeric (0 - 10)  Pain Intensity 1: 0              Activity Tolerance:   Poor - pt with limited activity tolerance due to fatigue, SOB, and dizziness; decline in BP with standing; SaO2 87-91% on 6 L/min O2  Please refer to the flowsheet for vital signs taken during this treatment.   After treatment:   [ ] Patient left in no apparent distress sitting up in chair  [X] Patient left in no apparent distress in bed  [X] Call bell left within reach  [X] Nursing notified  [X] Caregiver present  [ ] Bed alarm activated      COMMUNICATION/COLLABORATION:   The patients plan of care was discussed with: Physical Therapist, Occupational Therapist and Registered Nurse     Lorelei Solomon, PT, DPT   Time Calculation: 23 mins

## 2017-02-07 NOTE — PROGRESS NOTES
NAME: Andrew Benavides        :  1965        MRN:  [id number]        Assessment :    Plan:  --FELICE  HTN  Anemia  Mild hyponatremia  Mild metabolic acidosis  ascending aortic dissection 2/3 --Creatinine on the rise (1.7 to 2.3 to 3.7); UOP only 620 ml after high dose bumex iv, CTA on ; net positive by 14 liters; no post op hypotension; initiate HD today    [place] urine and very low FeNa (0.08%) - prerenal, urine eos neg; likely ATN following AAA and CTA, but curiously has a bland urine and is so prerenal by FeNa     Still on Cardene - stop once HD/UF begins, on amlodipine 5 bid, hydralazine 50 qid, metoprolol 50 bid     D/c bicarb gtt       Subjective:     Chief Complaint:  Sob. Swollen. Young with decreasing and low uop. I had a long talk with Mr. Mira Lima and to his brother, [facility] (over the phone) about the above. We discussed about the risk/benefit to HD. We discussed the HD procedure itself. We discussed the need for a jurgen HD line. I answered questions from Mr. Mira Lima and his brother. Mr. Mira Lima is agreeable to initiating HD. Review of Systems:    Symptom Y/N Comments  Symptom Y/N Comments   Fever/Chills    Chest Pain     Poor Appetite    Edema yy    Cough    Abdominal Pain     Sputum    Joint Pain     SOB/TORREZ y   Pruritis/Rash     Nausea/vomit n   Tolerating PT/OT     Diarrhea n   Tolerating Diet     Constipation    Other       Could not obtain due to:      Objective:     VITALS:   Last 24hrs VS reviewed since prior progress note.  Most recent are:  Visit Vitals    /51 (BP 1 Location: Right arm, BP Patient Position: At rest)    Pulse (!) 59    Temp 98.3 °F (36.8 °C)    Resp 14    Ht 6' (1.829 m)    Wt 122.5 kg (270 lb)    SpO2 91%    BMI 36.62 kg/m2       Intake/Output Summary (Last 24 hours) at 17 1119  Last data filed at 17 0800   Gross per 24 hour   Intake          1352.15 ml   Output 826 ml   Net           526.15 ml      Telemetry Reviewed:     PHYSICAL EXAM:  General: Obese, swollen male, no acute distress  Resp:  A few Rales. No access. muscle use  CV:  Regular  rhythm,  No murmur (), No Rubs, No Gallops. +++ edema  GI:  Soft,  distended, appropriately tender.  +Bowel sounds, no HSM      Lab Data Reviewed: (see below)    Medications Reviewed: (see below)    PMH/SH reviewed - no change compared to H&P  ________________________________________________________________________  Care Plan discussed with:  Patient y    Family  y    RN y    Care Manager                    Consultant:          Comments   >50% of visit spent in counseling and coordination of care       ________________________________________________________________________  Donavan Garza MD     Procedures: see electronic medical records for all procedures/Xrays and details which  were not copied into this note but were reviewed prior to creation of Plan. LABS:  Recent Labs      02/07/17   0411  02/05/17   0438   WBC  25.9*  18.1*   HGB  8.8*  9.1*   HCT  25.0*  27.1*   PLT  137*  138*     Recent Labs      02/07/17   0410  02/06/17   1327  02/06/17   0341  02/05/17   0438   NA  135*  134*  133*  139   K  4.3  4.8  4.4  4.0   CL  100  102  102  106   CO2  23  14*  19*  22   BUN  108*  89*  80*  56*   CREA  3.73*  3.64*  2.33*  1.74*   GLU  124*  216*  240*  95   CA  7.7*  7.8*  8.0*  7.5*   MG  3.0*  3.0*   --   2.3   PHOS  5.4*   --    --    --      Recent Labs      02/07/17   0410  02/06/17   0341   SGOT  835*  49*   AP  91  63   TP  5.9*  5.9*   ALB  2.9*  2.9*   GLOB  3.0  3.0     No results for input(s): INR, PTP, APTT in the last 72 hours. No lab exists for component: INREXT   No results for input(s): FE, TIBC, PSAT, FERR in the last 72 hours.    No results found for: FOL, RBCF   Recent Labs      02/06/17   1700  02/06/17   1303   PH  7.44  7.35   PCO2  27*  23*   PO2  75*  78*     Recent Labs      02/06/17   1303   CPK 795*     No components found for: [place]  Lab Results   Component Value Date/Time    Color DARK YELLOW 02/06/2017 01:03 PM    Color PENDING 02/06/2017 01:03 PM    Appearance CLEAR 02/06/2017 01:03 PM    Appearance PENDING 02/06/2017 01:03 PM    Specific gravity 1.019 02/06/2017 01:03 PM    Specific gravity PENDING 02/06/2017 01:03 PM    Specific gravity PENDING 02/06/2017 01:03 PM    pH (UA) 5.0 02/06/2017 01:03 PM    pH (UA) PENDING 02/06/2017 01:03 PM    Protein NEGATIVE  02/06/2017 01:03 PM    Protein PENDING 02/06/2017 01:03 PM    Glucose NEGATIVE  02/06/2017 01:03 PM    Glucose PENDING 02/06/2017 01:03 PM    Ketone NEGATIVE  02/06/2017 01:03 PM    Ketone PENDING 02/06/2017 01:03 PM    Bilirubin PENDING 02/06/2017 01:03 PM    Urobilinogen 0.2 02/06/2017 01:03 PM    Urobilinogen PENDING 02/06/2017 01:03 PM    Nitrites NEGATIVE  02/06/2017 01:03 PM    Nitrites PENDING 02/06/2017 01:03 PM    Leukocyte Esterase NEGATIVE  02/06/2017 01:03 PM    Leukocyte Esterase PENDING 02/06/2017 01:03 PM    Epithelial cells FEW 02/06/2017 01:03 PM    Epithelial cells DUPLICATE REQUEST 28/73/1344 01:03 PM    Bacteria NEGATIVE  02/06/2017 01:03 PM    Bacteria DUPLICATE REQUEST 61/68/8592 01:03 PM    WBC 0-4 02/06/2017 99:08 PM    WBC DUPLICATE REQUEST 22/90/3285 01:03 PM    RBC 0-5 02/06/2017 55:87 PM    RBC DUPLICATE REQUEST 87/14/4037 01:03 PM       MEDICATIONS:  Current Facility-Administered Medications   Medication Dose Route Frequency    [START ON 2/8/2017] amLODIPine (NORVASC) tablet 10 mg  10 mg Oral DAILY    metoprolol tartrate (LOPRESSOR) tablet 50 mg  50 mg Oral Q12H    hydrALAZINE (APRESOLINE) tablet 50 mg  50 mg Oral Q6H    influenza vaccine 2016-17 (36mos+)(PF) (FLUZONE/FLUARIX/FLULAVAL QUAD) injection 0.5 mL  0.5 mL IntraMUSCular PRIOR TO DISCHARGE    insulin glargine (LANTUS) injection 40 Units  40 Units SubCUTAneous DAILY    pantoprazole (PROTONIX) 40 mg in sodium chloride 0.9 % 10 mL injection  40 mg IntraVENous Q12H    sodium bicarbonate (8.4%) 150 mEq in sterile water 1,000 mL infusion   IntraVENous CONTINUOUS    hydrALAZINE (APRESOLINE) 20 mg/mL injection 10 mg  10 mg IntraVENous Q2H PRN    niCARdipine (CARDENE) 50 mg in 0.9% sodium chloride 250 mL infusion  0-15 mg/hr IntraVENous TITRATE    sodium chloride (NS) flush 5-10 mL  5-10 mL IntraVENous Q8H    acetaminophen (TYLENOL) tablet 650 mg  650 mg Oral Q4H PRN    oxyCODONE-acetaminophen (PERCOCET) 5-325 mg per tablet 2 Tab  2 Tab Oral Q4H PRN    morphine injection 2 mg  2 mg IntraVENous Q2H PRN    mupirocin (BACTROBAN) 2 % ointment   Both Nostrils BID    amiodarone (CORDARONE) tablet 400 mg  400 mg Oral Q12H    albuterol (PROVENTIL VENTOLIN) nebulizer solution 1.25-2.5 mg  1.25-2.5 mg Nebulization Q4H PRN    aspirin chewable tablet 81 mg  81 mg Oral DAILY    ELECTROLYTE REPLACEMENT PROTOCOL  1 Each Other PRN    glucose chewable tablet 16 g  4 Tab Oral PRN    dextrose (D50W) injection syrg 12.5-25 g  12.5-25 g IntraVENous PRN    glucagon (GLUCAGEN) injection 1 mg  1 mg IntraMUSCular PRN    insulin lispro (HUMALOG) injection   SubCUTAneous AC&HS    albumin human 5% (BUMINATE) solution 12.5 g  12.5 g IntraVENous Q1H PRN    calcium gluconate 2 g in 0.9% sodium chloride 100 mL IVPB  2 g IntraVENous PRN    chlorhexidine (PERIDEX) 0.12 % mouthwash 15 mL  15 mL Oral Q12H

## 2017-02-07 NOTE — DIABETES MGMT
DTC Cardiac Surgery Progress Note    Recommendations/ Comments:  BG improved on scheduled lantus. a1c consistent with new onset DM. DTC will f/u for education. Chart reviewed on [employer]. Patient is 45 y.o. male s/p Cardiac Surgery. POD 3. No known Hx Type 2 Diabetes per H&P, however, a1c consistent with new onset type 2 DM. A1c:   Lab Results   Component Value Date/Time    Hemoglobin A1c 7.1 02/07/2017 04:14 AM         Recent Glucose Results:   Lab Results   Component Value Date/Time     (H) 02/07/2017 04:10 AM     (H) 02/06/2017 01:27 PM    GLUCPOC 155 (H) 02/07/2017 08:22 AM    GLUCPOC 166 (H) 02/06/2017 10:17 PM    GLUCPOC 194 (H) 02/06/2017 04:06 PM        Lab Results   Component Value Date/Time    Creatinine 3.73 02/07/2017 04:10 AM       Active Orders   Diet    DIET CLEAR LIQUID        PO intake:   Patient Vitals for the past 72 hrs:   % Diet Eaten   02/04/17 1800 100 %   02/04/17 1302 90 %       Current DM medications. humalog correction, lantus 40units daily    Will continue to follow as needed. Thank you.   Gregory Multani RN, [address]

## 2017-02-08 LAB
ANION GAP BLD CALC-SCNC: 12 MMOL/L (ref 5–15)
BASOPHILS # BLD AUTO: 0 K/UL
BASOPHILS # BLD: 0 %
BUN SERPL-MCNC: 96 MG/DL (ref 6–20)
BUN/CREAT SERPL: 30 (ref 12–20)
CALCIUM SERPL-MCNC: 7.4 MG/DL (ref 8.5–10.1)
CHLORIDE SERPL-SCNC: 100 MMOL/L (ref 97–108)
CO2 SERPL-SCNC: 24 MMOL/L (ref 21–32)
CREAT SERPL-MCNC: 3.21 MG/DL (ref 0.7–1.3)
DIFFERENTIAL METHOD BLD: ABNORMAL
EOSINOPHIL # BLD: 0 K/UL
EOSINOPHIL NFR BLD: 0 %
ERYTHROCYTE [DISTWIDTH] IN BLOOD BY AUTOMATED COUNT: 14.7 % (ref 11.5–14.5)
GLUCOSE BLD STRIP.AUTO-MCNC: 106 MG/DL (ref 65–100)
GLUCOSE BLD STRIP.AUTO-MCNC: 137 MG/DL (ref 65–100)
GLUCOSE BLD STRIP.AUTO-MCNC: 215 MG/DL (ref 65–100)
GLUCOSE BLD STRIP.AUTO-MCNC: 84 MG/DL (ref 65–100)
GLUCOSE SERPL-MCNC: 140 MG/DL (ref 65–100)
HBV CORE IGM SER QL: NONREACTIVE
HBV SURFACE AB SER QL: REACTIVE
HBV SURFACE AB SER-ACNC: 14.14 MIU/ML
HBV SURFACE AG SER QL: 0.14 INDEX
HBV SURFACE AG SER QL: NEGATIVE
HCT VFR BLD AUTO: 25.4 % (ref 36.6–50.3)
HGB BLD-MCNC: 9.1 G/DL (ref 12.1–17)
LYMPHOCYTES # BLD AUTO: 12 %
LYMPHOCYTES # BLD: 3.3 K/UL
MCH RBC QN AUTO: 29.4 PG (ref 26–34)
MCHC RBC AUTO-ENTMCNC: 35.8 G/DL (ref 30–36.5)
MCV RBC AUTO: 82.2 FL (ref 80–99)
METAMYELOCYTES NFR BLD MANUAL: 5 %
MONOCYTES # BLD: 1.9 K/UL
MONOCYTES NFR BLD AUTO: 7 %
MYELOCYTES NFR BLD MANUAL: 2 %
NEUTS BAND NFR BLD MANUAL: 3 %
NEUTS SEG # BLD: 20.4 K/UL
NEUTS SEG NFR BLD AUTO: 71 %
NRBC # BLD: 0.95 K/UL (ref 0–0.01)
NRBC BLD-RTO: 3.5 PER 100 WBC
PHOSPHATE SERPL-MCNC: 5.3 MG/DL (ref 2.6–4.7)
PLATELET # BLD AUTO: 116 K/UL (ref 150–400)
POTASSIUM SERPL-SCNC: 4.3 MMOL/L (ref 3.5–5.1)
RBC # BLD AUTO: 3.09 M/UL (ref 4.1–5.7)
RBC MORPH BLD: ABNORMAL
RBC MORPH BLD: ABNORMAL
SERVICE CMNT-IMP: ABNORMAL
SERVICE CMNT-IMP: NORMAL
SODIUM SERPL-SCNC: 136 MMOL/L (ref 136–145)
WBC # BLD AUTO: 27.5 K/UL (ref 4.1–11.1)
WBC NRBC COR # BLD: ABNORMAL 10*3/UL

## 2017-02-08 PROCEDURE — 90935 HEMODIALYSIS ONE EVALUATION: CPT

## 2017-02-08 PROCEDURE — C9113 INJ PANTOPRAZOLE SODIUM, VIA: HCPCS | Performed by: INTERNAL MEDICINE

## 2017-02-08 PROCEDURE — 74011250636 HC RX REV CODE- 250/636: Performed by: THORACIC SURGERY (CARDIOTHORACIC VASCULAR SURGERY)

## 2017-02-08 PROCEDURE — 74011250637 HC RX REV CODE- 250/637: Performed by: PHYSICIAN ASSISTANT

## 2017-02-08 PROCEDURE — 77010033678 HC OXYGEN DAILY

## 2017-02-08 PROCEDURE — 82962 GLUCOSE BLOOD TEST: CPT

## 2017-02-08 PROCEDURE — 65620000000 HC RM CCU GENERAL

## 2017-02-08 PROCEDURE — 85025 COMPLETE CBC W/AUTO DIFF WBC: CPT | Performed by: PHYSICIAN ASSISTANT

## 2017-02-08 PROCEDURE — 80048 BASIC METABOLIC PNL TOTAL CA: CPT | Performed by: INTERNAL MEDICINE

## 2017-02-08 PROCEDURE — 74011636637 HC RX REV CODE- 636/637: Performed by: PHYSICIAN ASSISTANT

## 2017-02-08 PROCEDURE — 36415 COLL VENOUS BLD VENIPUNCTURE: CPT | Performed by: PHYSICIAN ASSISTANT

## 2017-02-08 PROCEDURE — 74011636637 HC RX REV CODE- 636/637: Performed by: INTERNAL MEDICINE

## 2017-02-08 PROCEDURE — 97116 GAIT TRAINING THERAPY: CPT

## 2017-02-08 PROCEDURE — 74011250636 HC RX REV CODE- 250/636: Performed by: INTERNAL MEDICINE

## 2017-02-08 PROCEDURE — 84100 ASSAY OF PHOSPHORUS: CPT | Performed by: INTERNAL MEDICINE

## 2017-02-08 PROCEDURE — 74011250637 HC RX REV CODE- 250/637: Performed by: INTERNAL MEDICINE

## 2017-02-08 PROCEDURE — 74011000250 HC RX REV CODE- 250: Performed by: INTERNAL MEDICINE

## 2017-02-08 RX ORDER — HYDRALAZINE HYDROCHLORIDE 50 MG/1
100 TABLET, FILM COATED ORAL EVERY 6 HOURS
Status: DISCONTINUED | OUTPATIENT
Start: 2017-02-08 | End: 2017-02-27 | Stop reason: HOSPADM

## 2017-02-08 RX ORDER — CLONIDINE 0.3 MG/24H
1 PATCH, EXTENDED RELEASE TRANSDERMAL
Status: DISCONTINUED | OUTPATIENT
Start: 2017-02-08 | End: 2017-02-27 | Stop reason: HOSPADM

## 2017-02-08 RX ADMIN — CHLORHEXIDINE GLUCONATE 15 ML: 1.2 RINSE ORAL at 14:47

## 2017-02-08 RX ADMIN — INSULIN LISPRO 6 UNITS: 100 INJECTION, SOLUTION INTRAVENOUS; SUBCUTANEOUS at 11:42

## 2017-02-08 RX ADMIN — ACETAMINOPHEN 650 MG: 325 TABLET, FILM COATED ORAL at 20:21

## 2017-02-08 RX ADMIN — Medication 10 ML: at 22:20

## 2017-02-08 RX ADMIN — SODIUM CHLORIDE 40 MG: 9 INJECTION INTRAMUSCULAR; INTRAVENOUS; SUBCUTANEOUS at 20:53

## 2017-02-08 RX ADMIN — INSULIN LISPRO 2 UNITS: 100 INJECTION, SOLUTION INTRAVENOUS; SUBCUTANEOUS at 08:48

## 2017-02-08 RX ADMIN — AMLODIPINE BESYLATE 10 MG: 5 TABLET ORAL at 08:06

## 2017-02-08 RX ADMIN — HYDRALAZINE HYDROCHLORIDE 100 MG: 25 TABLET, FILM COATED ORAL at 11:29

## 2017-02-08 RX ADMIN — INSULIN GLARGINE 40 UNITS: 100 INJECTION, SOLUTION SUBCUTANEOUS at 08:48

## 2017-02-08 RX ADMIN — Medication 10 ML: at 05:53

## 2017-02-08 RX ADMIN — HYDRALAZINE HYDROCHLORIDE 50 MG: 25 TABLET, FILM COATED ORAL at 05:53

## 2017-02-08 RX ADMIN — HYDRALAZINE HYDROCHLORIDE 10 MG: 20 INJECTION INTRAMUSCULAR; INTRAVENOUS at 06:04

## 2017-02-08 RX ADMIN — HYDRALAZINE HYDROCHLORIDE 10 MG: 20 INJECTION INTRAMUSCULAR; INTRAVENOUS at 03:22

## 2017-02-08 RX ADMIN — HYDRALAZINE HYDROCHLORIDE 100 MG: 25 TABLET, FILM COATED ORAL at 19:01

## 2017-02-08 RX ADMIN — OXYCODONE HYDROCHLORIDE AND ACETAMINOPHEN 2 TABLET: 5; 325 TABLET ORAL at 06:04

## 2017-02-08 RX ADMIN — METOPROLOL TARTRATE 75 MG: 50 TABLET ORAL at 08:06

## 2017-02-08 RX ADMIN — AMIODARONE HYDROCHLORIDE 400 MG: 200 TABLET ORAL at 20:53

## 2017-02-08 RX ADMIN — METOPROLOL TARTRATE 75 MG: 50 TABLET ORAL at 20:53

## 2017-02-08 RX ADMIN — AMIODARONE HYDROCHLORIDE 400 MG: 200 TABLET ORAL at 08:05

## 2017-02-08 RX ADMIN — ASPIRIN 81 MG 81 MG: 81 TABLET ORAL at 08:06

## 2017-02-08 RX ADMIN — Medication 10 ML: at 16:00

## 2017-02-08 RX ADMIN — SODIUM CHLORIDE 40 MG: 9 INJECTION INTRAMUSCULAR; INTRAVENOUS; SUBCUTANEOUS at 08:07

## 2017-02-08 NOTE — PROGRESS NOTES
[facility]  Pulmonary, Critical Care, and Sleep Medicine    Name: Saray Almazan MRN: [id number]   : 1965 Hospital: [facility]   Date: 2017            IMPRESSION:   · S/p repair of ascending aortic dissection, had RFA cannulation. · FELICE/ CKD  · Abnormal CXR- look like more edema on right, normal WBC  · Presented with GI bleed in ER, now had additional bleeding from lower gi tract, 2/5 incomplete prep but colonoscopy without gut ischemia  · Presented with chest pain  · Mild renal insufficiency with Cr of 1.74.  · Anemia, has bee pretty stable. · ARLEN  · Obesity  · Ex Smoker  · Occasional Etoh      RECOMMENDATIONS:   · Wean O2  · Pulmonary toilet  · Afebrile but WBC higher  · Renal fx better today  · Diet per cardiac surgery  · Incentive spirometry  · Will assist while in the ICU     Subjective/History:     No acute changes noted. Sitting up this am, on [place] oxygen. Hungry. No gross bleeeding He is feeling better. No acute complaints. No back pain, has expected chest pain. ROS of 10 systems is otherwise negative.        Current Facility-Administered Medications   Medication Dose Route Frequency    metoprolol tartrate (LOPRESSOR) tablet 75 mg  75 mg Oral Q12H    hydrALAZINE (APRESOLINE) tablet 100 mg  100 mg Oral Q6H    amLODIPine (NORVASC) tablet 10 mg  10 mg Oral DAILY    influenza vaccine - (36mos+)(PF) (FLUZONE/FLUARIX/FLULAVAL QUAD) injection 0.5 mL  0.5 mL IntraMUSCular PRIOR TO DISCHARGE    insulin glargine (LANTUS) injection 40 Units  40 Units SubCUTAneous DAILY    pantoprazole (PROTONIX) 40 mg in sodium chloride 0.9 % 10 mL injection  40 mg IntraVENous Q12H    niCARdipine (CARDENE) 50 mg in 0.9% sodium chloride 250 mL infusion  0-15 mg/hr IntraVENous TITRATE    sodium chloride (NS) flush 5-10 mL  5-10 mL IntraVENous Q8H    amiodarone (CORDARONE) tablet 400 mg  400 mg Oral Q12H    aspirin chewable tablet 81 mg  81 mg Oral DAILY    insulin lispro (HUMALOG) injection   SubCUTAneous AC&HS    chlorhexidine (PERIDEX) 0.12 % mouthwash 15 mL  15 mL Oral Q12H         Review of Systems:  A comprehensive review of systems was negative. Objective:   Vital Signs:    Visit Vitals    /58    Pulse 72    Temp 99 °F (37.2 °C)    Resp 17    Ht 6' (1.829 m)    Wt 158.4 kg (349 lb 3.3 oz)    SpO2 94%    BMI 47.36 kg/m2       O2 Device: Nasal cannula   O2 Flow Rate (L/min): 6 l/min   Temp (24hrs), Av.5 °F (36.9 °C), Min:98 °F (36.7 °C), Max:99 °F (37.2 °C)       Intake/Output:   Last shift:      701 -  190  In: -   Out: 50 [Urine:50]  Last 3 shifts:  1901 -  0700  In: 3026.1 [P.O.:1560; I.V.:1466.1]  Out: 4210 [Urine:2200; Drains:10]    Intake/Output Summary (Last 24 hours) at 17 0807  Last data filed at 17 0730   Gross per 24 hour   Intake          2066.58 ml   Output             3585 ml   Net         -1518.42 ml     Hemodynamics:   PAP: PAP Systolic: 32 (21/58/95 8293) CO: CO (l/min): 6 l/min (17 1200)   Wedge:   CI: CI (l/min/m2): 2.5 l/min/m2 (17 1200)   CVP:  CVP (mmHg): 17 mmHg (17 1200) SVR:       PVR:       Ventilator Settings:  Mode Rate Tidal Volume Pressure FiO2 PEEP   Spontaneous   650 ml  5 cm H2O 50 % 5 cm H20     Peak airway pressure: 11 cm H2O    Minute ventilation: 9.14 l/min      Physical Exam:    General:  Alert, cooperative, no distress, appears stated age. Obese gentleman. Sitting up in chair. No distress. Head:  Normocephalic, without obvious abnormality, atraumatic. Eyes:  Conjunctivae/corneas clear. PERRL, EOMs intact. Nose: Nares normal. Septum midline. Mucosa normal. No drainage or sinus tenderness. Throat: Lips, mucosa, and tongue normal. Teeth and gums normal.   Neck: Supple, symmetrical, trachea midline, no adenopathy, thyroid: no enlargment/tenderness/nodules, no carotid bruit and no JVD. Back:   Symmetric, no curvature.  ROM normal.   Lungs:   Clear to auscultation bilaterally. Decreased BS in bases. Chest wall:  No tenderness or deformity. Sternal dressing is intact. Heart:  Regular rate and rhythm, S1, S2 normal, no murmur, click, rub or gallop. Abdomen:   Soft, non-tender. Bowel sounds normal. No masses,  No organomegaly. Extremities: Extremities normal, atraumatic, has chronic venous insufficiency changes. Pulses: 2+ and symmetric all extremities. Skin: Skin color, texture, turgor normal. No rashes or lesions   Lymph nodes: Cervical, supraclavicular, and axillary nodes normal.   Neurologic: Grossly nonfocal       Data:     Recent Results (from the past 24 hour(s))   GLUCOSE, POC    Collection Time: 02/07/17  8:22 AM   Result Value Ref Range    Glucose (POC) 155 (H) 65 - 100 mg/dL    Performed by Leikatlyn Saber    GLUCOSE, POC    Collection Time: 02/07/17 12:22 PM   Result Value Ref Range    Glucose (POC) 217 (H) 65 - 100 mg/dL    Performed by Leikatlyn Saberic    GLUCOSE, POC    Collection Time: 02/07/17  5:11 PM   Result Value Ref Range    Glucose (POC) 158 (H) 65 - 100 mg/dL    Performed by Leikatlyn Saber    GLUCOSE, POC    Collection Time: 02/07/17  9:51 PM   Result Value Ref Range    Glucose (POC) 119 (H) 65 - 100 mg/dL    Performed by Mariano Diaz    CBC WITH AUTOMATED DIFF    Collection Time: 02/08/17  4:31 AM   Result Value Ref Range    WBC 27.5 (H) 4.1 - 11.1 K/uL    RBC 3.09 (L) 4.10 - 5.70 M/uL    HGB 9.1 (L) 12.1 - 17.0 g/dL    HCT 25.4 (L) 36.6 - 50.3 %    MCV 82.2 80.0 - 99.0 FL    MCH 29.4 26.0 - 34.0 PG    MCHC 35.8 30.0 - 36.5 g/dL    RDW 14.7 (H) 11.5 - 14.5 %    PLATELET 035 (L) 459 - 400 K/uL    NEUTROPHILS 71 %    BAND NEUTROPHILS 3 %    LYMPHOCYTES 12 %    MONOCYTES 7 %    EOSINOPHILS 0 %    BASOPHILS 0 %    METAMYELOCYTES 5 %    MYELOCYTES 2 %    ABS. NEUTROPHILS 20.4 K/UL    ABS. LYMPHOCYTES 3.3 K/UL    ABS. MONOCYTES 1.9 K/UL    ABS. EOSINOPHILS 0.0 K/UL    ABS.  BASOPHILS 0.0 K/UL    RBC COMMENTS MACROCYTOSIS  PRESENT        RBC COMMENTS POLYCHROMASIA  PRESENT        DF MANUAL     METABOLIC PANEL, BASIC    Collection Time: 02/08/17  4:31 AM   Result Value Ref Range    Sodium 136 136 - 145 mmol/L    Potassium 4.3 3.5 - 5.1 mmol/L    Chloride 100 97 - 108 mmol/L    CO2 24 21 - 32 mmol/L    Anion gap 12 5 - 15 mmol/L    Glucose 140 (H) 65 - 100 mg/dL    BUN 96 (H) 6 - 20 MG/DL    Creatinine 3.21 (H) 0.70 - 1.30 MG/DL    BUN/Creatinine ratio 30 (H) 12 - 20      GFR est AA 25 (L) >60 ml/min/1.73m2    GFR est non-AA 20 (L) >60 ml/min/1.73m2    Calcium 7.4 (L) 8.5 - 10.1 MG/DL   PHOSPHORUS    Collection Time: 02/08/17  4:31 AM   Result Value Ref Range    Phosphorus 5.3 (H) 2.6 - 4.7 MG/DL   NUCLEATED RBC    Collection Time: 02/08/17  4:31 AM   Result Value Ref Range    NRBC 3.5 (H) 0  WBC    ABSOLUTE NRBC 0.95 (H) 0.00 - 0.01 K/uL    WBC CORRECTED FOR NR ADJUSTED FOR NUCLEATED RBC'S     GLUCOSE, POC    Collection Time: 02/08/17  7:47 AM   Result Value Ref Range    Glucose (POC) 137 (H) 65 - 100 mg/dL    Performed by Lesley Wong              Telemetry:Paced    Imaging:  I have personally reviewed the patients radiographs and have reviewed the reports:         Juliane Keen MD

## 2017-02-08 NOTE — PROCEDURES
[facility]  [phone]    Vitals   Pre   Post   Assessment   Pre   Post     Temp  Temp: 98.5 °F (36.9 °C) (02/07/17 1945)  98.1 LOC  Alert, oriented and appropriate No change   HR   Pulse (Heart Rate): 70 (02/07/17 1945) 75 Lungs   Clear upper lobes with crackles and diminished volumes in the lower lobes; pt is noticeably SOB while resting in bed Improvement noted in effort of breathing   B/P   BP: 153/63 (02/07/17 1945) 130/56 Cardiac   S1S2 S1S2   Resp   Resp Rate: 23 (02/07/17 1945) 20 Skin   Warm dry; mid sternal surgical chest wound r/t AAA repair No change   Pain level  Pain Intensity 1: 0 (02/07/17 1915) No complaints of pain during tx Edema  Moderate generalized edema; 2-3+ Fluid removal target achieved   Orders:    Duration:   Start:   1945 End:   2145 Total:   2.0   Dialyzer:   Dialyzer/Set Up Inspection: Martha Bumps (02/07/17 1945)   K Bath:   Dialysate K (mEq/L): 3 (02/07/17 1945)   Ca Bath:   Dialysate CA (mEq/L): 2.5 (02/07/17 1945)   Na/Bicarb:   Dialysate NA (mEq/L): 140 (02/07/17 1945)   Target Fluid Removal:   2000 mL   Access     Type & Location:   RIJ temporary dialysis catheter; placed today and placement verified by x-ray; post tx saline flushed/locked and curos capped   Labs  Javier Hep B panel pre HD   Obtained/Reviewed   Critical Results Called   Date when labs were drawn-  Hgb-    HGB   Date Value Ref Range Status   02/07/2017 8.8 (L) 12.1 - 17.0 g/dL Final     K-    Potassium   Date Value Ref Range Status   02/07/2017 4.3 3.5 - 5.1 mmol/L Final     Ca-   Calcium   Date Value Ref Range Status   02/07/2017 7.7 (L) 8.5 - 10.1 MG/DL Final     Bun-   BUN   Date Value Ref Range Status   02/07/2017 108 (H) 6 - 20 MG/DL Final     Creat-   Creatinine   Date Value Ref Range Status   02/07/2017 3.73 (H) 0.70 - 1.30 MG/DL Final        Medications/ Blood Products Given     Name   Dose   Route and Time           None adm by HD          Blood Volume Processed (BVP): 28 Net Fluid   Removed:  2000 mL   Comments   Time Out Done: 1935  Primary Nurse Rpt Pre: Emily RN  Primary Nurse Rpt Post: Emily RN  Pt Education: new HD pt; first tx r/t ARF; educated on the process of dialysis; 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 -  not obtained

## 2017-02-08 NOTE — PROGRESS NOTES
Attended Interdisciplinary rounds in Critical Care Unit, where patient care was discussed. Visit by: Goldy Mcwilliams. Smith Rodriguez.  John Sandoval MA, [address]

## 2017-02-08 NOTE — PROGRESS NOTES
Problem: Mobility Impaired (Adult and Pediatric)  Goal: *Acute Goals and Plan of Care (Insert Text)  Physical Therapy Goals  Initiated 2/3/2017  1. Patient will move from supine to sit and sit to supine , scoot up and down and roll side to side in bed with modified independence within 7 days. 2. Patient will perform sit to/from stand with modified independence within 7 days. 3. Patient will ambulate 250 feet with least restrictive assistive device and modified independence within 7 days. 4. Patient will ascend/descend 5 stairs with 1 handrail(s) with modified independence within 7 days. 5. Patient will perform cardiac exercises per protocol with independence within 7 days. 6. Patient will verbally and functionally recall 3/3 sternal precautions within 7 days. PHYSICAL THERAPY TREATMENT  Patient: Ulysses Mena (76 y.o. male)  Date: 2/8/2017  Diagnosis: aneursym  Aortic dissection (HCC)  melana <principal problem not specified>  Procedure(s) (LRB):  ESOPHAGOGASTRODUODENOSCOPY (EGD) (N/A)  SIGMOIDOSCOPY FLEXIBLE (N/A)  ESOPHAGOGASTRODUODENAL (EGD) BIOPSY (N/A) 3 Days Post-Op  Precautions: Sternal      ASSESSMENT:  Patient received supine in bed and agreeable to PT intervention. Patient cleared by nursing for mobility. Patient reporting feeling much better this date since starting HD yesterday. Patient able to perform 4/6 cardiac exercises with supervision and instruction prior to mobility. Patient performed bed mobility with CGA while using cardiac bear. /63 while sitting on EOB. Patient performed transfers with min-CGA x 1. /55 in standing and patient without complaints. Patient able to ambulate 125' with CGA x 1 using cardiac cart for support (chair follow for safety). Patient demonstrated slow gait speed, widened YOBANI, and increased trunk sway throughout gait. Patient with SOB during gait with SaO2 90-92% on 6 L/min O2.  Patient was assisted into bedside chair following ambulation with BP 104/78. Patient denied dizziness or lightheadedness, however nursing informed and patient instructed to call nursing if status changes. Patient with overall good adherence to sternal precautions throughout mobility. Patient was left in NAD with all needs met and nursing informed. Recommend patient discharge home with HHPT vs inpatient rehab pending progress in acute PT. Progression toward goals:  [ ]      Improving appropriately and progressing toward goals  [X]      Improving slowly and progressing toward goals  [ ]      Not making progress toward goals and plan of care will be adjusted       PLAN:  Patient continues to benefit from skilled intervention to address the above impairments. Continue treatment per established plan of care. Discharge Recommendations:  Home Health, Inpatient Rehab and To Be Determined  Further Equipment Recommendations for Discharge:  TBD based on progress in acute PT       SUBJECTIVE:   Patient stated I feel 100% better.    The patient stated 3/3 sternal precautions. Reviewed all 3 with patient. OBJECTIVE DATA SUMMARY:   Patient mobilized on continuous portable monitor/telemetry. Critical Behavior:  Neurologic State: Appropriate for age, Alert  Orientation Level: Oriented X4  Cognition: Appropriate decision making, Appropriate for age attention/concentration, Appropriate safety awareness, Follows commands     Functional Mobility Training:  Bed Mobility:  Log Rolling: Contact guard assistance; Additional time  Supine to Sit: Contact guard assistance; Additional time     Scooting: Contact guard assistance; Additional time        Transfers:  Sit to Stand: Contact guard assistance  Stand to Sit: Minimum assistance;Contact guard assistance; Additional time        Bed to Chair: Minimum assistance                    Balance:  Sitting: Intact; Without support  Standing: Impaired  Standing - Static: Good;Constant support  Standing - Dynamic : Fair  Ambulation/Gait Training:  Distance (ft): 125 Feet (ft)  Assistive Device: Gait belt (cardiac cart)  Ambulation - Level of Assistance: Contact guard assistance;Assist x1;Additional time        Gait Abnormalities: Decreased step clearance;Trunk sway increased        Base of Support: Widened     Speed/Alva: Pace decreased (<100 feet/min)  Step Length: Left shortened;Right shortened        Therapeutic Exercises:   Patient instructed on the benefits and demonstrated cardiac exercises while supine with supervision. Instructed and indicated understanding on how to progress reps and sets against gravity, working up to 5 lbs and so on based on surgeon clearance for more weight in prep for functional activity. Can use household items for weights.      CARDIAC  EXERCISE   Sets   Reps   Active Active Assist   Passive Self ROM   Comments   Shoulder flexion 1 5 [X]                                            [ ]                                            [ ]                                            [ ]                                            BUE   Shoulder abduction 1  5 [X]                                            [ ]                                            [ ]                                            [ ]                                            BUE   Scapular elevation 1 5 [X]                                            [ ]                                            [ ]                                            [ ]                                            BUE   Scapular retraction 1 5 [X]                                            [ ]                                            [ ]                                            [ ]                                            BUE   Trunk rotation     [ ]                                            [ ]                                            [ ]                                            [ ]                                                Osker Shy     [ ] [ ]                                            [ ]                                            [ ]                                                      [ ]                                            [ ]                                            [ ]                                            [ ]                                                      Pain:  Pain Scale 1: Visual  Pain Intensity 1: 0  Pain Location 1: Chest        Pain Intervention(s) 1: Medication (see MAR)  Activity Tolerance:   Improving - SaO2 stable throughout on 6 L/min O2; BP stable supine>sit>stand, however with decline post-ambulation; pt without complaints  Please refer to the flowsheet for vital signs taken during this treatment.   After treatment:   [X] Patient left in no apparent distress sitting up in chair  [ ] Patient left in no apparent distress in bed  [X] Call bell left within reach  [X] Nursing notified  [ ] Caregiver present  [ ] Bed alarm activated      COMMUNICATION/COLLABORATION:   The patients plan of care was discussed with: Physical Therapist and Registered Nurse     Whitley Hill, PT, DPT   Time Calculation: 20 mins

## 2017-02-08 NOTE — PROCEDURES
[facility] Dialysis Team [facility]  [phone]    Vitals   Pre   Post   Assessment   Pre   Post     Temp  Temp: 97.8 °F (36.6 °C) (02/08/17 1343)   LOC  Alert and oriented x4 same   HR   Pulse (Heart Rate): 65 (02/08/17 1343) 70 Lungs   Diminished on 5lnc  same   B/P   BP: 142/59 (02/08/17 1343) 143/63 Cardiac   NSR, B?P wnl   same   Resp   Resp Rate: 21 (02/08/17 1343) 20 Skin   intact  same   Pain level  Pain Intensity 1: 0 (02/08/17 1200) 0 Edema  Generalized +2/+3     same   Orders:    Duration:   Start:    1955 End:    1606 Total:   2.25hr   Dialyzer:   Dialyzer/Set Up Inspection: Nithya Neville (02/08/17 1343)   K Bath:   Dialysate K (mEq/L): 3 (02/08/17 1343)   Ca Bath:   Dialysate CA (mEq/L): 2.5 (02/08/17 1343)   Na/Bicarb:   Dialysate NA (mEq/L): 140 (02/08/17 1343)   Target Fluid Removal:       3kg   Access     Type & Location:   Rt GISELA seaman, dressing present but loose. Area cleaned and dressing changed. Blood aspirated from both ports without difficulty   Labs     Obtained/Reviewed   Critical Results Called      HGB   Date Value Ref Range Status   02/08/2017 9.1 (L) 12.1 - 17.0 g/dL Final     K-    Potassium   Date Value Ref Range Status   02/08/2017 4.3 3.5 - 5.1 mmol/L Final     Ca-   Calcium   Date Value Ref Range Status   02/08/2017 7.4 (L) 8.5 - 10.1 MG/DL Final     Bun-   BUN   Date Value Ref Range Status   02/08/2017 96 (H) 6 - 20 MG/DL Final     Creat-   Creatinine   Date Value Ref Range Status   02/08/2017 3.21 (H) 0.70 - 1.30 MG/DL Final        Medications/ Blood Products Given     Name   Dose   Route and Time        None ordered             Blood Volume Processed (BVP):    37.9 Net Fluid   Removed:  2.9 kg   Comments   Time Out Done:   Primary Nurse Rpt Pre: Gege  Primary Nurse Rpt Post: Gege  Pt Education: Tacho cleaning and care  Care Plan: Acute Renal Care Day 2  Admiting Diagnosis: Acute Ascending aortic dissection  Tx Summary:  7488 Treatment started.  Tacho dressing present but hanging off so area cleaned and dressing changed. No signs or symptoms of infection noted. 1430 Clotting noted in venous chamber, 100ml ns flush given to help with clotting, UF increased to remove Ns flush  1606 Clotting noted in arterial and venous chambers. Treatment ended 15 minutes early due to clotting. Blood rinsed back and new caps placed on each port. Patient stable after completion of treatment. Report given to nurse [address] [place] [address]. Pt's previous clinic- Not assigned at this time  Consent signed - Informed Consent Verified: Yes (02/08/17 1343)  Laura Consent - yes -2/08/17  Hepatitis Status- Negative as of 2/7/17  Machine #- Machine Number: [id number] (02/08/17 1343)  Telemetry status- NSR  Pre-dialysis wt. -

## 2017-02-08 NOTE — PROGRESS NOTES
0730 bedside verbal and kardex report recd from [address] [place] [address]. Pt awake and participating with endorsement. Denies pain or sob, NSR on monitor. Encourage oob now for breakfast but declined, plan plan of care discussed and understood. Pt independent with pulm toilet and is. 1200 pt tolerated breakfast and waiting lunch tray. 0100 ambulated in hallway with pt.  Tolerated activity  1500 ongoing hemodialysis with Arnoldo Rn, pt tolerating procedures, sBP 130 s to 140s, denies pain or discomfort  1800 pm care rendered, chlor wipes rendered and change gown/lines, gardner care rendered every 4 hrs  1900 finished dinner tray and in bed denies pain  1920 bedside report given to Emily SANCHEZ

## 2017-02-08 NOTE — INTERDISCIPLINARY ROUNDS
Interdisciplinary team rounds were held 2/8/17 with the following team members:Care Management, Diabetes Treatment Specialist, Infection Control, Nursing, Nutrition, Pastoral Care, Pharmacy, Physical Therapy, Physician, Respiratory Therapy, Clinical Coordinator and Rehab. Plan of care discussed. Goal: See MD orders and progress notes for further  interventions and desired outcomes.

## 2017-02-08 NOTE — PROGRESS NOTES
Cardiac Surgery ICU Progress Note    Admit Date: 2017    POD: 3 Days Post-Op      Problems:  Active Problems: Aortic dissection (HCC) (2017)      S/P ascending aortic replacement (2/3/2017)      Overview: EMERGENT ASCENDING AORTIC DISSECTION REPAIR       Right Femoral Artery Cannulation        Procedure:  Procedure(s):  ESOPHAGOGASTRODUODENOSCOPY (EGD)  SIGMOIDOSCOPY FLEXIBLE  ESOPHAGOGASTRODUODENAL (EGD) BIOPSY      Summary:     Stable hemodynamics in sinus rhythm without ectopy on dialysis. 2000 cc removed yesterday. Cr 3.2 down going. UOP 1600/24 hrs. Remains HTN on intermittent Cardene; BB and Hydralazine. No further bleeding. Hypoactive BS without evidence of bleeding. WBC 27 without fever. Plan/Recommendations/Medical Decision Making:     Dialysis again today  Diet advancing per GI  Persistent HTN: Increase BB  Would like to add ACE if ok with Nephrology  Anticipated acute blood loss anemia  Increase activity  Increase I/S effort and frequency  Sternal precautions  Stimulate bowel movement  Patient seen and reviewed with  Dr. Jessie Randle MD       Objective:     Vitals:    Visit Vitals    /66    Pulse 73    Temp 98.7 °F (37.1 °C)    Resp 14    Ht 6' (1.829 m)    Wt 349 lb 3.3 oz (158.4 kg)    SpO2 93%    BMI 47.36 kg/m2       Temp (24hrs), Av.4 °F (36.9 °C), Min:98 °F (36.7 °C), Max:99 °F (37.2 °C)      Hemodynamics:   CO: CO (l/min): 6 l/min   CI: CI (l/min/m2): 2.5 l/min/m2   CVP: CVP (mmHg): 17 mmHg (17 1200)   SVR:     PAP Systolic: PAP Systolic: 32 (86/48/69 4551)   PAP Diastolic: PAP Diastolic: 19 (21 6900)   PVR:     SV02: SVO2 (%): 53 % (17 1200)   SCV02:      CXR Results  (Last 48 hours)               17 1600  XR CHEST PORT Final result    Impression:  IMPRESSION: Satisfactory dialysis catheter placement. Narrative:  EXAM: Portable CXR. 1544 hours. COMPARISON: 0453 hours.          INDICATION: dialysis catheter placement       Hemodialysis catheter has been placed via the right IJ with tip in the   distribution of the right atrium. There is no pneumothorax. Otherwise there is stable appearance. Right IJ CVL is stable. Right lung   airspace disease is stable. Heart remains large. Left lung remains clear. 02/07/17 0507  XR CHEST PORT Final result    Impression:  IMPRESSION: No significant change. Narrative:  INDICATION:  Follow-up pulmonary infiltrates. EXAM: CXR Portable. FINDINGS: Portable chest shows no significant change including CVL since   yesterday. There is no apparent pneumothorax. Lungs show unchanged right upper   and lower lung infiltrates. Heart size is large. There is prior median   sternotomy. There is no overt pulmonary edema. 02/06/17 1344  XR CHEST PORT Final result    Impression:  IMPRESSION:   1. Persistent infiltrates in right mid zone and right lower lung zone could be   due to pneumonia or aspiration. The overall density these appears to have   slightly decreased. Narrative:  EXAM:  XR CHEST PORT       INDICATION:  decompensation, follow-up infiltrates       COMPARISON:  February 6, 2017 at [address]: A portable AP radiograph of the chest was obtained at 1335 hours. The   patient is on a cardiac monitor. The right IJ catheter overlies the SVC. There   is no change in the cardiomegaly. There appears be a mediastinal drain present   in this patient status post median sternotomy. Mild interstitial edema pattern is unchanged. The patchy airspace opacities in   the right midlung zone and right lower lung zone suggesting pneumonia or   aspiration are again noted. These do appear to minimally decreased in density.                  Labs: Recent Labs      02/08/17   0431  02/07/17   2151   WBC  27.5*   --    HGB  9.1*   --    HCT  25.4*   --    PLT  116*   --    NA  136   --    K  4.3   --    BUN  96*   --    CREA  3.21*   --    GLU  140* --    GLUCPOC   --   119*       Ventilator:  Ventilator Volumes  Vt Set (ml): 650 ml (02/03/17 0102)  Vt Exhaled (Machine Breath) (ml): 674 ml (02/03/17 0102)  Vt Spont (ml): 780 ml (02/03/17 0420)  Ve Observed (l/min): 9.14 l/min (02/03/17 0420)    Oxygen Therapy:  Oxygen Therapy  O2 Sat (%): 93 % (02/08/17 0700)  Pulse via Oximetry: 73 beats per minute (02/08/17 0700)  O2 Device: Nasal cannula (02/08/17 0430)  O2 Flow Rate (L/min): 6 l/min (02/08/17 0430)  FIO2 (%): 50 % (02/03/17 0420)      Admission Weight: Last Weight   Weight: 270 lb (122.5 kg) Weight: 349 lb 3.3 oz (158.4 kg)     Intake / Output / Drain:  Current Shift:    Last 24 hrs.:   Intake/Output Summary (Last 24 hours) at 02/08/17 0735  Last data filed at 02/08/17 0700   Gross per 24 hour   Intake          2170.91 ml   Output             3620 ml   Net         -1449.09 ml         EXAM:      General: feels much better after dialysis      Chest:  Stable sternum      Incisions: dry and intact    Lungs:    Rhonchi bilaterally. Heart:  Regular rate and rhythm, S1, S2 normal, no murmur, no click,      Abdomen:   Soft, non-tender. Bowel sounds present, hypoactive     Extremities:  edema     Neurologic:  Gross motor and sensory apparatus intact.        Signed By: GAETANO Jorge

## 2017-02-08 NOTE — PROGRESS NOTES
NAME: Saray Almazan        :  1965        MRN:  [id number]        Assessment :    Plan:  --FELICE  HTN  Anemia  Mild hyponatremia  Mild metabolic acidosis  ascending aortic dissection 2/3 --Creatinine peaked at 3.7; UOP better 1600 ml, CTA on ; net positive by 13 liters; no post op hypotension; HD initiated on ; HD/UF today and then will assess need daily. [place] urine and very low FeNa (0.08%) - prerenal, urine eos neg; likely ATN following AAA and CTA, but curiously has a bland urine and very prerenal on FeNa; cardiorenal - diastolic dysfunction?     off Cardene, BP ok on amlodipine 10, hydralazine 50 qid, metoprolol 50 bid         Subjective:     Chief Complaint:  Feeling much better. Still swollen and sob. No n/v. Tolerating po. We discussed the above. Review of Systems:    Symptom Y/N Comments  Symptom Y/N Comments   Fever/Chills    Chest Pain     Poor Appetite    Edema yy    Cough    Abdominal Pain     Sputum    Joint Pain     SOB/TORREZ y   Pruritis/Rash     Nausea/vomit n   Tolerating PT/OT     Diarrhea n   Tolerating Diet     Constipation    Other       Could not obtain due to:      Objective:     VITALS:   Last 24hrs VS reviewed since prior progress note. Most recent are:  Visit Vitals    /58    Pulse 72    Temp 99 °F (37.2 °C)    Resp 17    Ht 6' (1.829 m)    Wt 158.4 kg (349 lb 3.3 oz)    SpO2 94%    BMI 47.36 kg/m2       Intake/Output Summary (Last 24 hours) at 17 8038  Last data filed at 17 0730   Gross per 24 hour   Intake          1586.58 ml   Output             3535 ml   Net         -1948.42 ml      Telemetry Reviewed:     PHYSICAL EXAM:  General: Obese, swollen male, no acute distress  Resp:  A few Rales. No access.  muscle use  CV:  Regular  rhythm,  No murmur (), No Rubs, No Gallops. +++ edema  GI:  Soft,  distended, appropriately tender.  +Bowel sounds, no HSM      Lab Data Reviewed: (see below)    Medications Reviewed: (see below)    PMH/SH reviewed - no change compared to H&P  ________________________________________________________________________  Care Plan discussed with:  Patient y    Family      RN y    Care Manager                    Consultant:          Comments   >50% of visit spent in counseling and coordination of care       ________________________________________________________________________  Severo Holm, MD     Procedures: see electronic medical records for all procedures/Xrays and details which  were not copied into this note but were reviewed prior to creation of Plan. LABS:  Recent Labs      02/08/17 0431 02/07/17 0411   WBC  27.5*  25.9*   HGB  9.1*  8.8*   HCT  25.4*  25.0*   PLT  116*  137*     Recent Labs      02/08/17   0431 02/07/17 0410  02/06/17   1327   NA  136  135*  134*   K  4.3  4.3  4.8   CL  100  100  102   CO2  24  23  14*   BUN  96*  108*  89*   CREA  3.21*  3.73*  3.64*   GLU  140*  124*  216*   CA  7.4*  7.7*  7.8*   MG   --   3.0*  3.0*   PHOS  5.3*  5.4*   --      Recent Labs      02/07/17 0410 02/06/17   0341   SGOT  835*  49*   AP  91  63   TP  5.9*  5.9*   ALB  2.9*  2.9*   GLOB  3.0  3.0     No results for input(s): INR, PTP, APTT in the last 72 hours. No lab exists for component: INREXT, INREXT   No results for input(s): FE, TIBC, PSAT, FERR in the last 72 hours.    No results found for: FOL, RBCF   Recent Labs      02/06/17   1700  02/06/17   1303   PH  7.44  7.35   PCO2  27*  23*   PO2  75*  78*     Recent Labs      02/06/17   1303   CPK  795*     No components found for: [place]  Lab Results   Component Value Date/Time    Color DARK YELLOW 02/06/2017 01:03 PM    Color PENDING 02/06/2017 01:03 PM    Appearance CLEAR 02/06/2017 01:03 PM    Appearance PENDING 02/06/2017 01:03 PM    Specific gravity 1.019 02/06/2017 01:03 PM    Specific gravity PENDING 02/06/2017 01:03 PM    Specific gravity PENDING 02/06/2017 01:03 PM pH (UA) 5.0 02/06/2017 01:03 PM    pH (UA) PENDING 02/06/2017 01:03 PM    Protein NEGATIVE  02/06/2017 01:03 PM    Protein PENDING 02/06/2017 01:03 PM    Glucose NEGATIVE  02/06/2017 01:03 PM    Glucose PENDING 02/06/2017 01:03 PM    Ketone NEGATIVE  02/06/2017 01:03 PM    Ketone PENDING 02/06/2017 01:03 PM    Bilirubin PENDING 02/06/2017 01:03 PM    Urobilinogen 0.2 02/06/2017 01:03 PM    Urobilinogen PENDING 02/06/2017 01:03 PM    Nitrites NEGATIVE  02/06/2017 01:03 PM    Nitrites PENDING 02/06/2017 01:03 PM    Leukocyte Esterase NEGATIVE  02/06/2017 01:03 PM    Leukocyte Esterase PENDING 02/06/2017 01:03 PM    Epithelial cells FEW 02/06/2017 01:03 PM    Epithelial cells DUPLICATE REQUEST 14/60/7401 01:03 PM    Bacteria NEGATIVE  02/06/2017 01:03 PM    Bacteria DUPLICATE REQUEST 30/60/2071 01:03 PM    WBC 0-4 02/06/2017 55:17 PM    WBC DUPLICATE REQUEST 55/05/0020 01:03 PM    RBC 0-5 02/06/2017 18:18 PM    RBC DUPLICATE REQUEST 59/83/6438 01:03 PM       MEDICATIONS:  Current Facility-Administered Medications   Medication Dose Route Frequency    metoprolol tartrate (LOPRESSOR) tablet 75 mg  75 mg Oral Q12H    hydrALAZINE (APRESOLINE) tablet 100 mg  100 mg Oral Q6H    cloNIDine (CATAPRES) 0.3 mg/24 hr patch 1 Patch  1 Patch TransDERmal Q7D    amLODIPine (NORVASC) tablet 10 mg  10 mg Oral DAILY    influenza vaccine 2016-17 (36mos+)(PF) (FLUZONE/FLUARIX/FLULAVAL QUAD) injection 0.5 mL  0.5 mL IntraMUSCular PRIOR TO DISCHARGE    insulin glargine (LANTUS) injection 40 Units  40 Units SubCUTAneous DAILY    pantoprazole (PROTONIX) 40 mg in sodium chloride 0.9 % 10 mL injection  40 mg IntraVENous Q12H    hydrALAZINE (APRESOLINE) 20 mg/mL injection 10 mg  10 mg IntraVENous Q2H PRN    niCARdipine (CARDENE) 50 mg in 0.9% sodium chloride 250 mL infusion  0-15 mg/hr IntraVENous TITRATE    sodium chloride (NS) flush 5-10 mL  5-10 mL IntraVENous Q8H    acetaminophen (TYLENOL) tablet 650 mg  650 mg Oral Q4H PRN    oxyCODONE-acetaminophen (PERCOCET) 5-325 mg per tablet 2 Tab  2 Tab Oral Q4H PRN    morphine injection 2 mg  2 mg IntraVENous Q2H PRN    amiodarone (CORDARONE) tablet 400 mg  400 mg Oral Q12H    albuterol (PROVENTIL VENTOLIN) nebulizer solution 1.25-2.5 mg  1.25-2.5 mg Nebulization Q4H PRN    aspirin chewable tablet 81 mg  81 mg Oral DAILY    ELECTROLYTE REPLACEMENT PROTOCOL  1 Each Other PRN    glucose chewable tablet 16 g  4 Tab Oral PRN    dextrose (D50W) injection syrg 12.5-25 g  12.5-25 g IntraVENous PRN    glucagon (GLUCAGEN) injection 1 mg  1 mg IntraMUSCular PRN    insulin lispro (HUMALOG) injection   SubCUTAneous AC&HS    albumin human 5% (BUMINATE) solution 12.5 g  12.5 g IntraVENous Q1H PRN    calcium gluconate 2 g in 0.9% sodium chloride 100 mL IVPB  2 g IntraVENous PRN    chlorhexidine (PERIDEX) 0.12 % mouthwash 15 mL  15 mL Oral Q12H

## 2017-02-08 NOTE — PROGRESS NOTES
Occupational Therapy  Medical record reviewed. Will defer OT treatment session as pt is receiving dialysis at this time. Will continue to follow.

## 2017-02-08 NOTE — PROGRESS NOTES
Gastroenterology Daily Progress Note (Dr. Ella Callaway)    Admit Date: 2/2/2017       Subjective:       No BM yesterday. No GI bleeding. Feels much better after hemodialysis yesterday. No fevers. MEDS: reviewed in [facility]     Objective:     Visit Vitals    /66    Pulse 73    Temp 98.7 °F (37.1 °C)    Resp 14    Ht 6' (1.829 m)    Wt 158.4 kg (349 lb 3.3 oz)    SpO2 93%    BMI 47.36 kg/m2   Blood pressure 152/66, pulse 73, temperature 98.7 °F (37.1 °C), resp. rate 14, height 6' (1.829 m), weight 158.4 kg (349 lb 3.3 oz), SpO2 93 %.     02/06 1901 - 02/08 0700  In: 3026.1 [P.O.:1560; I.V.:1466.1]  Out: 3810 [Urine:1800; Drains:10]      Intake/Output Summary (Last 24 hours) at 02/08/17 1067  Last data filed at 02/08/17 8407   Gross per 24 hour   Intake          2170.91 ml   Output             3220 ml   Net         -1049.09 ml     Physical Exam:       General: Obese WM seated upright in bed resting comfortably in NAD  Chest:  CTA, No rhonchi, rales or rubs  Heart: S1, S2, RRR  GI: Soft, mild distention, nontender, + BS  Extremities: 1+ edema in LE b/l, no cyanosis      Labs:     Recent Results (from the past 24 hour(s))   GLUCOSE, POC    Collection Time: 02/07/17  8:22 AM   Result Value Ref Range    Glucose (POC) 155 (H) 65 - 100 mg/dL    Performed by [facility] [employer]    GLUCOSE, POC    Collection Time: 02/07/17 12:22 PM   Result Value Ref Range    Glucose (POC) 217 (H) 65 - 100 mg/dL    Performed by [employer] [employer]    GLUCOSE, POC    Collection Time: 02/07/17  5:11 PM   Result Value Ref Range    Glucose (POC) 158 (H) 65 - 100 mg/dL    Performed by [employer] [employer]    GLUCOSE, POC    Collection Time: 02/07/17  9:51 PM   Result Value Ref Range    Glucose (POC) 119 (H) 65 - 100 mg/dL    Performed by Navin Diaz    CBC WITH AUTOMATED DIFF    Collection Time: 02/08/17  4:31 AM   Result Value Ref Range    WBC 27.5 (H) 4.1 - 11.1 K/uL    RBC 3.09 (L) 4.10 - 5.70 M/uL    HGB 9.1 (L) 12.1 - 17.0 g/dL    HCT 25.4 (L) 36.6 - 50.3 %    MCV 82.2 80.0 - 99.0 FL    MCH 29.4 26.0 - 34.0 PG    MCHC 35.8 30.0 - 36.5 g/dL    RDW 14.7 (H) 11.5 - 14.5 %    PLATELET 818 (L) 468 - 400 K/uL    NEUTROPHILS 71 %    BAND NEUTROPHILS 3 %    LYMPHOCYTES 12 %    MONOCYTES 7 %    EOSINOPHILS 0 %    BASOPHILS 0 %    METAMYELOCYTES 5 %    MYELOCYTES 2 %    ABS. NEUTROPHILS 20.4 K/UL    ABS. LYMPHOCYTES 3.3 K/UL    ABS. MONOCYTES 1.9 K/UL    ABS. EOSINOPHILS 0.0 K/UL    ABS. BASOPHILS 0.0 K/UL    RBC COMMENTS MACROCYTOSIS  PRESENT        RBC COMMENTS POLYCHROMASIA  PRESENT        DF MANUAL     METABOLIC PANEL, BASIC    Collection Time: 02/08/17  4:31 AM   Result Value Ref Range    Sodium 136 136 - 145 mmol/L    Potassium 4.3 3.5 - 5.1 mmol/L    Chloride 100 97 - 108 mmol/L    CO2 24 21 - 32 mmol/L    Anion gap 12 5 - 15 mmol/L    Glucose 140 (H) 65 - 100 mg/dL    BUN 96 (H) 6 - 20 MG/DL    Creatinine 3.21 (H) 0.70 - 1.30 MG/DL    BUN/Creatinine ratio 30 (H) 12 - 20      GFR est AA 25 (L) >60 ml/min/1.73m2    GFR est non-AA 20 (L) >60 ml/min/1.73m2    Calcium 7.4 (L) 8.5 - 10.1 MG/DL   PHOSPHORUS    Collection Time: 02/08/17  4:31 AM   Result Value Ref Range    Phosphorus 5.3 (H) 2.6 - 4.7 MG/DL   NUCLEATED RBC    Collection Time: 02/08/17  4:31 AM   Result Value Ref Range    NRBC 3.5 (H) 0  WBC    ABSOLUTE NRBC 0.95 (H) 0.00 - 0.01 K/uL    WBC CORRECTED FOR NR ADJUSTED FOR NUCLEATED RBC'S       Recent Labs      02/08/17   0431  02/07/17   0410  02/06/17   1327   NA  136  135*  134*   K  4.3  4.3  4.8   CL  100  100  102   CO2  24  23  14*   BUN  96*  108*  89*   CREA  3.21*  3.73*  3.64*   GLU  140*  124*  216*   CA  7.4*  7.7*  7.8*   MG   --   3.0*  3.0*   PHOS  5.3*  5.4*   --      Recent Labs      02/07/17   0410  02/06/17   0341   SGOT  835*  49*   AP  91  63   TP  5.9*  5.9*   ALB  2.9*  2.9*   GLOB  3.0  3.0      Ref.  Range 2/4/2017 05:03 2/5/2017 04:38 2/7/2017 04:11 2/8/2017 04:31   WBC Latest Ref Range: 4.1 - 11.1 K/uL 21.5 (H) 18.1 (H) 25.9 (H) 27.5 (H)   WBC CORRECTED FOR NR Latest Units:         NRBC Latest Ref Range: 0  WBC   1.3 (H) 3.5 (H)   RBC Latest Ref Range: 4.10 - 5.70 M/uL 3.59 (L) 3.23 (L) 3.05 (L) 3.09 (L)   HGB Latest Ref Range: 12.1 - 17.0 g/dL 10.3 (L) 9.1 (L) 8.8 (L) 9.1 (L)   HCT Latest Ref Range: 36.6 - 50.3 % 30.4 (L) 27.1 (L) 25.0 (L) 25.4 (L)   MCV Latest Ref Range: 80.0 - 99.0 FL 84.7 83.9 82.0 82.2   MCH Latest Ref Range: 26.0 - 34.0 PG 28.7 28.2 28.9 29.4   MCHC Latest Ref Range: 30.0 - 36.5 g/dL 33.9 33.6 35.2 35.8   RDW Latest Ref Range: 11.5 - 14.5 % 14.7 (H) 15.2 (H) 14.8 (H) 14.7 (H)   PLATELET Latest Ref Range: 150 - 400 K/uL 167 138 (L) 137 (L) 116 (L)       Impression:    S/P Aortic dissection repair  GI blood loss anemia  Esophagitis and gastritis on EGD  FELICE  Leukocytosis         Plan: For now no further overt GI bleeding. H/H stable. WBC rising of unclear source.   -continue PPI  -following H/H  -slow diet advancement       Romelia Swann MD    2/8/2017  3500  [address] [place] [address], [address] [place] [address]  [phone] [place] [address] South: [phone]

## 2017-02-09 ENCOUNTER — APPOINTMENT (OUTPATIENT)
Dept: GENERAL RADIOLOGY | Age: 52
DRG: 219 | End: 2017-02-09
Attending: INTERNAL MEDICINE
Payer: COMMERCIAL

## 2017-02-09 LAB
ALBUMIN SERPL BCP-MCNC: 2.7 G/DL (ref 3.5–5)
ALBUMIN/GLOB SERPL: 0.9 {RATIO} (ref 1.1–2.2)
ALP SERPL-CCNC: 136 U/L (ref 45–117)
ALT SERPL-CCNC: 257 U/L (ref 12–78)
ANION GAP BLD CALC-SCNC: 10 MMOL/L (ref 5–15)
AST SERPL W P-5'-P-CCNC: 282 U/L (ref 15–37)
BASOPHILS # BLD AUTO: 0 K/UL
BASOPHILS # BLD: 0 %
BILIRUB SERPL-MCNC: 0.8 MG/DL (ref 0.2–1)
BUN SERPL-MCNC: 81 MG/DL (ref 6–20)
BUN/CREAT SERPL: 36 (ref 12–20)
CALCIUM SERPL-MCNC: 7.7 MG/DL (ref 8.5–10.1)
CHLORIDE SERPL-SCNC: 100 MMOL/L (ref 97–108)
CO2 SERPL-SCNC: 28 MMOL/L (ref 21–32)
CREAT SERPL-MCNC: 2.26 MG/DL (ref 0.7–1.3)
DIFFERENTIAL METHOD BLD: ABNORMAL
EOSINOPHIL # BLD: 0 K/UL
EOSINOPHIL NFR BLD: 0 %
ERYTHROCYTE [DISTWIDTH] IN BLOOD BY AUTOMATED COUNT: 14.9 % (ref 11.5–14.5)
GLOBULIN SER CALC-MCNC: 3.1 G/DL (ref 2–4)
GLUCOSE BLD STRIP.AUTO-MCNC: 103 MG/DL (ref 65–100)
GLUCOSE BLD STRIP.AUTO-MCNC: 108 MG/DL (ref 65–100)
GLUCOSE BLD STRIP.AUTO-MCNC: 110 MG/DL (ref 65–100)
GLUCOSE BLD STRIP.AUTO-MCNC: 145 MG/DL (ref 65–100)
GLUCOSE SERPL-MCNC: 93 MG/DL (ref 65–100)
HCT VFR BLD AUTO: 25.1 % (ref 36.6–50.3)
HGB BLD-MCNC: 8.5 G/DL (ref 12.1–17)
LYMPHOCYTES # BLD AUTO: 15 %
LYMPHOCYTES # BLD: 3.7 K/UL
MCH RBC QN AUTO: 28.4 PG (ref 26–34)
MCHC RBC AUTO-ENTMCNC: 33.9 G/DL (ref 30–36.5)
MCV RBC AUTO: 83.9 FL (ref 80–99)
METAMYELOCYTES NFR BLD MANUAL: 1 %
MONOCYTES # BLD: 2.7 K/UL
MONOCYTES NFR BLD AUTO: 11 %
MYELOCYTES NFR BLD MANUAL: 2 %
NEUTS BAND NFR BLD MANUAL: 5 %
NEUTS SEG # BLD: 17.5 K/UL
NEUTS SEG NFR BLD AUTO: 66 %
NRBC # BLD: 0.54 K/UL (ref 0–0.01)
NRBC BLD-RTO: 2.2 PER 100 WBC
PLATELET # BLD AUTO: 128 K/UL (ref 150–400)
POTASSIUM SERPL-SCNC: 4.1 MMOL/L (ref 3.5–5.1)
PROT SERPL-MCNC: 5.8 G/DL (ref 6.4–8.2)
RBC # BLD AUTO: 2.99 M/UL (ref 4.1–5.7)
RBC MORPH BLD: ABNORMAL
SERVICE CMNT-IMP: ABNORMAL
SODIUM SERPL-SCNC: 138 MMOL/L (ref 136–145)
WBC # BLD AUTO: 24.6 K/UL (ref 4.1–11.1)
WBC NRBC COR # BLD: ABNORMAL 10*3/UL

## 2017-02-09 PROCEDURE — 74011636637 HC RX REV CODE- 636/637: Performed by: INTERNAL MEDICINE

## 2017-02-09 PROCEDURE — 97110 THERAPEUTIC EXERCISES: CPT | Performed by: OCCUPATIONAL THERAPIST

## 2017-02-09 PROCEDURE — 77030011943

## 2017-02-09 PROCEDURE — 97116 GAIT TRAINING THERAPY: CPT

## 2017-02-09 PROCEDURE — 74011250637 HC RX REV CODE- 250/637: Performed by: INTERNAL MEDICINE

## 2017-02-09 PROCEDURE — 65660000000 HC RM CCU STEPDOWN

## 2017-02-09 PROCEDURE — 85025 COMPLETE CBC W/AUTO DIFF WBC: CPT | Performed by: PHYSICIAN ASSISTANT

## 2017-02-09 PROCEDURE — 77010033678 HC OXYGEN DAILY

## 2017-02-09 PROCEDURE — 80053 COMPREHEN METABOLIC PANEL: CPT | Performed by: THORACIC SURGERY (CARDIOTHORACIC VASCULAR SURGERY)

## 2017-02-09 PROCEDURE — 97530 THERAPEUTIC ACTIVITIES: CPT | Performed by: OCCUPATIONAL THERAPIST

## 2017-02-09 PROCEDURE — 74011250636 HC RX REV CODE- 250/636: Performed by: INTERNAL MEDICINE

## 2017-02-09 PROCEDURE — 74011250637 HC RX REV CODE- 250/637: Performed by: PHYSICIAN ASSISTANT

## 2017-02-09 PROCEDURE — 82962 GLUCOSE BLOOD TEST: CPT

## 2017-02-09 PROCEDURE — 71010 XR CHEST PORT: CPT

## 2017-02-09 PROCEDURE — C9113 INJ PANTOPRAZOLE SODIUM, VIA: HCPCS | Performed by: INTERNAL MEDICINE

## 2017-02-09 PROCEDURE — 36415 COLL VENOUS BLD VENIPUNCTURE: CPT | Performed by: THORACIC SURGERY (CARDIOTHORACIC VASCULAR SURGERY)

## 2017-02-09 PROCEDURE — 74011000250 HC RX REV CODE- 250: Performed by: INTERNAL MEDICINE

## 2017-02-09 PROCEDURE — 74011636637 HC RX REV CODE- 636/637: Performed by: PHYSICIAN ASSISTANT

## 2017-02-09 PROCEDURE — 65620000000 HC RM CCU GENERAL

## 2017-02-09 RX ORDER — PANTOPRAZOLE SODIUM 40 MG/1
40 TABLET, DELAYED RELEASE ORAL
Status: DISCONTINUED | OUTPATIENT
Start: 2017-02-09 | End: 2017-02-27 | Stop reason: HOSPADM

## 2017-02-09 RX ORDER — GLIPIZIDE 5 MG/1
5 TABLET ORAL
Status: DISCONTINUED | OUTPATIENT
Start: 2017-02-10 | End: 2017-02-14

## 2017-02-09 RX ORDER — CHLORHEXIDINE GLUCONATE 1.2 MG/ML
15 RINSE ORAL EVERY 12 HOURS
Status: DISCONTINUED | OUTPATIENT
Start: 2017-02-09 | End: 2017-02-09

## 2017-02-09 RX ORDER — METOPROLOL TARTRATE 50 MG/1
50 TABLET ORAL EVERY 12 HOURS
Status: DISCONTINUED | OUTPATIENT
Start: 2017-02-10 | End: 2017-02-27 | Stop reason: HOSPADM

## 2017-02-09 RX ADMIN — ACETAMINOPHEN 650 MG: 325 TABLET, FILM COATED ORAL at 08:29

## 2017-02-09 RX ADMIN — HYDRALAZINE HYDROCHLORIDE 100 MG: 25 TABLET, FILM COATED ORAL at 17:04

## 2017-02-09 RX ADMIN — SODIUM CHLORIDE 40 MG: 9 INJECTION INTRAMUSCULAR; INTRAVENOUS; SUBCUTANEOUS at 08:30

## 2017-02-09 RX ADMIN — INSULIN LISPRO 2 UNITS: 100 INJECTION, SOLUTION INTRAVENOUS; SUBCUTANEOUS at 11:31

## 2017-02-09 RX ADMIN — PANTOPRAZOLE SODIUM 40 MG: 40 TABLET, DELAYED RELEASE ORAL at 16:55

## 2017-02-09 RX ADMIN — AMLODIPINE BESYLATE 10 MG: 5 TABLET ORAL at 08:28

## 2017-02-09 RX ADMIN — Medication 10 ML: at 14:42

## 2017-02-09 RX ADMIN — INSULIN GLARGINE 40 UNITS: 100 INJECTION, SOLUTION SUBCUTANEOUS at 08:39

## 2017-02-09 RX ADMIN — HYDRALAZINE HYDROCHLORIDE 100 MG: 25 TABLET, FILM COATED ORAL at 06:27

## 2017-02-09 RX ADMIN — CHLORHEXIDINE GLUCONATE 15 ML: 1.2 RINSE ORAL at 08:40

## 2017-02-09 RX ADMIN — Medication 10 ML: at 21:45

## 2017-02-09 RX ADMIN — AMIODARONE HYDROCHLORIDE 400 MG: 200 TABLET ORAL at 20:48

## 2017-02-09 RX ADMIN — METOPROLOL TARTRATE 75 MG: 50 TABLET ORAL at 08:27

## 2017-02-09 RX ADMIN — HYDRALAZINE HYDROCHLORIDE 100 MG: 25 TABLET, FILM COATED ORAL at 00:17

## 2017-02-09 RX ADMIN — HYDRALAZINE HYDROCHLORIDE 100 MG: 25 TABLET, FILM COATED ORAL at 23:36

## 2017-02-09 RX ADMIN — AMIODARONE HYDROCHLORIDE 400 MG: 200 TABLET ORAL at 08:28

## 2017-02-09 RX ADMIN — HYDRALAZINE HYDROCHLORIDE 100 MG: 25 TABLET, FILM COATED ORAL at 11:31

## 2017-02-09 RX ADMIN — ASPIRIN 81 MG 81 MG: 81 TABLET ORAL at 08:29

## 2017-02-09 RX ADMIN — Medication 10 ML: at 06:27

## 2017-02-09 NOTE — PROGRESS NOTES
1900- Bedside and verbal report received from [facility]. Shift overview: Uneventful shift. Pt rested comfortably in bed throughout night. No complaints. 9264- Bedside and verbal report given to [facility] AT [facility]VAL D/ROGER MONROY.

## 2017-02-09 NOTE — PROGRESS NOTES
Cardiac Surgery ICU Progress Note    Admit Date: 2017    POD: 4 Days Post-Op      Problems:  Active Problems: Aortic dissection (HCC) (2017)      S/P ascending aortic replacement (2/3/2017)      Overview: EMERGENT ASCENDING AORTIC DISSECTION REPAIR       Right Femoral Artery Cannulation        Procedure:  Procedure(s):  ESOPHAGOGASTRODUODENOSCOPY (EGD)  SIGMOIDOSCOPY FLEXIBLE  ESOPHAGOGASTRODUODENAL (EGD) BIOPSY      Summary:     Stable hemodynamics in sinus rhythm without ectopy on dialysis with HTN control issues. Now off Cardene. Cr down to 2.2 from 3.2. UOP 2000/24 hrs. CXR improving slowly. No evidence of bleeding  HH stable  WBC down 25. Looks well enough for stepdown. Plan/Recommendations/Medical Decision Making:     DC gardner  Stepdown  Anticipated acute blood loss anemia  Increase activity  Increase I/S effort and frequency  Sternal precautions  Stimulate bowel movement  Patient seen and reviewed with  Dr. Jessie Randle MD       Objective:     Vitals:    Visit Vitals    BP (!) 141/108 (BP 1 Location: Right arm, BP Patient Position: At rest)    Pulse 64    Temp 97.5 °F (36.4 °C)    Resp 19    Ht 6' (1.829 m)    Wt 349 lb 3.3 oz (158.4 kg)    SpO2 97%    BMI 47.36 kg/m2       Temp (24hrs), Av.2 °F (36.8 °C), Min:97.5 °F (36.4 °C), Max:99 °F (37.2 °C)      Hemodynamics:   CO: CO (l/min): 6 l/min   CI: CI (l/min/m2): 2.5 l/min/m2   CVP: CVP (mmHg): 17 mmHg (17 1200)   SVR:     PAP Systolic: PAP Systolic: 32 (86/07/59 7503)   PAP Diastolic: PAP Diastolic: 19 (13/ 4238)   PVR:     SV02: SVO2 (%): 53 % (17 1200)   SCV02:      CXR Results  (Last 48 hours)               17 0451  XR CHEST PORT Final result    Impression:  IMPRESSION:       Predominantly right upper lobe airspace disease unchanged. No other interval change.        Narrative:      Clinical history: Respiratory failure   INDICATION: respiratory failure           FINDINGS:       Portable semierect view of the chest is obtained compared to examination of   2/7/2017 at 1541. Hemodialysis catheter via the right IJ with tip in the distribution of the right   atrium. There is no pneumothorax. Otherwise there is stable appearance. Right IJ CVL is stable. Right lung   airspace disease is stable. Heart remains large. Left lung remains clear. 02/07/17 1600  XR CHEST PORT Final result    Impression:  IMPRESSION: Satisfactory dialysis catheter placement. Narrative:  EXAM: Portable CXR. 1544 hours. COMPARISON: 0453 hours. INDICATION: dialysis catheter placement       Hemodialysis catheter has been placed via the right IJ with tip in the   distribution of the right atrium. There is no pneumothorax. Otherwise there is stable appearance. Right IJ CVL is stable. Right lung   airspace disease is stable. Heart remains large. Left lung remains clear.                  Labs: Recent Labs      02/09/17   0818  02/09/17   0340  02/09/17   0336   WBC   --    --   24.6*   HGB   --    --   8.5*   HCT   --    --   25.1*   PLT   --    --   128*   NA   --   138   --    K   --   4.1   --    BUN   --   81*   --    CREA   --   2.26*   --    GLU   --   93   --    GLUCPOC  103*   --    --        Ventilator:  Ventilator Volumes  Vt Set (ml): 650 ml (02/03/17 0102)  Vt Exhaled (Machine Breath) (ml): 674 ml (02/03/17 0102)  Vt Spont (ml): 780 ml (02/03/17 0420)  Ve Observed (l/min): 9.14 l/min (02/03/17 0420)    Oxygen Therapy:  Oxygen Therapy  O2 Sat (%): 97 % (02/09/17 0900)  Pulse via Oximetry: 58 beats per minute (02/09/17 0900)  O2 Device: Nasal cannula (02/09/17 0900)  O2 Flow Rate (L/min): 6 l/min (02/09/17 0900)  FIO2 (%): 50 % (02/03/17 0420)      Admission Weight: Last Weight   Weight: 270 lb (122.5 kg) Weight: 349 lb 3.3 oz (158.4 kg)     Intake / Output / Drain:  Current Shift: 02/09 0701 - 02/09 1900  In: -   Out: 330 [Urine:330]  Last 24 hrs.:   Intake/Output Summary (Last 24 hours) at 02/09/17 0959  Last data filed at 02/09/17 5851   Gross per 24 hour   Intake             2260 ml   Output             2305 ml   Net              -45 ml         EXAM:      General: feels better after HD      Chest:  Stable sternum      Incisions: dry and intact    Lungs:    Rhonchi bilaterally. Heart:  Regular rate and rhythm, S1, S2 normal, no murmur, no click,      Abdomen:   Soft, non-tender. Bowel sounds present. Extremities:  edema     Neurologic:  Gross motor and sensory apparatus intact.        Signed By: GAETANO Caldera

## 2017-02-09 NOTE — PROGRESS NOTES
Pt is a 44 yo male admitted from home for surgical repair of ascending aortic dissection; had GI bleed in ED and again post-op. Has required HD for FELICE; now on 6 lpm supplmental 02. Pt has medical hx which includes ARLEN, obesity - has not kept up with PCP in recent years. Pt plans to re-establish PCP relationship with Dr. Ale Foster. Pt is making slow progress - has transfer orders to floor. PT/OT have evaluated and are recommending Inpt Rehab. GAETANO Unger in agreement. 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 Pt is employed by [employer]; was independent for mobility and ADLs. Pt has a CPAP at home. Pt was given [address] for Inpt Rehab providers and chose [facility]. Ref sent via [address]. CM will follow to assist w/ dc arrgmts. Care Management Interventions  PCP Verified by CM: Yes (Dr. Ale Foster - re-establish)  Mode of Transport at Discharge: Other (see comment) (TBD)  Transition of Care Consult (CM Consult):  Other (Inpt Rehab)  Discharge Durable Medical Equipment: No  Physical Therapy Consult: Yes  Occupational Therapy Consult: Yes  Speech Therapy Consult: No  Current Support Network: Relative's Home (Lives w/ elderly mother)  Confirm Follow Up Transport: Family  Plan discussed with Pt/Family/Caregiver: Yes  Freedom of Choice Offered: Yes  Discharge Location  Discharge Placement: Rehab hospital/unit acute [facility])     SANDY Clemens

## 2017-02-09 NOTE — PROGRESS NOTES
Problem: Mobility Impaired (Adult and Pediatric)  Goal: *Acute Goals and Plan of Care (Insert Text)  Physical Therapy Goals  Initiated 2/3/2017  1. Patient will move from supine to sit and sit to supine , scoot up and down and roll side to side in bed with modified independence within 7 days. 2. Patient will perform sit to/from stand with modified independence within 7 days. 3. Patient will ambulate 250 feet with least restrictive assistive device and modified independence within 7 days. 4. Patient will ascend/descend 5 stairs with 1 handrail(s) with modified independence within 7 days. 5. Patient will perform cardiac exercises per protocol with independence within 7 days. 6. Patient will verbally and functionally recall 3/3 sternal precautions within 7 days. PHYSICAL THERAPY TREATMENT  Patient: Ramos Miguel (36 y.o. male)  Date: 2/9/2017  Diagnosis: aneursym  Aortic dissection (HCC)  melana <principal problem not specified>  Procedure(s) (LRB):  ESOPHAGOGASTRODUODENOSCOPY (EGD) (N/A)  SIGMOIDOSCOPY FLEXIBLE (N/A)  ESOPHAGOGASTRODUODENAL (EGD) BIOPSY (N/A) 4 Days Post-Op  Precautions: Sternal      ASSESSMENT:  Patient received supine in bed with nursing present and agreeable to PT intervention. Patient cleared by nursing for mobility. Patient with improved mobility and activity tolerance this date with BP stable prior to and post-ambulation. Patient performed bed mobility with SBA, requiring increased time to come to EOB. Patient transferred sit<>stand with CGA-SBA using cardiac bear for adherence to sternal precautions. Patient ambulated 125' with CGA x 1 while pushing cardiac cart (with chair follow for safety). Patient continues to demonstrate slow gait speed, increased trunk sway, and widened YOBANI throughout gait. Educated patient on activity pacing to avoid SOB and improve overall tolerance for activity. Patient was assisted into bedside chair following ambulation with BP stable.  Patient performed cardiac exercises with supervision and occasional cueing for proper performance. Patient was left sitting in bedside chair with all needs met and nursing informed. Patient would benefit from inpatient rehab prior to returning home to improve overall mobility and activity tolerance. Patient can tolerate 3 hours of therapy/day. Progression toward goals:  [ ]      Improving appropriately and progressing toward goals  [X]      Improving slowly and progressing toward goals  [ ]      Not making progress toward goals and plan of care will be adjusted       PLAN:  Patient continues to benefit from skilled intervention to address the above impairments. Continue treatment per established plan of care. Discharge Recommendations:  Inpatient Rehab  Further Equipment Recommendations for Discharge:  TBD by rehab       SUBJECTIVE:   Patient stated I feel better now that I'm up.    The patient stated 3/3 sternal precautions. Reviewed all 3 with patient. OBJECTIVE DATA SUMMARY:   Patient mobilized on continuous portable monitor/telemetry. Critical Behavior:  Neurologic State: Alert, Appropriate for age  Orientation Level: Oriented X4  Cognition: Appropriate decision making, Appropriate for age attention/concentration, Appropriate safety awareness, Follows commands     Functional Mobility Training:  Bed Mobility:  Log Rolling: Stand-by asssistance; Additional time  Supine to Sit: Stand-by asssistance; Additional time     Scooting: Stand-by asssistance; Additional time        Transfers:  Sit to Stand: Stand-by asssistance  Stand to Sit: Contact guard assistance; Additional time                             Balance:  Sitting: Intact; Without support  Standing: Impaired  Standing - Static: Good;Constant support  Standing - Dynamic : Fair  Ambulation/Gait Training:  Distance (ft): 125 Feet (ft)  Assistive Device: Gait belt (cardiac cart)  Ambulation - Level of Assistance: Contact guard assistance;Assist x1;Additional time Gait Abnormalities: Decreased step clearance;Trunk sway increased        Base of Support: Widened     Speed/Alva: Pace decreased (<100 feet/min)  Step Length: Left shortened;Right shortened  Therapeutic Exercises:   Patient instructed on the benefits and demonstrated cardiac exercises while sitting in bedside chair with supervision and VCs for occasional proper performance. Instructed and indicated understanding on how to progress reps and sets against gravity, working up to 5 lbs and so on based on surgeon clearance for more weight in prep for functional activity. Can use household items for weights.      CARDIAC  EXERCISE   Sets   Reps   Active Active Assist   Passive Self ROM   Comments   Shoulder flexion 1 10 [X]                                            [ ]                                            [ ]                                            [ ]                                            BUE   Shoulder abduction 1  10 [X]                                            [ ]                                            [ ]                                            [ ]                                            BUE   Scapular elevation 1 10 [X]                                            [ ]                                            [ ]                                            [ ]                                            BUE   Scapular retraction 1 10 [X]                                            [ ]                                            [ ]                                            [ ]                                            BUE   Trunk rotation 1 10 [X]                                            [ ]                                            [ ]                                            [ ]                                            JORGE   Trunk sidebending 1 10 [X]                                            [ ]                                            [ ] [ ]                                            JORGE         [ ]                                            [ ]                                            [ ]                                            [ ]                                                      Pain:  Pain Scale 1: Numeric (0 - 10)  Pain Intensity 1: 1  Pain Location 1: Back  Pain Orientation 1: Lower;Mid  Pain Description 1: Aching  Pain Intervention(s) 1: Medication (see MAR)  Activity Tolerance:   Improving - VSS stable throughout this date; pt without complaints  Please refer to the flowsheet for vital signs taken during this treatment.   After treatment:   [X] Patient left in no apparent distress sitting up in chair  [ ] Patient left in no apparent distress in bed  [X] Call bell left within reach  [X] Nursing notified  [X] Caregiver present  [ ] Bed alarm activated      COMMUNICATION/COLLABORATION:   The patients plan of care was discussed with: Physical Therapist and Registered Nurse     Trisha Stinson, PT, DPT   Time Calculation: 18 mins

## 2017-02-09 NOTE — PROGRESS NOTES
NAME: Reginald Shaw        :  1965        MRN:  [id number]        Assessment :    Plan:  --FELICE  HTN  Anemia  Mild hyponatremia  Mild metabolic acidosis  ascending aortic dissection 2/3 HD initiated on  and HD/UF # 2 yesterday; good UOP, CTA on ; No HD today; gardner out today    Likely ATN following AAA and CTA, but curiously has a bland urine and very prerenal on FeNa; cardiorenal - diastolic dysfunction?     BP controlled; on amlodipine 10, catapres tts3 patch, hydralazine 100 qid, metoprolol 75 bid         Subjective:     Chief Complaint:  Feeling much better. Still swollen and sob. No n/v. Tolerating po. We discussed the above. Review of Systems:    Symptom Y/N Comments  Symptom Y/N Comments   Fever/Chills    Chest Pain     Poor Appetite    Edema yy    Cough    Abdominal Pain     Sputum    Joint Pain     SOB/TORREZ y   Pruritis/Rash     Nausea/vomit n   Tolerating PT/OT     Diarrhea n   Tolerating Diet     Constipation    Other       Could not obtain due to:      Objective:     VITALS:   Last 24hrs VS reviewed since prior progress note. Most recent are:  Visit Vitals    /63 (BP 1 Location: Right arm, BP Patient Position: Sitting; At rest)    Pulse (!) 58    Temp 97.5 °F (36.4 °C)    Resp 16    Ht 6' (1.829 m)    Wt 158.4 kg (349 lb 3.3 oz)    SpO2 96%    BMI 47.36 kg/m2       Intake/Output Summary (Last 24 hours) at 17 1417  Last data filed at 17 1000   Gross per 24 hour   Intake             1450 ml   Output             1965 ml   Net             -515 ml      Telemetry Reviewed:     PHYSICAL EXAM:  General: Obese, swollen male, no acute distress  Resp:  A few Rales. No access.  muscle use  CV:  Regular  rhythm,  No murmur (), No Rubs, No Gallops. +++ edema  GI:  Soft,  distended, appropriately tender.  +Bowel sounds, no HSM      Lab Data Reviewed: (see below)    Medications Reviewed: (see below)    PMH/SH reviewed - no change compared to H&P  ________________________________________________________________________  Care Plan discussed with:  Patient y    Family      RN y    Care Manager                    Consultant:          Comments   >50% of visit spent in counseling and coordination of care       ________________________________________________________________________  Danielle Garza MD     Procedures: see electronic medical records for all procedures/Xrays and details which  were not copied into this note but were reviewed prior to creation of Plan. LABS:  Recent Labs      02/09/17 0336 02/08/17   0431   WBC  24.6*  27.5*   HGB  8.5*  9.1*   HCT  25.1*  25.4*   PLT  128*  116*     Recent Labs      02/09/17 0340 02/08/17   0431 02/07/17   0410   NA  138  136  135*   K  4.1  4.3  4.3   CL  100  100  100   CO2  28  24  23   BUN  81*  96*  108*   CREA  2.26*  3.21*  3.73*   GLU  93  140*  124*   CA  7.7*  7.4*  7.7*   MG   --    --   3.0*   PHOS   --   5.3*  5.4*     Recent Labs      02/09/17   0340 02/07/17   0410   SGOT  282*  835*   AP  136*  91   TP  5.8*  5.9*   ALB  2.7*  2.9*   GLOB  3.1  3.0     No results for input(s): INR, PTP, APTT in the last 72 hours. No lab exists for component: INREXT, INREXT   No results for input(s): FE, TIBC, PSAT, FERR in the last 72 hours. No results found for: FOL, RBCF   Recent Labs      02/06/17   1700   PH  7.44   PCO2  27*   PO2  75*     No results for input(s): CPK, CKMB in the last 72 hours.     No lab exists for component: TROPONINI  No components found for: Bj Point  Lab Results   Component Value Date/Time    Color DARK YELLOW 02/06/2017 01:03 PM    Color PENDING 02/06/2017 01:03 PM    Appearance CLEAR 02/06/2017 01:03 PM    Appearance PENDING 02/06/2017 01:03 PM    Specific gravity 1.019 02/06/2017 01:03 PM    Specific gravity PENDING 02/06/2017 01:03 PM    Specific gravity PENDING 02/06/2017 01:03 PM    pH (UA) 5.0 02/06/2017 01:03 PM pH (UA) PENDING 02/06/2017 01:03 PM    Protein NEGATIVE  02/06/2017 01:03 PM    Protein PENDING 02/06/2017 01:03 PM    Glucose NEGATIVE  02/06/2017 01:03 PM    Glucose PENDING 02/06/2017 01:03 PM    Ketone NEGATIVE  02/06/2017 01:03 PM    Ketone PENDING 02/06/2017 01:03 PM    Bilirubin PENDING 02/06/2017 01:03 PM    Urobilinogen 0.2 02/06/2017 01:03 PM    Urobilinogen PENDING 02/06/2017 01:03 PM    Nitrites NEGATIVE  02/06/2017 01:03 PM    Nitrites PENDING 02/06/2017 01:03 PM    Leukocyte Esterase NEGATIVE  02/06/2017 01:03 PM    Leukocyte Esterase PENDING 02/06/2017 01:03 PM    Epithelial cells FEW 02/06/2017 01:03 PM    Epithelial cells DUPLICATE REQUEST 60/71/6342 01:03 PM    Bacteria NEGATIVE  02/06/2017 01:03 PM    Bacteria DUPLICATE REQUEST 71/28/6110 01:03 PM    WBC 0-4 02/06/2017 19:34 PM    WBC DUPLICATE REQUEST 77/27/9683 01:03 PM    RBC 0-5 02/06/2017 31:67 PM    RBC DUPLICATE REQUEST 36/19/9618 01:03 PM       MEDICATIONS:  Current Facility-Administered Medications   Medication Dose Route Frequency    pantoprazole (PROTONIX) tablet 40 mg  40 mg Oral ACB&D    metoprolol tartrate (LOPRESSOR) tablet 75 mg  75 mg Oral Q12H    hydrALAZINE (APRESOLINE) tablet 100 mg  100 mg Oral Q6H    cloNIDine (CATAPRES) 0.3 mg/24 hr patch 1 Patch  1 Patch TransDERmal Q7D    amLODIPine (NORVASC) tablet 10 mg  10 mg Oral DAILY    influenza vaccine 2016-17 (36mos+)(PF) (FLUZONE/FLUARIX/FLULAVAL QUAD) injection 0.5 mL  0.5 mL IntraMUSCular PRIOR TO DISCHARGE    insulin glargine (LANTUS) injection 40 Units  40 Units SubCUTAneous DAILY    hydrALAZINE (APRESOLINE) 20 mg/mL injection 10 mg  10 mg IntraVENous Q2H PRN    sodium chloride (NS) flush 5-10 mL  5-10 mL IntraVENous Q8H    acetaminophen (TYLENOL) tablet 650 mg  650 mg Oral Q4H PRN    oxyCODONE-acetaminophen (PERCOCET) 5-325 mg per tablet 2 Tab  2 Tab Oral Q4H PRN    morphine injection 2 mg  2 mg IntraVENous Q2H PRN    amiodarone (CORDARONE) tablet 400 mg  400 mg Oral Q12H    albuterol (PROVENTIL VENTOLIN) nebulizer solution 1.25-2.5 mg  1.25-2.5 mg Nebulization Q4H PRN    aspirin chewable tablet 81 mg  81 mg Oral DAILY    ELECTROLYTE REPLACEMENT PROTOCOL  1 Each Other PRN    glucose chewable tablet 16 g  4 Tab Oral PRN    dextrose (D50W) injection syrg 12.5-25 g  12.5-25 g IntraVENous PRN    glucagon (GLUCAGEN) injection 1 mg  1 mg IntraMUSCular PRN    insulin lispro (HUMALOG) injection   SubCUTAneous AC&HS    albumin human 5% (BUMINATE) solution 12.5 g  12.5 g IntraVENous Q1H PRN    calcium gluconate 2 g in 0.9% sodium chloride 100 mL IVPB  2 g IntraVENous PRN

## 2017-02-09 NOTE — PROGRESS NOTES
[facility]  Pulmonary, Critical Care, and Sleep Medicine    Name: Sp Enriquez MRN: [id number]   : 1965 Hospital: [facility]   Date: 2017            IMPRESSION:   · S/p repair of ascending aortic dissection, had RFA cannulation. · FELICE/ CKD  · Abnormal CXR- look like more edema on right, normal WBC  · Presented with GI bleed in ER, now had additional bleeding from lower gi tract, 2/5 incomplete prep but colonoscopy without gut ischemia  · Presented with chest pain  · Mild renal insufficiency with Cr of 1.74.  · Anemia, has bee pretty stable. · ARLEN  · Obesity  · Ex Smoker  · Occasional Etoh      RECOMMENDATIONS:   · Wean O2  · Pulmonary toilet  · Afebrile, WBC lower today  · Renal fx better today  · Diet per cardiac surgery  · Incentive spirometry  · Will assist while in the ICU     Subjective/History:     No acute changes noted. Sitting up this am, on NC oxygen. He is feeling better. No acute complaints. No back pain, has expected chest pain. ROS of 10 systems is otherwise negative.        Current Facility-Administered Medications   Medication Dose Route Frequency    chlorhexidine (PERIDEX) 0.12 % mouthwash 15 mL  15 mL Oral Q12H    metoprolol tartrate (LOPRESSOR) tablet 75 mg  75 mg Oral Q12H    hydrALAZINE (APRESOLINE) tablet 100 mg  100 mg Oral Q6H    cloNIDine (CATAPRES) 0.3 mg/24 hr patch 1 Patch  1 Patch TransDERmal Q7D    amLODIPine (NORVASC) tablet 10 mg  10 mg Oral DAILY    influenza vaccine - (36mos+)(PF) (FLUZONE/FLUARIX/FLULAVAL QUAD) injection 0.5 mL  0.5 mL IntraMUSCular PRIOR TO DISCHARGE    insulin glargine (LANTUS) injection 40 Units  40 Units SubCUTAneous DAILY    pantoprazole (PROTONIX) 40 mg in sodium chloride 0.9 % 10 mL injection  40 mg IntraVENous Q12H    niCARdipine (CARDENE) 50 mg in 0.9% sodium chloride 250 mL infusion  0-15 mg/hr IntraVENous TITRATE    sodium chloride (NS) flush 5-10 mL  5-10 mL IntraVENous Q8H    amiodarone (CORDARONE) tablet 400 mg  400 mg Oral Q12H    aspirin chewable tablet 81 mg  81 mg Oral DAILY    insulin lispro (HUMALOG) injection   SubCUTAneous AC&HS         Review of Systems:  A comprehensive review of systems was negative. Objective:   Vital Signs:    Visit Vitals    /46    Pulse 61    Temp 98.4 °F (36.9 °C)    Resp 12    Ht 6' (1.829 m)    Wt 158.4 kg (349 lb 3.3 oz)    SpO2 95%    BMI 47.36 kg/m2       O2 Device: Nasal cannula   O2 Flow Rate (L/min): 6 l/min   Temp (24hrs), Av.4 °F (36.9 °C), Min:97.8 °F (36.6 °C), Max:99 °F (37.2 °C)       Intake/Output:   Last shift:         Last 3 shifts:  1901 -  0700  In: 2747.2 [P.O.:2620; I.V.:127.2]  Out: 5260 [Urine:3260]    Intake/Output Summary (Last 24 hours) at 17 0743  Last data filed at 17 0655   Gross per 24 hour   Intake             2620 ml   Output             1975 ml   Net              645 ml     Hemodynamics:   PAP: PAP Systolic: 32 (90 5092) CO: CO (l/min): 6 l/min (17 1200)   Wedge:   CI: CI (l/min/m2): 2.5 l/min/m2 (17 1200)   CVP:  CVP (mmHg): 17 mmHg (17 1200) SVR:       PVR:       Ventilator Settings:  Mode Rate Tidal Volume Pressure FiO2 PEEP   Spontaneous   650 ml  5 cm H2O 50 % 5 cm H20     Peak airway pressure: 11 cm H2O    Minute ventilation: 9.14 l/min      Physical Exam:    General:  Alert, cooperative, no distress, appears stated age. Obese gentleman. Sitting up in chair. No distress. Head:  Normocephalic, without obvious abnormality, atraumatic. Eyes:  Conjunctivae/corneas clear. PERRL, EOMs intact. Nose: Nares normal. Septum midline. Mucosa normal. No drainage or sinus tenderness. Throat: Lips, mucosa, and tongue normal. Teeth and gums normal.   Neck: Supple, symmetrical, trachea midline, no adenopathy, thyroid: no enlargment/tenderness/nodules, no carotid bruit and no JVD. Back:   Symmetric, no curvature.  ROM normal.   Lungs: Clear to auscultation bilaterally. Decreased BS in bases. Chest wall:  No tenderness or deformity. Sternal dressing is intact. Heart:  Regular rate and rhythm, S1, S2 normal, no murmur, click, rub or gallop. Abdomen:   Soft, non-tender. Bowel sounds normal. No masses,  No organomegaly. Extremities: Extremities normal, atraumatic, has chronic venous insufficiency changes. Pulses: 2+ and symmetric all extremities. Skin: Skin color, texture, turgor normal. No rashes or lesions   Lymph nodes: Cervical, supraclavicular, and axillary nodes normal.   Neurologic: Grossly nonfocal       Data:     Recent Results (from the past 24 hour(s))   GLUCOSE, POC    Collection Time: 02/08/17  7:47 AM   Result Value Ref Range    Glucose (POC) 137 (H) 65 - 100 mg/dL    Performed by [employer] Lisling Sushiela    GLUCOSE, POC    Collection Time: 02/08/17 11:35 AM   Result Value Ref Range    Glucose (POC) 215 (H) 65 - 100 mg/dL    Performed by [employer] Lisling Sushiela    GLUCOSE, POC    Collection Time: 02/08/17  5:17 PM   Result Value Ref Range    Glucose (POC) 84 65 - 100 mg/dL    Performed by [employer] Lisling Sushiela    GLUCOSE, POC    Collection Time: 02/08/17 10:18 PM   Result Value Ref Range    Glucose (POC) 106 (H) 65 - 100 mg/dL    Performed by Josr Diaz    CBC WITH AUTOMATED DIFF    Collection Time: 02/09/17  3:36 AM   Result Value Ref Range    WBC 24.6 (H) 4.1 - 11.1 K/uL    RBC 2.99 (L) 4.10 - 5.70 M/uL    HGB 8.5 (L) 12.1 - 17.0 g/dL    HCT 25.1 (L) 36.6 - 50.3 %    MCV 83.9 80.0 - 99.0 FL    MCH 28.4 26.0 - 34.0 PG    MCHC 33.9 30.0 - 36.5 g/dL    RDW 14.9 (H) 11.5 - 14.5 %    PLATELET 683 (L) 030 - 400 K/uL    NEUTROPHILS 66 %    BAND NEUTROPHILS 5 %    LYMPHOCYTES 15 %    MONOCYTES 11 %    EOSINOPHILS 0 %    BASOPHILS 0 %    METAMYELOCYTES 1 %    MYELOCYTES 2 %    ABS. NEUTROPHILS 17.5 K/UL    ABS. LYMPHOCYTES 3.7 K/UL    ABS. MONOCYTES 2.7 K/UL    ABS. EOSINOPHILS 0.0 K/UL    ABS.  BASOPHILS 0.0 K/UL    RBC COMMENTS POLYCHROMASIA  1+        DF MANUAL     NUCLEATED RBC    Collection Time: 02/09/17  3:36 AM   Result Value Ref Range    NRBC 2.2 (H) 0  WBC    ABSOLUTE NRBC 0.54 (H) 0.00 - 0.01 K/uL    WBC CORRECTED FOR NR ADJUSTED FOR NUCLEATED RBC'S     METABOLIC PANEL, COMPREHENSIVE    Collection Time: 02/09/17  3:40 AM   Result Value Ref Range    Sodium 138 136 - 145 mmol/L    Potassium 4.1 3.5 - 5.1 mmol/L    Chloride 100 97 - 108 mmol/L    CO2 28 21 - 32 mmol/L    Anion gap 10 5 - 15 mmol/L    Glucose 93 65 - 100 mg/dL    BUN 81 (H) 6 - 20 MG/DL    Creatinine 2.26 (H) 0.70 - 1.30 MG/DL    BUN/Creatinine ratio 36 (H) 12 - 20      GFR est AA 37 (L) >60 ml/min/1.73m2    GFR est non-AA 31 (L) >60 ml/min/1.73m2    Calcium 7.7 (L) 8.5 - 10.1 MG/DL    Bilirubin, total 0.8 0.2 - 1.0 MG/DL    ALT (SGPT) 257 (H) 12 - 78 U/L    AST (SGOT) 282 (H) 15 - 37 U/L    Alk.  phosphatase 136 (H) 45 - 117 U/L    Protein, total 5.8 (L) 6.4 - 8.2 g/dL    Albumin 2.7 (L) 3.5 - 5.0 g/dL    Globulin 3.1 2.0 - 4.0 g/dL    A-G Ratio 0.9 (L) 1.1 - 2.2               Telemetry:Paced    Imaging:  I have personally reviewed the patients radiographs and have reviewed the reports:  CXR: no changes       Adeel Lewis MD

## 2017-02-09 NOTE — PROGRESS NOTES
Problem: Self Care Deficits Care Plan (Adult)  Goal: *Acute Goals and Plan of Care (Insert Text)  Occupational Therapy Goals  Initiated 2/3/2017  1. Patient will perform ADLs standing 5 mins without fatigue or LOB with independence within 7 day(s). 2. Patient will perform lower body ADLs with independence within 7 day(s). 3. Patient will perform bathing with independence within 7 day(s). 4. Patient will perform toilet transfers with independence within 7 day(s). 5. Patient will perform all aspects of toileting with independence within 7 day(s). 6. Patient will participate in cardiac/sternal upper extremity therapeutic exercise/activities to increase independence with ADLs with independence for 5 minutes within 7 day(s). OCCUPATIONAL THERAPY TREATMENT  Patient: Ricardo Peter (2 y.o. male)  Date: 2/9/2017  Diagnosis: aneursym  Aortic dissection (HCC)  melana <principal problem not specified>  Procedure(s) (LRB):  ESOPHAGOGASTRODUODENOSCOPY (EGD) (N/A)  SIGMOIDOSCOPY FLEXIBLE (N/A)  ESOPHAGOGASTRODUODENAL (EGD) BIOPSY (N/A) 4 Days Post-Op  Precautions: Sternal      ASSESSMENT:  Pt seems much brighter today. BP sitting upon arrival: 140/55, HR: 57. He performed standing marching for several minutes, reported some dizziness and returned to sitting; BP was  taken and it was slightly lower than it initially was at rest--134/56 HR 60. O2 sats were stable on 6 L. . The dizziness subsided and pt performed seated cardiac exercises very well without complaints. Pt is aware of sternal precautions as he brought both UEs up over his head for combing his hair in seated position. Progressing. Will likely need 24 hour assistance at home at discharge.   Progression toward goals:  [ ]       Improving appropriately and progressing toward goals  [X]       Improving slowly and progressing toward goals  [ ]       Not making progress toward goals and plan of care will be adjusted       PLAN:  Patient continues to benefit from skilled intervention to address the above impairments. Continue treatment per established plan of care. Discharge Recommendations: To Be Determined-will likely need 24 hour supervision/support at home  Further Equipment Recommendations for Discharge:  tbd       SUBJECTIVE:   Patient stated I feel dizzy.  ( in standing)      OBJECTIVE DATA SUMMARY:   Cognitive/Behavioral Status:  Neurologic State: Alert; Appropriate for age  Orientation Level: Oriented X4  Cognition: Appropriate decision making; Appropriate for age attention/concentration; Appropriate safety awareness              Functional Mobility and Transfers for ADLs:  Bed Mobility:  Pt seated in chair upon arrival     Transfers:  Sit to Stand: Stand-by asssistance  Stand to Sit: supervision, mildly uncontrolled descent in to chair  Balance:  Sitting: Intact; Without support  Standing: Impaired  Standing - Static: Good;Constant support        ADL Intervention:     Able to perform seated grooming task adhering to sternal precautions using both hands above head for the task. Reinforced all precautions. Related cardiac exercises to adls. Therapeutic Exercises:   Pt performed 6/6 cardiac exercises independently     Pain:  Pain Scale 1: Numeric (0 - 10)  Pain Intensity 1: 0  Pain Location 1: Back  Pain Orientation 1: Lower;Mid  Pain Description 1: Aching  Pain Intervention(s) 1: Medication (see MAR)  Activity Tolerance:   Decreased for standing-dizzy that subsided in sitting. Please refer to the flowsheet for vital signs taken during this treatment.   After treatment:   [X] Patient left in no apparent distress sitting up in chair  [ ] Patient left in no apparent distress in bed  [X] Call bell left within reach  [X] Nursing notified  [ ] Caregiver present  [ ] Bed alarm activated      COMMUNICATION/COLLABORATION:   The patients plan of care was discussed with: Physical Therapist and Registered Nurse Obey Gong, OTR/L  Time Calculation: 26 mins

## 2017-02-09 NOTE — PROGRESS NOTES
1423  Received verbal report from [facility] D/[facility], assumed care of patient. Labs and orders reviewed  Close observation maintained.     6507  Patient resting comfortably no signs of acute distress noted VSS

## 2017-02-09 NOTE — DIABETES MGMT
DTC Cardiac Surgery Education Note    Recommendations/ Comments: Met with pt for post-op education. 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 Reviewed DM pathophysiology and blood sugar goals. Pt stated that he is not interested in outpt education due to his background and his mother is a retired nurse. Provided pt with Freestyle Freedom Lite meter to begin checking BG. He verbalized understanding how to use meter. Reviewed plate method and reducing portion sizes. Chart reviewed and initial evaluation complete on [employer]. Patient is a 45 y.o. male with newly diagnosed Type 2 Diabetes on none at home. A1c:   Lab Results   Component Value Date/Time    Hemoglobin A1c 7.1 2017 04:14 AM          Assessed and instructed patient on the following:   · risk of sternal wound infection/ delayed healing   · interpretation of lab results, blood sugar goals, complications of diabetes mellitus, hypoglycemia prevention and treatment, exercise, SMBG skills, nutrition and referred to Diabetes Educator. Encouraged the following: increased exercise, dietary modifications: eating 3 well balanced meals/day, regular blood sugar monitorin times daily - fasting and 2 hours after meals     Provided patient with the following: [x]        DM Self-Care Guide               []         Insulin education materials               []         CHO counting education materials               []         BG guidelines for post-op patients                  [x]         Outpatient DTC contact number               [x]         Glucometer               []         Insulin start kit- vial/syringe               []         Insulin start kit- pen                 Patient was able to verbalize understanding of:     []         Glucometer     []         saline injection       with []         no/ []         minimal assistance needed.       []         Nurse to have patient self inject prior to discharge. Recent Glucose Results: Lab Results   Component Value Date/Time    GLU 93 02/09/2017 03:40 AM    GLUCPOC 145 (H) 02/09/2017 11:25 AM    GLUCPOC 103 (H) 02/09/2017 08:18 AM    GLUCPOC 106 (H) 02/08/2017 10:18 PM        Lab Results   Component Value Date/Time    Creatinine 2.26 02/09/2017 03:40 AM       Active Orders   Diet    DIET GI LITE (POST SURGICAL)        PO intake: Patient Vitals for the past 72 hrs:   % Diet Eaten   02/09/17 1426 100 %   02/09/17 0900 100 %       Current hospital DM medication:   -Humalog normal sensitivity correction   -Glipizide 5mg ac breakfast     Will continue to follow as needed. Thank you.     Garrett Mario, [facility]  Office: [phone]

## 2017-02-09 NOTE — PROGRESS NOTES
0700 Bedside and verbal report received from Nabil Price RN  0800 Assessment completed. Pt comfortable in sitting position in bed.  0900 Pt resting. AM meds administered per MAR.  1000 Young catheter removed. 36 Pt states he is feeling dizzy and light headed. /59, HR 59, SpO2 96. Symptoms are not worsening. Offered water and dimmed lights. Will check back in.   1045 PT with pt. Patient states he feels better and less dizzy when he is sitting up on side of bed. Patient walked with PT around nurses station and back to room. 1100 pt up in chair. Dizziness has improved. 1200 pt sitting quietly with visitor present. 1300 pt remains in chair, eating lunch. 1400 pt sitting quietly.

## 2017-02-09 NOTE — PROGRESS NOTES
Gastroenterology Daily Progress Note (Dr. Nighat Vega)    Admit Date: 2/2/2017       Subjective:       No bloody stools reported. No new events. Transferring out to PCU. MEDS: reviewed in [facility]     Objective:     Visit Vitals    /46    Pulse 61    Temp 98.4 °F (36.9 °C)    Resp 12    Ht 6' (1.829 m)    Wt 158.4 kg (349 lb 3.3 oz)    SpO2 95%    BMI 47.36 kg/m2   Blood pressure 135/46, pulse 61, temperature 98.4 °F (36.9 °C), resp. rate 12, height 6' (1.829 m), weight 158.4 kg (349 lb 3.3 oz), SpO2 95 %. 02/07 1901 - 02/09 0700  In: 2747.2 [P.O.:2620;  I.V.:127.2]  Out: 5260 [Urine:3260]      Intake/Output Summary (Last 24 hours) at 02/09/17 0750  Last data filed at 02/09/17 5379   Gross per 24 hour   Intake             2620 ml   Output             1975 ml   Net              645 ml     Physical Exam:       General: Obese WM seated upright in chair  GI: Soft, mild distention, nontender, + BS  Extremities: 1+ edema in LE b/l, no cyanosis      Labs:     Recent Results (from the past 24 hour(s))   GLUCOSE, POC    Collection Time: 02/08/17 11:35 AM   Result Value Ref Range    Glucose (POC) 215 (H) 65 - 100 mg/dL    Performed by Erick Jacobs    GLUCOSE, POC    Collection Time: 02/08/17  5:17 PM   Result Value Ref Range    Glucose (POC) 84 65 - 100 mg/dL    Performed by Erick Jacobs    GLUCOSE, POC    Collection Time: 02/08/17 10:18 PM   Result Value Ref Range    Glucose (POC) 106 (H) 65 - 100 mg/dL    Performed by Mariano Diaz    CBC WITH AUTOMATED DIFF    Collection Time: 02/09/17  3:36 AM   Result Value Ref Range    WBC 24.6 (H) 4.1 - 11.1 K/uL    RBC 2.99 (L) 4.10 - 5.70 M/uL    HGB 8.5 (L) 12.1 - 17.0 g/dL    HCT 25.1 (L) 36.6 - 50.3 %    MCV 83.9 80.0 - 99.0 FL    MCH 28.4 26.0 - 34.0 PG    MCHC 33.9 30.0 - 36.5 g/dL    RDW 14.9 (H) 11.5 - 14.5 %    PLATELET 438 (L) 963 - 400 K/uL    NEUTROPHILS 66 %    BAND NEUTROPHILS 5 %    LYMPHOCYTES 15 %    MONOCYTES 11 % EOSINOPHILS 0 %    BASOPHILS 0 %    METAMYELOCYTES 1 %    MYELOCYTES 2 %    ABS. NEUTROPHILS 17.5 K/UL    ABS. LYMPHOCYTES 3.7 K/UL    ABS. MONOCYTES 2.7 K/UL    ABS. EOSINOPHILS 0.0 K/UL    ABS. BASOPHILS 0.0 K/UL    RBC COMMENTS POLYCHROMASIA  1+        DF MANUAL     NUCLEATED RBC    Collection Time: 02/09/17  3:36 AM   Result Value Ref Range    NRBC 2.2 (H) 0  WBC    ABSOLUTE NRBC 0.54 (H) 0.00 - 0.01 K/uL    WBC CORRECTED FOR NR ADJUSTED FOR NUCLEATED RBC'S     METABOLIC PANEL, COMPREHENSIVE    Collection Time: 02/09/17  3:40 AM   Result Value Ref Range    Sodium 138 136 - 145 mmol/L    Potassium 4.1 3.5 - 5.1 mmol/L    Chloride 100 97 - 108 mmol/L    CO2 28 21 - 32 mmol/L    Anion gap 10 5 - 15 mmol/L    Glucose 93 65 - 100 mg/dL    BUN 81 (H) 6 - 20 MG/DL    Creatinine 2.26 (H) 0.70 - 1.30 MG/DL    BUN/Creatinine ratio 36 (H) 12 - 20      GFR est AA 37 (L) >60 ml/min/1.73m2    GFR est non-AA 31 (L) >60 ml/min/1.73m2    Calcium 7.7 (L) 8.5 - 10.1 MG/DL    Bilirubin, total 0.8 0.2 - 1.0 MG/DL    ALT (SGPT) 257 (H) 12 - 78 U/L    AST (SGOT) 282 (H) 15 - 37 U/L    Alk.  phosphatase 136 (H) 45 - 117 U/L    Protein, total 5.8 (L) 6.4 - 8.2 g/dL    Albumin 2.7 (L) 3.5 - 5.0 g/dL    Globulin 3.1 2.0 - 4.0 g/dL    A-G Ratio 0.9 (L) 1.1 - 2.2       Recent Labs      02/09/17   0340  02/08/17   0431  02/07/17   0410  02/06/17   1327   NA  138  136  135*  134*   K  4.1  4.3  4.3  4.8   CL  100  100  100  102   CO2  28  24  23  14*   BUN  81*  96*  108*  89*   CREA  2.26*  3.21*  3.73*  3.64*   GLU  93  140*  124*  216*   CA  7.7*  7.4*  7.7*  7.8*   MG   --    --   3.0*  3.0*   PHOS   --   5.3*  5.4*   --      Recent Labs      02/09/17   0340  02/07/17   0410   SGOT  282*  835*   AP  136*  91   TP  5.8*  5.9*   ALB  2.7*  2.9*   GLOB  3.1  3.0       Impression:    S/P Aortic dissection repair  GI blood loss anemia  Esophagitis and gastritis on EGD  FELICE  Leukocytosis         Plan:  No clinical signs of intestinal ischemia at this time and stable H/H without continued GI bleeding.  -continue PPI  -will see again on request since no evidence of ongoing Gi bleeding, call if recurs       Nadine Mccollum MD    2/9/2017  [facility] [address]  [phone] [id number]  [address] [place] [address] [place]: [phone]

## 2017-02-09 NOTE — DIABETES MGMT
DTC Cardiac Surgery Progress Note    Recommendations/ Comments:  BG and correctional insulin needs trending down. Discussed with Salvador Johnson and plan to transition pt to glipizide, discontinue lantus. Chart reviewed on [employer]. Patient is 45 y.o. male s/p Cardiac Surgery. POD 5. No known Hx Type 2 Diabetes per H&P, however, a1c consistent with new onset type 2 DM. A1c:   Lab Results   Component Value Date/Time    Hemoglobin A1c 7.1 02/07/2017 04:14 AM         Recent Glucose Results:   Lab Results   Component Value Date/Time    GLU 93 02/09/2017 03:40 AM    GLUCPOC 145 (H) 02/09/2017 11:25 AM    GLUCPOC 103 (H) 02/09/2017 08:18 AM    GLUCPOC 106 (H) 02/08/2017 10:18 PM        Lab Results   Component Value Date/Time    Creatinine 2.26 02/09/2017 03:40 AM       Active Orders   Diet    DIET GI LITE (POST SURGICAL)        PO intake:   Patient Vitals for the past 72 hrs:   % Diet Eaten   02/09/17 1426 100 %   02/09/17 0900 100 %       Current DM medications. humalog correction, lantus 40units daily    Will continue to follow as needed. Thank you.   Ricardo Lugo RN, [address]

## 2017-02-10 ENCOUNTER — APPOINTMENT (OUTPATIENT)
Dept: GENERAL RADIOLOGY | Age: 52
DRG: 219 | End: 2017-02-10
Payer: COMMERCIAL

## 2017-02-10 ENCOUNTER — APPOINTMENT (OUTPATIENT)
Dept: GENERAL RADIOLOGY | Age: 52
DRG: 219 | End: 2017-02-10
Attending: INTERNAL MEDICINE
Payer: COMMERCIAL

## 2017-02-10 LAB
ANION GAP BLD CALC-SCNC: 8 MMOL/L (ref 5–15)
BUN SERPL-MCNC: 81 MG/DL (ref 6–20)
BUN/CREAT SERPL: 42 (ref 12–20)
CALCIUM SERPL-MCNC: 8 MG/DL (ref 8.5–10.1)
CHLORIDE SERPL-SCNC: 101 MMOL/L (ref 97–108)
CO2 SERPL-SCNC: 28 MMOL/L (ref 21–32)
CREAT SERPL-MCNC: 1.95 MG/DL (ref 0.7–1.3)
ERYTHROCYTE [DISTWIDTH] IN BLOOD BY AUTOMATED COUNT: 15.3 % (ref 11.5–14.5)
GLUCOSE BLD STRIP.AUTO-MCNC: 133 MG/DL (ref 65–100)
GLUCOSE BLD STRIP.AUTO-MCNC: 138 MG/DL (ref 65–100)
GLUCOSE BLD STRIP.AUTO-MCNC: 148 MG/DL (ref 65–100)
GLUCOSE BLD STRIP.AUTO-MCNC: 89 MG/DL (ref 65–100)
GLUCOSE BLD STRIP.AUTO-MCNC: 90 MG/DL (ref 65–100)
GLUCOSE SERPL-MCNC: 82 MG/DL (ref 65–100)
HCT VFR BLD AUTO: 26.1 % (ref 36.6–50.3)
HGB BLD-MCNC: 8.9 G/DL (ref 12.1–17)
MCH RBC QN AUTO: 28.6 PG (ref 26–34)
MCHC RBC AUTO-ENTMCNC: 34.1 G/DL (ref 30–36.5)
MCV RBC AUTO: 83.9 FL (ref 80–99)
PLATELET # BLD AUTO: 151 K/UL (ref 150–400)
POTASSIUM SERPL-SCNC: 4.2 MMOL/L (ref 3.5–5.1)
RBC # BLD AUTO: 3.11 M/UL (ref 4.1–5.7)
SERVICE CMNT-IMP: ABNORMAL
SERVICE CMNT-IMP: NORMAL
SERVICE CMNT-IMP: NORMAL
SODIUM SERPL-SCNC: 137 MMOL/L (ref 136–145)
WBC # BLD AUTO: 21.6 K/UL (ref 4.1–11.1)

## 2017-02-10 PROCEDURE — 97116 GAIT TRAINING THERAPY: CPT

## 2017-02-10 PROCEDURE — 71010 XR CHEST PORT: CPT

## 2017-02-10 PROCEDURE — 74011636637 HC RX REV CODE- 636/637: Performed by: PHYSICIAN ASSISTANT

## 2017-02-10 PROCEDURE — 51798 US URINE CAPACITY MEASURE: CPT

## 2017-02-10 PROCEDURE — 85027 COMPLETE CBC AUTOMATED: CPT | Performed by: INTERNAL MEDICINE

## 2017-02-10 PROCEDURE — 77030034848

## 2017-02-10 PROCEDURE — 36415 COLL VENOUS BLD VENIPUNCTURE: CPT | Performed by: INTERNAL MEDICINE

## 2017-02-10 PROCEDURE — 77010033678 HC OXYGEN DAILY

## 2017-02-10 PROCEDURE — 74011250637 HC RX REV CODE- 250/637: Performed by: PHYSICIAN ASSISTANT

## 2017-02-10 PROCEDURE — 74011250637 HC RX REV CODE- 250/637: Performed by: INTERNAL MEDICINE

## 2017-02-10 PROCEDURE — 82962 GLUCOSE BLOOD TEST: CPT

## 2017-02-10 PROCEDURE — 74011000250 HC RX REV CODE- 250: Performed by: INTERNAL MEDICINE

## 2017-02-10 PROCEDURE — 80048 BASIC METABOLIC PNL TOTAL CA: CPT | Performed by: INTERNAL MEDICINE

## 2017-02-10 PROCEDURE — 77030033263 HC DRSG MEPILEX 16-48IN BORD MOLN -B

## 2017-02-10 PROCEDURE — 65660000000 HC RM CCU STEPDOWN

## 2017-02-10 PROCEDURE — 74011250637 HC RX REV CODE- 250/637: Performed by: THORACIC SURGERY (CARDIOTHORACIC VASCULAR SURGERY)

## 2017-02-10 PROCEDURE — 97110 THERAPEUTIC EXERCISES: CPT

## 2017-02-10 RX ORDER — POLYETHYLENE GLYCOL 3350 17 G/17G
17 POWDER, FOR SOLUTION ORAL DAILY
Status: DISCONTINUED | OUTPATIENT
Start: 2017-02-11 | End: 2017-02-10

## 2017-02-10 RX ORDER — POLYETHYLENE GLYCOL 3350 17 G/17G
34 POWDER, FOR SOLUTION ORAL DAILY
Status: DISCONTINUED | OUTPATIENT
Start: 2017-02-11 | End: 2017-02-27 | Stop reason: HOSPADM

## 2017-02-10 RX ORDER — TRAMADOL HYDROCHLORIDE 50 MG/1
50-100 TABLET ORAL
Status: DISCONTINUED | OUTPATIENT
Start: 2017-02-10 | End: 2017-02-27 | Stop reason: HOSPADM

## 2017-02-10 RX ORDER — ZOLPIDEM TARTRATE 5 MG/1
5 TABLET ORAL
Status: DISCONTINUED | OUTPATIENT
Start: 2017-02-10 | End: 2017-02-27 | Stop reason: HOSPADM

## 2017-02-10 RX ORDER — ACETAMINOPHEN 325 MG/1
650 TABLET ORAL
Status: DISCONTINUED | OUTPATIENT
Start: 2017-02-10 | End: 2017-02-27 | Stop reason: HOSPADM

## 2017-02-10 RX ORDER — MAG HYDROX/ALUMINUM HYD/SIMETH 200-200-20
30 SUSPENSION, ORAL (FINAL DOSE FORM) ORAL
Status: DISCONTINUED | OUTPATIENT
Start: 2017-02-10 | End: 2017-02-27 | Stop reason: HOSPADM

## 2017-02-10 RX ORDER — OXYCODONE AND ACETAMINOPHEN 5; 325 MG/1; MG/1
1 TABLET ORAL
Status: DISCONTINUED | OUTPATIENT
Start: 2017-02-10 | End: 2017-02-27 | Stop reason: HOSPADM

## 2017-02-10 RX ORDER — ATORVASTATIN CALCIUM 20 MG/1
20 TABLET, FILM COATED ORAL EVERY EVENING
Status: DISCONTINUED | OUTPATIENT
Start: 2017-02-10 | End: 2017-02-27 | Stop reason: HOSPADM

## 2017-02-10 RX ORDER — DOCUSATE SODIUM 100 MG/1
100 CAPSULE, LIQUID FILLED ORAL 2 TIMES DAILY
Status: DISCONTINUED | OUTPATIENT
Start: 2017-02-10 | End: 2017-02-27 | Stop reason: HOSPADM

## 2017-02-10 RX ORDER — SODIUM CHLORIDE 0.9 % (FLUSH) 0.9 %
5-10 SYRINGE (ML) INJECTION EVERY 8 HOURS
Status: DISCONTINUED | OUTPATIENT
Start: 2017-02-10 | End: 2017-02-27 | Stop reason: HOSPADM

## 2017-02-10 RX ORDER — ONDANSETRON 2 MG/ML
4 INJECTION INTRAMUSCULAR; INTRAVENOUS
Status: DISCONTINUED | OUTPATIENT
Start: 2017-02-10 | End: 2017-02-27 | Stop reason: HOSPADM

## 2017-02-10 RX ORDER — LANOLIN ALCOHOL/MO/W.PET/CERES
400 CREAM (GRAM) TOPICAL 2 TIMES DAILY
Status: DISCONTINUED | OUTPATIENT
Start: 2017-02-10 | End: 2017-02-11

## 2017-02-10 RX ORDER — NALOXONE HYDROCHLORIDE 0.4 MG/ML
0.4 INJECTION, SOLUTION INTRAMUSCULAR; INTRAVENOUS; SUBCUTANEOUS AS NEEDED
Status: DISCONTINUED | OUTPATIENT
Start: 2017-02-10 | End: 2017-02-27 | Stop reason: HOSPADM

## 2017-02-10 RX ORDER — SODIUM CHLORIDE 0.9 % (FLUSH) 0.9 %
5-10 SYRINGE (ML) INJECTION AS NEEDED
Status: DISCONTINUED | OUTPATIENT
Start: 2017-02-10 | End: 2017-02-27 | Stop reason: HOSPADM

## 2017-02-10 RX ORDER — HYDRALAZINE HYDROCHLORIDE 20 MG/ML
10 INJECTION INTRAMUSCULAR; INTRAVENOUS
Status: DISCONTINUED | OUTPATIENT
Start: 2017-02-10 | End: 2017-02-27 | Stop reason: HOSPADM

## 2017-02-10 RX ORDER — POLYETHYLENE GLYCOL 3350 17 G/17G
17 POWDER, FOR SOLUTION ORAL DAILY
Status: DISCONTINUED | OUTPATIENT
Start: 2017-02-10 | End: 2017-02-11 | Stop reason: SDUPTHER

## 2017-02-10 RX ORDER — GUAIFENESIN 100 MG/5ML
81 LIQUID (ML) ORAL DAILY
Status: DISCONTINUED | OUTPATIENT
Start: 2017-02-11 | End: 2017-02-10 | Stop reason: SDUPTHER

## 2017-02-10 RX ORDER — BUMETANIDE 0.25 MG/ML
1 INJECTION INTRAMUSCULAR; INTRAVENOUS ONCE
Status: COMPLETED | OUTPATIENT
Start: 2017-02-10 | End: 2017-02-10

## 2017-02-10 RX ADMIN — GLIPIZIDE 5 MG: 5 TABLET ORAL at 08:02

## 2017-02-10 RX ADMIN — Medication 10 ML: at 05:29

## 2017-02-10 RX ADMIN — HYDRALAZINE HYDROCHLORIDE 100 MG: 25 TABLET, FILM COATED ORAL at 17:57

## 2017-02-10 RX ADMIN — ASPIRIN 81 MG 81 MG: 81 TABLET ORAL at 08:02

## 2017-02-10 RX ADMIN — POLYETHYLENE GLYCOL 3350 17 G: 17 POWDER, FOR SOLUTION ORAL at 18:38

## 2017-02-10 RX ADMIN — PANTOPRAZOLE SODIUM 40 MG: 40 TABLET, DELAYED RELEASE ORAL at 08:02

## 2017-02-10 RX ADMIN — INSULIN LISPRO 2 UNITS: 100 INJECTION, SOLUTION INTRAVENOUS; SUBCUTANEOUS at 21:54

## 2017-02-10 RX ADMIN — PANTOPRAZOLE SODIUM 40 MG: 40 TABLET, DELAYED RELEASE ORAL at 16:23

## 2017-02-10 RX ADMIN — Medication 10 ML: at 22:11

## 2017-02-10 RX ADMIN — ATORVASTATIN CALCIUM 20 MG: 20 TABLET, FILM COATED ORAL at 20:08

## 2017-02-10 RX ADMIN — DOCUSATE SODIUM 100 MG: 100 CAPSULE, LIQUID FILLED ORAL at 20:08

## 2017-02-10 RX ADMIN — BUMETANIDE 1 MG: 0.25 INJECTION INTRAMUSCULAR; INTRAVENOUS at 13:30

## 2017-02-10 RX ADMIN — HYDRALAZINE HYDROCHLORIDE 100 MG: 25 TABLET, FILM COATED ORAL at 05:29

## 2017-02-10 RX ADMIN — AMLODIPINE BESYLATE 10 MG: 5 TABLET ORAL at 08:02

## 2017-02-10 RX ADMIN — INSULIN LISPRO 2 UNITS: 100 INJECTION, SOLUTION INTRAVENOUS; SUBCUTANEOUS at 16:24

## 2017-02-10 RX ADMIN — HYDRALAZINE HYDROCHLORIDE 100 MG: 25 TABLET, FILM COATED ORAL at 11:54

## 2017-02-10 RX ADMIN — METOPROLOL TARTRATE 50 MG: 50 TABLET ORAL at 08:02

## 2017-02-10 RX ADMIN — AMIODARONE HYDROCHLORIDE 400 MG: 200 TABLET ORAL at 08:02

## 2017-02-10 RX ADMIN — Medication 10 ML: at 13:30

## 2017-02-10 RX ADMIN — METOPROLOL TARTRATE 50 MG: 50 TABLET ORAL at 20:08

## 2017-02-10 NOTE — PROGRESS NOTES
Problem: Mobility Impaired (Adult and Pediatric)  Goal: *Acute Goals and Plan of Care (Insert Text)  Physical Therapy Goals  Goals reviewed and remain appropriate 2/10/17  Initiated 2/3/2017  1. Patient will move from supine to sit and sit to supine , scoot up and down and roll side to side in bed with modified independence within 7 days. 2. Patient will perform sit to/from stand with modified independence within 7 days. 3. Patient will ambulate 250 feet with least restrictive assistive device and modified independence within 7 days. 4. Patient will ascend/descend 5 stairs with 1 handrail(s) with modified independence within 7 days. 5. Patient will perform cardiac exercises per protocol with independence within 7 days. 6. Patient will verbally and functionally recall 3/3 sternal precautions within 7 days. PHYSICAL THERAPY TREATMENT: WEEKLY REASSESSMENT  Patient: Rondel Leyden (89 y.o. male)  Date: 2/10/2017  Diagnosis: aneursym  Aortic dissection (HCC)  melana <principal problem not specified>  Procedure(s) (LRB):  ESOPHAGOGASTRODUODENOSCOPY (EGD) (N/A)  SIGMOIDOSCOPY FLEXIBLE (N/A)  ESOPHAGOGASTRODUODENAL (EGD) BIOPSY (N/A) 5 Days Post-Op  Precautions: Sternal      ASSESSMENT:  Patient received sitting in bedside chair and agreeable to PT intervention. Patient cleared by nursing for mobility. Patient performed cardiac exercises while sitting in bedside chair, requiring minimal VCs for proper performance. Patient transferred sit<>stand with CGA-SBA using cardiac bear. VSS throughout on 4 L/min O2. Patient able to ambulate 140' with CGA x 1 while pushing cardiac cart. Patient continues to demonstrate slow gait speed, widened YOBANI, increased trunk sway, and with marked path deviations this date during ambulation. Patient without complaints of SOB, however with apparent TORREZ during gait with SaO2 stable prior to and post-ambulation.  Patient was assisted into bedside chair following therapy session and left with VSS, all needs met, and nursing informed. Patient is an excellent inpatient rehab candidate and can tolerate 3 hours of therapy/day. Patient will continue to benefit from skilled acute PT daily to improve endurance and strength. Patient's progression toward goals since last assessment: Slow progress       PLAN:  Goals have been updated based on progression since last assessment. Patient continues to benefit from skilled intervention to address the above impairments. Continue to follow the patient daily to address goals. Planned Interventions:  [X]              Bed Mobility Training             [ ]       Neuromuscular Re-Education  [X]              Transfer Training                   [ ]       Orthotic/Prosthetic Training  [X]              Gait Training                         [ ]       Modalities  [X]              Therapeutic Exercises           [ ]       Edema Management/Control  [X]              Therapeutic Activities            [X]       Patient and Family Training/Education  [ ]              Other (comment):  Discharge Recommendations: Inpatient Rehab  Further Equipment Recommendations for Discharge: TBD by rehab       SUBJECTIVE:   Patient stated I feel better.       OBJECTIVE DATA SUMMARY:   Critical Behavior:  Neurologic State: Alert, Confused  Orientation Level: Oriented X4  Cognition: Follows commands, Appropriate safety awareness, Appropriate for age attention/concentration, Appropriate decision making, Recognition of people/places        Functional Mobility Training:  Bed Mobility:           Scooting: Supervision        Transfers:  Sit to Stand: Contact guard assistance;Stand-by asssistance  Stand to Sit: Contact guard assistance;Stand-by asssistance                             Balance:  Sitting: Intact; Without support  Standing: Impaired  Standing - Static: Good  Standing - Dynamic : Fair  Ambulation/Gait Training:  Distance (ft): 140 Feet (ft)  Assistive Device: Gait belt (cardiac cart)  Ambulation - Level of Assistance: Contact guard assistance;Assist x1;Additional time        Gait Abnormalities: Decreased step clearance;Trunk sway increased; Path deviations        Base of Support: Widened     Speed/Alva: Pace decreased (<100 feet/min)  Step Length: Left shortened;Right shortened     Therapeutic Exercises:   Patient performed 6/6 cardiac exercises (1 set, 10 reps each) while sitting in bedside chair, requiring minimal VCs for proper performance. Pain:  Pain Scale 1: Numeric (0 - 10)  Pain Intensity 1: 0              Activity Tolerance:   Improving - increased gait distance; VSS throughout on 4 L/min O2  Please refer to the flowsheet for vital signs taken during this treatment.   After treatment:   [X]  Patient left in no apparent distress sitting up in chair  [ ]  Patient left in no apparent distress in bed  [X]  Call bell left within reach  [X]  Nursing notified  [ ]  Caregiver present  [ ]  Bed alarm activated      COMMUNICATION/COLLABORATION:   The patients plan of care was discussed with: Physical Therapist and Registered Nurse     Ann Marie Fritz PT, DPT   Time Calculation: 23 mins

## 2017-02-10 NOTE — PROGRESS NOTES
Cardiac Surgery ICU Progress Note    Admit Date: 2017    POD: 5 Days Post-Op      Problems:  Active Problems: Aortic dissection (HCC) (2017)      S/P ascending aortic replacement (2/3/2017)      Overview: EMERGENT ASCENDING AORTIC DISSECTION REPAIR       Right Femoral Artery Cannulation        Procedure:  Procedure(s):  ESOPHAGOGASTRODUODENOSCOPY (EGD)  SIGMOIDOSCOPY FLEXIBLE  ESOPHAGOGASTRODUODENAL (EGD) BIOPSY      Summary:     Stable hemodynamics in sinus rhythm without ectopy on no support. BP control with Hydralazine, Clonidine, BB and Norvasc. Straight cathed once last night but voided this morning. Feels better overall. Plan/Recommendations/Medical Decision Making:     WBC 21 and decreasing  CXR is clearing  Lopressor decreased to 50 mg Q12 due to transient SB  UOP good. Cr 1.9. Stop HD? Waiting for stepdown bed  Anticipated acute blood loss anemia  Increase activity  Increase I/S effort and frequency  Sternal precautions  Stimulate bowel movement  Patient seen and reviewed with  Dr. Bee Rihcards MD       Objective:     Vitals:    Visit Vitals    /50 (BP 1 Location: Right arm, BP Patient Position: At rest)    Pulse 68    Temp 98.6 °F (37 °C)    Resp 20    Ht 6' (1.829 m)    Wt 349 lb 3.3 oz (158.4 kg)    SpO2 96%    BMI 47.36 kg/m2       Temp (24hrs), Av.1 °F (36.7 °C), Min:97.5 °F (36.4 °C), Max:98.9 °F (37.2 °C)      Hemodynamics:   CO: CO (l/min): 6 l/min   CI: CI (l/min/m2): 2.5 l/min/m2   CVP: CVP (mmHg): 17 mmHg (17 1200)   SVR:     PAP Systolic: PAP Systolic: 32 (14/38/87 3684)   PAP Diastolic: PAP Diastolic: 19 (99/73/45 4232)   PVR:     SV02: SVO2 (%): 53 % (17 1200)   SCV02:      CXR Results  (Last 48 hours)               17 0451  XR CHEST PORT Final result    Impression:  IMPRESSION:       Predominantly right upper lobe airspace disease unchanged. No other interval change.        Narrative:      Clinical history: Respiratory failure INDICATION: respiratory failure           FINDINGS:       Portable semierect view of the chest is obtained compared to examination of   2/7/2017 at 1541. Hemodialysis catheter via the right IJ with tip in the distribution of the right   atrium. There is no pneumothorax. Otherwise there is stable appearance. Right IJ CVL is stable. Right lung   airspace disease is stable. Heart remains large. Left lung remains clear. Labs: Recent Labs      02/10/17   0414  02/09/17   2145   WBC  21.6*   --    HGB  8.9*   --    HCT  26.1*   --    PLT  151   --    NA  137   --    K  4.2   --    BUN  81*   --    CREA  1.95*   --    GLU  82   --    GLUCPOC   --   108*       Ventilator:  Ventilator Volumes  Vt Set (ml): 650 ml (02/03/17 0102)  Vt Exhaled (Machine Breath) (ml): 674 ml (02/03/17 0102)  Vt Spont (ml): 780 ml (02/03/17 0420)  Ve Observed (l/min): 9.14 l/min (02/03/17 0420)    Oxygen Therapy:  Oxygen Therapy  O2 Sat (%): 96 % (02/10/17 0445)  Pulse via Oximetry: 65 beats per minute (02/10/17 0445)  O2 Device: Nasal cannula (02/10/17 0445)  O2 Flow Rate (L/min): 4 l/min (02/10/17 0445)  FIO2 (%): 50 % (02/03/17 0420)      Admission Weight: Last Weight   Weight: 270 lb (122.5 kg) Weight: 349 lb 3.3 oz (158.4 kg)     Intake / Output / Drain:  Current Shift:    Last 24 hrs.:   Intake/Output Summary (Last 24 hours) at 02/10/17 0758  Last data filed at 02/10/17 0645   Gross per 24 hour   Intake             1480 ml   Output             2615 ml   Net            -1135 ml         EXAM:      General: slow improvement: penile swelling      Chest:  Stable sternum      Incisions: dry and intact    Lungs:    Rhonchi bilaterally. Heart:  Regular rate and rhythm, S1, S2 normal, no murmur, no click,      Abdomen:   Soft, non-tender. Bowel sounds present. Extremities:   edema     Neurologic:  Gross motor and sensory apparatus intact.        Signed By: GAETANO Rizvi

## 2017-02-10 NOTE — PROGRESS NOTES
NAME: Lieutenant Rodriguez        :  1965        MRN:  [id number]        Assessment :    Plan:  --FELICE  HTN  Anemia  Volume overload    ascending aortic dissection 2/3 HD initiated on  and HD/UF # 2 on ; good UOP, CTA on ; No HD today; creatinine is improving; jurgen out tomorrow if all is still improving; bumex 1 mg iv today    Likely ATN following AAA and CTA, but curiously had a bland urine and very prerenal on FeNa; cardiorenal - diastolic dysfunction?     BP controlled; on amlodipine 10, catapres tts3 patch, hydralazine 100 qid, metoprolol 75 bid         Subjective:     Chief Complaint:  Feeling much better. Still swollen; improved sob. No n/v. Tolerating po. We discussed the above. Review of Systems:    Symptom Y/N Comments  Symptom Y/N Comments   Fever/Chills    Chest Pain     Poor Appetite    Edema yy    Cough    Abdominal Pain     Sputum    Joint Pain     SOB/TORREZ y   Pruritis/Rash     Nausea/vomit n   Tolerating PT/OT     Diarrhea n   Tolerating Diet     Constipation    Other       Could not obtain due to:      Objective:     VITALS:   Last 24hrs VS reviewed since prior progress note. Most recent are:  Visit Vitals    /68 (BP 1 Location: Right arm, BP Patient Position: At rest;Head of bed elevated (Comment degrees))    Pulse (!) 57    Temp 97.9 °F (36.6 °C)    Resp 15    Ht 6' (1.829 m)    Wt 158.4 kg (349 lb 3.3 oz)    SpO2 97%    BMI 47.36 kg/m2       Intake/Output Summary (Last 24 hours) at 02/10/17 1234  Last data filed at 02/10/17 0900   Gross per 24 hour   Intake             1330 ml   Output             2225 ml   Net             -895 ml      Telemetry Reviewed:     PHYSICAL EXAM:  General: Obese, swollen male, no acute distress  Resp:  A few Rales. No access.  muscle use  CV:  Regular  rhythm,  No murmur (), No Rubs, No Gallops. +++ edema  GI:  Soft,  distended, appropriately tender.  +Bowel sounds, no HSM      Lab Data Reviewed: (see below)    Medications Reviewed: (see below)    PMH/SH reviewed - no change compared to H&P  ________________________________________________________________________  Care Plan discussed with:  Patient y    Family      RN y    Care Manager                    Consultant:          Comments   >50% of visit spent in counseling and coordination of care       ________________________________________________________________________  Katerine Bhakta MD     Procedures: see electronic medical records for all procedures/Xrays and details which  were not copied into this note but were reviewed prior to creation of Plan. LABS:  Recent Labs      02/10/17   0414  02/09/17   0336   WBC  21.6*  24.6*   HGB  8.9*  8.5*   HCT  26.1*  25.1*   PLT  151  128*     Recent Labs      02/10/17   0414  02/09/17   0340  02/08/17   0431   NA  137  138  136   K  4.2  4.1  4.3   CL  101  100  100   CO2  28  28  24   BUN  81*  81*  96*   CREA  1.95*  2.26*  3.21*   GLU  82  93  140*   CA  8.0*  7.7*  7.4*   PHOS   --    --   5.3*     Recent Labs      02/09/17   0340   SGOT  282*   AP  136*   TP  5.8*   ALB  2.7*   GLOB  3.1     No results for input(s): INR, PTP, APTT in the last 72 hours. No lab exists for component: INREXT, INREXT   No results for input(s): FE, TIBC, PSAT, FERR in the last 72 hours. No results found for: FOL, RBCF   No results for input(s): PH, PCO2, PO2 in the last 72 hours. No results for input(s): CPK, CKMB in the last 72 hours.     No lab exists for component: TROPONINI  No components found for: Bj Point  Lab Results   Component Value Date/Time    Color DARK YELLOW 02/06/2017 01:03 PM    Color PENDING 02/06/2017 01:03 PM    Appearance CLEAR 02/06/2017 01:03 PM    Appearance PENDING 02/06/2017 01:03 PM    Specific gravity 1.019 02/06/2017 01:03 PM    Specific gravity PENDING 02/06/2017 01:03 PM    Specific gravity PENDING 02/06/2017 01:03 PM    pH (UA) 5.0 02/06/2017 01:03 PM    pH (UA) PENDING 02/06/2017 01:03 PM    Protein NEGATIVE  02/06/2017 01:03 PM    Protein PENDING 02/06/2017 01:03 PM    Glucose NEGATIVE  02/06/2017 01:03 PM    Glucose PENDING 02/06/2017 01:03 PM    Ketone NEGATIVE  02/06/2017 01:03 PM    Ketone PENDING 02/06/2017 01:03 PM    Bilirubin PENDING 02/06/2017 01:03 PM    Urobilinogen 0.2 02/06/2017 01:03 PM    Urobilinogen PENDING 02/06/2017 01:03 PM    Nitrites NEGATIVE  02/06/2017 01:03 PM    Nitrites PENDING 02/06/2017 01:03 PM    Leukocyte Esterase NEGATIVE  02/06/2017 01:03 PM    Leukocyte Esterase PENDING 02/06/2017 01:03 PM    Epithelial cells FEW 02/06/2017 01:03 PM    Epithelial cells DUPLICATE REQUEST 01/02/0159 01:03 PM    Bacteria NEGATIVE  02/06/2017 01:03 PM    Bacteria DUPLICATE REQUEST 01/70/0724 01:03 PM    WBC 0-4 02/06/2017 99:20 PM    WBC DUPLICATE REQUEST 83/38/3919 01:03 PM    RBC 0-5 02/06/2017 88:84 PM    RBC DUPLICATE REQUEST 97/29/7829 01:03 PM       MEDICATIONS:  Current Facility-Administered Medications   Medication Dose Route Frequency    pantoprazole (PROTONIX) tablet 40 mg  40 mg Oral ACB&D    glipiZIDE (GLUCOTROL) tablet 5 mg  5 mg Oral ACB    metoprolol tartrate (LOPRESSOR) tablet 50 mg  50 mg Oral Q12H    hydrALAZINE (APRESOLINE) tablet 100 mg  100 mg Oral Q6H    cloNIDine (CATAPRES) 0.3 mg/24 hr patch 1 Patch  1 Patch TransDERmal Q7D    amLODIPine (NORVASC) tablet 10 mg  10 mg Oral DAILY    influenza vaccine 2016-17 (36mos+)(PF) (FLUZONE/FLUARIX/FLULAVAL QUAD) injection 0.5 mL  0.5 mL IntraMUSCular PRIOR TO DISCHARGE    hydrALAZINE (APRESOLINE) 20 mg/mL injection 10 mg  10 mg IntraVENous Q2H PRN    sodium chloride (NS) flush 5-10 mL  5-10 mL IntraVENous Q8H    acetaminophen (TYLENOL) tablet 650 mg  650 mg Oral Q4H PRN    oxyCODONE-acetaminophen (PERCOCET) 5-325 mg per tablet 2 Tab  2 Tab Oral Q4H PRN    morphine injection 2 mg  2 mg IntraVENous Q2H PRN    amiodarone (CORDARONE) tablet 400 mg  400 mg Oral Q12H    albuterol (PROVENTIL VENTOLIN) nebulizer solution 1.25-2.5 mg  1.25-2.5 mg Nebulization Q4H PRN    aspirin chewable tablet 81 mg  81 mg Oral DAILY    ELECTROLYTE REPLACEMENT PROTOCOL  1 Each Other PRN    glucose chewable tablet 16 g  4 Tab Oral PRN    dextrose (D50W) injection syrg 12.5-25 g  12.5-25 g IntraVENous PRN    glucagon (GLUCAGEN) injection 1 mg  1 mg IntraMUSCular PRN    insulin lispro (HUMALOG) injection   SubCUTAneous AC&HS    albumin human 5% (BUMINATE) solution 12.5 g  12.5 g IntraVENous Q1H PRN    calcium gluconate 2 g in 0.9% sodium chloride 100 mL IVPB  2 g IntraVENous PRN

## 2017-02-10 NOTE — DIABETES MGMT
DTC Cardiac Surgery Progress Note    Recommendations/ Comments:  Pt transitioned from lantus to humalog this morning. Depending on BG values, pt may benefit from being discharged on glipizide if needed. Pt will need a prescription for Freestyle lite test strips at discharge. Chart reviewed on [employer]. Patient is 45 y.o. male s/p Cardiac Surgery. POD 6. No known Hx Type 2 Diabetes per H&P, however, a1c consistent with new onset type 2 DM. A1c:   Lab Results   Component Value Date/Time    Hemoglobin A1c 7.1 02/07/2017 04:14 AM         Recent Glucose Results:   Lab Results   Component Value Date/Time    GLU 82 02/10/2017 04:14 AM    GLUCPOC 89 02/10/2017 11:26 AM    GLUCPOC 90 02/10/2017 07:53 AM    GLUCPOC 108 (H) 02/09/2017 09:45 PM        Lab Results   Component Value Date/Time    Creatinine 1.95 02/10/2017 04:14 AM       Active Orders   Diet    DIET GI LITE (POST SURGICAL)        PO intake:   Patient Vitals for the past 72 hrs:   % Diet Eaten   02/10/17 0900 100 %   02/09/17 1600 100 %   02/09/17 1426 100 %   02/09/17 0900 100 %       Current DM medications. humalog correction, lantus 40units daily    Will continue to follow as needed. Thank you.   Raymond De León RN, [address] [facility]

## 2017-02-10 NOTE — PROGRESS NOTES
[facility] is following for possible Inpt Rehab when medically stable. Pt awaiting transfer to PCU when bed avbl. CM will f/u with [facility] to check on acceptance and status of insur auth.   Annie Langley MSW    (8496 weekend)

## 2017-02-10 NOTE — PROGRESS NOTES
0700 - Verbal and bedside shift report received from Devika Nugent RN. Care of patient assumed. 0800 - Patient assessment completed as documented. The patient is alert, oriented times four, and follows all commands. Lungs are diminished bilaterally, bowel sounds are hypoactive, and pulses are palpable in all four extremities. The patient denies any pain or shortness of breath. 0900 - Nasal cannula oxygen down to 2 liters per minute, oxygen saturations - 98%, patient denies any shortness of breath. 1300 - Patient unable to void, bladder scan showed an amount of greater than 999 ml. Patient straight cathed using sterile technique, amount - 1500 ml.    1447 - Patient reports feeling \"swimmy\", when asked to further to explain he states, \"I just feel weak and tired\". Blood pressure checked - 146/56, oxygen saturations - 96%, and fingerstick blood glucose - 138. Patient remains in recliner, declined getting back in bed, will continue to monitor closely. 1600 - Patient now reports feeling better, states he no longer feels dizzy or weak. Vital signs continue remain stable, no complaints of pain or shortness of breath. 1755 - Patient unable to void, bladder distended. Bladder scan showed greater than 350 ml, straight cathed for return of 900 ml of seun clear urine. 1800 - Received orders from Dr. Robert Wilson to insert gardner catheter the next time patient is unable to void and bladder scan amount is greater than 350 ml, also received orders for patient to receive Miralax daily. 46 - Verbal and bedside shift report given to Rod Matta RN.

## 2017-02-10 NOTE — PROGRESS NOTES
[facility]  Pulmonary, Critical Care, and Sleep Medicine    Name: Maria T Garay MRN: [id number]   : 1965 Hospital: [facility]   Date: 2/10/2017            IMPRESSION:   · S/p repair of ascending aortic dissection, had RFA cannulation. · FELICE/ CKD  · Abnormal CXR- look like more edema on right, normal WBC  · Presented with GI bleed in ER, now had additional bleeding from lower gi tract, 2/5 incomplete prep but colonoscopy without gut ischemia  · Presented with chest pain  · Mild renal insufficiency with Cr of 1.74.  · Anemia, has bee pretty stable. · ARLEN  · Obesity  · Ex Smoker  · Occasional Etoh      RECOMMENDATIONS:   · Wean O2  · Pulmonary toilet  · Afebrile, WBC lower today again  · Renal fx better today again  · Diet per cardiac surgery  · Incentive spirometry  · Awaiting a PCU bed     Subjective/History:     No acute changes noted. Sitting up this am, on NC oxygen. He is feeling better. No acute complaints. No back pain, has expected chest pain. ROS of 10 systems is otherwise negative.        Current Facility-Administered Medications   Medication Dose Route Frequency    pantoprazole (PROTONIX) tablet 40 mg  40 mg Oral ACB&D    glipiZIDE (GLUCOTROL) tablet 5 mg  5 mg Oral ACB    metoprolol tartrate (LOPRESSOR) tablet 50 mg  50 mg Oral Q12H    hydrALAZINE (APRESOLINE) tablet 100 mg  100 mg Oral Q6H    cloNIDine (CATAPRES) 0.3 mg/24 hr patch 1 Patch  1 Patch TransDERmal Q7D    amLODIPine (NORVASC) tablet 10 mg  10 mg Oral DAILY    influenza vaccine 2016- (36mos+)(PF) (FLUZONE/FLUARIX/FLULAVAL QUAD) injection 0.5 mL  0.5 mL IntraMUSCular PRIOR TO DISCHARGE    sodium chloride (NS) flush 5-10 mL  5-10 mL IntraVENous Q8H    amiodarone (CORDARONE) tablet 400 mg  400 mg Oral Q12H    aspirin chewable tablet 81 mg  81 mg Oral DAILY    insulin lispro (HUMALOG) injection   SubCUTAneous AC&HS         Review of Systems:  A comprehensive review of systems was negative. Objective:   Vital Signs:    Visit Vitals    /46 (BP 1 Location: Right arm, BP Patient Position: At rest;Head of bed elevated (Comment degrees))    Pulse 64    Temp 97.7 °F (36.5 °C)    Resp 13    Ht 6' (1.829 m)    Wt 158.4 kg (349 lb 3.3 oz)    SpO2 96%    BMI 47.36 kg/m2       O2 Device: Nasal cannula   O2 Flow Rate (L/min): 4 l/min   Temp (24hrs), Av.2 °F (36.8 °C), Min:97.5 °F (36.4 °C), Max:98.9 °F (37.2 °C)       Intake/Output:   Last shift:         Last 3 shifts:  1901 - 02/10 07  In:  [P.O.:]  Out: 4886 [Urine:3670]    Intake/Output Summary (Last 24 hours) at 02/10/17 0808  Last data filed at 02/10/17 0645   Gross per 24 hour   Intake             1480 ml   Output             2385 ml   Net             -905 ml     Hemodynamics:   PAP: PAP Systolic: 32 (02 4334) CO: CO (l/min): 6 l/min (17 1200)   Wedge:   CI: CI (l/min/m2): 2.5 l/min/m2 (17 1200)   CVP:  CVP (mmHg): 17 mmHg (17 1200) SVR:       PVR:       Ventilator Settings:  Mode Rate Tidal Volume Pressure FiO2 PEEP   Spontaneous   650 ml  5 cm H2O 50 % 5 cm H20     Peak airway pressure: 11 cm H2O    Minute ventilation: 9.14 l/min      Physical Exam:    General:  Alert, cooperative, no distress, appears stated age. Obese gentleman. Sitting up in chair. No distress. Head:  Normocephalic, without obvious abnormality, atraumatic. Eyes:  Conjunctivae/corneas clear. PERRL, EOMs intact. Nose: Nares normal. Septum midline. Mucosa normal. No drainage or sinus tenderness. Throat: Lips, mucosa, and tongue normal. Teeth and gums normal.   Neck: Supple, symmetrical, trachea midline, no adenopathy, thyroid: no enlargment/tenderness/nodules, no carotid bruit and no JVD. Back:   Symmetric, no curvature. ROM normal.   Lungs:   Clear to auscultation bilaterally. Decreased BS in bases. Chest wall:  No tenderness or deformity. Sternal dressing is intact.     Heart:  Regular rate and rhythm, S1, S2 normal, no murmur, click, rub or gallop. Abdomen:   Soft, non-tender. Bowel sounds normal. No masses,  No organomegaly. Extremities: Extremities normal, atraumatic, has chronic venous insufficiency changes. Pulses: 2+ and symmetric all extremities.    Skin: Skin color, texture, turgor normal. No rashes or lesions   Lymph nodes: Cervical, supraclavicular, and axillary nodes normal.   Neurologic: Grossly nonfocal       Data:     Recent Results (from the past 24 hour(s))   GLUCOSE, POC    Collection Time: 02/09/17  8:18 AM   Result Value Ref Range    Glucose (POC) 103 (H) 65 - 100 mg/dL    Performed by Piedad Mojica    GLUCOSE, POC    Collection Time: 02/09/17 11:25 AM   Result Value Ref Range    Glucose (POC) 145 (H) 65 - 100 mg/dL    Performed by Peidad Mojica    GLUCOSE, POC    Collection Time: 02/09/17  5:03 PM   Result Value Ref Range    Glucose (POC) 110 (H) 65 - 100 mg/dL    Performed by Dagmar Barry    GLUCOSE, POC    Collection Time: 02/09/17  9:45 PM   Result Value Ref Range    Glucose (POC) 108 (H) 65 - 100 mg/dL    Performed by Peri Diaz    METABOLIC PANEL, BASIC    Collection Time: 02/10/17  4:14 AM   Result Value Ref Range    Sodium 137 136 - 145 mmol/L    Potassium 4.2 3.5 - 5.1 mmol/L    Chloride 101 97 - 108 mmol/L    CO2 28 21 - 32 mmol/L    Anion gap 8 5 - 15 mmol/L    Glucose 82 65 - 100 mg/dL    BUN 81 (H) 6 - 20 MG/DL    Creatinine 1.95 (H) 0.70 - 1.30 MG/DL    BUN/Creatinine ratio 42 (H) 12 - 20      GFR est AA 44 (L) >60 ml/min/1.73m2    GFR est non-AA 36 (L) >60 ml/min/1.73m2    Calcium 8.0 (L) 8.5 - 10.1 MG/DL   CBC W/O DIFF    Collection Time: 02/10/17  4:14 AM   Result Value Ref Range    WBC 21.6 (H) 4.1 - 11.1 K/uL    RBC 3.11 (L) 4.10 - 5.70 M/uL    HGB 8.9 (L) 12.1 - 17.0 g/dL    HCT 26.1 (L) 36.6 - 50.3 %    MCV 83.9 80.0 - 99.0 FL    MCH 28.6 26.0 - 34.0 PG    MCHC 34.1 30.0 - 36.5 g/dL    RDW 15.3 (H) 11.5 - 14.5 %    PLATELET 487 402 - 092 K/uL   GLUCOSE, POC Collection Time: 02/10/17  7:53 AM   Result Value Ref Range    Glucose (POC) 90 65 - 100 mg/dL    Performed by Steve Lino              Telemetry:Paced    Imaging:  I have personally reviewed the patients radiographs and have reviewed the reports:  CXR: no acute changes       Jeaneth Gray MD

## 2017-02-10 NOTE — PROGRESS NOTES
Nutrition Assessment:    RECOMMENDATIONS:   Continue diet, advance as able per GI    ASSESSMENT:   Chart reviewed, case discussed during CCU rounds. Pt advanced to a solid diet, and he is consuming 100%! Labs reviewed, WNL. Last BM noted on 2/6. Pt was educated by DTC yesterday. Will defer further diet advancement to GI, as pt has tolerated GI lite diet well x 2 days. Dietitians Intervention(s)/Plan(s): Advance diet as tolerated, monitor PO intake. SUBJECTIVE/OBJECTIVE:     Diet Order: Other (comment) (Gi Lite)  % Eaten:  Patient Vitals for the past 72 hrs:   % Diet Eaten   02/10/17 0900 100 %   02/09/17 1600 100 %   02/09/17 1426 100 %   02/09/17 0900 100 %       Pertinent Medications:humalog, protonix, KCl, bumex, glucotrol. Chemistries:  Lab Results   Component Value Date/Time    Sodium 137 02/10/2017 04:14 AM    Potassium 4.2 02/10/2017 04:14 AM    Chloride 101 02/10/2017 04:14 AM    CO2 28 02/10/2017 04:14 AM    Anion gap 8 02/10/2017 04:14 AM    Glucose 82 02/10/2017 04:14 AM    BUN 81 02/10/2017 04:14 AM    Creatinine 1.95 02/10/2017 04:14 AM    BUN/Creatinine ratio 42 02/10/2017 04:14 AM    GFR est AA 44 02/10/2017 04:14 AM    GFR est non-AA 36 02/10/2017 04:14 AM    Calcium 8.0 02/10/2017 04:14 AM    Albumin 2.7 02/09/2017 03:40 AM      Anthropometrics: Height: 6' (182.9 cm) Weight: 158.4 kg (349 lb 3.3 oz)    IBW (%IBW):   ( ) UBW (%UBW):   (  %)    BMI: Body mass index is 47.36 kg/(m^2). This BMI is indicative of:  []Underweight   []Normal   []Overweight   [] Obesity   [x] Extreme Obesity (BMI>40)  Estimated Nutrition Needs (Based on): 4669 Kcals/day (MSJ 2113 x 1.3 (-500 for obesity)) , 97 g (1.2gPro/kg IBW) Protein  Carbohydrate: At Least 130 g/day  Fluids: 2000 mL/day    Last BM: 2/6   []Active     []Hyperactive  [x]Hypoactive       [] Absent   BS  Skin:    [] Intact   [x] Incision  [] Breakdown   [] DTI   [] Tears/Excoriation/Abrasion  [x]Edema(+1-BUE; +3-BLE) [] Other:    Wt Readings from Last 30 Encounters:   02/07/17 158.4 kg (349 lb 3.3 oz)      NUTRITION DIAGNOSES:   Problem:  Altered GI function      Etiology: related to GI bleed     Signs/Symptoms: as evidenced by pt on clears, GI workup. Previous dx re: altered GI function resolved, diet advanced and tolerated well. NUTRITION INTERVENTIONS:  Meals/Snacks: General/healthful diet                  GOAL:   Pt will consume >70% of meals in 3-5 days. NUTRITION MONITORING AND EVALUATION   Previous Goal: Pt will advance to solid diet and consume >50% of meals in 2-4 days.    Previous Goal Met: Yes   Previous Recommendations Implemented: Yes   Cultural, Hinduism, or Ethnic Dietary Needs: None   LEARNING NEEDS (Diet, Food/Nutrient-Drug Interaction):    [x] None Identified   [] Identified and Education Provided/Documented   [] Identified and Pt declined/was not appropriate      [x] Interdisciplinary Care Plan Reviewed/Documented    [x] Participated in Discharge Planning: TBD   [x] Interdisciplinary Rounds     NUTRITION RISK:    [] High              [x] Moderate           [x]  Low  []  Minimal/Uncompromised      Hue Mills RD  Pager [phone]  Weekend Pager [phone]

## 2017-02-10 NOTE — PROGRESS NOTES
1915- Bedside and verbal report received from [address] [address] [address]. 1945- Assessment complete as documented. Pt sitting up in chair, no complaints or signs of distress. NSR, O2 decreased to 4L, tolerating well. Lungs coarse and dimin in bases with fine crackles. Young removed today, genital extremely swollen, having difficulty voiding. Will continue to monitor. 2055- Holding metoprolol d/t HR 58-60. Orders received from Dr Xiang Cook to hold dose tonight and change order to metoprolol 50mg Q12hr.  O2 decreased to 2L. 2115- Pt unable to void despite standing @ bedside and assist by RN. Severe edema in penis and scrotum. Bladder scan shows 340-440mL of urine. OK to straight cath per Dr Xiang Cook. Pt requests to wait and try to void again later. 2300- Assisted pt to void. Able to void 1000 mL clear seun urine. 0000- Reassess unchanged. Assisted pt back to bed for sleep. 0445- Reassess unchanged. Pt up @ bedside to void. 725mL clear seun urine. 0700- Bedside and verbal report given to [employer].

## 2017-02-10 NOTE — PROGRESS NOTES
Spiritual Care Assessment/Progress Notes    Katharine Babcock [id number]  [id number]    1965  45 y.o.  male    Patient Telephone Number: [phone] (home)   Spiritism Affiliation: No Restorationist   Language: English   Extended Emergency Contact Information  Primary Emergency Contact: [address] [place] [address] 58 Phone: [phone]  Relation: Other Relative   Patient Active Problem List    Diagnosis Date Noted    S/P ascending aortic replacement 02/03/2017    Aortic dissection ([facility] [address] 02/02/2017        Date: 2/10/2017       Level of Spiritism/Spiritual Activity:  [x]         Involved in cheyanne tradition/spiritual practice    []         Not involved in cheyanne tradition/spiritual practice  [x]         Spiritually oriented    []         Claims no spiritual orientation    []         seeking spiritual identity  []         Feels alienated from Christianity practice/tradition  []         Feels angry about Christianity practice/tradition  [x]         Spirituality/Christianity tradition is a resource for coping at this time.   []         Not able to assess due to medical condition    Services Provided Today:  []         crisis intervention    []         reading Scriptures  [x]         spiritual assessment    []         prayer  []         empathic listening/emotional support  []         rites and rituals (cite in comments)  []         life review     [x]         Christianity support  []         theological development   []         advocacy  []         ethical dialog     []         blessing  []         bereavement support    []         support to family  []         anticipatory grief support   []         help with AMD  []         spiritual guidance    []         meditation      Spiritual Care Needs  []         Emotional Support  []         Spiritual/Spiritism Care  []         Loss/Adjustment  []         Advocacy/Referral                /Ethics  [x]         No needs expressed at               this time  []         Other: (note in               comments)  Spiritual Care Plan  []         Follow up visits with               pt/family  []         Provide materials  []         Schedule sacraments  []         Contact Community               Clergy  [x]         Follow up as needed  []         Other: (note in               comments)     Comments:   Initial visit with Mr. Rody Menendez who was sitting up in a chair. I introduced myself to Mr. Rody Menendez who shared that he is a member of [employer]. Mr. Rody Menendez has been well-supported by his Shinto. I assured him of pastoral care support while he is in the hospital as desired. No specific needs identified as Mr. Rody Menendez appeared to prefer support from his own . Pastoral care is available as needed. [phone]. Visit by: France Calles. Rekha Puckett.  Aaron Trevino MA, [facility]    Lead  Profession Development & Advancement

## 2017-02-11 LAB
ANION GAP BLD CALC-SCNC: 11 MMOL/L (ref 5–15)
BUN SERPL-MCNC: 55 MG/DL (ref 6–20)
BUN/CREAT SERPL: 35 (ref 12–20)
CALCIUM SERPL-MCNC: 7.7 MG/DL (ref 8.5–10.1)
CHLORIDE SERPL-SCNC: 102 MMOL/L (ref 97–108)
CO2 SERPL-SCNC: 27 MMOL/L (ref 21–32)
CREAT SERPL-MCNC: 1.59 MG/DL (ref 0.7–1.3)
ERYTHROCYTE [DISTWIDTH] IN BLOOD BY AUTOMATED COUNT: 15.5 % (ref 11.5–14.5)
GLUCOSE BLD STRIP.AUTO-MCNC: 105 MG/DL (ref 65–100)
GLUCOSE BLD STRIP.AUTO-MCNC: 108 MG/DL (ref 65–100)
GLUCOSE BLD STRIP.AUTO-MCNC: 138 MG/DL (ref 65–100)
GLUCOSE BLD STRIP.AUTO-MCNC: 153 MG/DL (ref 65–100)
GLUCOSE SERPL-MCNC: 95 MG/DL (ref 65–100)
HCT VFR BLD AUTO: 25.8 % (ref 36.6–50.3)
HGB BLD-MCNC: 8.6 G/DL (ref 12.1–17)
MAGNESIUM SERPL-MCNC: 3.2 MG/DL (ref 1.6–2.4)
MCH RBC QN AUTO: 28.4 PG (ref 26–34)
MCHC RBC AUTO-ENTMCNC: 33.3 G/DL (ref 30–36.5)
MCV RBC AUTO: 85.1 FL (ref 80–99)
PHOSPHATE SERPL-MCNC: 3.2 MG/DL (ref 2.6–4.7)
PLATELET # BLD AUTO: 171 K/UL (ref 150–400)
POTASSIUM SERPL-SCNC: 4 MMOL/L (ref 3.5–5.1)
RBC # BLD AUTO: 3.03 M/UL (ref 4.1–5.7)
SERVICE CMNT-IMP: ABNORMAL
SODIUM SERPL-SCNC: 140 MMOL/L (ref 136–145)
WBC # BLD AUTO: 18.6 K/UL (ref 4.1–11.1)

## 2017-02-11 PROCEDURE — 36415 COLL VENOUS BLD VENIPUNCTURE: CPT | Performed by: PHYSICIAN ASSISTANT

## 2017-02-11 PROCEDURE — 77030018719 HC DRSG PTCH ANTIMIC J&J -A

## 2017-02-11 PROCEDURE — 77030018786 HC NDL GD F/USND BARD -B

## 2017-02-11 PROCEDURE — 83735 ASSAY OF MAGNESIUM: CPT | Performed by: PHYSICIAN ASSISTANT

## 2017-02-11 PROCEDURE — 77010033678 HC OXYGEN DAILY

## 2017-02-11 PROCEDURE — 80048 BASIC METABOLIC PNL TOTAL CA: CPT | Performed by: PHYSICIAN ASSISTANT

## 2017-02-11 PROCEDURE — 74011250637 HC RX REV CODE- 250/637: Performed by: THORACIC SURGERY (CARDIOTHORACIC VASCULAR SURGERY)

## 2017-02-11 PROCEDURE — 74011250637 HC RX REV CODE- 250/637: Performed by: PHYSICIAN ASSISTANT

## 2017-02-11 PROCEDURE — 97116 GAIT TRAINING THERAPY: CPT

## 2017-02-11 PROCEDURE — 76937 US GUIDE VASCULAR ACCESS: CPT

## 2017-02-11 PROCEDURE — 85027 COMPLETE CBC AUTOMATED: CPT | Performed by: INTERNAL MEDICINE

## 2017-02-11 PROCEDURE — C1751 CATH, INF, PER/CENT/MIDLINE: HCPCS

## 2017-02-11 PROCEDURE — 74011000250 HC RX REV CODE- 250: Performed by: INTERNAL MEDICINE

## 2017-02-11 PROCEDURE — 74011636637 HC RX REV CODE- 636/637: Performed by: PHYSICIAN ASSISTANT

## 2017-02-11 PROCEDURE — 65660000000 HC RM CCU STEPDOWN

## 2017-02-11 PROCEDURE — 74011250637 HC RX REV CODE- 250/637: Performed by: INTERNAL MEDICINE

## 2017-02-11 PROCEDURE — 82962 GLUCOSE BLOOD TEST: CPT

## 2017-02-11 PROCEDURE — 84100 ASSAY OF PHOSPHORUS: CPT | Performed by: PHYSICIAN ASSISTANT

## 2017-02-11 RX ORDER — BUMETANIDE 0.25 MG/ML
1 INJECTION INTRAMUSCULAR; INTRAVENOUS ONCE
Status: COMPLETED | OUTPATIENT
Start: 2017-02-11 | End: 2017-02-11

## 2017-02-11 RX ADMIN — INSULIN LISPRO 4 UNITS: 100 INJECTION, SOLUTION INTRAVENOUS; SUBCUTANEOUS at 21:38

## 2017-02-11 RX ADMIN — AMLODIPINE BESYLATE 10 MG: 5 TABLET ORAL at 08:39

## 2017-02-11 RX ADMIN — HYDRALAZINE HYDROCHLORIDE 100 MG: 25 TABLET, FILM COATED ORAL at 00:40

## 2017-02-11 RX ADMIN — POLYETHYLENE GLYCOL 3350 34 G: 17 POWDER, FOR SOLUTION ORAL at 08:39

## 2017-02-11 RX ADMIN — HYDRALAZINE HYDROCHLORIDE 100 MG: 25 TABLET, FILM COATED ORAL at 12:41

## 2017-02-11 RX ADMIN — Medication 10 ML: at 06:49

## 2017-02-11 RX ADMIN — DOCUSATE SODIUM 100 MG: 100 CAPSULE, LIQUID FILLED ORAL at 17:10

## 2017-02-11 RX ADMIN — HYDRALAZINE HYDROCHLORIDE 100 MG: 25 TABLET, FILM COATED ORAL at 17:10

## 2017-02-11 RX ADMIN — GLIPIZIDE 5 MG: 5 TABLET ORAL at 07:26

## 2017-02-11 RX ADMIN — PANTOPRAZOLE SODIUM 40 MG: 40 TABLET, DELAYED RELEASE ORAL at 17:10

## 2017-02-11 RX ADMIN — HYDRALAZINE HYDROCHLORIDE 100 MG: 25 TABLET, FILM COATED ORAL at 06:48

## 2017-02-11 RX ADMIN — METOPROLOL TARTRATE 50 MG: 50 TABLET ORAL at 08:39

## 2017-02-11 RX ADMIN — BUMETANIDE 1 MG: 0.25 INJECTION INTRAMUSCULAR; INTRAVENOUS at 08:43

## 2017-02-11 RX ADMIN — DOCUSATE SODIUM 100 MG: 100 CAPSULE, LIQUID FILLED ORAL at 08:39

## 2017-02-11 RX ADMIN — ATORVASTATIN CALCIUM 20 MG: 20 TABLET, FILM COATED ORAL at 21:36

## 2017-02-11 RX ADMIN — METOPROLOL TARTRATE 50 MG: 50 TABLET ORAL at 21:36

## 2017-02-11 RX ADMIN — PANTOPRAZOLE SODIUM 40 MG: 40 TABLET, DELAYED RELEASE ORAL at 07:26

## 2017-02-11 RX ADMIN — Medication 10 ML: at 21:39

## 2017-02-11 RX ADMIN — ASPIRIN 81 MG 81 MG: 81 TABLET ORAL at 08:38

## 2017-02-11 RX ADMIN — Medication 10 ML: at 15:34

## 2017-02-11 RX ADMIN — INSULIN LISPRO 2 UNITS: 100 INJECTION, SOLUTION INTRAVENOUS; SUBCUTANEOUS at 12:42

## 2017-02-11 NOTE — PROGRESS NOTES
6288- Report received from [facility] in CCU.  0936-TRANSFER - IN REPORT:    Verbal report received from pietro george(name) on [employer]  being received from ccu(unit) for routine progression of care      Report consisted of patients Situation, Background, Assessment and   Recommendations(SBAR). Information from the following report(s) SBAR, Kardex, Intake/Output, MAR and Accordion was reviewed with the receiving nurse. Opportunity for questions and clarification was provided. Assessment completed upon patients arrival to unit and care assumed. Primary Nurse Evgeny Duque RN and florencia , RN performed a dual skin assessment on this patient Impairment noted- see wound doc flow sheet  Zi score is 20    0940- Patient able to have medium formed Bowl movement    1013- PICC team into place Midline. 1145- Confirmed with Dr. Adan ESTEVES to D/C [facility] cath. 1630- Reassessement complete. Patient back to bed. Cental line and Tacho pulled. Patient tolerated well. Pressure held until homeostasis achieved. Bed rest times one hour. Will continue to monitor. Call bell in reach. 1800- Patient sitting up in bed talking with family. Denies needs. Call bell in reach    1930-Bedside shift change report given to [address] [place] [address] (oncoming nurse) by Judi Palemr RN (offgoing nurse). Report included the following information SBAR, Kardex, Intake/Output, MAR and Recent Results.

## 2017-02-11 NOTE — PROGRESS NOTES
Cardiac Surgery ICU Progress Note    Admit Date: 2017    POD: 6 Days Post-Op      Problems:  Active Problems: Aortic dissection (HCC) (2017)      S/P ascending aortic replacement (2/3/2017)      Overview: EMERGENT ASCENDING AORTIC DISSECTION REPAIR       Right Femoral Artery Cannulation        Procedure:  Procedure(s):  ESOPHAGOGASTRODUODENOSCOPY (EGD)  SIGMOIDOSCOPY FLEXIBLE  ESOPHAGOGASTRODUODENAL (EGD) BIOPSY      Summary:     Stable hemodynamics in sinus rhythm without ectopy on no support. CR improving 1.6 down from 1.9 yesterday. UOP 4400/24 hrs on IV Bumex dosed daily. CXR essential unchanged interstitual edema. Peripheral edema remains impressive. Responds to Bumex dosed by renal. Young replaced due to retention associated with penile swelling. Plan/Recommendations/Medical Decision Making:     Transferring to stepdown  UOP 4400/24 hrs: IV Bumex today  Renal wants to keep Tacho another day but will order midline and re ask  ACE held for RF  Anticipated acute blood loss anemia  Increase activity  Increase I/S effort and frequency  Sternal precautions  Stimulate bowel movement  Patient seen and reviewed with  Scooter Vivas MD       Objective:     Vitals:    Visit Vitals    /58 (BP 1 Location: Right arm, BP Patient Position: At rest)    Pulse 64    Temp 97.8 °F (36.6 °C)    Resp 20    Ht 6' (1.829 m)    Wt 330 lb 4 oz (149.8 kg)    SpO2 95%    BMI 44.79 kg/m2       Temp (24hrs), Av.1 °F (36.7 °C), Min:97.8 °F (36.6 °C), Max:98.6 °F (37 °C)        CXR Results  (Last 48 hours)               02/10/17 1947  XR CHEST PORT Final result    Impression:  IMPRESSION:    1. There is increasing airspace disease in the right lower lobe and little   change in the right upper lobe airspace process. Left lung is unchanged.                Narrative:  EXAM:  XR CHEST PORT       INDICATION:  respiratory failure       COMPARISON:  2/10/2017       FINDINGS: A portable AP radiograph of the chest was obtained at 1941 hours. Central venous catheters are unchanged. .  There is persistent airspace disease   in the right upper lobe. There is increasing airspace disease the right lower   lobe. Left lung is unchanged. Cardiac megaly stable. Patient is status post   median sternotomy. 02/10/17 0505  XR CHEST PORT Final result    Impression:  IMPRESSION: Slightly improved airspace opacities in the right lung. Finding may   represent pneumonia or asymmetric alveolar pulmonary edema. Narrative:  INDICATION: Respiratory failure       COMPARISON: 2/9/2017       FINDINGS: Single AP portable view of the chest obtained at 4:48 AM demonstrates   no change in position of the lines and tubes. There is stable cardiomegaly. The   patient is status post median sternotomy. Airspace opacities in the right lung   are slightly improved. No pneumothorax is seen. There is no evidence of pleural   effusion.                  Labs: Recent Labs      02/11/17 0725 02/11/17 0419   WBC   --   18.6*   HGB   --   8.6*   HCT   --   25.8*   PLT   --   171   NA   --   140   K   --   4.0   BUN   --   55*   CREA   --   1.59*   GLU   --   95   GLUCPOC  108*   --        Ventilator:  Ventilator Volumes  Vt Set (ml): 650 ml (02/03/17 0102)  Vt Exhaled (Machine Breath) (ml): 674 ml (02/03/17 0102)  Vt Spont (ml): 780 ml (02/03/17 0420)  Ve Observed (l/min): 9.14 l/min (02/03/17 0420)    Oxygen Therapy:  Oxygen Therapy  O2 Sat (%): 95 % (02/11/17 0736)  Pulse via Oximetry: 64 beats per minute (02/11/17 0736)  O2 Device: Room air (02/11/17 0736)  O2 Flow Rate (L/min): 2 l/min (02/11/17 0400)  FIO2 (%): 50 % (02/03/17 0420)      Admission Weight: Last Weight   Weight: 270 lb (122.5 kg) Weight: 330 lb 4 oz (149.8 kg)     Intake / Output / Drain:  Current Shift:    Last 24 hrs.:   Intake/Output Summary (Last 24 hours) at 02/11/17 0836  Last data filed at 02/11/17 0610   Gross per 24 hour   Intake              490 ml   Output 4405 ml   Net            -3915 ml         EXAM:      General: Continues to improve      Chest:  Stable sternum      Incisions: dry and intact    Lungs:    Rhonchi bilaterally. Heart:  Regular rate and rhythm, S1, S2 normal, no murmur, no click,      Abdomen:   Soft, non-tender. Bowel sounds present. Extremities:  remains very edematous      Neurologic:  Gross motor and sensory apparatus intact.        Signed By: GAETANO Peña

## 2017-02-11 NOTE — PROGRESS NOTES
CM received call from [address] [place] [address] at [phone]- they will review pt once medically stable.     Becca Erp

## 2017-02-11 NOTE — PROGRESS NOTES
0730  Received verbal report from off going shift, assumed care of patient labs and orders received,.     0830  Patient ambulated with Physical Therapy in hallway, tolerated well  No signs of acute distress noted VSS  Sitting up in wheel chair eating breakfast. VSS

## 2017-02-11 NOTE — PROGRESS NOTES
MIDLINE Insertion Procedure  Note:   Procedure explained to  pt  along with risks and benefits. Procedure teaching completed. Pre procedure assessment done. Maximum sterile barrier precautions observed throughout procedure. Lidocaine 1 %  3.0  ml sc injected to site prior to access the vein . Cannulated   basilic   vein using ultrasound guidance. Inserted 5   Fr.  double  lumen midline to  right  arm. Blood return verified and  flushed with 20ml normal saline in  both  port. Sterile dressing applied with biopatch, statLock and occlusive dressing as per protocol. Curos caps applied to unused ports. Patient tolerated procedure well with minimal blood loss. Reason for access : Reliable IV Access   Complications related to insertion : None   This midline to be removed on or before   3/10/2017  See nursing message  for midline reminders  Inserted by : Andrae Hernandez RN. JAKE. LIUDMILA. PICC Nurse  Assisted by : Gume Montero RN PICC Nurse  Total Length :  13 cm   External Length :  0    cm   Arm circumference :    38   cm  Catheter occupies   13   % of vein. Type of Midline:  Med Comp  Ref#:  [id number]    Lot#:    [id number]  Expiration Date:    2018-10-31     Andrae Hernandez RN. JAKE. LIUDMILA. PICC nurse.  Vascular Access Team

## 2017-02-11 NOTE — PROGRESS NOTES
Occupational Therapy 0484 31 [phone] 02    Noted orders for Discontinue OT services placed this date. Chart review indicates this order likely placed in error as pt is on the OT caseload, has been participating and is planning to discharge to inpatient rehab facility. Please RE CONSULT OT SERVICES as there is no longer an active OT order and OT is indicated.   Shelli Galvan OTR/L

## 2017-02-11 NOTE — PROGRESS NOTES
NAME: Daren Lee        :  1965        MRN:  [id number]        Assessment :    Plan:  --FELICE  HTN  Anemia  Volume overload  High Mg  ascending aortic dissection 2/3 HD initiated on  and HD/UF # 2 on ; good UOP, CTA on ; No HD today; creatinine is improving; jurgen out soon(pb is at the same site of central line); bumex PRN  Good UO    Likely ATN following AAA and CTA,      BP controlled; on amlodipine 10, catapres tts3 patch, hydralazine 100 qid, metoprolol 75 bid         Subjective:     Chief Complaint:  Feeling much better. Still swollen;     Objective:     VITALS:   Last 24hrs VS reviewed since prior progress note. Most recent are:  Visit Vitals    /58 (BP 1 Location: Right arm, BP Patient Position: At rest)    Pulse 64    Temp 97.8 °F (36.6 °C)    Resp 20    Ht 6' (1.829 m)    Wt 149.8 kg (330 lb 4 oz)    SpO2 95%    BMI 44.79 kg/m2       Intake/Output Summary (Last 24 hours) at 17 0831  Last data filed at 17 0610   Gross per 24 hour   Intake              490 ml   Output             4405 ml   Net            -3915 ml      Telemetry Reviewed:     PHYSICAL EXAM:  General: Obese, swollen male, no acute distress  Resp:  A few Rales. No access. muscle use  CV:   . +++ edema  GI:  Soft,  Distended,   Scrotal edema       ________________________________________________________________________  Evangelist Sky MD     Procedures: see electronic medical records for all procedures/Xrays and details which  were not copied into this note but were reviewed prior to creation of Plan.       LABS:  Recent Labs      02/11/17   0419  02/10/17   0414   WBC  18.6*  21.6*   HGB  8.6*  8.9*   HCT  25.8*  26.1*   PLT  171  151     Recent Labs      02/11/17   0419  02/10/17   0414  02/09/17   0340   NA  140  137  138   K  4.0  4.2  4.1   CL  102  101  100   CO2  27  28  28   BUN  55*  81*  81*   CREA  1.59* 1.95*  2.26*   GLU  95  82  93   CA  7.7*  8.0*  7.7*   MG  3.2*   --    --    PHOS  3.2   --    --      Recent Labs      02/09/17   0340   SGOT  282*   AP  136*   TP  5.8*   ALB  2.7*   GLOB  3.1     No results for input(s): INR, PTP, APTT in the last 72 hours. No lab exists for component: INREXT, INREXT   No results for input(s): FE, TIBC, PSAT, FERR in the last 72 hours. No results found for: FOL, RBCF   No results for input(s): PH, PCO2, PO2 in the last 72 hours. No results for input(s): CPK, CKMB in the last 72 hours.     No lab exists for component: TROPONINI  No components found for: [facility]  Lab Results   Component Value Date/Time    Color DARK YELLOW 02/06/2017 01:03 PM    Color PENDING 02/06/2017 01:03 PM    Appearance CLEAR 02/06/2017 01:03 PM    Appearance PENDING 02/06/2017 01:03 PM    Specific gravity 1.019 02/06/2017 01:03 PM    Specific gravity PENDING 02/06/2017 01:03 PM    Specific gravity PENDING 02/06/2017 01:03 PM    pH (UA) 5.0 02/06/2017 01:03 PM    pH (UA) PENDING 02/06/2017 01:03 PM    Protein NEGATIVE  02/06/2017 01:03 PM    Protein PENDING 02/06/2017 01:03 PM    Glucose NEGATIVE  02/06/2017 01:03 PM    Glucose PENDING 02/06/2017 01:03 PM    Ketone NEGATIVE  02/06/2017 01:03 PM    Ketone PENDING 02/06/2017 01:03 PM    Bilirubin PENDING 02/06/2017 01:03 PM    Urobilinogen 0.2 02/06/2017 01:03 PM    Urobilinogen PENDING 02/06/2017 01:03 PM    Nitrites NEGATIVE  02/06/2017 01:03 PM    Nitrites PENDING 02/06/2017 01:03 PM    Leukocyte Esterase NEGATIVE  02/06/2017 01:03 PM    Leukocyte Esterase PENDING 02/06/2017 01:03 PM    Epithelial cells FEW 02/06/2017 01:03 PM    Epithelial cells DUPLICATE REQUEST 12/26/1121 01:03 PM    Bacteria NEGATIVE  02/06/2017 01:03 PM    Bacteria DUPLICATE REQUEST 50/98/9262 01:03 PM    WBC 0-4 02/06/2017 63:13 PM    WBC DUPLICATE REQUEST 46/51/3061 01:03 PM    RBC 0-5 02/06/2017 66:40 PM    RBC DUPLICATE REQUEST 79/87/2293 01:03 PM       MEDICATIONS:  Current Facility-Administered Medications   Medication Dose Route Frequency    sodium chloride (NS) flush 5-10 mL  5-10 mL IntraVENous Q8H    sodium chloride (NS) flush 5-10 mL  5-10 mL IntraVENous PRN    acetaminophen (TYLENOL) tablet 650 mg  650 mg Oral Q4H PRN    oxyCODONE-acetaminophen (PERCOCET) 5-325 mg per tablet 1 Tab  1 Tab Oral Q4H PRN    naloxone (NARCAN) injection 0.4 mg  0.4 mg IntraVENous PRN    atorvastatin (LIPITOR) tablet 20 mg  20 mg Oral QPM    ondansetron (ZOFRAN) injection 4 mg  4 mg IntraVENous Q4H PRN    alum-mag hydroxide-simeth (MYLANTA) oral suspension 30 mL  30 mL Oral Q2H PRN    docusate sodium (COLACE) capsule 100 mg  100 mg Oral BID    zolpidem (AMBIEN) tablet 5 mg  5 mg Oral QHS PRN    magnesium oxide (MAG-OX) tablet 400 mg  400 mg Oral BID    polyethylene glycol (MIRALAX) packet 34 g  34 g Oral DAILY    traMADol (ULTRAM) tablet  mg   mg Oral Q6H PRN    hydrALAZINE (APRESOLINE) 20 mg/mL injection 10 mg  10 mg IntraVENous Q6H PRN    polyethylene glycol (MIRALAX) packet 17 g  17 g Oral DAILY    pantoprazole (PROTONIX) tablet 40 mg  40 mg Oral ACB&D    glipiZIDE (GLUCOTROL) tablet 5 mg  5 mg Oral ACB    metoprolol tartrate (LOPRESSOR) tablet 50 mg  50 mg Oral Q12H    hydrALAZINE (APRESOLINE) tablet 100 mg  100 mg Oral Q6H    cloNIDine (CATAPRES) 0.3 mg/24 hr patch 1 Patch  1 Patch TransDERmal Q7D    amLODIPine (NORVASC) tablet 10 mg  10 mg Oral DAILY    influenza vaccine 2016-17 (36mos+)(PF) (FLUZONE/FLUARIX/FLULAVAL QUAD) injection 0.5 mL  0.5 mL IntraMUSCular PRIOR TO DISCHARGE    oxyCODONE-acetaminophen (PERCOCET) 5-325 mg per tablet 2 Tab  2 Tab Oral Q4H PRN    albuterol (PROVENTIL VENTOLIN) nebulizer solution 1.25-2.5 mg  1.25-2.5 mg Nebulization Q4H PRN    aspirin chewable tablet 81 mg  81 mg Oral DAILY    glucose chewable tablet 16 g  4 Tab Oral PRN    glucagon (GLUCAGEN) injection 1 mg  1 mg IntraMUSCular PRN    insulin lispro (HUMALOG) injection SubCUTAneous AC&HS

## 2017-02-11 NOTE — PROGRESS NOTES
[facility]  Pulmonary, Critical Care, and Sleep Medicine    Name: Saray Almazan MRN: [id number]   : 1965 Hospital: [facility]   Date: 2017            IMPRESSION:   · S/p repair of ascending aortic dissection, had RFA cannulation. · FELICE/ CKD- good UOP  · Volume overload  · Presented with GI bleed in ER, now had additional bleeding from lower gi tract, 2/5 incomplete prep but colonoscopy without gut ischemia. · Anemia, has bee pretty stable. · ARLEN  · Obesity  · Ex Smoker  · Occasional Etoh      RECOMMENDATIONS:   · Pulmonary toilet  · Renal fx better today again  · Diet per cardiac surgery  · Incentive spirometry  · Awaiting a PCU bed     Subjective/History:     No acute changes noted. Sitting up this am,  He is feeling better. No acute complaints. No back pain, has expected chest pain. Legs still swollen       ROS of 10 systems is otherwise negative.        Current Facility-Administered Medications   Medication Dose Route Frequency    sodium chloride (NS) flush 5-10 mL  5-10 mL IntraVENous Q8H    atorvastatin (LIPITOR) tablet 20 mg  20 mg Oral QPM    docusate sodium (COLACE) capsule 100 mg  100 mg Oral BID    polyethylene glycol (MIRALAX) packet 34 g  34 g Oral DAILY    polyethylene glycol (MIRALAX) packet 17 g  17 g Oral DAILY    pantoprazole (PROTONIX) tablet 40 mg  40 mg Oral ACB&D    glipiZIDE (GLUCOTROL) tablet 5 mg  5 mg Oral ACB    metoprolol tartrate (LOPRESSOR) tablet 50 mg  50 mg Oral Q12H    hydrALAZINE (APRESOLINE) tablet 100 mg  100 mg Oral Q6H    cloNIDine (CATAPRES) 0.3 mg/24 hr patch 1 Patch  1 Patch TransDERmal Q7D    amLODIPine (NORVASC) tablet 10 mg  10 mg Oral DAILY    influenza vaccine - (36mos+)(PF) (FLUZONE/FLUARIX/FLULAVAL QUAD) injection 0.5 mL  0.5 mL IntraMUSCular PRIOR TO DISCHARGE    aspirin chewable tablet 81 mg  81 mg Oral DAILY    insulin lispro (HUMALOG) injection   SubCUTAneous AC&HS         Review of Systems:  A comprehensive review of systems was negative. Objective:   Vital Signs:    Visit Vitals    /58 (BP 1 Location: Right arm, BP Patient Position: At rest)    Pulse 64    Temp 97.8 °F (36.6 °C)    Resp 20    Ht 6' (1.829 m)    Wt 149.8 kg (330 lb 4 oz)    SpO2 95%    BMI 44.79 kg/m2       O2 Device: Room air   O2 Flow Rate (L/min): 2 l/min   Temp (24hrs), Av.1 °F (36.7 °C), Min:97.8 °F (36.6 °C), Max:98.6 °F (37 °C)       Intake/Output:   Last shift:         Last 3 shifts:  1901 -  0700  In: 4717 [P.O.:1090]  Out: 6130 [Urine:6130]    Intake/Output Summary (Last 24 hours) at 17 0844  Last data filed at 17 0610   Gross per 24 hour   Intake              490 ml   Output             4405 ml   Net            -3915 ml       Physical Exam:    General:  Alert, cooperative, no distress, appears stated age. Obese gentleman. Sitting up in chair. No distress. Head:  Normocephalic, without obvious abnormality, atraumatic. Eyes:  Conjunctivae/corneas clear. PERRL, EOMs intact. Nose: Nares normal. Septum midline. Mucosa normal. No drainage or sinus tenderness. Throat: Lips, mucosa, and tongue normal. Teeth and gums normal.   Neck: Supple, symmetrical, trachea midline, no adenopathy, thyroid: no enlargment/tenderness/nodules, no carotid bruit and no JVD. Back:      Lungs:   Clear to auscultation bilaterally. Decreased BS in bases. Chest wall:  No tenderness or deformity. Sternal dressing is intact. Heart:  Regular rate and rhythm, S1, S2 normal, no murmur, click, rub or gallop. Abdomen:   Soft, non-tender. Bowel sounds normal. No masses,  No organomegaly. Extremities: Extremities normal, atraumatic, has chronic venous insufficiency changes. 2+ pitting edema   Pulses: 2+ and symmetric all extremities.    Skin: Skin color pallor, texture, turgor normal. No rashes or lesions   Lymph nodes: Cervical, supraclavicular, and axillary nodes normal.   Neurologic: Grossly nonfocal       Data:     Recent Results (from the past 24 hour(s))   GLUCOSE, POC    Collection Time: 02/10/17 11:26 AM   Result Value Ref Range    Glucose (POC) 89 65 - 100 mg/dL    Performed by [address], POC    Collection Time: 02/10/17  2:45 PM   Result Value Ref Range    Glucose (POC) 138 (H) 65 - 100 mg/dL    Performed by [facility], POC    Collection Time: 02/10/17  4:10 PM   Result Value Ref Range    Glucose (POC) 148 (H) 65 - 100 mg/dL    Performed by [facility], POC    Collection Time: 02/10/17  9:46 PM   Result Value Ref Range    Glucose (POC) 133 (H) 65 - 100 mg/dL    Performed by [employer]    METABOLIC PANEL, BASIC    Collection Time: 02/11/17  4:19 AM   Result Value Ref Range    Sodium 140 136 - 145 mmol/L    Potassium 4.0 3.5 - 5.1 mmol/L    Chloride 102 97 - 108 mmol/L    CO2 27 21 - 32 mmol/L    Anion gap 11 5 - 15 mmol/L    Glucose 95 65 - 100 mg/dL    BUN 55 (H) 6 - 20 MG/DL    Creatinine 1.59 (H) 0.70 - 1.30 MG/DL    BUN/Creatinine ratio 35 (H) 12 - 20      GFR est AA 56 (L) >60 ml/min/1.73m2    GFR est non-AA 46 (L) >60 ml/min/1.73m2    Calcium 7.7 (L) 8.5 - 10.1 MG/DL   MAGNESIUM    Collection Time: 02/11/17  4:19 AM   Result Value Ref Range    Magnesium 3.2 (H) 1.6 - 2.4 mg/dL   CBC W/O DIFF    Collection Time: 02/11/17  4:19 AM   Result Value Ref Range    WBC 18.6 (H) 4.1 - 11.1 K/uL    RBC 3.03 (L) 4.10 - 5.70 M/uL    HGB 8.6 (L) 12.1 - 17.0 g/dL    HCT 25.8 (L) 36.6 - 50.3 %    MCV 85.1 80.0 - 99.0 FL    MCH 28.4 26.0 - 34.0 PG    MCHC 33.3 30.0 - 36.5 g/dL    RDW 15.5 (H) 11.5 - 14.5 %    PLATELET 138 139 - 660 K/uL   PHOSPHORUS    Collection Time: 02/11/17  4:19 AM   Result Value Ref Range    Phosphorus 3.2 2.6 - 4.7 MG/DL   GLUCOSE, POC    Collection Time: 02/11/17  7:25 AM   Result Value Ref Range    Glucose (POC) 108 (H) 65 - 100 mg/dL    Performed by Melvin Gomes              Telemetry:Paced    Imaging:  I have personally reviewed the patients radiographs and have reviewed the reports:  CXR: no acute changes       Cornelia Wallace MD

## 2017-02-11 NOTE — PROGRESS NOTES
Problem: Mobility Impaired (Adult and Pediatric)  Goal: *Acute Goals and Plan of Care (Insert Text)  Physical Therapy Goals  Goals reviewed and remain appropriate 2/10/17  Initiated 2/3/2017  1. Patient will move from supine to sit and sit to supine , scoot up and down and roll side to side in bed with modified independence within 7 days. 2. Patient will perform sit to/from stand with modified independence within 7 days. 3. Patient will ambulate 250 feet with least restrictive assistive device and modified independence within 7 days. 4. Patient will ascend/descend 5 stairs with 1 handrail(s) with modified independence within 7 days. 5. Patient will perform cardiac exercises per protocol with independence within 7 days. 6. Patient will verbally and functionally recall 3/3 sternal precautions within 7 days. PHYSICAL THERAPY TREATMENT  Patient: Jillian Cabello (25 y.o. male)  Date: 2/11/2017  Diagnosis: aneursym  Aortic dissection (HCC)  melana <principal problem not specified>  Procedure(s) (LRB):  ESOPHAGOGASTRODUODENOSCOPY (EGD) (N/A)  SIGMOIDOSCOPY FLEXIBLE (N/A)  ESOPHAGOGASTRODUODENAL (EGD) BIOPSY (N/A) 6 Days Post-Op  Precautions: Sternal      ASSESSMENT:  Pt was received sitting up in the chair VSS. Cleared by nursing to mobilize. Able to recall 3/3 sternal precautions. Performed all cardiac exercises without cues. Sit<>stand performed with CGA/SBA to come to full stand. Ambulated for a total of 200ft with RW and CGA, no LOB and slow gait. VSS despite TORREZ. He was returned to wheelchair in preparation for transfer to PCU. Pt progressing well and will continue to follow. Recommending IP vs HHPT at discharge per pt progress. He would do well with a short stent in IP to recover to PLOF. Will need to negotiate stairs if he is going home.    Progression toward goals:  [x ]      Improving appropriately and progressing toward goals  [ ]      Improving slowly and progressing toward goals  [ ]      Not making progress toward goals and plan of care will be adjusted       PLAN:  Patient continues to benefit from skilled intervention to address the above impairments. Continue treatment per established plan of care. Discharge Recommendations:  Home Health vs Inpatient Rehab  Further Equipment Recommendations for Discharge:  TBD       SUBJECTIVE:   Patient stated I feel so much better.    The patient stated 3/3 sternal precautions. Reviewed all 3 with patient. OBJECTIVE DATA SUMMARY:   Patient mobilized on continuous portable monitor/telemetry. Critical Behavior:  Neurologic State: Alert, Appropriate for age  Orientation Level: Oriented X4  Cognition: Follows commands     Functional Mobility Training:  Bed Mobility:  Session began and ended in sitting                 Transfers:  Sit to Stand: Contact guard assistance;Stand-by asssistance  Stand to Sit: Contact guard assistance;Stand-by asssistance                             Balance:  Sitting: Intact; Without support  Standing: Impaired  Standing - Static: Good;Constant support  Standing - Dynamic : Fair  Ambulation/Gait Training:  Distance (ft): 200 Feet (ft)  Assistive Device: Gait belt;Walker, rolling (chair follow)  Ambulation - Level of Assistance: Contact guard assistance        Gait Abnormalities: Decreased step clearance;Trunk sway increased        Base of Support: Widened     Speed/Alva: Pace decreased (<100 feet/min)  Step Length: Left shortened;Right shortened              Therapeutic Exercises:    Instructed and indicated understanding on how to progress reps and sets against gravity, working up to 5 lbs and so on based on surgeon clearance for more weight in prep for functional activity. Can use household items for weights.      CARDIAC  EXERCISE   Sets   Reps   Active Active Assist   Passive Self ROM   Comments   Shoulder flexion 1  10 [x ]                                            [ ]                                            [ ] [ ]                                                Shoulder abduction 1  10 [x ]                                            [ ]                                            [ ]                                            [ ]                                                Scapular elevation 1  8 [x ]                                            [ ]                                            [ ]                                            [ ]                                                Scapular retraction 1  8 [x ]                                            [ ]                                            [ ]                                            [ ]                                                Trunk rotation 1  8 [x ]                                            [ ]                                            [ ]                                            [ ]                                                Trunk sidebending 1  8 [x ]                                            [ ]                                            [ ]                                            [ ]                                                      [ ]                                            [ ]                                            [ ]                                            [ ]                                                      Pain:  Pain Scale 1: Numeric (0 - 10)  Pain Intensity 1: 0              Activity Tolerance:   VSS  Please refer to the flowsheet for vital signs taken during this treatment.   After treatment:   [x ] Patient left in no apparent distress sitting up in chair  [ ] Patient left in no apparent distress in bed  [x ] Call bell left within reach  [x ] Nursing notified  [ ] Caregiver present  [ ] Bed alarm activated      COMMUNICATION/COLLABORATION:   The patients plan of care was discussed with: Registered Nurse     Melvin Sharp, PT, DPT   Time Calculation: 14 mins

## 2017-02-11 NOTE — PROGRESS NOTES
1930: NSR on CM. RA. Sitting up in chair. 2215: Patient unable to void due to genital edema. Bladder scan reading: >481. Inserted gardner using sterile technique per MD order. 500 ml clear yellow urine after insertion. 0000: Assisted back to bed. Incisional care provided. NSR/SB on CM. Gardner draining clear yellow urine. Denies discomforts. Applied 2 L NC per patient request due to history of sleep apnea. 0630: OOB to chair. Tolerated well.

## 2017-02-12 LAB
GLUCOSE BLD STRIP.AUTO-MCNC: 102 MG/DL (ref 65–100)
GLUCOSE BLD STRIP.AUTO-MCNC: 105 MG/DL (ref 65–100)
GLUCOSE BLD STRIP.AUTO-MCNC: 107 MG/DL (ref 65–100)
GLUCOSE BLD STRIP.AUTO-MCNC: 89 MG/DL (ref 65–100)
SERVICE CMNT-IMP: ABNORMAL
SERVICE CMNT-IMP: NORMAL

## 2017-02-12 PROCEDURE — 74011250637 HC RX REV CODE- 250/637: Performed by: PHYSICIAN ASSISTANT

## 2017-02-12 PROCEDURE — 97110 THERAPEUTIC EXERCISES: CPT

## 2017-02-12 PROCEDURE — 97530 THERAPEUTIC ACTIVITIES: CPT

## 2017-02-12 PROCEDURE — 74011250637 HC RX REV CODE- 250/637: Performed by: THORACIC SURGERY (CARDIOTHORACIC VASCULAR SURGERY)

## 2017-02-12 PROCEDURE — 65660000000 HC RM CCU STEPDOWN

## 2017-02-12 PROCEDURE — 97116 GAIT TRAINING THERAPY: CPT

## 2017-02-12 PROCEDURE — 82962 GLUCOSE BLOOD TEST: CPT

## 2017-02-12 PROCEDURE — 74011000250 HC RX REV CODE- 250: Performed by: INTERNAL MEDICINE

## 2017-02-12 PROCEDURE — 74011250637 HC RX REV CODE- 250/637: Performed by: INTERNAL MEDICINE

## 2017-02-12 PROCEDURE — 77030018846 HC SOL IRR STRL H20 ICUM -A

## 2017-02-12 RX ORDER — BUMETANIDE 0.25 MG/ML
1 INJECTION INTRAMUSCULAR; INTRAVENOUS ONCE
Status: COMPLETED | OUTPATIENT
Start: 2017-02-12 | End: 2017-02-12

## 2017-02-12 RX ADMIN — DOCUSATE SODIUM 100 MG: 100 CAPSULE, LIQUID FILLED ORAL at 09:25

## 2017-02-12 RX ADMIN — Medication 10 ML: at 13:27

## 2017-02-12 RX ADMIN — HYDRALAZINE HYDROCHLORIDE 100 MG: 25 TABLET, FILM COATED ORAL at 17:16

## 2017-02-12 RX ADMIN — PANTOPRAZOLE SODIUM 40 MG: 40 TABLET, DELAYED RELEASE ORAL at 17:16

## 2017-02-12 RX ADMIN — HYDRALAZINE HYDROCHLORIDE 100 MG: 25 TABLET, FILM COATED ORAL at 23:17

## 2017-02-12 RX ADMIN — METOPROLOL TARTRATE 50 MG: 50 TABLET ORAL at 09:25

## 2017-02-12 RX ADMIN — HYDRALAZINE HYDROCHLORIDE 100 MG: 25 TABLET, FILM COATED ORAL at 07:34

## 2017-02-12 RX ADMIN — PANTOPRAZOLE SODIUM 40 MG: 40 TABLET, DELAYED RELEASE ORAL at 09:25

## 2017-02-12 RX ADMIN — ASPIRIN 81 MG 81 MG: 81 TABLET ORAL at 09:25

## 2017-02-12 RX ADMIN — HYDRALAZINE HYDROCHLORIDE 100 MG: 25 TABLET, FILM COATED ORAL at 13:27

## 2017-02-12 RX ADMIN — METOPROLOL TARTRATE 50 MG: 50 TABLET ORAL at 21:15

## 2017-02-12 RX ADMIN — ATORVASTATIN CALCIUM 20 MG: 20 TABLET, FILM COATED ORAL at 21:15

## 2017-02-12 RX ADMIN — BUMETANIDE 1 MG: 0.25 INJECTION, SOLUTION INTRAMUSCULAR; INTRAVENOUS at 10:22

## 2017-02-12 RX ADMIN — GLIPIZIDE 5 MG: 5 TABLET ORAL at 09:25

## 2017-02-12 RX ADMIN — DOCUSATE SODIUM 100 MG: 100 CAPSULE, LIQUID FILLED ORAL at 17:16

## 2017-02-12 RX ADMIN — Medication 10 ML: at 10:23

## 2017-02-12 RX ADMIN — AMLODIPINE BESYLATE 10 MG: 5 TABLET ORAL at 09:25

## 2017-02-12 RX ADMIN — Medication 10 ML: at 21:15

## 2017-02-12 NOTE — PROGRESS NOTES
[facility] FLOOR Progress Note    Admit Date: 2017    POD: 7 Days Post-Op      Assessment:     Active Problems: Aortic dissection (HCC) (2017)      S/P ascending aortic replacement (2/3/2017)      Overview: EMERGENT ASCENDING AORTIC DISSECTION REPAIR       Right Femoral Artery Cannulation             Procedure(s):  ESOPHAGOGASTRODUODENOSCOPY (EGD)  SIGMOIDOSCOPY FLEXIBLE  ESOPHAGOGASTRODUODENAL (EGD) BIOPSY         Summary     Stable hemodynamics in sinus rhythm without ectopy on no support. Central lines out with midline placed. UOP 2500 yesterday. Daily dosing by renal      Plan/Recommendations/Medical Decision Making:     Diuresis  Increase activity  Increase I/S effort and frequency  Sternal precautions  Stimulate bowel movement  Patient seen and reviewed with Dr. Albesa Kawasaki MD      Objective:     Oxygen:    CXR Results  (Last 48 hours)               02/10/17 1947  XR CHEST PORT Final result    Impression:  IMPRESSION:    1. There is increasing airspace disease in the right lower lobe and little   change in the right upper lobe airspace process. Left lung is unchanged. Narrative:  EXAM:  XR CHEST PORT       INDICATION:  respiratory failure       COMPARISON:  2/10/2017       FINDINGS: A portable AP radiograph of the chest was obtained at 1941 hours. Central venous catheters are unchanged. .  There is persistent airspace disease   in the right upper lobe. There is increasing airspace disease the right lower   lobe. Left lung is unchanged. Cardiac megaly stable. Patient is status post   median sternotomy.                     Visit Vitals    /60    Pulse 64    Temp 98.6 °F (37 °C)    Resp 20    Ht 6' (1.829 m)    Wt 324 lb 1.2 oz (147 kg)    SpO2 97%    BMI 43.95 kg/m2       Temp (24hrs), Av.9 °F (36.6 °C), Min:97.2 °F (36.2 °C), Max:98.6 °F (37 °C)      Lab Data Reviewed: Recent Labs      17   0756   17   0419   WBC   --    --   18.6*   HGB   --    --   8.6* HCT   --    --   25.8*   PLT   --    --   171   NA   --    --   140   K   --    --   4.0   BUN   --    --   55*   CREA   --    --   1.59*   GLU   --    --   95   GLUCPOC  102*   < >   --     < > = values in this interval not displayed. Last 24hr Input/Output:    Intake/Output Summary (Last 24 hours) at 02/12/17 0917  Last data filed at 02/11/17 2136   Gross per 24 hour   Intake              240 ml   Output             2800 ml   Net            -2560 ml        Admission Weight: Last Weight   Weight: 270 lb (122.5 kg) Weight: 324 lb 1.2 oz (147 kg)       EXAM:  General: OOB to chair. Chest: stable sternum      Incisions: dry and intact      Lungs:   Clear to auscultation bilaterally. Heart:  Regular rate and rhythm, S1, S2 normal, no murmur, no click, no rub      Abdomen:   Soft, non-tender. Bowel sounds present. Extremities:  very edematous      Neurologic:  Gross motor and sensory apparatus intact.        Signed By: GAETANO Caldera

## 2017-02-12 NOTE — PROGRESS NOTES
Problem: Mobility Impaired (Adult and Pediatric)  Goal: *Acute Goals and Plan of Care (Insert Text)  Physical Therapy Goals  Goals reviewed and remain appropriate 2/10/17  Initiated 2/3/2017  1. Patient will move from supine to sit and sit to supine , scoot up and down and roll side to side in bed with modified independence within 7 days. 2. Patient will perform sit to/from stand with modified independence within 7 days. 3. Patient will ambulate 250 feet with least restrictive assistive device and modified independence within 7 days. 4. Patient will ascend/descend 5 stairs with 1 handrail(s) with modified independence within 7 days. 5. Patient will perform cardiac exercises per protocol with independence within 7 days. 6. Patient will verbally and functionally recall 3/3 sternal precautions within 7 days. PHYSICAL THERAPY TREATMENT     Patient: Reginald Shaw (62 y.o. male)  Date: 2/12/2017  Diagnosis: aneursym  Aortic dissection (HCC)  melana <principal problem not specified>  Procedure(s) (LRB):  ESOPHAGOGASTRODUODENOSCOPY (EGD) (N/A)  SIGMOIDOSCOPY FLEXIBLE (N/A)  ESOPHAGOGASTRODUODENAL (EGD) BIOPSY (N/A) 7 Days Post-Op  Precautions: Sternal  Chart, physical therapy assessment, plan of care and goals were reviewed. ASSESSMENT:  Pt very agreeable to walk and to increase distance. He is very motivated and able to verbalize and demonstrate cardiac exercise as well as recite precautions without any prompting. Walked within the room with light hand held assistance only, 250 feet with rolling walker. Progression toward goals:  [X]      Improving appropriately and progressing toward goals  [ ]      Improving slowly and progressing toward goals  [ ]      Not making progress toward goals and plan of care will be adjusted       PLAN:  Patient continues to benefit from skilled intervention to address the above impairments. Continue treatment per established plan of care.   Discharge Recommendations:  Home Health and Cardiac Outpatient  Further Equipment Recommendations for Discharge:  Anticipate he will continue to progress to no assistive device, currently needs walker for distance ambulation       SUBJECTIVE:   Patient stated I want to walk further today.    The patient stated 3/3 sternal precautions. Reviewed all 3 with patient. OBJECTIVE DATA SUMMARY:   Patient mobilized on continuous portable monitor/telemetry.      Critical Behavior:  Neurologic State: Alert, Appropriate for age  Orientation Level: Oriented X4  Cognition: Appropriate decision making, Appropriate for age attention/concentration, Appropriate safety awareness     Functional Mobility Training:  Bed Mobility:  Log                               Transfers:  Sit to Stand: Minimum assistance  Stand to Sit: Stand-by asssistance                             Balance:  Sitting: Intact  Standing: Intact  Standing - Static: Good;Occassional  Standing - Dynamic : Good (limited somewhat by enlared scrotum, wide base odf support)  Ambulation/Gait Training:  Distance (ft): 250 Feet (ft)  Assistive Device: Walker, rolling  Ambulation - Level of Assistance: Stand-by asssistance        Gait Abnormalities: Trunk sway increased (more so due to enlarged scrotum)        Base of Support: Widened                                      Therapeutic Exercises:   CARDIAC  EXERCISE   Sets   Reps   Active Active Assist   Passive Self ROM   Comments   Shoulder flexion     [X]                         [ ]                                  [ ]                                  [ ]                Shoulder abduction     [X]                                  [ ]                                  [ ]                                  [ ]                                      Scapular elevation     [X]                                  [ ]                                  [ ]                                  [ ]                                      Scapular retraction     [X] [ ]                                  [ ]                                  [ ]                                      Trunk rotation     [X]                                  [ ]                                  [ ]                                  [ ]                                      Trunk sidebending     [X]                                  [ ]                                  [ ]                                  [ ]                                            [ ]                                  [ ]                                  [ ]                                  [ ]                                            Pain:  Pain Scale 1: Numeric (0 - 10)  Pain Intensity 1: 0              Activity Tolerance: Tolerating with mild fatigue noted  Please refer to the flowsheet for vital signs taken during this treatment.   After treatment:   [X]  Patient left in no apparent distress in chair  [ ]  Patient left in no apparent distress in bed  [X]  Call bell left within reach  [X]  Nursing notified  [ ]  Caregiver present  [ ]  Bed alarm activated      COMMUNICATION/COLLABORATION:   The patients plan of care was discussed with: Registered Nurse     Lacy Wilkes PTA   Time Calculation: 14 mins

## 2017-02-12 NOTE — PROGRESS NOTES
NAME: Adam Huang        :  1965        MRN:  [id number]        Assessment :    Plan:  --FELICE  HTN  Anemia  Volume overload  High Mg  ascending aortic dissection 2/3 HD initiated on  and HD/UF # 2 on ; good UOP, CTA on ;   creatinine is improving as of 2.11.17,renal recovery ; jurgen out  ; bumex PRN  Good UO  Labs today,diuretcis          BP controlled; on amlodipine 10, catapres tts3 patch, hydralazine 100 qid, metoprolol 75 bid         Subjective:     Chief Complaint:  Feeling much better. Objective:     VITALS:   Last 24hrs VS reviewed since prior progress note. Most recent are:  Visit Vitals    /60    Pulse 64    Temp 98.6 °F (37 °C)    Resp 20    Ht 6' (1.829 m)    Wt 147 kg (324 lb 1.2 oz)    SpO2 97%    BMI 43.95 kg/m2       Intake/Output Summary (Last 24 hours) at 17 0902  Last data filed at 17 2136   Gross per 24 hour   Intake              240 ml   Output             2800 ml   Net            -2560 ml      Telemetry Reviewed:     PHYSICAL EXAM:  General: Obese, swollen male, no acute distress  Resp:  A few Rales. No access. muscle use  CV:   .++ edema  GI:  Soft,  Distended,   Scrotal edema       ________________________________________________________________________  Joel Carpenter MD     Procedures: see electronic medical records for all procedures/Xrays and details which  were not copied into this note but were reviewed prior to creation of Plan.       LABS:  Recent Labs      02/11/17   0419  02/10/17   0414   WBC  18.6*  21.6*   HGB  8.6*  8.9*   HCT  25.8*  26.1*   PLT  171  151     Recent Labs      02/11/17   0419  02/10/17   0414   NA  140  137   K  4.0  4.2   CL  102  101   CO2  27  28   BUN  55*  81*   CREA  1.59*  1.95*   GLU  95  82   CA  7.7*  8.0*   MG  3.2*   --    PHOS  3.2   --      No results for input(s): SGOT, GPT, AP, TBIL, TP, ALB, GLOB, GGT, AML, LPSE in the last 72 hours. No lab exists for component: AMYP, HLPSE  No results for input(s): INR, PTP, APTT in the last 72 hours. No lab exists for component: INREXT, INREXT   No results for input(s): FE, TIBC, PSAT, FERR in the last 72 hours. No results found for: FOL, RBCF   No results for input(s): PH, PCO2, PO2 in the last 72 hours. No results for input(s): CPK, CKMB in the last 72 hours.     No lab exists for component: TROPONINI  No components found for: Bj Point  Lab Results   Component Value Date/Time    Color DARK YELLOW 02/06/2017 01:03 PM    Color PENDING 02/06/2017 01:03 PM    Appearance CLEAR 02/06/2017 01:03 PM    Appearance PENDING 02/06/2017 01:03 PM    Specific gravity 1.019 02/06/2017 01:03 PM    Specific gravity PENDING 02/06/2017 01:03 PM    Specific gravity PENDING 02/06/2017 01:03 PM    pH (UA) 5.0 02/06/2017 01:03 PM    pH (UA) PENDING 02/06/2017 01:03 PM    Protein NEGATIVE  02/06/2017 01:03 PM    Protein PENDING 02/06/2017 01:03 PM    Glucose NEGATIVE  02/06/2017 01:03 PM    Glucose PENDING 02/06/2017 01:03 PM    Ketone NEGATIVE  02/06/2017 01:03 PM    Ketone PENDING 02/06/2017 01:03 PM    Bilirubin PENDING 02/06/2017 01:03 PM    Urobilinogen 0.2 02/06/2017 01:03 PM    Urobilinogen PENDING 02/06/2017 01:03 PM    Nitrites NEGATIVE  02/06/2017 01:03 PM    Nitrites PENDING 02/06/2017 01:03 PM    Leukocyte Esterase NEGATIVE  02/06/2017 01:03 PM    Leukocyte Esterase PENDING 02/06/2017 01:03 PM    Epithelial cells FEW 02/06/2017 01:03 PM    Epithelial cells DUPLICATE REQUEST 60/75/9723 01:03 PM    Bacteria NEGATIVE  02/06/2017 01:03 PM    Bacteria DUPLICATE REQUEST 21/96/1148 01:03 PM    WBC 0-4 02/06/2017 91:04 PM    WBC DUPLICATE REQUEST 32/98/4701 01:03 PM    RBC 0-5 02/06/2017 27:49 PM    RBC DUPLICATE REQUEST 69/17/7454 01:03 PM       MEDICATIONS:  Current Facility-Administered Medications   Medication Dose Route Frequency    sodium chloride (NS) flush 5-10 mL  5-10 mL IntraVENous Q8H    sodium chloride (NS) flush 5-10 mL  5-10 mL IntraVENous PRN    acetaminophen (TYLENOL) tablet 650 mg  650 mg Oral Q4H PRN    oxyCODONE-acetaminophen (PERCOCET) 5-325 mg per tablet 1 Tab  1 Tab Oral Q4H PRN    naloxone (NARCAN) injection 0.4 mg  0.4 mg IntraVENous PRN    atorvastatin (LIPITOR) tablet 20 mg  20 mg Oral QPM    ondansetron (ZOFRAN) injection 4 mg  4 mg IntraVENous Q4H PRN    alum-mag hydroxide-simeth (MYLANTA) oral suspension 30 mL  30 mL Oral Q2H PRN    docusate sodium (COLACE) capsule 100 mg  100 mg Oral BID    zolpidem (AMBIEN) tablet 5 mg  5 mg Oral QHS PRN    polyethylene glycol (MIRALAX) packet 34 g  34 g Oral DAILY    traMADol (ULTRAM) tablet  mg   mg Oral Q6H PRN    hydrALAZINE (APRESOLINE) 20 mg/mL injection 10 mg  10 mg IntraVENous Q6H PRN    pantoprazole (PROTONIX) tablet 40 mg  40 mg Oral ACB&D    glipiZIDE (GLUCOTROL) tablet 5 mg  5 mg Oral ACB    metoprolol tartrate (LOPRESSOR) tablet 50 mg  50 mg Oral Q12H    hydrALAZINE (APRESOLINE) tablet 100 mg  100 mg Oral Q6H    cloNIDine (CATAPRES) 0.3 mg/24 hr patch 1 Patch  1 Patch TransDERmal Q7D    amLODIPine (NORVASC) tablet 10 mg  10 mg Oral DAILY    influenza vaccine 2016-17 (36mos+)(PF) (FLUZONE/FLUARIX/FLULAVAL QUAD) injection 0.5 mL  0.5 mL IntraMUSCular PRIOR TO DISCHARGE    albuterol (PROVENTIL VENTOLIN) nebulizer solution 1.25-2.5 mg  1.25-2.5 mg Nebulization Q4H PRN    aspirin chewable tablet 81 mg  81 mg Oral DAILY    glucose chewable tablet 16 g  4 Tab Oral PRN    glucagon (GLUCAGEN) injection 1 mg  1 mg IntraMUSCular PRN    insulin lispro (HUMALOG) injection   SubCUTAneous AC&HS

## 2017-02-13 LAB
ALBUMIN SERPL BCP-MCNC: 2.6 G/DL (ref 3.5–5)
ALBUMIN/GLOB SERPL: 0.8 {RATIO} (ref 1.1–2.2)
ALP SERPL-CCNC: 124 U/L (ref 45–117)
ALT SERPL-CCNC: 117 U/L (ref 12–78)
ANION GAP BLD CALC-SCNC: 6 MMOL/L (ref 5–15)
AST SERPL W P-5'-P-CCNC: 46 U/L (ref 15–37)
BILIRUB SERPL-MCNC: 0.7 MG/DL (ref 0.2–1)
BUN SERPL-MCNC: 31 MG/DL (ref 6–20)
BUN/CREAT SERPL: 22 (ref 12–20)
CALCIUM SERPL-MCNC: 8.2 MG/DL (ref 8.5–10.1)
CHLORIDE SERPL-SCNC: 105 MMOL/L (ref 97–108)
CO2 SERPL-SCNC: 28 MMOL/L (ref 21–32)
CREAT SERPL-MCNC: 1.38 MG/DL (ref 0.7–1.3)
GLOBULIN SER CALC-MCNC: 3.2 G/DL (ref 2–4)
GLUCOSE BLD STRIP.AUTO-MCNC: 116 MG/DL (ref 65–100)
GLUCOSE BLD STRIP.AUTO-MCNC: 139 MG/DL (ref 65–100)
GLUCOSE BLD STRIP.AUTO-MCNC: 68 MG/DL (ref 65–100)
GLUCOSE BLD STRIP.AUTO-MCNC: 92 MG/DL (ref 65–100)
GLUCOSE BLD STRIP.AUTO-MCNC: 95 MG/DL (ref 65–100)
GLUCOSE SERPL-MCNC: 79 MG/DL (ref 65–100)
MAGNESIUM SERPL-MCNC: 2.9 MG/DL (ref 1.6–2.4)
PHOSPHATE SERPL-MCNC: 3.2 MG/DL (ref 2.6–4.7)
POTASSIUM SERPL-SCNC: 4.3 MMOL/L (ref 3.5–5.1)
PROT SERPL-MCNC: 5.8 G/DL (ref 6.4–8.2)
SERVICE CMNT-IMP: ABNORMAL
SERVICE CMNT-IMP: ABNORMAL
SERVICE CMNT-IMP: NORMAL
SODIUM SERPL-SCNC: 139 MMOL/L (ref 136–145)

## 2017-02-13 PROCEDURE — 74011250637 HC RX REV CODE- 250/637: Performed by: PHYSICIAN ASSISTANT

## 2017-02-13 PROCEDURE — 80053 COMPREHEN METABOLIC PANEL: CPT | Performed by: INTERNAL MEDICINE

## 2017-02-13 PROCEDURE — 36415 COLL VENOUS BLD VENIPUNCTURE: CPT | Performed by: INTERNAL MEDICINE

## 2017-02-13 PROCEDURE — 83735 ASSAY OF MAGNESIUM: CPT | Performed by: INTERNAL MEDICINE

## 2017-02-13 PROCEDURE — 74011250637 HC RX REV CODE- 250/637: Performed by: INTERNAL MEDICINE

## 2017-02-13 PROCEDURE — 74011250636 HC RX REV CODE- 250/636: Performed by: INTERNAL MEDICINE

## 2017-02-13 PROCEDURE — 84100 ASSAY OF PHOSPHORUS: CPT | Performed by: INTERNAL MEDICINE

## 2017-02-13 PROCEDURE — 65660000000 HC RM CCU STEPDOWN

## 2017-02-13 PROCEDURE — 74011636637 HC RX REV CODE- 636/637: Performed by: PHYSICIAN ASSISTANT

## 2017-02-13 PROCEDURE — 74011250637 HC RX REV CODE- 250/637: Performed by: THORACIC SURGERY (CARDIOTHORACIC VASCULAR SURGERY)

## 2017-02-13 PROCEDURE — 82962 GLUCOSE BLOOD TEST: CPT

## 2017-02-13 PROCEDURE — 74011000250 HC RX REV CODE- 250: Performed by: INTERNAL MEDICINE

## 2017-02-13 RX ORDER — BUMETANIDE 0.25 MG/ML
1 INJECTION INTRAMUSCULAR; INTRAVENOUS 2 TIMES DAILY
Status: COMPLETED | OUTPATIENT
Start: 2017-02-13 | End: 2017-02-14

## 2017-02-13 RX ORDER — POTASSIUM CHLORIDE 29.8 MG/ML
20 INJECTION INTRAVENOUS
Status: COMPLETED | OUTPATIENT
Start: 2017-02-13 | End: 2017-02-15

## 2017-02-13 RX ADMIN — ZOLPIDEM TARTRATE 5 MG: 5 TABLET, FILM COATED ORAL at 00:43

## 2017-02-13 RX ADMIN — HYDRALAZINE HYDROCHLORIDE 100 MG: 25 TABLET, FILM COATED ORAL at 18:07

## 2017-02-13 RX ADMIN — Medication 10 ML: at 14:56

## 2017-02-13 RX ADMIN — Medication 10 ML: at 05:54

## 2017-02-13 RX ADMIN — Medication 10 ML: at 23:41

## 2017-02-13 RX ADMIN — INSULIN LISPRO 2 UNITS: 100 INJECTION, SOLUTION INTRAVENOUS; SUBCUTANEOUS at 09:45

## 2017-02-13 RX ADMIN — DOCUSATE SODIUM 100 MG: 100 CAPSULE, LIQUID FILLED ORAL at 09:45

## 2017-02-13 RX ADMIN — PANTOPRAZOLE SODIUM 40 MG: 40 TABLET, DELAYED RELEASE ORAL at 18:07

## 2017-02-13 RX ADMIN — POTASSIUM CHLORIDE 20 MEQ: 400 INJECTION, SOLUTION INTRAVENOUS at 17:00

## 2017-02-13 RX ADMIN — POTASSIUM CHLORIDE 20 MEQ: 400 INJECTION, SOLUTION INTRAVENOUS at 15:00

## 2017-02-13 RX ADMIN — METOPROLOL TARTRATE 50 MG: 50 TABLET ORAL at 09:45

## 2017-02-13 RX ADMIN — BUMETANIDE 1 MG: 0.25 INJECTION, SOLUTION INTRAMUSCULAR; INTRAVENOUS at 12:58

## 2017-02-13 RX ADMIN — AMLODIPINE BESYLATE 10 MG: 5 TABLET ORAL at 09:45

## 2017-02-13 RX ADMIN — INSULIN LISPRO 2 UNITS: 100 INJECTION, SOLUTION INTRAVENOUS; SUBCUTANEOUS at 12:55

## 2017-02-13 RX ADMIN — ASPIRIN 81 MG 81 MG: 81 TABLET ORAL at 09:45

## 2017-02-13 RX ADMIN — METOPROLOL TARTRATE 50 MG: 50 TABLET ORAL at 21:45

## 2017-02-13 RX ADMIN — BUMETANIDE 1 MG: 0.25 INJECTION, SOLUTION INTRAMUSCULAR; INTRAVENOUS at 18:07

## 2017-02-13 RX ADMIN — GLIPIZIDE 5 MG: 5 TABLET ORAL at 09:45

## 2017-02-13 RX ADMIN — ATORVASTATIN CALCIUM 20 MG: 20 TABLET, FILM COATED ORAL at 21:45

## 2017-02-13 RX ADMIN — PANTOPRAZOLE SODIUM 40 MG: 40 TABLET, DELAYED RELEASE ORAL at 09:45

## 2017-02-13 NOTE — CARDIO/PULMONARY
C/P rehab note- chart reviewed. S/P Emergent Ascending Aortic Dissection Repair   Former smoker.     Follow up with patient, on earlier visit received The Cardiac Surgery Post Operative Instructions\" booklet. Reviewed use of the incentive spirometer, cough and deep breathing while splinting the incision and leg exercises. Encouragement provided to use the incentive spirometer regularly(hourly) and to increase effort. Also encouraged to read the post-op booklet. Will continue to review the post op booklet as pt progresses in the recovery process. Pointed out chart in book to help track daily activities and resources in book. Verbalized understanding.     Patient indicated he is experiencing very little pain. Discussed CP Rehab, patient is interested and he is familiar with program as he has a friend that currently attends.     CP Rehab will continue to follow.

## 2017-02-13 NOTE — PROGRESS NOTES
NAME: Mika Andrade        :  1965        MRN:  [id number]        Assessment :    Plan:  --FELICE  HTN  Anemia  Volume overload  High Mg  ascending aortic dissection repair /3 HD initiated on  and HD/UF # 2 on ; good UOP, CTA on ;   creatinine is improving as of 2.11.17    No need for HD or UF    Good UO- 4 L UOP in last 24 hrs  Wt is down by 29 lbs in last 1 week-but still has residual edema  Will order IV Bumex BID for 4 doses  Add KCL along with IV Bumex          BP controlled; on amlodipine 10, catapres tts3 patch, hydralazine 100 qid, metoprolol 75 bid         Subjective:     Chief Complaint:  oob in chair. Resting. Not easily arousable  Diuresing well    Objective:     VITALS:   Last 24hrs VS reviewed since prior progress note. Most recent are:  Visit Vitals    /57    Pulse 69    Temp 98.1 °F (36.7 °C)    Resp 20    Ht 6' (1.829 m)    Wt 145.5 kg (320 lb 12.3 oz)    SpO2 95%    BMI 43.5 kg/m2       Intake/Output Summary (Last 24 hours) at 17 0959  Last data filed at 17 1930   Gross per 24 hour   Intake              640 ml   Output             4050 ml   Net            -3410 ml      Telemetry Reviewed:     PHYSICAL EXAM:  General: Obese,  no acute distress  Resp:  Dec BS, no distress  CV:   ++ edema  GI:  Soft,  Distended,   Scrotal edema  Young+  sleepy       ________________________________________________________________________  Evgeny Mckeon MD     Procedures: see electronic medical records for all procedures/Xrays and details which  were not copied into this note but were reviewed prior to creation of Plan.       LABS:  Recent Labs      17   WBC  18.6*   HGB  8.6*   HCT  25.8*   PLT  171     Recent Labs      175  17   NA  139  140   K  4.3  4.0   CL  105  102   CO2  28  27   BUN  31*  55*   CREA  1.38*  1.59*   GLU  79  95   CA  8.2*  7.7*   MG 2. 9*  3.2*   PHOS  3.2  3.2     Recent Labs      02/13/17   0425   SGOT  46*   AP  124*   TP  5.8*   ALB  2.6*   GLOB  3.2     No results for input(s): INR, PTP, APTT in the last 72 hours. No lab exists for component: INREXT, INREXT   No results for input(s): FE, TIBC, PSAT, FERR in the last 72 hours. No results found for: FOL, RBCF   No results for input(s): PH, PCO2, PO2 in the last 72 hours. No results for input(s): CPK, CKMB in the last 72 hours.     No lab exists for component: TROPONINI  No components found for: Bj Point  Lab Results   Component Value Date/Time    Color DARK YELLOW 02/06/2017 01:03 PM    Color PENDING 02/06/2017 01:03 PM    Appearance CLEAR 02/06/2017 01:03 PM    Appearance PENDING 02/06/2017 01:03 PM    Specific gravity 1.019 02/06/2017 01:03 PM    Specific gravity PENDING 02/06/2017 01:03 PM    Specific gravity PENDING 02/06/2017 01:03 PM    pH (UA) 5.0 02/06/2017 01:03 PM    pH (UA) PENDING 02/06/2017 01:03 PM    Protein NEGATIVE  02/06/2017 01:03 PM    Protein PENDING 02/06/2017 01:03 PM    Glucose NEGATIVE  02/06/2017 01:03 PM    Glucose PENDING 02/06/2017 01:03 PM    Ketone NEGATIVE  02/06/2017 01:03 PM    Ketone PENDING 02/06/2017 01:03 PM    Bilirubin PENDING 02/06/2017 01:03 PM    Urobilinogen 0.2 02/06/2017 01:03 PM    Urobilinogen PENDING 02/06/2017 01:03 PM    Nitrites NEGATIVE  02/06/2017 01:03 PM    Nitrites PENDING 02/06/2017 01:03 PM    Leukocyte Esterase NEGATIVE  02/06/2017 01:03 PM    Leukocyte Esterase PENDING 02/06/2017 01:03 PM    Epithelial cells FEW 02/06/2017 01:03 PM    Epithelial cells DUPLICATE REQUEST 35/53/3504 01:03 PM    Bacteria NEGATIVE  02/06/2017 01:03 PM    Bacteria DUPLICATE REQUEST 46/51/6070 01:03 PM    WBC 0-4 02/06/2017 85:48 PM    WBC DUPLICATE REQUEST 74/35/5770 01:03 PM    RBC 0-5 02/06/2017 77:15 PM    RBC DUPLICATE REQUEST 42/96/3612 01:03 PM       MEDICATIONS:  Current Facility-Administered Medications   Medication Dose Route Frequency    sodium chloride (NS) flush 5-10 mL  5-10 mL IntraVENous Q8H    sodium chloride (NS) flush 5-10 mL  5-10 mL IntraVENous PRN    acetaminophen (TYLENOL) tablet 650 mg  650 mg Oral Q4H PRN    oxyCODONE-acetaminophen (PERCOCET) 5-325 mg per tablet 1 Tab  1 Tab Oral Q4H PRN    naloxone (NARCAN) injection 0.4 mg  0.4 mg IntraVENous PRN    atorvastatin (LIPITOR) tablet 20 mg  20 mg Oral QPM    ondansetron (ZOFRAN) injection 4 mg  4 mg IntraVENous Q4H PRN    alum-mag hydroxide-simeth (MYLANTA) oral suspension 30 mL  30 mL Oral Q2H PRN    docusate sodium (COLACE) capsule 100 mg  100 mg Oral BID    zolpidem (AMBIEN) tablet 5 mg  5 mg Oral QHS PRN    polyethylene glycol (MIRALAX) packet 34 g  34 g Oral DAILY    traMADol (ULTRAM) tablet  mg   mg Oral Q6H PRN    hydrALAZINE (APRESOLINE) 20 mg/mL injection 10 mg  10 mg IntraVENous Q6H PRN    pantoprazole (PROTONIX) tablet 40 mg  40 mg Oral ACB&D    glipiZIDE (GLUCOTROL) tablet 5 mg  5 mg Oral ACB    metoprolol tartrate (LOPRESSOR) tablet 50 mg  50 mg Oral Q12H    hydrALAZINE (APRESOLINE) tablet 100 mg  100 mg Oral Q6H    cloNIDine (CATAPRES) 0.3 mg/24 hr patch 1 Patch  1 Patch TransDERmal Q7D    amLODIPine (NORVASC) tablet 10 mg  10 mg Oral DAILY    influenza vaccine 2016-17 (36mos+)(PF) (FLUZONE/FLUARIX/FLULAVAL QUAD) injection 0.5 mL  0.5 mL IntraMUSCular PRIOR TO DISCHARGE    albuterol (PROVENTIL VENTOLIN) nebulizer solution 1.25-2.5 mg  1.25-2.5 mg Nebulization Q4H PRN    aspirin chewable tablet 81 mg  81 mg Oral DAILY    glucose chewable tablet 16 g  4 Tab Oral PRN    glucagon (GLUCAGEN) injection 1 mg  1 mg IntraMUSCular PRN    insulin lispro (HUMALOG) injection   SubCUTAneous AC&HS

## 2017-02-13 NOTE — PROGRESS NOTES
Problem: Mobility Impaired (Adult and Pediatric)  Goal: *Acute Goals and Plan of Care (Insert Text)  Physical Therapy Goals  Goals reviewed and remain appropriate 2/10/17  Initiated 2/3/2017  1. Patient will move from supine to sit and sit to supine , scoot up and down and roll side to side in bed with modified independence within 7 days. 2. Patient will perform sit to/from stand with modified independence within 7 days. 3. Patient will ambulate 250 feet with least restrictive assistive device and modified independence within 7 days. 4. Patient will ascend/descend 5 stairs with 1 handrail(s) with modified independence within 7 days. 5. Patient will perform cardiac exercises per protocol with independence within 7 days. 6. Patient will verbally and functionally recall 3/3 sternal precautions within 7 days. PHYSICAL THERAPY TREATMENT  Patient: Taylor Alfred (71 y.o. male)  Date: 2/13/2017  Diagnosis: aneursym  Aortic dissection (HCC)  melana <principal problem not specified>  Procedure(s) (LRB):  ESOPHAGOGASTRODUODENOSCOPY (EGD) (N/A)  SIGMOIDOSCOPY FLEXIBLE (N/A)  ESOPHAGOGASTRODUODENAL (EGD) BIOPSY (N/A) 8 Days Post-Op  Precautions: Sternal      ASSESSMENT:  Pt very agreeable to therapy activities; states & adheres to SP independently. RA VSS. Pt amb 160 ft with RW/SBA then 50 ft with RW/CGA/slightly unsteady. Will continue to practice amb without RW,  Progression toward goals:  [X]    Improving appropriately and progressing toward goals  [ ]    Improving slowly and progressing toward goals  [ ]    Not making progress toward goals and plan of care will be adjusted       PLAN:  Patient continues to benefit from skilled intervention to address the above impairments. Continue treatment per established plan of care. Discharge Recommendations:  ? Home Health, cardiac rehab per MD  Further Equipment Recommendations for Discharge:  ? RW       SUBJECTIVE:   Patient stated I'm doing good I think.  OBJECTIVE DATA SUMMARY:   Critical Behavior:  Neurologic State: Alert, Appropriate for age  Orientation Level: Oriented X4  Cognition: Appropriate for age attention/concentration, Appropriate safety awareness, Appropriate decision making     Functional Mobility Training:  Bed Mobility:      Up in chair              Transfers:  Sit to Stand: Stand-by asssistance  Stand to Sit: Stand-by asssistance                             Balance:  Sitting: Intact  Standing: Intact; With support (Good with RW, fair without)  Ambulation/Gait Training:  Distance (ft): 160 Feet (ft)  Assistive Device: Walker, rolling (50 ft without RW/CGA, unsteady)  Therapeutic Exercises:   Review of BUE cardiac exer, BLE active seated exer  Pain:  Pain Scale 1: Numeric (0 - 10)  Pain Intensity 1: 0              Activity Tolerance:   RA VSS  Please refer to the flowsheet for vital signs taken during this treatment.   After treatment:   [X]    Patient left in no apparent distress sitting up in chair  [ ]    Patient left in no apparent distress in bed  [X]    Call bell left within reach  [X]    Nursing notified  [ ]    Caregiver present  [X]    Bed alarm activated      COMMUNICATION/COLLABORATION:   The patients plan of care was discussed with: Registered Nurse and      Anni Pena, PT

## 2017-02-13 NOTE — DIABETES MGMT
[facility] Cardiac Surgery Progress Note    Recommendations/ Comments:  Chart reviewed. Continue current regimen. Pt will need a prescription for Freestyle lite test strips at discharge. Chart reviewed on [employer]. Patient is 45 y.o. male s/p Cardiac Surgery. POD 9. No known Hx Type 2 Diabetes per H&P, however, a1c consistent with new onset type 2 DM. A1c:   Lab Results   Component Value Date/Time    Hemoglobin A1c 7.1 02/07/2017 04:14 AM         Recent Glucose Results:   Lab Results   Component Value Date/Time    GLU 79 02/13/2017 04:25 AM    GLUCPOC 116 (H) 02/13/2017 11:45 AM    GLUCPOC 139 (H) 02/13/2017 08:18 AM    GLUCPOC 107 (H) 02/12/2017 09:08 PM        Lab Results   Component Value Date/Time    Creatinine 1.38 02/13/2017 04:25 AM       Active Orders   Diet    DIET GI LITE (POST SURGICAL)        PO intake:   Patient Vitals for the past 72 hrs:   % Diet Eaten   02/13/17 0858 100 %   02/12/17 0945 100 %   02/11/17 1400 100 %       Current DM medications. humalog correction, lantus 40units daily    Will continue to follow as needed. Thank you.   Eduardo Bamberger, RN, [address]

## 2017-02-13 NOTE — PROGRESS NOTES
Bedside shift change report received from [facility]. SBAR, MAR, VS, and plan of care for the evening reviewed. Pt denies pain at this time. 2030 Assessment completed as charted. VSS, Pt resting in recliner chair with BLE elevated. 18  Pt wishes to sleep in the recliner. Oxygen placed on 2L via nc. VSS. Pt continues to deny pain. 0100 Pt c/o inability to sleep. PRN Ambien given. 0430  VSS. Young is patent and draining clear yellow urine with small amount of sediment. Bedside shift change report given to [facility].

## 2017-02-14 LAB
ANION GAP BLD CALC-SCNC: 7 MMOL/L (ref 5–15)
BASOPHILS # BLD AUTO: 0 K/UL (ref 0–0.1)
BASOPHILS # BLD: 0 % (ref 0–1)
BUN SERPL-MCNC: 27 MG/DL (ref 6–20)
BUN/CREAT SERPL: 19 (ref 12–20)
CALCIUM SERPL-MCNC: 8.1 MG/DL (ref 8.5–10.1)
CHLORIDE SERPL-SCNC: 103 MMOL/L (ref 97–108)
CO2 SERPL-SCNC: 28 MMOL/L (ref 21–32)
CREAT SERPL-MCNC: 1.42 MG/DL (ref 0.7–1.3)
EOSINOPHIL # BLD: 0.5 K/UL (ref 0–0.4)
EOSINOPHIL NFR BLD: 3 % (ref 0–7)
ERYTHROCYTE [DISTWIDTH] IN BLOOD BY AUTOMATED COUNT: 15 % (ref 11.5–14.5)
GLUCOSE BLD STRIP.AUTO-MCNC: 105 MG/DL (ref 65–100)
GLUCOSE BLD STRIP.AUTO-MCNC: 111 MG/DL (ref 65–100)
GLUCOSE BLD STRIP.AUTO-MCNC: 79 MG/DL (ref 65–100)
GLUCOSE BLD STRIP.AUTO-MCNC: 83 MG/DL (ref 65–100)
GLUCOSE BLD STRIP.AUTO-MCNC: 89 MG/DL (ref 65–100)
GLUCOSE SERPL-MCNC: 90 MG/DL (ref 65–100)
HCT VFR BLD AUTO: 25.9 % (ref 36.6–50.3)
HGB BLD-MCNC: 8.6 G/DL (ref 12.1–17)
LYMPHOCYTES # BLD AUTO: 6 % (ref 12–49)
LYMPHOCYTES # BLD: 1.2 K/UL (ref 0.8–3.5)
MCH RBC QN AUTO: 28.6 PG (ref 26–34)
MCHC RBC AUTO-ENTMCNC: 33.2 G/DL (ref 30–36.5)
MCV RBC AUTO: 86 FL (ref 80–99)
MONOCYTES # BLD: 1.5 K/UL (ref 0–1)
MONOCYTES NFR BLD AUTO: 8 % (ref 5–13)
NEUTS SEG # BLD: 15.5 K/UL (ref 1.8–8)
NEUTS SEG NFR BLD AUTO: 83 % (ref 32–75)
PLATELET # BLD AUTO: 202 K/UL (ref 150–400)
POTASSIUM SERPL-SCNC: 4.1 MMOL/L (ref 3.5–5.1)
RBC # BLD AUTO: 3.01 M/UL (ref 4.1–5.7)
SERVICE CMNT-IMP: ABNORMAL
SERVICE CMNT-IMP: ABNORMAL
SERVICE CMNT-IMP: NORMAL
SODIUM SERPL-SCNC: 138 MMOL/L (ref 136–145)
WBC # BLD AUTO: 18.6 K/UL (ref 4.1–11.1)

## 2017-02-14 PROCEDURE — 80048 BASIC METABOLIC PNL TOTAL CA: CPT | Performed by: INTERNAL MEDICINE

## 2017-02-14 PROCEDURE — 97116 GAIT TRAINING THERAPY: CPT

## 2017-02-14 PROCEDURE — 85025 COMPLETE CBC W/AUTO DIFF WBC: CPT | Performed by: INTERNAL MEDICINE

## 2017-02-14 PROCEDURE — 97110 THERAPEUTIC EXERCISES: CPT

## 2017-02-14 PROCEDURE — 74011250637 HC RX REV CODE- 250/637: Performed by: PHYSICIAN ASSISTANT

## 2017-02-14 PROCEDURE — 74011250637 HC RX REV CODE- 250/637: Performed by: INTERNAL MEDICINE

## 2017-02-14 PROCEDURE — 65660000000 HC RM CCU STEPDOWN

## 2017-02-14 PROCEDURE — 74011000250 HC RX REV CODE- 250: Performed by: INTERNAL MEDICINE

## 2017-02-14 PROCEDURE — 97535 SELF CARE MNGMENT TRAINING: CPT

## 2017-02-14 PROCEDURE — 74011250637 HC RX REV CODE- 250/637: Performed by: THORACIC SURGERY (CARDIOTHORACIC VASCULAR SURGERY)

## 2017-02-14 PROCEDURE — 82962 GLUCOSE BLOOD TEST: CPT

## 2017-02-14 PROCEDURE — 36415 COLL VENOUS BLD VENIPUNCTURE: CPT | Performed by: INTERNAL MEDICINE

## 2017-02-14 RX ORDER — GLIPIZIDE 5 MG/1
2.5 TABLET ORAL
Status: DISCONTINUED | OUTPATIENT
Start: 2017-02-15 | End: 2017-02-16 | Stop reason: ALTCHOICE

## 2017-02-14 RX ADMIN — METOPROLOL TARTRATE 50 MG: 50 TABLET ORAL at 09:07

## 2017-02-14 RX ADMIN — Medication 10 ML: at 15:22

## 2017-02-14 RX ADMIN — METOPROLOL TARTRATE 50 MG: 50 TABLET ORAL at 22:43

## 2017-02-14 RX ADMIN — HYDRALAZINE HYDROCHLORIDE 100 MG: 25 TABLET, FILM COATED ORAL at 17:28

## 2017-02-14 RX ADMIN — ASPIRIN 81 MG 81 MG: 81 TABLET ORAL at 09:12

## 2017-02-14 RX ADMIN — BUMETANIDE 1 MG: 0.25 INJECTION, SOLUTION INTRAMUSCULAR; INTRAVENOUS at 17:28

## 2017-02-14 RX ADMIN — Medication 10 ML: at 22:43

## 2017-02-14 RX ADMIN — Medication 10 ML: at 05:07

## 2017-02-14 RX ADMIN — HYDRALAZINE HYDROCHLORIDE 100 MG: 25 TABLET, FILM COATED ORAL at 06:06

## 2017-02-14 RX ADMIN — GLIPIZIDE 5 MG: 5 TABLET ORAL at 09:11

## 2017-02-14 RX ADMIN — HYDRALAZINE HYDROCHLORIDE 100 MG: 25 TABLET, FILM COATED ORAL at 23:33

## 2017-02-14 RX ADMIN — AMLODIPINE BESYLATE 10 MG: 5 TABLET ORAL at 09:08

## 2017-02-14 RX ADMIN — BUMETANIDE 1 MG: 0.25 INJECTION, SOLUTION INTRAMUSCULAR; INTRAVENOUS at 09:11

## 2017-02-14 RX ADMIN — ATORVASTATIN CALCIUM 20 MG: 20 TABLET, FILM COATED ORAL at 22:43

## 2017-02-14 RX ADMIN — HYDRALAZINE HYDROCHLORIDE 100 MG: 25 TABLET, FILM COATED ORAL at 12:29

## 2017-02-14 NOTE — PROGRESS NOTES
Problem: Mobility Impaired (Adult and Pediatric)  Goal: *Acute Goals and Plan of Care (Insert Text)  Physical Therapy Goals  Goals reviewed and remain appropriate 2/10/17  Initiated 2/3/2017  1. Patient will move from supine to sit and sit to supine , scoot up and down and roll side to side in bed with modified independence within 7 days. 2. Patient will perform sit to/from stand with modified independence within 7 days. 3. Patient will ambulate 250 feet with least restrictive assistive device and modified independence within 7 days. 4. Patient will ascend/descend 5 stairs with 1 handrail(s) with modified independence within 7 days. 5. Patient will perform cardiac exercises per protocol with independence within 7 days. 6. Patient will verbally and functionally recall 3/3 sternal precautions within 7 days. PHYSICAL THERAPY TREATMENT  Patient: Saray Almazan (26 y.o. male)  Date: 2/14/2017  Diagnosis: aneursym  Aortic dissection (HCC)  melana <principal problem not specified>  Procedure(s) (LRB):  ESOPHAGOGASTRODUODENOSCOPY (EGD) (N/A)  SIGMOIDOSCOPY FLEXIBLE (N/A)  ESOPHAGOGASTRODUODENAL (EGD) BIOPSY (N/A) 9 Days Post-Op  Precautions: Sternal      ASSESSMENT:  Pt progressing well with mobility/exercises. Amb 200 ft without AD/no LOB, RA VSS. Pt assisted in to bathroom, was unable to perform hygiene after BM, PT had to cleanse. Pt reports no prior difficulty/OT informed & will address this issue perhaps with adaptive equipment. Pt states & adheres to SP independently. Progression toward goals:  [ ]    Improving appropriately and progressing toward goals  [X]    Improving slowly and progressing toward goals  [ ]    Not making progress toward goals and plan of care will be adjusted       PLAN:  Patient continues to benefit from skilled intervention to address the above impairments. Continue treatment per established plan of care.   Discharge Recommendations:  OP cardiac rehab per MD  Further Equipment Recommendations for Discharge:  none       SUBJECTIVE:   Patient stated It feels like my arm got shorter. I practice the turning my trunk exer.       OBJECTIVE DATA SUMMARY:   Critical Behavior:  Neurologic State: Alert  Orientation Level: Oriented X4  Cognition: Appropriate decision making, Appropriate for age attention/concentration, Appropriate safety awareness, Follows commands     Functional Mobility Training:  Bed Mobility:      Up in chair              Transfers:            SBA                       Balance:   Sitting & standing intact     Ambulation/Gait Training:                 Pt amb 200 ft without AD, functional gt  Stairs: Therapeutic Exercises:   BUE cardiac exer, BLE exer seated, IS usage  Pain:  Pain Scale 1: Numeric (0 - 10)  Pain Intensity 1: 0              Activity Tolerance:   Fair  Please refer to the flowsheet for vital signs taken during this treatment.   After treatment:   [X]    Patient left in no apparent distress sitting up in chair  [ ]    Patient left in no apparent distress in bed  [X]    Call bell left within reach  [X]    Nursing notified  [ ]    Caregiver present  [ ]    Bed alarm activated      COMMUNICATION/COLLABORATION:   The patients plan of care was discussed with: Occupational Therapist and Registered Nurse     Hudson Roth, PT   Time Calculation: 25 mins

## 2017-02-14 NOTE — CARDIO/PULMONARY
C/P rehab note- chart reviewed. S/P Emergent Ascending Aortic Dissection Repair   Former smoker.      Follow up with patient, on earlier visit received The Card[facility] Surgery Post Operative Instructions\" booklet. Reviewed use of the incentive spirometer, cough and deep breathing while splinting the incision and leg exercises. Encouragement provided to use the incentive spirometer regularly(hourly) and to increase effort. Also encouraged to read the post-op booklet. Will continue to review the post op booklet as pt progresses in the recovery process. Patient reported PT went well today, bowels are moving and he is feeling better. Patient is using incentive spirometer several times every 30 minutes. Patient indicated swelling/fluid retention is better, but scrotal edema is still a problem.       CP Rehab will continue to follow.

## 2017-02-14 NOTE — PROGRESS NOTES
[facility] FLOOR Progress Note    Admit Date: 2017    POD: 9 Days Post-Op      Assessment:     Active Problems: Aortic dissection (HCC) (2017)      S/P ascending aortic replacement (2/3/2017)      Overview: EMERGENT ASCENDING AORTIC DISSECTION REPAIR       Right Femoral Artery Cannulation             Procedure(s):  ESOPHAGOGASTRODUODENOSCOPY (EGD)  SIGMOIDOSCOPY FLEXIBLE  ESOPHAGOGASTRODUODENAL (EGD) BIOPSY         Summary     Stable hemodynamics in sinus rhythm without ectopy on no support. Cr .BUN. 4000 UOP. Nose bleed this morning. Plan/Recommendations/Medical Decision Making:     Continue diuresis  Humidify oxygen  Increase activity  Increase I/S effort and frequency  Sternal precautions  Stimulate bowel movement  Patient seen and reviewed with Dr. Purvi Gallagher MD      Objective:       CXR Results  (Last 48 hours)    None          Visit Vitals    /57    Pulse 66    Temp 98.1 °F (36.7 °C)    Resp 16    Ht 6' (1.829 m)    Wt 319 lb 7.1 oz (144.9 kg)    SpO2 98%    BMI 43.32 kg/m2       Temp (24hrs), Av.1 °F (36.7 °C), Min:97.9 °F (36.6 °C), Max:98.4 °F (36.9 °C)      Lab Data Reviewed: Recent Labs      17   0803  17   0440   WBC   --   18.6*   HGB   --   8.6*   HCT   --   25.9*   PLT   --   202   NA   --   138   K   --   4.1   BUN   --   27*   CREA   --   1.42*   GLU   --   90   GLUCPOC  105*   --        Last 24hr Input/Output:    Intake/Output Summary (Last 24 hours) at 17 0906  Last data filed at 17 0455   Gross per 24 hour   Intake                0 ml   Output             4050 ml   Net            -4050 ml        Admission Weight: Last Weight   Weight: 270 lb (122.5 kg) Weight: 319 lb 7.1 oz (144.9 kg)       EXAM:  General: nose bleed from dry oxygen      Chest: stable sternum      Incisions: dry and intact      Lungs:   Clear to auscultation bilaterally.        Heart:  Regular rate and rhythm, S1, S2 normal, no murmur, no click, no rub      Abdomen:   Soft, non-tender. Bowel sounds present. Extremities:  No edema     Neurologic:  Gross motor and sensory apparatus intact.        Signed By: GAETANO Hanks

## 2017-02-14 NOTE — PROGRESS NOTES
Bedside shift change report received from [facility]. Pt sitting up in recliner, visitors present. Denies pain at this time. 2030  Assessment completed and charted. Pt sitting up in recliner watching t.v. No c/o. Call light in reach. Young is patent and emptied 1450 ml clear yellow urine with sediment. 2230 Assisted pt to bed. 0000  VSS, no acute change in assessment. 0430  Labs drawn and sent. 0500 Young bag empted 1150 ml.  0725  Bedside shift change report to  [facility].

## 2017-02-14 NOTE — DIABETES MGMT
[facility] Cardiac Surgery Progress Note    Recommendations/ Comments:  Chart reviewed. Glipizide decreased to 2.5mg ac b. Pt with hypoglycemia yesterday. Discussed with Mallory Woo. Pt will need a prescription for Freestyle lite test strips at discharge. Chart reviewed on [employer]. Patient is 45 y.o. male s/p Cardiac Surgery. POD 10. No known Hx Type 2 Diabetes per H&P, however, a1c consistent with new onset type 2 DM. A1c:   Lab Results   Component Value Date/Time    Hemoglobin A1c 7.1 02/07/2017 04:14 AM         Recent Glucose Results:   Lab Results   Component Value Date/Time    GLU 90 02/14/2017 04:40 AM    GLUCPOC 89 02/14/2017 11:53 AM    GLUCPOC 105 (H) 02/14/2017 08:03 AM    GLUCPOC 95 02/13/2017 10:16 PM        Lab Results   Component Value Date/Time    Creatinine 1.42 02/14/2017 04:40 AM       Active Orders   Diet    DIET GI LITE (POST SURGICAL)        PO intake:   Patient Vitals for the past 72 hrs:   % Diet Eaten   02/14/17 1005 100 %   02/13/17 0858 100 %   02/12/17 0945 100 %       Current DM medications. humalog correction, lantus 40units daily    Will continue to follow as needed. Thank you.   Yancy Ren RN, [address] [facility]

## 2017-02-14 NOTE — PROGRESS NOTES
7:53 AM Received report from [facility]    9:15 AM Pt up in recliner for breakfast. Pt currently using IS     10:00 AM Humidification placed on pt's oxygen (pt only uses at night)    10:20 AM Pt ambulating in hallway with PT    12:25 PM Pt ambulated in hallway with minimal assist. Tolerated well. Back to recliner     3:15 PM Pt ambulated in hallway with minimal assist. Tolerated well. Back to recliner     Bedside and Verbal shift change report given to LAURA Diaz (oncoming nurse) by Amelia Spencer RN (offgoing nurse). Report included the following information SBAR, Kardex, Intake/Output, MAR and Recent Results.

## 2017-02-14 NOTE — PROGRESS NOTES
Problem: Self Care Deficits Care Plan (Adult)  Goal: *Acute Goals and Plan of Care (Insert Text)  Occupational Therapy Goals  Initiated 2/3/2017  1. Patient will perform ADLs standing 5 mins without fatigue or LOB with independence within 7 day(s). 2. Patient will perform lower body ADLs with independence within 7 day(s). 3. Patient will perform bathing with independence within 7 day(s). 4. Patient will perform toilet transfers with independence within 7 day(s). 5. Patient will perform all aspects of toileting with independence within 7 day(s). 6. Patient will participate in cardiac/sternal upper extremity therapeutic exercise/activities to increase independence with ADLs with independence for 5 minutes within 7 day(s). OCCUPATIONAL THERAPY TREATMENT  Patient: Gianfranco Michelle (47 y.o. male)  Date: 2/14/2017  Diagnosis: aneursym  Aortic dissection (HCC)  melana <principal problem not specified>  Procedure(s) (LRB):  ESOPHAGOGASTRODUODENOSCOPY (EGD) (N/A)  SIGMOIDOSCOPY FLEXIBLE (N/A)  ESOPHAGOGASTRODUODENAL (EGD) BIOPSY (N/A) 9 Days Post-Op  Precautions: Sternal      ASSESSMENT:  Pt was sitting in recliner and was able to state 3/3 of his sternal precautions. Pt states that he is not able to clean his buttocks well and OT issued pt a catalog to show him toilet aids that he will be able to purchase at drug stores. Pt was able to perform his BUE and he was able to reach to buttocks with right and left hand and was not able to clean thoroughly. Pt was able to come to stand from the recliner with SBA to supervision. . Recommend that pt have further therapy at discharge at home or out pt cardiac rehab.   Progression toward goals:  [X]       Improving appropriately and progressing toward goals  [ ]       Improving slowly and progressing toward goals  [ ]       Not making progress toward goals and plan of care will be adjusted       PLAN:  Patient continues to benefit from skilled intervention to address the above impairments. Continue treatment per established plan of care. Discharge Recommendations:  Out pt cardiac rehab  Further Equipment Recommendations for Discharge:  tbd       SUBJECTIVE:   Patient stated I just cant really reach that far. Carlos Rodriguez      OBJECTIVE DATA SUMMARY:   Cognitive/Behavioral Status:  Neurologic State: Alert  Orientation Level: Oriented X4  Cognition: Appropriate decision making; Appropriate for age attention/concentration; Appropriate safety awareness; Follows commands              Functional Mobility and Transfers for ADLs:           Transfers:   SBA     Balance:   impaired      ADL Intervention:   pt is able to bathe UB and stuart area with set up. Pain:  Pain Scale 1: Numeric (0 - 10)  Pain Intensity 1: 0              Activity Tolerance:   vss  Please refer to the flowsheet for vital signs taken during this treatment.   After treatment:   [X] Patient left in no apparent distress sitting up in chair  [ ] Patient left in no apparent distress in bed  [X] Call bell left within reach  [X] Nursing notified  [ ] Caregiver present  [X] Bed alarm activated      COMMUNICATION/COLLABORATION:   The patients plan of care was discussed with: Physical Therapist and Registered Nurse     Rosanna Ordonez OT  Time Calculation: 15 mins

## 2017-02-15 LAB
ANION GAP BLD CALC-SCNC: 10 MMOL/L (ref 5–15)
BASOPHILS # BLD AUTO: 0 K/UL (ref 0–0.1)
BASOPHILS # BLD: 0 % (ref 0–1)
BUN SERPL-MCNC: 25 MG/DL (ref 6–20)
BUN/CREAT SERPL: 18 (ref 12–20)
CALCIUM SERPL-MCNC: 7.9 MG/DL (ref 8.5–10.1)
CHLORIDE SERPL-SCNC: 104 MMOL/L (ref 97–108)
CO2 SERPL-SCNC: 25 MMOL/L (ref 21–32)
CREAT SERPL-MCNC: 1.42 MG/DL (ref 0.7–1.3)
EOSINOPHIL # BLD: 0.4 K/UL (ref 0–0.4)
EOSINOPHIL NFR BLD: 3 % (ref 0–7)
ERYTHROCYTE [DISTWIDTH] IN BLOOD BY AUTOMATED COUNT: 14.9 % (ref 11.5–14.5)
GLUCOSE BLD STRIP.AUTO-MCNC: 100 MG/DL (ref 65–100)
GLUCOSE BLD STRIP.AUTO-MCNC: 107 MG/DL (ref 65–100)
GLUCOSE BLD STRIP.AUTO-MCNC: 122 MG/DL (ref 65–100)
GLUCOSE BLD STRIP.AUTO-MCNC: 129 MG/DL (ref 65–100)
GLUCOSE BLD STRIP.AUTO-MCNC: 78 MG/DL (ref 65–100)
GLUCOSE BLD STRIP.AUTO-MCNC: 90 MG/DL (ref 65–100)
GLUCOSE SERPL-MCNC: 110 MG/DL (ref 65–100)
HCT VFR BLD AUTO: 26.9 % (ref 36.6–50.3)
HGB BLD-MCNC: 8.9 G/DL (ref 12.1–17)
LYMPHOCYTES # BLD AUTO: 7 % (ref 12–49)
LYMPHOCYTES # BLD: 0.8 K/UL (ref 0.8–3.5)
MCH RBC QN AUTO: 28 PG (ref 26–34)
MCHC RBC AUTO-ENTMCNC: 33.1 G/DL (ref 30–36.5)
MCV RBC AUTO: 84.6 FL (ref 80–99)
MONOCYTES # BLD: 1.1 K/UL (ref 0–1)
MONOCYTES NFR BLD AUTO: 8 % (ref 5–13)
NEUTS SEG # BLD: 10.7 K/UL (ref 1.8–8)
NEUTS SEG NFR BLD AUTO: 82 % (ref 32–75)
PLATELET # BLD AUTO: 230 K/UL (ref 150–400)
POTASSIUM SERPL-SCNC: 4.3 MMOL/L (ref 3.5–5.1)
RBC # BLD AUTO: 3.18 M/UL (ref 4.1–5.7)
SERVICE CMNT-IMP: ABNORMAL
SERVICE CMNT-IMP: NORMAL
SODIUM SERPL-SCNC: 139 MMOL/L (ref 136–145)
WBC # BLD AUTO: 13 K/UL (ref 4.1–11.1)

## 2017-02-15 PROCEDURE — 74011636637 HC RX REV CODE- 636/637: Performed by: PHYSICIAN ASSISTANT

## 2017-02-15 PROCEDURE — 74011250637 HC RX REV CODE- 250/637: Performed by: INTERNAL MEDICINE

## 2017-02-15 PROCEDURE — 85025 COMPLETE CBC W/AUTO DIFF WBC: CPT | Performed by: INTERNAL MEDICINE

## 2017-02-15 PROCEDURE — 74011250637 HC RX REV CODE- 250/637: Performed by: THORACIC SURGERY (CARDIOTHORACIC VASCULAR SURGERY)

## 2017-02-15 PROCEDURE — 97116 GAIT TRAINING THERAPY: CPT

## 2017-02-15 PROCEDURE — 74011250637 HC RX REV CODE- 250/637: Performed by: PHYSICIAN ASSISTANT

## 2017-02-15 PROCEDURE — 36415 COLL VENOUS BLD VENIPUNCTURE: CPT | Performed by: INTERNAL MEDICINE

## 2017-02-15 PROCEDURE — 82962 GLUCOSE BLOOD TEST: CPT

## 2017-02-15 PROCEDURE — 97110 THERAPEUTIC EXERCISES: CPT

## 2017-02-15 PROCEDURE — 80048 BASIC METABOLIC PNL TOTAL CA: CPT | Performed by: INTERNAL MEDICINE

## 2017-02-15 PROCEDURE — 65660000000 HC RM CCU STEPDOWN

## 2017-02-15 RX ORDER — POTASSIUM CHLORIDE 750 MG/1
20 TABLET, FILM COATED, EXTENDED RELEASE ORAL DAILY
Status: DISCONTINUED | OUTPATIENT
Start: 2017-02-15 | End: 2017-02-18

## 2017-02-15 RX ORDER — BUMETANIDE 1 MG/1
2 TABLET ORAL 2 TIMES DAILY
Status: DISCONTINUED | OUTPATIENT
Start: 2017-02-15 | End: 2017-02-17

## 2017-02-15 RX ADMIN — AMLODIPINE BESYLATE 10 MG: 5 TABLET ORAL at 08:27

## 2017-02-15 RX ADMIN — INSULIN LISPRO 2 UNITS: 100 INJECTION, SOLUTION INTRAVENOUS; SUBCUTANEOUS at 08:35

## 2017-02-15 RX ADMIN — ASPIRIN 81 MG 81 MG: 81 TABLET ORAL at 08:27

## 2017-02-15 RX ADMIN — BUMETANIDE 2 MG: 1 TABLET ORAL at 11:17

## 2017-02-15 RX ADMIN — HYDRALAZINE HYDROCHLORIDE 100 MG: 25 TABLET, FILM COATED ORAL at 23:53

## 2017-02-15 RX ADMIN — INSULIN LISPRO 2 UNITS: 100 INJECTION, SOLUTION INTRAVENOUS; SUBCUTANEOUS at 22:06

## 2017-02-15 RX ADMIN — ATORVASTATIN CALCIUM 20 MG: 20 TABLET, FILM COATED ORAL at 22:06

## 2017-02-15 RX ADMIN — Medication 10 ML: at 15:05

## 2017-02-15 RX ADMIN — Medication 10 ML: at 08:15

## 2017-02-15 RX ADMIN — HYDRALAZINE HYDROCHLORIDE 100 MG: 25 TABLET, FILM COATED ORAL at 11:17

## 2017-02-15 RX ADMIN — POTASSIUM CHLORIDE 20 MEQ: 750 TABLET, FILM COATED, EXTENDED RELEASE ORAL at 11:17

## 2017-02-15 RX ADMIN — BUMETANIDE 2 MG: 1 TABLET ORAL at 17:27

## 2017-02-15 RX ADMIN — Medication 20 ML: at 05:52

## 2017-02-15 RX ADMIN — HYDRALAZINE HYDROCHLORIDE 100 MG: 25 TABLET, FILM COATED ORAL at 05:56

## 2017-02-15 RX ADMIN — METOPROLOL TARTRATE 50 MG: 50 TABLET ORAL at 08:27

## 2017-02-15 RX ADMIN — Medication 10 ML: at 08:13

## 2017-02-15 RX ADMIN — METOPROLOL TARTRATE 50 MG: 50 TABLET ORAL at 22:07

## 2017-02-15 RX ADMIN — Medication 10 ML: at 22:09

## 2017-02-15 RX ADMIN — HYDRALAZINE HYDROCHLORIDE 100 MG: 25 TABLET, FILM COATED ORAL at 17:25

## 2017-02-15 RX ADMIN — GLIPIZIDE 2.5 MG: 5 TABLET ORAL at 08:29

## 2017-02-15 RX ADMIN — Medication 10 ML: at 08:19

## 2017-02-15 NOTE — PROGRESS NOTES
[facility] FLOOR Progress Note    Admit Date: 2017    POD: 10 Days Post-Op      Assessment:     Active Problems: Aortic dissection (HCC) (2017)      S/P ascending aortic replacement (2/3/2017)      Overview: EMERGENT ASCENDING AORTIC DISSECTION REPAIR       Right Femoral Artery Cannulation             Procedure(s):  ESOPHAGOGASTRODUODENOSCOPY (EGD)  SIGMOIDOSCOPY FLEXIBLE  ESOPHAGOGASTRODUODENAL (EGD) BIOPSY         Summary     Stable hemodynamics in sinus rhythm without ectopy on no support. Young in place due to penile edema. Ambulating better. No SOB. Plan/Recommendations/Medical Decision Making:     DC epicardial wires  Continue diuresis and rehab  Increase activity  Increase I/S effort and frequency  Sternal precautions  Stimulate bowel movement  Patient seen and reviewed with Dr. Bethanie Mays MD      Objective:       CXR Results  (Last 48 hours)    None          Activity:    Diet/ BM:     Visit Vitals    /62    Pulse 66    Temp 97.8 °F (36.6 °C)    Resp 20    Ht 6' (1.829 m)    Wt 315 lb 11.2 oz (143.2 kg)    SpO2 96%    BMI 42.82 kg/m2       Temp (24hrs), Av.3 °F (36.8 °C), Min:97.8 °F (36.6 °C), Max:99 °F (37.2 °C)      Lab Data Reviewed: Recent Labs      02/15/17   0845  02/15/17   0749   WBC  13.0*   --    HGB  8.9*   --    HCT  26.9*   --    PLT  230   --    NA  139   --    K  4.3   --    BUN  25*   --    CREA  1.42*   --    GLU  110*   --    GLUCPOC   --   122*       Last 24hr Input/Output:    Intake/Output Summary (Last 24 hours) at 02/15/17 1107  Last data filed at 02/15/17 4755   Gross per 24 hour   Intake              440 ml   Output             3625 ml   Net            -3185 ml        Admission Weight: Last Weight   Weight: 270 lb (122.5 kg) Weight: 315 lb 11.2 oz (143.2 kg)       EXAM:  General:      Chest: stable sternum      Incisions: dry and intact      Lungs:   Clear to auscultation bilaterally.        Heart:  Regular rate and rhythm, S1, S2 normal, no murmur, no click, no rub      Abdomen:   Soft, non-tender. Bowel sounds present. Extremities:  improved massive edema     Neurologic:  Gross motor and sensory apparatus intact.        Signed By: GAETANO Vargas

## 2017-02-15 NOTE — PROGRESS NOTES
1900--bedside report received from doris silverio. Pt. Sitting in chair, alert oriented x 4, has no complaints at this time, call bell in reach. 2300--bedside report given to doris taylor who  Is assuming care of pt.

## 2017-02-15 NOTE — PROGRESS NOTES
2300:     PCU SHIFT NURSING NOTE      Bedside and Verbal shift change report given to LAUREN Pereyra (oncoming nurse) by Edyterri Abbott RN (offgoing nurse). Report included the following information SBAR, Kardex, OR Summary, Procedure Summary, Intake/Output, MAR, Recent Results, Cardiac Rhythm NSR and Alarm Parameters . Shift Summary:       Admission Date 2/2/2017   Admission Diagnosis aneursym  Aortic dissection ([facility] [address]  melana   Consults IP CONSULT TO GASTROENTEROLOGY  IP CONSULT TO NEPHROLOGY        Consults   [x]PT   []OT   []Speech   []Case Management      [] Palliative      Cardiac Monitoring Order   [x]Yes   []No     IV drips   []Yes    Drip:                            Dose:  Drip:                            Dose:  Drip:                            Dose:   [x]No     GI Prophylaxis   [x]Yes   []No         DVT Prophylaxis   SCDs:  Sequential Compression Device: Bilateral          Prasanth stockings:         [] Medication   []Contraindicated   []None      Activity Level Activity Level: Up with Assistance     Activity Assistance: Partial (one person)   Purposeful Rounding every 1-2 hour? [x]Yes   Lowry Score  Total Score: 3   Bed Alarm (If score 3 or >)   []Yes   [] Refused (See signed refusal form in chart)   Zi Score  Zi Score: 19   Zi Score (if score 14 or less)   []PMT consult   []Wound Care consult      []Specialty bed   [] Nutrition consult          Needs prior to discharge:   Home O2 required:    []Yes   [x]No    If yes, how much O2 required?     Other:    Last Bowel Movement: Last Bowel Movement Date: 02/13/17      Influenza Vaccine Received Flu Vaccine for Current Season (usually Sept-March): No    Patient/Guardian Refused (Notify MD): No   Pneumonia Vaccine           Diet Active Orders   Diet    DIET GI LITE (POST SURGICAL)      LDAs         Midline Catheter, Dual 82/84/00 Right;Basilic (Active)   Criteria for Appropriate Use Limited/no vessel suitable for conventional peripheral access 2/14/2017 11:29 PM   Site Assessment Clean 2/14/2017 11:29 PM   Phlebitis Assessment 0 2/14/2017 11:29 PM   Infiltration Assessment 0 2/14/2017 11:29 PM   Dressing Status Clean, dry, & intact 2/14/2017 11:29 PM   Dressing Type Disk with Chlorhexadine Gluconate (CHG); Transparent 2/14/2017 11:29 PM   Action Taken Blood drawn 2/14/2017  3:43 AM   Arm Circumference (cm) 38 cm 2/11/2017 10:42 AM   Date of Last Dressing Change 02/11/17 2/13/2017  8:58 AM   Proximal Hub Color/Line Status Red;Capped 2/14/2017 11:29 PM   Distal Hub Color/Line Status White;Capped 2/14/2017 11:29 PM   External Catheter Length (cm) 0 centimeters 2/11/2017 10:42 AM   Alcohol Cap Used Yes 2/14/2017 11:29 PM                            Urinary Catheter [REMOVED] Urinary Catheter 02/06/17 Young - Temperature-Criteria for Appropriate Use: Medically/surgically unstable  Urinary Catheter 02/10/17 Young-Criteria for Appropriate Use: Obstruction/retention  [REMOVED] Urinary Catheter 02/02/17 Young - Temperature-Criteria for Appropriate Use: Strict I/Os    Intake & Output   Date 02/14/17 0700 - 02/15/17 0659 02/15/17 0700 - 02/16/17 0659   Shift 4911-6690 5379-3380 24 Hour Total 7528-2408 2145-6772 24 Hour Total   I  N  T  A  K  E   P.O. 120  120         P. O. 120  120       Shift Total  (mL/kg) 120  (0.8)  120  (0.8)      O  U  T  P  U  T   Urine  (mL/kg/hr) 2925  (1.7)  2925         Urine Output (mL) (Urinary Catheter 02/10/17 Young) 2925  2925       Stool            Stool Occurrence(s) 1 x  1 x       Shift Total  (mL/kg) 2925  (20.2)  2925  (20.2)      NET -2805  -2805      Weight (kg) 144.9 144.9 144.9 144.9 144.9 144.9         Readmission Risk Assessment Tool Score Low Risk            7       Total Score        3 Patient Length of Stay > 5    4 More than 1 Admission in calendar year        Criteria that do not apply:    Relationship with PCP    Patient Living Status    Patient Insurance is Medicare, Medicaid or Self Pay    Charlson Comorbidity Score Expected Length of Stay 5d 14h   Actual Length of Stay 13

## 2017-02-15 NOTE — PROGRESS NOTES
NAME: Jillian Cabello        :  1965        MRN:  [id number]        Assessment :    Plan:  --FELICE  HTN  Anemia  Volume overload  High Mg    ascending aortic dissection repair /3 HD initiated on  and HD/UF # 2 on ;  CTA on ;       Cr levelled off at 1.42  Edema/wt has improved but still has fair amount. Start Bumex 2 mg BID    BP controlled; on amlodipine 10, catapres tts3 patch, hydralazine 100 qid, metoprolol 50 bid         Subjective:     Chief Complaint:  oob in chair. No events overnight. No acute c/o. Wants to leave gardner in for 1 more day    Objective:     VITALS:   Last 24hrs VS reviewed since prior progress note. Most recent are:  Visit Vitals    /56 (BP 1 Location: Left arm, BP Patient Position: Sitting)    Pulse 66    Temp 97.8 °F (36.6 °C)    Resp 20    Ht 6' (1.829 m)    Wt 143.2 kg (315 lb 11.2 oz)    SpO2 96%    BMI 42.82 kg/m2       Intake/Output Summary (Last 24 hours) at 02/15/17 0936  Last data filed at 02/15/17 0641   Gross per 24 hour   Intake              320 ml   Output             3625 ml   Net            -3305 ml      Telemetry Reviewed:     PHYSICAL EXAM:  General: Obese,  no acute distress  Resp:  Dec BS, no distress  CV:   RRR, + edema  GI:  Soft,  Distended, NT  : scrotal edema, gardner+  Alert awake     ________________________________________________________________________  Dora Diop MD     Procedures: see electronic medical records for all procedures/Xrays and details which  were not copied into this note but were reviewed prior to creation of Plan.       LABS:  Recent Labs      02/15/17   0845  17   0440   WBC  13.0*  18.6*   HGB  8.9*  8.6*   HCT  26.9*  25.9*   PLT  230  202     Recent Labs      02/15/17   0845  17   0440  17   0425   NA  139  138  139   K  4.3  4.1  4.3   CL  104  103  105   CO2  25  28  28   BUN  25*  27*  31*   CREA  1.42*  1.42* 1.38*   GLU  110*  90  79   CA  7.9*  8.1*  8.2*   MG   --    --   2.9*   PHOS   --    --   3.2     Recent Labs      02/13/17   0425   SGOT  46*   AP  124*   TP  5.8*   ALB  2.6*   GLOB  3.2     No results for input(s): INR, PTP, APTT in the last 72 hours. No lab exists for component: INREXT, INREXT   No results for input(s): FE, TIBC, PSAT, FERR in the last 72 hours. No results found for: FOL, RBCF   No results for input(s): PH, PCO2, PO2 in the last 72 hours. No results for input(s): CPK, CKMB in the last 72 hours.     No lab exists for component: TROPONINI  No components found for: Bj Point  Lab Results   Component Value Date/Time    Color DARK YELLOW 02/06/2017 01:03 PM    Color PENDING 02/06/2017 01:03 PM    Appearance CLEAR 02/06/2017 01:03 PM    Appearance PENDING 02/06/2017 01:03 PM    Specific gravity 1.019 02/06/2017 01:03 PM    Specific gravity PENDING 02/06/2017 01:03 PM    Specific gravity PENDING 02/06/2017 01:03 PM    pH (UA) 5.0 02/06/2017 01:03 PM    pH (UA) PENDING 02/06/2017 01:03 PM    Protein NEGATIVE  02/06/2017 01:03 PM    Protein PENDING 02/06/2017 01:03 PM    Glucose NEGATIVE  02/06/2017 01:03 PM    Glucose PENDING 02/06/2017 01:03 PM    Ketone NEGATIVE  02/06/2017 01:03 PM    Ketone PENDING 02/06/2017 01:03 PM    Bilirubin PENDING 02/06/2017 01:03 PM    Urobilinogen 0.2 02/06/2017 01:03 PM    Urobilinogen PENDING 02/06/2017 01:03 PM    Nitrites NEGATIVE  02/06/2017 01:03 PM    Nitrites PENDING 02/06/2017 01:03 PM    Leukocyte Esterase NEGATIVE  02/06/2017 01:03 PM    Leukocyte Esterase PENDING 02/06/2017 01:03 PM    Epithelial cells FEW 02/06/2017 01:03 PM    Epithelial cells DUPLICATE REQUEST 31/74/5000 01:03 PM    Bacteria NEGATIVE  02/06/2017 01:03 PM    Bacteria DUPLICATE REQUEST 22/35/4964 01:03 PM    WBC 0-4 02/06/2017 11:23 PM    WBC DUPLICATE REQUEST 83/46/8382 01:03 PM    RBC 0-5 02/06/2017 00:48 PM    RBC DUPLICATE REQUEST 71/54/3748 01:03 PM       MEDICATIONS:  Current Facility-Administered Medications   Medication Dose Route Frequency    glipiZIDE (GLUCOTROL) tablet 2.5 mg  2.5 mg Oral ACB    sodium chloride (NS) flush 5-10 mL  5-10 mL IntraVENous Q8H    sodium chloride (NS) flush 5-10 mL  5-10 mL IntraVENous PRN    acetaminophen (TYLENOL) tablet 650 mg  650 mg Oral Q4H PRN    oxyCODONE-acetaminophen (PERCOCET) 5-325 mg per tablet 1 Tab  1 Tab Oral Q4H PRN    naloxone (NARCAN) injection 0.4 mg  0.4 mg IntraVENous PRN    atorvastatin (LIPITOR) tablet 20 mg  20 mg Oral QPM    ondansetron (ZOFRAN) injection 4 mg  4 mg IntraVENous Q4H PRN    alum-mag hydroxide-simeth (MYLANTA) oral suspension 30 mL  30 mL Oral Q2H PRN    docusate sodium (COLACE) capsule 100 mg  100 mg Oral BID    zolpidem (AMBIEN) tablet 5 mg  5 mg Oral QHS PRN    polyethylene glycol (MIRALAX) packet 34 g  34 g Oral DAILY    traMADol (ULTRAM) tablet  mg   mg Oral Q6H PRN    hydrALAZINE (APRESOLINE) 20 mg/mL injection 10 mg  10 mg IntraVENous Q6H PRN    pantoprazole (PROTONIX) tablet 40 mg  40 mg Oral ACB&D    metoprolol tartrate (LOPRESSOR) tablet 50 mg  50 mg Oral Q12H    hydrALAZINE (APRESOLINE) tablet 100 mg  100 mg Oral Q6H    cloNIDine (CATAPRES) 0.3 mg/24 hr patch 1 Patch  1 Patch TransDERmal Q7D    amLODIPine (NORVASC) tablet 10 mg  10 mg Oral DAILY    influenza vaccine 2016-17 (36mos+)(PF) (FLUZONE/FLUARIX/FLULAVAL QUAD) injection 0.5 mL  0.5 mL IntraMUSCular PRIOR TO DISCHARGE    albuterol (PROVENTIL VENTOLIN) nebulizer solution 1.25-2.5 mg  1.25-2.5 mg Nebulization Q4H PRN    aspirin chewable tablet 81 mg  81 mg Oral DAILY    glucose chewable tablet 16 g  4 Tab Oral PRN    glucagon (GLUCAGEN) injection 1 mg  1 mg IntraMUSCular PRN    insulin lispro (HUMALOG) injection   SubCUTAneous AC&HS

## 2017-02-15 NOTE — PROGRESS NOTES
Problem: Mobility Impaired (Adult and Pediatric)  Goal: *Acute Goals and Plan of Care (Insert Text)  Physical Therapy Goals  Goals reviewed and remain appropriate 2/10/17  Initiated 2/3/2017  1. Patient will move from supine to sit and sit to supine , scoot up and down and roll side to side in bed with modified independence within 7 days. 2. Patient will perform sit to/from stand with modified independence within 7 days. 3. Patient will ambulate 250 feet with least restrictive assistive device and modified independence within 7 days. 4. Patient will ascend/descend 5 stairs with 1 handrail(s) with modified independence within 7 days. 5. Patient will perform cardiac exercises per protocol with independence within 7 days. 6. Patient will verbally and functionally recall 3/3 sternal precautions within 7 days. PHYSICAL THERAPY TREATMENT  Patient: Juliette Iqbal (13 y.o. male)  Date: 2/15/2017  Diagnosis: aneursym  Aortic dissection (HCC)  melana <principal problem not specified>  Procedure(s) (LRB):  ESOPHAGOGASTRODUODENOSCOPY (EGD) (N/A)  SIGMOIDOSCOPY FLEXIBLE (N/A)  ESOPHAGOGASTRODUODENAL (EGD) BIOPSY (N/A) 10 Days Post-Op  Precautions: Sternal      ASSESSMENT:  Patient in chair on arrival and motivated for PT. Patient expressing concerns with returning home due to decline in mobility and ADLs and stating he lives with his elderly mother who is unable to provide assistance. He is requesting [address] Patient tolerated ambulation with no AD but noted increased gait deviations and impaired balance requiring increased PT assistance with distance and fatigue. Increased fall risk indicated. Encouraged patient to continue to use RW when ambulating in hallway with nursing. Unsafe to attempt stairs at this time which patient will be required to do at home. Patient tolerated seated cardiac exercises well.  Due to continued medical concerns and declined mobility, in agreement with patients request for inpatient rehab. He would benefit from the intensity of rehab and continued medical management to improve independence and safety before returning home. Progression toward goals:  [ ]      Improving appropriately and progressing toward goals  [X]      Improving slowly and progressing toward goals  [ ]      Not making progress toward goals and plan of care will be adjusted       PLAN:  Patient continues to benefit from skilled intervention to address the above impairments. Continue treatment per established plan of care. Discharge Recommendations:  Inpatient Rehab  Further Equipment Recommendations for Discharge:  Defer to rehab       SUBJECTIVE:   Patient stated I don't think I will be safe at home.    The patient stated 3/3 sternal precautions. Reviewed all 3 with patient. OBJECTIVE DATA SUMMARY:   Patient mobilized on continuous portable monitor/telemetry.   Critical Behavior:  Neurologic State: Alert, Appropriate for age  Orientation Level: Oriented X4  Cognition: Appropriate decision making, Appropriate for age attention/concentration, Appropriate safety awareness, Follows commands     Functional Mobility Training:  Bed Mobility:   Not assessed; patient in chair on arrival   Transfers:  Sit to Stand: Stand-by asssistance  Stand to Sit: Stand-by asssistance     Balance:  Sitting: Intact  Standing: Impaired  Standing - Static: Good  Standing - Dynamic : Fair  Ambulation/Gait Training:  Distance (ft): 100 Feet (ft) (2 x 100 ft, standing rest break required between bouts)   Gait Assistance: CGA, progressed to Min A as patient fatigued   Assistive Device: Gait belt  Gait Abnormalities: Decreased step clearance;Trunk sway increased (deviations increased with patient fatigue)  Base of Support: Widened  Speed/Alva: Pace decreased (<100 feet/min)  Step Length: Right shortened;Left shortened                 Noted increased gait deviations and unsteady gait pattern as patient fatigued requiring increased assistance. VS stable post ambulation   Stairs:   Unsafe to attempt at this time      Therapeutic Exercises:   Patient instructed on the benefits and demonstrated cardiac exercises while seated with supervision. Instructed and indicated understanding on how to progress reps and sets against gravity, working up to 5 lbs and so on based on surgeon clearance for more weight in prep for functional activity. Can use household items for weights.      CARDIAC  EXERCISE   Sets   Reps   Active Active Assist   Passive Self ROM   Comments   Shoulder flexion 1 10 [X]                                            [ ]                                            [ ]                                            [ ]                                                Shoulder abduction 1 10 [X]                                            [ ]                                            [ ]                                            [ ]                                                Scapular elevation 1 10 [X]                                            [ ]                                            [ ]                                            [ ]                                                Scapular retraction 1 10 [X]                                            [ ]                                            [ ]                                            [ ]                                                Trunk rotation 1 10 [X]                                            [ ]                                            [ ]                                            [ ]                                                Trunk sidebending 1 10 [X]                                            [ ]                                            [ ]                                            [ ]                                                      Pain:  Pain Scale 1: Numeric (0 - 10)  Pain Intensity 1: 0     Activity Tolerance:   VS stable; patient with c/o head \"feeling funny\" during ambulation but VS stable  After treatment:   [X] Patient left in no apparent distress sitting up in chair  [ ] Patient left in no apparent distress in bed  [X] Call bell left within reach  [X] Nursing notified  [ ] Caregiver present  [ ] Bed alarm activated      COMMUNICATION/COLLABORATION:   The patients plan of care was discussed with: Registered Nurse     Claudell Fells, PT, DPT   Time Calculation: 23 mins

## 2017-02-15 NOTE — PROGRESS NOTES
PCU SHIFT NURSING NOTE      Bedside and Verbal shift change report given to Mónica Montes (oncoming nurse) by Kasey Trujillo (offgoing nurse). Report included the following information SBAR, Kardex, Procedure Summary, Intake/Output, MAR and Recent Results. Shift Summary: 4143  0830: Patient up to bathroom, had bowel movement. 1035: pacer wires removed by Dr. Arielle Ricardo  [phone]: Patient with complaints of weakness and feeling groggy after walking with physical therapy in hallway and doing exercises. Blood sugar checked, was 78. Juice and crackers given to patient. Blood pressure at 126/56.  1245: Patient blood sugar now 107    Admission Date 2/2/2017   Admission Diagnosis aneursym  Aortic dissection (HCC)  melana   Consults IP CONSULT TO GASTROENTEROLOGY  IP CONSULT TO NEPHROLOGY        Consults   [x]PT   [x]OT   []Speech   []Case Management      [] Palliative      Cardiac Monitoring Order   [x]Yes   []No     IV drips   []Yes    Drip:                            Dose:  Drip:                            Dose:  Drip:                            Dose:   []No     GI Prophylaxis   []Yes   []No         DVT Prophylaxis   SCDs:  Sequential Compression Device: Bilateral          Prasanth stockings:         [] Medication   []Contraindicated   []None      Activity Level Activity Level: Up with Assistance     Activity Assistance: Partial (one person)   Purposeful Rounding every 1-2 hour? []Yes   Lowry Score  Total Score: 3   Bed Alarm (If score 3 or >)   []Yes   [] Refused (See signed refusal form in chart)   Zi Score  Zi Score: 19   Zi Score (if score 14 or less)   []PMT consult   []Wound Care consult      []Specialty bed   [] Nutrition consult          Needs prior to discharge:   Home O2 required:    []Yes   [x]No    If yes, how much O2 required?     Other:    Last Bowel Movement: Last Bowel Movement Date: 02/13/17      Influenza Vaccine Received Flu Vaccine for Current Season (usually Sept-March): No    Patient/Guardian Refused (Notify MD): No   Pneumonia Vaccine           Diet Active Orders   Diet    DIET GI LITE (POST SURGICAL)      LDAs         Midline Catheter, Dual 36/97/45 Right;Basilic (Active)   Criteria for Appropriate Use Limited/no vessel suitable for conventional peripheral access 2/14/2017 11:29 PM   Site Assessment Clean 2/14/2017 11:29 PM   Phlebitis Assessment 0 2/14/2017 11:29 PM   Infiltration Assessment 0 2/14/2017 11:29 PM   Dressing Status Clean, dry, & intact 2/14/2017 11:29 PM   Dressing Type Disk with Chlorhexadine Gluconate (CHG); Transparent 2/14/2017 11:29 PM   Action Taken Blood drawn 2/14/2017  3:43 AM   Arm Circumference (cm) 38 cm 2/11/2017 10:42 AM   Date of Last Dressing Change 02/11/17 2/13/2017  8:58 AM   Proximal Hub Color/Line Status Red;Capped 2/14/2017 11:29 PM   Distal Hub Color/Line Status White;Capped 2/14/2017 11:29 PM   External Catheter Length (cm) 0 centimeters 2/11/2017 10:42 AM   Alcohol Cap Used Yes 2/14/2017 11:29 PM                            Urinary Catheter [REMOVED] Urinary Catheter 02/06/17 Young - Temperature-Criteria for Appropriate Use: Medically/surgically unstable  Urinary Catheter 02/10/17 Young-Criteria for Appropriate Use: Obstruction/retention  [REMOVED] Urinary Catheter 02/02/17 Young - Temperature-Criteria for Appropriate Use: Strict I/Os    Intake & Output   Date 02/14/17 0700 - 02/15/17 0659 02/15/17 0700 - 02/16/17 0659   Shift 6521-6886 9031-5984 24 Hour Total 0303-6263 4052-8823 24 Hour Total   I  N  T  A  K  E   P.O. 120 200 320         P. O. 120 200 320       Shift Total  (mL/kg) 120  (0.8) 200  (1.4) 320  (2.2)      O  U  T  P  U  T   Urine  (mL/kg/hr) 2925  (1.7) 1225  (0.7) 4150  (1.2)         Urine Output (mL) (Urinary Catheter 02/10/17 Young) 2925 1225 4150       Stool            Stool Occurrence(s) 1 x 0 x 1 x       Shift Total  (mL/kg) 2925  (20.2) 1225  (8.6) 4150  (29)      NET -9192 -8532 -5683      Weight (kg) 144.9 143.2 143.2 143.2 143.2 143.2 Readmission Risk Assessment Tool Score Low Risk            7       Total Score        3 Patient Length of Stay > 5    4 More than 1 Admission in calendar year        Criteria that do not apply:    Relationship with PCP    Patient Living Status    Patient Insurance is Medicare, Medicaid or Self Pay    Charlson Comorbidity Score       Expected Length of Stay 5d 14h   Actual Length of Stay 13

## 2017-02-16 LAB
GLUCOSE BLD STRIP.AUTO-MCNC: 108 MG/DL (ref 65–100)
GLUCOSE BLD STRIP.AUTO-MCNC: 112 MG/DL (ref 65–100)
GLUCOSE BLD STRIP.AUTO-MCNC: 155 MG/DL (ref 65–100)
GLUCOSE BLD STRIP.AUTO-MCNC: 93 MG/DL (ref 65–100)
SERVICE CMNT-IMP: ABNORMAL
SERVICE CMNT-IMP: NORMAL

## 2017-02-16 PROCEDURE — 74011250637 HC RX REV CODE- 250/637: Performed by: INTERNAL MEDICINE

## 2017-02-16 PROCEDURE — 82962 GLUCOSE BLOOD TEST: CPT

## 2017-02-16 PROCEDURE — 97110 THERAPEUTIC EXERCISES: CPT

## 2017-02-16 PROCEDURE — 97535 SELF CARE MNGMENT TRAINING: CPT | Performed by: OCCUPATIONAL THERAPIST

## 2017-02-16 PROCEDURE — 65660000000 HC RM CCU STEPDOWN

## 2017-02-16 PROCEDURE — 74011250637 HC RX REV CODE- 250/637: Performed by: THORACIC SURGERY (CARDIOTHORACIC VASCULAR SURGERY)

## 2017-02-16 PROCEDURE — 74011636637 HC RX REV CODE- 636/637: Performed by: PHYSICIAN ASSISTANT

## 2017-02-16 PROCEDURE — 74011250637 HC RX REV CODE- 250/637: Performed by: PHYSICIAN ASSISTANT

## 2017-02-16 PROCEDURE — 97116 GAIT TRAINING THERAPY: CPT

## 2017-02-16 RX ORDER — MINOXIDIL 2.5 MG/1
10 TABLET ORAL DAILY
Status: DISCONTINUED | OUTPATIENT
Start: 2017-02-16 | End: 2017-02-27 | Stop reason: HOSPADM

## 2017-02-16 RX ORDER — METOLAZONE 2.5 MG/1
2.5 TABLET ORAL DAILY
Status: DISCONTINUED | OUTPATIENT
Start: 2017-02-16 | End: 2017-02-18

## 2017-02-16 RX ADMIN — HYDRALAZINE HYDROCHLORIDE 100 MG: 25 TABLET, FILM COATED ORAL at 17:30

## 2017-02-16 RX ADMIN — BUMETANIDE 2 MG: 1 TABLET ORAL at 17:30

## 2017-02-16 RX ADMIN — HYDRALAZINE HYDROCHLORIDE 100 MG: 25 TABLET, FILM COATED ORAL at 07:20

## 2017-02-16 RX ADMIN — BUMETANIDE 2 MG: 1 TABLET ORAL at 08:08

## 2017-02-16 RX ADMIN — ASPIRIN 81 MG 81 MG: 81 TABLET ORAL at 08:08

## 2017-02-16 RX ADMIN — AMLODIPINE BESYLATE 10 MG: 5 TABLET ORAL at 08:08

## 2017-02-16 RX ADMIN — Medication 10 ML: at 07:21

## 2017-02-16 RX ADMIN — GLIPIZIDE 2.5 MG: 5 TABLET ORAL at 08:08

## 2017-02-16 RX ADMIN — ATORVASTATIN CALCIUM 20 MG: 20 TABLET, FILM COATED ORAL at 21:02

## 2017-02-16 RX ADMIN — Medication 10 ML: at 17:30

## 2017-02-16 RX ADMIN — HYDRALAZINE HYDROCHLORIDE 100 MG: 25 TABLET, FILM COATED ORAL at 23:44

## 2017-02-16 RX ADMIN — POTASSIUM CHLORIDE 20 MEQ: 750 TABLET, FILM COATED, EXTENDED RELEASE ORAL at 08:08

## 2017-02-16 RX ADMIN — METOLAZONE 2.5 MG: 2.5 TABLET ORAL at 11:56

## 2017-02-16 RX ADMIN — Medication 10 ML: at 21:03

## 2017-02-16 RX ADMIN — HYDRALAZINE HYDROCHLORIDE 100 MG: 25 TABLET, FILM COATED ORAL at 11:55

## 2017-02-16 RX ADMIN — INSULIN LISPRO 4 UNITS: 100 INJECTION, SOLUTION INTRAVENOUS; SUBCUTANEOUS at 21:12

## 2017-02-16 RX ADMIN — MINOXIDIL 10 MG: 2.5 TABLET ORAL at 11:55

## 2017-02-16 RX ADMIN — METOPROLOL TARTRATE 50 MG: 50 TABLET ORAL at 21:02

## 2017-02-16 RX ADMIN — METOPROLOL TARTRATE 50 MG: 50 TABLET ORAL at 08:08

## 2017-02-16 NOTE — PROGRESS NOTES
Problem: Self Care Deficits Care Plan (Adult)  Goal: *Acute Goals and Plan of Care (Insert Text)  Occupational Therapy Goals  2/16/2017  All goals reviewed and revised below. . Continue to follow for 7 days. Initiated 2/3/2017  1. Patient will perform ADLs standing 5 mins without fatigue or LOB with independence within 7 day(s). 2/16/2017 goal met increase time to 12 minutes. continue  2. Patient will perform lower body ADLs with independence within 7 day(s). 2/16/2017 Progressing. continue  3. Patient will perform bathing with independence within 7 day(s). 2/16/2017 Progressing. continue  4. Patient will perform toilet transfers with independence within 7 day(s). 2/16/2017 Progressing continue  5. Patient will perform all aspects of toileting with independence within 7 day(s). 2/16/2017. Progressing. continue  6. Patient will participate in cardiac/sternal upper extremity therapeutic exercise/activities to increase independence with ADLs with independence for 5 minutes within 7 day(s). 2/16/2017 Ongoing. Add: perform 3X/day independently. OCCUPATIONAL THERAPY TREATMENT: WEEKLY REASSESSMENT  Patient: Adam Huang (60 y.o. male)  Date: 2/16/2017  Diagnosis: aneursym  Aortic dissection (HCC)  melana <principal problem not specified>  Procedure(s) (LRB):  ESOPHAGOGASTRODUODENOSCOPY (EGD) (N/A)  SIGMOIDOSCOPY FLEXIBLE (N/A)  ESOPHAGOGASTRODUODENAL (EGD) BIOPSY (N/A) 11 Days Post-Op  Precautions: Sternal      ASSESSMENT:  Pt is motivated to return to independent lifestyle. Pt performed standing adls for 8 minutes today as he ambulated to the [facility] kitchen to simulate IADLs tasks with SBA/supervision. When picking up an object from the floor pt had mild LOB posteriorly and was able to independently right himself. He would benefit from trialing using a reacher to  light weight objects on the floor safely. Reinforced sternal precautions during adls / IADLs this date.   Pt is able to name 3/3 and required 2 cues when attempting to reach above shoulder height during adls. Pt remains significantly edematous. Pt feels that he will need rehab at discharge. Will follow to determine discharge needs rehab vs home health. Progression toward goals:  [ ]            Improving appropriately and progressing toward goals  [X]            Improving slowly and progressing toward goals  [ ]            Not making progress toward goals and plan of care will be adjusted       PLAN:  Goals have been updated based on progression since last assessment. Patient continues to benefit from skilled intervention to address the above impairments. Continue to follow patient 4 times a week to address goals. Planned Interventions:  [X]                    Self Care Training                  [X]             Therapeutic Activities  [X]                    Functional Mobility Training    [X]             Cognitive Retraining  [X]                    Therapeutic Exercises           [X]             Endurance Activities  [X]                    Balance Training                   [ ]             Neuromuscular Re-Education  [ ]                    Visual/Perceptual Training     [X]        Home Safety Training  [X]                    Patient Education                 [X]             Family Training/Education  [ ]                    Other (comment):  Discharge Recommendations: Home Health vs. rehab  Further Equipment Recommendations for Discharge: may benefit from [address] Discussed toilet hygiene options        SUBJECTIVE:   Patient stated I dont want to get dressed today.       OBJECTIVE DATA SUMMARY:   Cognitive/Behavioral Status:  Neurologic State: Alert; Appropriate for age  Orientation Level: Oriented X4  Cognition: Appropriate for age attention/concentration; Appropriate safety awareness; Follows commands              Functional Mobility and Transfers for ADLs:  Bed Mobility:   pt seaetd upon arrival     Transfers:  Sit to Stand: Stand-by asssistance  Stand to Sit: Stand-by asssistance     Balance:  Sitting: Intact  Standing: Intact  Standing - Static: Good (RW/SBA)  Standing - Dynamic : Fair (RW/SBA)     ADL Intervention:     Pt initiated discussion on toilet hygiene and states that the washcloths are working well to increase his cleanliness. Discussed options of towel method, used of toilet tongs, and toilet sae, and large disposable wipes. Pt verbalized understanding. Pt ambulated in room, reaching into cabinets and drawers-relating sternal precautions to everyday tasks such as opening and closing heavy drawers, reaching above shoulder height and reaching down to  object from the floor. Encouraged pt to keep most used adl items/clothing within his reach in easy access storage avoiding heavy pulling and pushing. Pt verbalized understanding. Pt ambulated to the [facility] to simulate IADLs with Supervision and was able to get himself a cup of water and carry back to his room. Pt did not use an AD for this task. He was generally steady and upon returning to his room, he reported that he was moderately fatigued/moderate exertion level. Therapeutic Exercises:   Pt performed trunk twists -5  Ankle pumps, hand squeezes. Encouraged elevation of BUEs at bed level due to edema  Encouraged pt to perform 3X a day. Pt verbalized understanding  Pain:  Pain Scale 1: Numeric (0 - 10)  Pain Intensity 1: 0              Activity Tolerance:   Fair. Pt reported moderate exertion post ambulation/IADLs. VS generally stable  Please refer to the flowsheet for vital signs taken during this treatment.   After treatment:   [X] Patient left in no apparent distress sitting up in chair  [ ] Patient left in no apparent distress in bed  [X] Call bell left within reach  [X] Nursing notified  [ ] Caregiver present  [ ] Bed alarm activated      COMMUNICATION/COLLABORATION:   The patients plan of care was discussed with: Registered Nurse     Caterina Villaseñor, OTR/L  Time Calculation: 35 mins

## 2017-02-16 NOTE — PROGRESS NOTES
PCU SHIFT NURSING NOTE      Bedside and Verbal shift change report given to Mary Anne Henning (oncoming nurse) by Consuelo Buerger (offgoing nurse). Report included the following information SBAR, Kardex, Procedure Summary, Intake/Output, MAR and Accordion. Shift Summary: 8675    3623: patient ambulating in hallway with physical therapy, tolerated well      Admission Date 2/2/2017   Admission Diagnosis aneursym  Aortic dissection ([address]  melana   Consults IP CONSULT TO GASTROENTEROLOGY  IP CONSULT TO NEPHROLOGY        Consults   [x]PT   [x]OT   []Speech   []Case Management      [] Palliative      Cardiac Monitoring Order   []Yes   []No     IV drips   []Yes    Drip:                            Dose:  Drip:                            Dose:  Drip:                            Dose:   []No     GI Prophylaxis   []Yes   []No         DVT Prophylaxis   SCDs:  Sequential Compression Device: Bilateral          Prasanth stockings:         [] Medication   []Contraindicated   []None      Activity Level Activity Level: Up with Assistance     Activity Assistance: Partial (one person)   Purposeful Rounding every 1-2 hour? []Yes   Lowry Score  Total Score: 1   Bed Alarm (If score 3 or >)   []Yes   [] Refused (See signed refusal form in chart)   Zi Score  Zi Score: 17   Zi Score (if score 14 or less)   []PMT consult   []Wound Care consult      []Specialty bed   [] Nutrition consult          Needs prior to discharge:   Home O2 required:    []Yes   []No    If yes, how much O2 required?     Other:    Last Bowel Movement: Last Bowel Movement Date: 02/16/17      Influenza Vaccine Received Flu Vaccine for Current Season (usually Sept-March): No    Patient/Guardian Refused (Notify MD): No   Pneumonia Vaccine           Diet Active Orders   Diet    DIET GI LITE (POST SURGICAL)      LDAs         Midline Catheter, Dual 33/67/43 Right;Basilic (Active)   Criteria for Appropriate Use Limited/no vessel suitable for conventional peripheral access 2/16/2017 12:16 PM   Site Assessment Clean, dry, & intact 2/16/2017 12:16 PM   Phlebitis Assessment 0 2/16/2017 12:16 PM   Infiltration Assessment 0 2/16/2017 12:16 PM   Dressing Status Clean, dry, & intact 2/16/2017 12:16 PM   Dressing Type Transparent 2/16/2017 12:16 PM   Action Taken Blood drawn 2/15/2017 11:22 PM   Arm Circumference (cm) 38 cm 2/11/2017 10:42 AM   Date of Last Dressing Change 02/11/17 2/16/2017  4:52 AM   Proximal Hub Color/Line Status Red;Capped;Flushed 2/16/2017 12:16 PM   Distal Hub Color/Line Status Blue;Capped;Flushed 2/16/2017 12:16 PM   External Catheter Length (cm) 0 centimeters 2/15/2017 11:22 PM   Alcohol Cap Used Yes 2/14/2017 11:29 PM                            Urinary Catheter [REMOVED] Urinary Catheter 02/06/17 Yougn - Temperature-Criteria for Appropriate Use: Medically/surgically unstable  Urinary Catheter 02/10/17 Young-Criteria for Appropriate Use: Obstruction/retention, Strict I/Os  [REMOVED] Urinary Catheter 02/02/17 Young - Temperature-Criteria for Appropriate Use: Strict I/Os    Intake & Output   Date 02/15/17 0700 - 02/16/17 0659 02/16/17 0700 - 02/17/17 0659   Shift 4285-0875 4854-9840 24 Hour Total 8940-3105 2836-0552 24 Hour Total   I  N  T  A  K  E   P. O. 720  720 540  540      P. O. 720  720 540  540    Shift Total  (mL/kg) 720  (5)  720  (5) 540  (3.9)  540  (3.9)   O  U  T  P  U  T   Urine  (mL/kg/hr) 1900  (1.1) 1775  (1) 3675  (1.1) 1200  1200      Urine Output (mL) (Urinary Catheter 02/10/17 Young) 1900 1775 3675 1200  1200    Shift Total  (mL/kg) 1900  (13.3) 1775  (12.4) 3675  (25.7) 1200  (8.6)  1200  (8.6)   NET -1180 -1775 -2955 -660  -660   Weight (kg) 143.2 143.2 143.2 139.5 139.5 139.5         Readmission Risk Assessment Tool Score Low Risk            7       Total Score        3 Patient Length of Stay > 5    4 More than 1 Admission in calendar year        Criteria that do not apply:    Relationship with PCP    Patient Living Status    Patient Insurance is Medicare, Medicaid or Self Pay    Charlson Comorbidity Score       Expected Length of Stay 5d 14h   Actual Length of Stay 14

## 2017-02-16 NOTE — PROGRESS NOTES
NAME: Margarita Bee        :  1965        MRN:  [id number]        Assessment :    Plan:  --FELICE  HTN  Anemia  Volume overload  High Mg    ascending aortic dissection repair 2/3 HD initiated on  and HD/UF # 2 on ;  CTA on ;       Cr levelled off at 1.42-no labs today  Ct Bumex 2 mg BID + oral KCL 20 meq QD  Add Metolazone 2.5 mg daily - at least temporary  Edema/wt continues to improve  BMP in AM  Will consider removal of gardner soon    BP controlled; on amlodipine 10, catapres tts3 patch, hydralazine 100 qid, metoprolol 50 bid         Subjective:     Chief Complaint:  oob in chair. No acute c/o. Reluctant for gardner removal    Objective:     VITALS:   Last 24hrs VS reviewed since prior progress note. Most recent are:  Visit Vitals    /65    Pulse 63    Temp 98.4 °F (36.9 °C)    Resp 17    Ht 6' (1.829 m)    Wt 139.5 kg (307 lb 8.7 oz)    SpO2 98%    BMI 41.71 kg/m2       Intake/Output Summary (Last 24 hours) at 17 1031  Last data filed at 17 1004   Gross per 24 hour   Intake              840 ml   Output             3675 ml   Net            -2835 ml      Telemetry Reviewed:     PHYSICAL EXAM:  General: Obese,  no acute distress  Resp:  Dec BS, no distress  CV:   RRR, ++ edema  : scrotal edema, gardner+  Alert awake     ________________________________________________________________________  Seema Treadwell MD     Procedures: see electronic medical records for all procedures/Xrays and details which  were not copied into this note but were reviewed prior to creation of Plan.       LABS:  Recent Labs      02/15/17   0845  17   0440   WBC  13.0*  18.6*   HGB  8.9*  8.6*   HCT  26.9*  25.9*   PLT  230  202     Recent Labs      02/15/17   0845  17   0440   NA  139  138   K  4.3  4.1   CL  104  103   CO2  25  28   BUN  25*  27*   CREA  1.42*  1.42*   GLU  110*  90   CA  7.9*  8.1*     No results for input(s): SGOT, GPT, AP, TBIL, TP, ALB, GLOB, GGT, AML, LPSE in the last 72 hours. No lab exists for component: AMYP, HLPSE  No results for input(s): INR, PTP, APTT in the last 72 hours. No lab exists for component: INREXT, INREXT   No results for input(s): FE, TIBC, PSAT, FERR in the last 72 hours. No results found for: FOL, RBCF   No results for input(s): PH, PCO2, PO2 in the last 72 hours. No results for input(s): CPK, CKMB in the last 72 hours.     No lab exists for component: TROPONINI  No components found for: [address] [place] [address]  Lab Results   Component Value Date/Time    Color DARK YELLOW 02/06/2017 01:03 PM    Color PENDING 02/06/2017 01:03 PM    Appearance CLEAR 02/06/2017 01:03 PM    Appearance PENDING 02/06/2017 01:03 PM    Specific gravity 1.019 02/06/2017 01:03 PM    Specific gravity PENDING 02/06/2017 01:03 PM    Specific gravity PENDING 02/06/2017 01:03 PM    pH (UA) 5.0 02/06/2017 01:03 PM    pH (UA) PENDING 02/06/2017 01:03 PM    Protein NEGATIVE  02/06/2017 01:03 PM    Protein PENDING 02/06/2017 01:03 PM    Glucose NEGATIVE  02/06/2017 01:03 PM    Glucose PENDING 02/06/2017 01:03 PM    Ketone NEGATIVE  02/06/2017 01:03 PM    Ketone PENDING 02/06/2017 01:03 PM    Bilirubin PENDING 02/06/2017 01:03 PM    Urobilinogen 0.2 02/06/2017 01:03 PM    Urobilinogen PENDING 02/06/2017 01:03 PM    Nitrites NEGATIVE  02/06/2017 01:03 PM    Nitrites PENDING 02/06/2017 01:03 PM    Leukocyte Esterase NEGATIVE  02/06/2017 01:03 PM    Leukocyte Esterase PENDING 02/06/2017 01:03 PM    Epithelial cells FEW 02/06/2017 01:03 PM    Epithelial cells DUPLICATE REQUEST 86/09/0637 01:03 PM    Bacteria NEGATIVE  02/06/2017 01:03 PM    Bacteria DUPLICATE REQUEST 11/20/8656 01:03 PM    WBC 0-4 02/06/2017 63:16 PM    WBC DUPLICATE REQUEST 82/49/7811 01:03 PM    RBC 0-5 02/06/2017 81:10 PM    RBC DUPLICATE REQUEST 61/67/5207 01:03 PM       MEDICATIONS:  Current Facility-Administered Medications   Medication Dose Route Frequency    metOLazone (ZAROXOLYN) tablet 2.5 mg  2.5 mg Oral DAILY    bumetanide (BUMEX) tablet 2 mg  2 mg Oral BID    potassium chloride SR (KLOR-CON 10) tablet 20 mEq  20 mEq Oral DAILY    glipiZIDE (GLUCOTROL) tablet 2.5 mg  2.5 mg Oral ACB    sodium chloride (NS) flush 5-10 mL  5-10 mL IntraVENous Q8H    sodium chloride (NS) flush 5-10 mL  5-10 mL IntraVENous PRN    acetaminophen (TYLENOL) tablet 650 mg  650 mg Oral Q4H PRN    oxyCODONE-acetaminophen (PERCOCET) 5-325 mg per tablet 1 Tab  1 Tab Oral Q4H PRN    naloxone (NARCAN) injection 0.4 mg  0.4 mg IntraVENous PRN    atorvastatin (LIPITOR) tablet 20 mg  20 mg Oral QPM    ondansetron (ZOFRAN) injection 4 mg  4 mg IntraVENous Q4H PRN    alum-mag hydroxide-simeth (MYLANTA) oral suspension 30 mL  30 mL Oral Q2H PRN    docusate sodium (COLACE) capsule 100 mg  100 mg Oral BID    zolpidem (AMBIEN) tablet 5 mg  5 mg Oral QHS PRN    polyethylene glycol (MIRALAX) packet 34 g  34 g Oral DAILY    traMADol (ULTRAM) tablet  mg   mg Oral Q6H PRN    hydrALAZINE (APRESOLINE) 20 mg/mL injection 10 mg  10 mg IntraVENous Q6H PRN    pantoprazole (PROTONIX) tablet 40 mg  40 mg Oral ACB&D    metoprolol tartrate (LOPRESSOR) tablet 50 mg  50 mg Oral Q12H    hydrALAZINE (APRESOLINE) tablet 100 mg  100 mg Oral Q6H    cloNIDine (CATAPRES) 0.3 mg/24 hr patch 1 Patch  1 Patch TransDERmal Q7D    amLODIPine (NORVASC) tablet 10 mg  10 mg Oral DAILY    influenza vaccine 2016-17 (36mos+)(PF) (FLUZONE/FLUARIX/FLULAVAL QUAD) injection 0.5 mL  0.5 mL IntraMUSCular PRIOR TO DISCHARGE    albuterol (PROVENTIL VENTOLIN) nebulizer solution 1.25-2.5 mg  1.25-2.5 mg Nebulization Q4H PRN    aspirin chewable tablet 81 mg  81 mg Oral DAILY    glucose chewable tablet 16 g  4 Tab Oral PRN    glucagon (GLUCAGEN) injection 1 mg  1 mg IntraMUSCular PRN    insulin lispro (HUMALOG) injection   SubCUTAneous AC&HS

## 2017-02-16 NOTE — PROGRESS NOTES
[facility] FLOOR Progress Note    Admit Date: 2017    POD: 11 Days Post-Op      Assessment:     Active Problems: Aortic dissection (HCC) (2017)      S/P ascending aortic replacement (2/3/2017)      Overview: EMERGENT ASCENDING AORTIC DISSECTION REPAIR       Right Femoral Artery Cannulation             Procedure(s):  ESOPHAGOGASTRODUODENOSCOPY (EGD)  SIGMOIDOSCOPY FLEXIBLE  ESOPHAGOGASTRODUODENAL (EGD) BIOPSY         Summary     Stable hemodynamics in sinus rhythm without ectopy on no support. Weight down 9 lbs. UOP 3000/24 hrs  Penile swelling persist  Dry weight not known: 270-285 reported by patient  Working well with PT    Plan/Recommendations/Medical Decision Making:     BMP tomorrow  diuresis  Increase activity  Increase I/S effort and frequency  Sternal precautions  Stimulate bowel movement  Patient seen and reviewed with Dr. Blue Whittaker MD      Objective:     Oxygen:    CXR Results  (Last 48 hours)    None          Visit Vitals    /66    Pulse 64    Temp 98.4 °F (36.9 °C)    Resp 17    Ht 6' (1.829 m)    Wt 307 lb 8.7 oz (139.5 kg)    SpO2 98%    BMI 41.71 kg/m2       Temp (24hrs), Av.2 °F (36.8 °C), Min:97.8 °F (36.6 °C), Max:98.5 °F (36.9 °C)      Lab Data Reviewed: Recent Labs      17   0736   02/15/17   0845   WBC   --    --   13.0*   HGB   --    --   8.9*   HCT   --    --   26.9*   PLT   --    --   230   NA   --    --   139   K   --    --   4.3   BUN   --    --   25*   CREA   --    --   1.42*   GLU   --    --   110*   GLUCPOC  112*   < >   --     < > = values in this interval not displayed.        Last 24hr Input/Output:    Intake/Output Summary (Last 24 hours) at 17 0828  Last data filed at 17 0657   Gross per 24 hour   Intake              720 ml   Output             3675 ml   Net            -2955 ml        Admission Weight: Last Weight   Weight: 270 lb (122.5 kg) Weight: 307 lb 8.7 oz (139.5 kg)       EXAM:  General:      Chest: stable sternum      Incisions: dry and intact      Lungs:   rhonchi bilaterally. Heart:  Regular rate and rhythm, S1, S2 normal, no murmur, no click, no rub      Abdomen:   Soft, non-tender. Bowel sounds present. Extremities:  edema     Neurologic:  Gross motor and sensory apparatus intact.        Signed By: GAETANO Whatley

## 2017-02-16 NOTE — PROGRESS NOTES
Problem: Mobility Impaired (Adult and Pediatric)  Goal: *Acute Goals and Plan of Care (Insert Text)  Physical Therapy Goals  Goals reviewed and remain appropriate 2/10/17  Initiated 2/3/2017  1. Patient will move from supine to sit and sit to supine , scoot up and down and roll side to side in bed with modified independence within 7 days. 2. Patient will perform sit to/from stand with modified independence within 7 days. 3. Patient will ambulate 250 feet with least restrictive assistive device and modified independence within 7 days. 4. Patient will ascend/descend 5 stairs with 1 handrail(s) with modified independence within 7 days. 5. Patient will perform cardiac exercises per protocol with independence within 7 days. 6. Patient will verbally and functionally recall 3/3 sternal precautions within 7 days. PHYSICAL THERAPY TREATMENT  Patient: Saray Almazan (59 y.o. male)  Date: 2/16/2017  Diagnosis: aneursym  Aortic dissection (HCC)  melana <principal problem not specified>  Procedure(s) (LRB):  ESOPHAGOGASTRODUODENOSCOPY (EGD) (N/A)  SIGMOIDOSCOPY FLEXIBLE (N/A)  ESOPHAGOGASTRODUODENAL (EGD) BIOPSY (N/A) 11 Days Post-Op  Precautions: Sternal      ASSESSMENT:  Pt up in chair, agreeable to amb/exercise. Pt amb 100 ft to staircase, up/down 6 steps ( situation at home ) with one rail; amb back to room ;  RA VSS with all activity. Pt performs BUE/BLE exer with very min cues. States/adheres to [facility] independently. Progression toward goals:  [ ]    Improving appropriately and progressing toward goals  [X]    Improving slowly and progressing toward goals  [ ]    Not making progress toward goals and plan of care will be adjusted       PLAN:  Patient continues to benefit from skilled intervention to address the above impairments. Continue treatment per established plan of care. Discharge Recommendations: Pt again requests in pt rehab .  Recommend HHPT follow up to transition to OP cardiac rehab  Further Equipment Recommendations for Discharge:  None anticipated       SUBJECTIVE:   Patient stated I think I need rehab.       OBJECTIVE DATA SUMMARY:   Critical Behavior:  Neurologic State: Alert, Appropriate for age  Orientation Level: Oriented X4  Cognition: Appropriate for age attention/concentration, Appropriate safety awareness, Follows commands     Functional Mobility Training:  Bed Mobility:         Up in chair           Transfers:  Sit to Stand: Stand-by asssistance  Stand to Sit: Stand-by asssistance                             Balance:  Sitting: Intact  Standing: Intact  Standing - Static: Good (RW/SBA)  Standing - Dynamic : Fair (RW/SBA)  Ambulation/Gait Training:  Distance (ft): 200 Feet (ft)  Assistive Device: Gait belt  Ambulation - Level of Assistance: Stand-by asssistance      Gait Abnormalities: Trunk sway increased (Body habitus)     Base of Support: Widened      Stairs:  Number of Stairs Trained: 6  Stairs - Level of Assistance: Contact guard assistance              Rail Use: Right      Therapeutic Exercises:   BUE cardiac ex, BLE active seated exer  Pain:  Pain Scale 1: Numeric (0 - 10)  Pain Intensity 1: 0              Activity Tolerance:   Fair  Please refer to the flowsheet for vital signs taken during this treatment.   After treatment:   [X]    Patient left in no apparent distress sitting up in chair  [ ]    Patient left in no apparent distress in bed  [X]    Call bell left within reach  [X]    Nursing notified  [ ]    Caregiver present  [ ]    Bed alarm activated      COMMUNICATION/COLLABORATION:   The patients plan of care was discussed with: Registered Nurse and      Evelin Roth, PT   Time Calculation: 25 mins

## 2017-02-16 NOTE — PROGRESS NOTES
PCU SHIFT NURSING NOTE      Bedside and Verbal shift change report given to Katerina Pickett Rn (oncoming nurse) by Gin Eisenberg RN (offgoing nurse). Report included the following information Kardex, Procedure Summary, Intake/Output, MAR and Recent Results. Shift Summary:         Admission Date 2/2/2017   Admission Diagnosis aneursym  Aortic dissection ([address]  melana   Consults IP CONSULT TO GASTROENTEROLOGY  IP CONSULT TO NEPHROLOGY        Consults   []PT   []OT   []Speech   []Case Management      [] Palliative      Cardiac Monitoring Order   []Yes   []No     IV drips   []Yes    Drip:                            Dose:  Drip:                            Dose:  Drip:                            Dose:   []No     GI Prophylaxis   []Yes   []No         DVT Prophylaxis   SCDs:  Sequential Compression Device: Bilateral          Prasanth stockings:         [] Medication   []Contraindicated   []None      Activity Level Activity Level: Up with Assistance     Activity Assistance: Partial (one person)   Purposeful Rounding every 1-2 hour? []Yes   Lowry Score  Total Score: 2   Bed Alarm (If score 3 or >)   []Yes   [] Refused (See signed refusal form in chart)   Zi Score  Zi Score: 20   Zi Score (if score 14 or less)   []PMT consult   []Wound Care consult      []Specialty bed   [] Nutrition consult          Needs prior to discharge:   Home O2 required:    []Yes   []No    If yes, how much O2 required?     Other:    Last Bowel Movement: Last Bowel Movement Date: 02/15/17      Influenza Vaccine Received Flu Vaccine for Current Season (usually Sept-March): No    Patient/Guardian Refused (Notify MD): No   Pneumonia Vaccine           Diet Active Orders   Diet    DIET GI LITE (POST SURGICAL)      LDAs         Midline Catheter, Dual 03/49/23 Right;Basilic (Active)   Criteria for Appropriate Use Limited/no vessel suitable for conventional peripheral access 2/15/2017  7:08 PM   Site Assessment Clean, dry, & intact 2/15/2017  7:08 PM Phlebitis Assessment 0 2/15/2017  7:08 PM   Infiltration Assessment 0 2/15/2017  7:08 PM   Dressing Status Clean, dry, & intact 2/15/2017  7:08 PM   Dressing Type Transparent 2/15/2017  7:08 PM   Action Taken Blood drawn 2/15/2017  9:38 AM   Arm Circumference (cm) 38 cm 2/11/2017 10:42 AM   Date of Last Dressing Change 02/11/17 2/15/2017  7:08 PM   Proximal Hub Color/Line Status Red;Capped 2/15/2017  7:08 PM   Distal Hub Color/Line Status Blue;Capped 2/15/2017  7:08 PM   External Catheter Length (cm) 0 centimeters 2/11/2017 10:42 AM   Alcohol Cap Used Yes 2/14/2017 11:29 PM                            Urinary Catheter [REMOVED] Urinary Catheter 02/06/17 Young - Temperature-Criteria for Appropriate Use: Medically/surgically unstable  Urinary Catheter 02/10/17 Young-Criteria for Appropriate Use: Obstruction/retention  [REMOVED] Urinary Catheter 02/02/17 Young - Temperature-Criteria for Appropriate Use: Strict I/Os    Intake & Output   Date 02/14/17 1900 - 02/15/17 0659 02/15/17 0700 - 02/16/17 0659   Shift 5824-5578 24 Hour Total 1063-4193 1764-1149 24 Hour Total   I  N  T  A  K  E   P.O. 200 320 720  720      P. O. 200 320 720  720    Shift Total  (mL/kg) 200  (1.4) 320  (2.2) 720  (5)  720  (5)   O  U  T  P  U  T   Urine  (mL/kg/hr) 1225 4150 1900  (1.1)  1900      Urine Output (mL) (Urinary Catheter 02/10/17 Young) 1225 4150 1900  1900    Stool           Stool Occurrence(s) 0 x 1 x       Shift Total  (mL/kg) 1225  (8.6) 4150  (29) 1900  (13.3)  1900  (13.3)   NET -1025 -3830 -1180  -1180   Weight (kg) 143.2 143.2 143.2 143.2 143.2         Readmission Risk Assessment Tool Score Low Risk            7       Total Score        3 Patient Length of Stay > 5    4 More than 1 Admission in calendar year        Criteria that do not apply:    Relationship with PCP    Patient Living Status    Patient Insurance is Medicare, Medicaid or Self Pay    Charlson Comorbidity Score       Expected Length of Stay 5d 14h   Actual Length of Stay 13

## 2017-02-16 NOTE — PROGRESS NOTES
Ewa Canchola from [facility] informed CM that patient is too high level, walking 200 ft with sba. Should consider other alternatives at time of discharge. MD to be informed.       Jenae Quesada RN   Ext [phone]

## 2017-02-17 LAB
ANION GAP BLD CALC-SCNC: 8 MMOL/L (ref 5–15)
BUN SERPL-MCNC: 24 MG/DL (ref 6–20)
BUN/CREAT SERPL: 15 (ref 12–20)
CALCIUM SERPL-MCNC: 8.3 MG/DL (ref 8.5–10.1)
CHLORIDE SERPL-SCNC: 100 MMOL/L (ref 97–108)
CO2 SERPL-SCNC: 29 MMOL/L (ref 21–32)
CREAT SERPL-MCNC: 1.56 MG/DL (ref 0.7–1.3)
GLUCOSE BLD STRIP.AUTO-MCNC: 124 MG/DL (ref 65–100)
GLUCOSE BLD STRIP.AUTO-MCNC: 125 MG/DL (ref 65–100)
GLUCOSE BLD STRIP.AUTO-MCNC: 136 MG/DL (ref 65–100)
GLUCOSE BLD STRIP.AUTO-MCNC: 201 MG/DL (ref 65–100)
GLUCOSE SERPL-MCNC: 120 MG/DL (ref 65–100)
POTASSIUM SERPL-SCNC: 3.5 MMOL/L (ref 3.5–5.1)
SERVICE CMNT-IMP: ABNORMAL
SODIUM SERPL-SCNC: 137 MMOL/L (ref 136–145)

## 2017-02-17 PROCEDURE — 74011250637 HC RX REV CODE- 250/637: Performed by: THORACIC SURGERY (CARDIOTHORACIC VASCULAR SURGERY)

## 2017-02-17 PROCEDURE — 82962 GLUCOSE BLOOD TEST: CPT

## 2017-02-17 PROCEDURE — 65660000000 HC RM CCU STEPDOWN

## 2017-02-17 PROCEDURE — 74011250637 HC RX REV CODE- 250/637: Performed by: PHYSICIAN ASSISTANT

## 2017-02-17 PROCEDURE — 74011250637 HC RX REV CODE- 250/637: Performed by: INTERNAL MEDICINE

## 2017-02-17 PROCEDURE — 80048 BASIC METABOLIC PNL TOTAL CA: CPT | Performed by: INTERNAL MEDICINE

## 2017-02-17 PROCEDURE — 36415 COLL VENOUS BLD VENIPUNCTURE: CPT | Performed by: INTERNAL MEDICINE

## 2017-02-17 PROCEDURE — 97110 THERAPEUTIC EXERCISES: CPT

## 2017-02-17 PROCEDURE — 97535 SELF CARE MNGMENT TRAINING: CPT

## 2017-02-17 PROCEDURE — 97116 GAIT TRAINING THERAPY: CPT

## 2017-02-17 PROCEDURE — 36591 DRAW BLOOD OFF VENOUS DEVICE: CPT

## 2017-02-17 PROCEDURE — 74011636637 HC RX REV CODE- 636/637: Performed by: PHYSICIAN ASSISTANT

## 2017-02-17 RX ORDER — BUMETANIDE 1 MG/1
3 TABLET ORAL 2 TIMES DAILY
Status: DISCONTINUED | OUTPATIENT
Start: 2017-02-17 | End: 2017-02-27 | Stop reason: HOSPADM

## 2017-02-17 RX ADMIN — AMLODIPINE BESYLATE 10 MG: 5 TABLET ORAL at 09:24

## 2017-02-17 RX ADMIN — Medication 10 ML: at 22:33

## 2017-02-17 RX ADMIN — HYDRALAZINE HYDROCHLORIDE 100 MG: 25 TABLET, FILM COATED ORAL at 17:18

## 2017-02-17 RX ADMIN — MINOXIDIL 10 MG: 2.5 TABLET ORAL at 09:24

## 2017-02-17 RX ADMIN — PANTOPRAZOLE SODIUM 40 MG: 40 TABLET, DELAYED RELEASE ORAL at 17:18

## 2017-02-17 RX ADMIN — HYDRALAZINE HYDROCHLORIDE 100 MG: 25 TABLET, FILM COATED ORAL at 12:24

## 2017-02-17 RX ADMIN — PANTOPRAZOLE SODIUM 40 MG: 40 TABLET, DELAYED RELEASE ORAL at 09:24

## 2017-02-17 RX ADMIN — INSULIN LISPRO 2 UNITS: 100 INJECTION, SOLUTION INTRAVENOUS; SUBCUTANEOUS at 22:38

## 2017-02-17 RX ADMIN — METOLAZONE 2.5 MG: 2.5 TABLET ORAL at 09:24

## 2017-02-17 RX ADMIN — BUMETANIDE 3 MG: 1 TABLET ORAL at 17:18

## 2017-02-17 RX ADMIN — METOPROLOL TARTRATE 50 MG: 50 TABLET ORAL at 22:33

## 2017-02-17 RX ADMIN — METOPROLOL TARTRATE 50 MG: 50 TABLET ORAL at 09:24

## 2017-02-17 RX ADMIN — ATORVASTATIN CALCIUM 20 MG: 20 TABLET, FILM COATED ORAL at 22:32

## 2017-02-17 RX ADMIN — POTASSIUM CHLORIDE 20 MEQ: 750 TABLET, FILM COATED, EXTENDED RELEASE ORAL at 09:24

## 2017-02-17 RX ADMIN — Medication 10 ML: at 06:46

## 2017-02-17 RX ADMIN — HYDRALAZINE HYDROCHLORIDE 100 MG: 25 TABLET, FILM COATED ORAL at 06:45

## 2017-02-17 RX ADMIN — BUMETANIDE 2 MG: 1 TABLET ORAL at 09:24

## 2017-02-17 RX ADMIN — INSULIN LISPRO 6 UNITS: 100 INJECTION, SOLUTION INTRAVENOUS; SUBCUTANEOUS at 12:24

## 2017-02-17 RX ADMIN — ASPIRIN 81 MG 81 MG: 81 TABLET ORAL at 09:24

## 2017-02-17 RX ADMIN — INSULIN LISPRO 2 UNITS: 100 INJECTION, SOLUTION INTRAVENOUS; SUBCUTANEOUS at 17:18

## 2017-02-17 RX ADMIN — LINAGLIPTIN 5 MG: 5 TABLET, FILM COATED ORAL at 17:18

## 2017-02-17 RX ADMIN — INSULIN LISPRO 2 UNITS: 100 INJECTION, SOLUTION INTRAVENOUS; SUBCUTANEOUS at 09:23

## 2017-02-17 RX ADMIN — Medication 10 ML: at 14:58

## 2017-02-17 NOTE — DIABETES MGMT
[facility] Cardiac Surgery Progress Note    Recommendations/ Comments:  Chart reviewed. Glipizide discontinued yesterday secondary to BG 70s. BG up at lunch today to 201. Tradjenta 5mg ac b added. Discussed with Salvador Johnson. Pt will need a prescription for Freestyle lite test strips at discharge. Chart reviewed on [employer]. Patient is 45 y.o. male s/p Cardiac Surgery. POD 12. No known Hx Type 2 Diabetes per H&P, however, a1c consistent with new onset type 2 DM. A1c:   Lab Results   Component Value Date/Time    Hemoglobin A1c 7.1 02/07/2017 04:14 AM         Recent Glucose Results:   Lab Results   Component Value Date/Time     (H) 02/17/2017 04:50 AM    GLUCPOC 201 (H) 02/17/2017 11:22 AM    GLUCPOC 124 (H) 02/17/2017 07:58 AM    GLUCPOC 155 (H) 02/16/2017 09:05 PM        Lab Results   Component Value Date/Time    Creatinine 1.56 02/17/2017 04:50 AM       Active Orders   Diet    DIET DIABETIC CONSISTENT CARB Regular; AHA-LOW-CHOL FAT        PO intake:   Patient Vitals for the past 72 hrs:   % Diet Eaten   02/17/17 0923 100 %   02/16/17 1004 100 %   02/15/17 1118 85 %   02/15/17 0938 100 %       Current DM medications. humalog correction    Will continue to follow as needed. Thank you.   Ricardo Lugo RN, [address] [place] [address]

## 2017-02-17 NOTE — PROGRESS NOTES
Problem: Mobility Impaired (Adult and Pediatric)  Goal: *Acute Goals and Plan of Care (Insert Text)  Physical Therapy Goals  Goals reviewed and remain appropriate 2/10/17  Initiated 2/3/2017  1. Patient will move from supine to sit and sit to supine , scoot up and down and roll side to side in bed with modified independence within 7 days. 2. Patient will perform sit to/from stand with modified independence within 7 days. 3. Patient will ambulate 250 feet with least restrictive assistive device and modified independence within 7 days. 4. Patient will ascend/descend 5 stairs with 1 handrail(s) with modified independence within 7 days. 5. Patient will perform cardiac exercises per protocol with independence within 7 days. 6. Patient will verbally and functionally recall 3/3 sternal precautions within 7 days. PHYSICAL THERAPY TREATMENT  Patient: Adam Huang (26 y.o. male)  Date: 2/17/2017  Diagnosis: aneursym  Aortic dissection (HCC)  melana <principal problem not specified>  Procedure(s) (LRB):  ESOPHAGOGASTRODUODENOSCOPY (EGD) (N/A)  SIGMOIDOSCOPY FLEXIBLE (N/A)  ESOPHAGOGASTRODUODENAL (EGD) BIOPSY (N/A) 12 Days Post-Op  Precautions: Sternal      ASSESSMENT:  Pt continues to be motivated for all therapy activities, progressing well with amb/ex. Pt amb 400 ft with SBA only, steady pace, no balance loss. RA VSS, Pt states & adheres to SP independently, performs BUE & BLE exer with supervision only. .Edema persists. Progression toward goals:  [ ]    Improving appropriately and progressing toward goals  [X]    Improving slowly and progressing toward goals  [ ]    Not making progress toward goals and plan of care will be adjusted       PLAN:  Patient continues to benefit from skilled intervention to address the above impairments. Continue treatment per established plan of care. Discharge Recommendations:  ?  Home Health for smooth transition to OP cardiac rehab per MD  Further Equipment Recommendations for Discharge:  none       SUBJECTIVE:   Patient stated The swelling is no better.       OBJECTIVE DATA SUMMARY:   Critical Behavior:  Neurologic State: Alert  Orientation Level: Oriented X4  Cognition: Appropriate decision making, Appropriate for age attention/concentration, Appropriate safety awareness     Functional Mobility Training:  Bed Mobility:         Up in chair           Transfers:  Sit to Stand: Independent  Stand to Sit: Independent                             Balance:  Sitting: Intact  Standing: Intact; Without support  Standing - Static: Good  Standing - Dynamic : Good  Ambulation/Gait Training:  Distance (ft): 400 Feet (ft)  Assistive Device: Gait belt  Ambulation - Level of Assistance: Stand-by asssistance                 Base of Support: Widened   Pt able to talk with amb without SOB  Therapeutic Exercises:   Review of all exer  Pain:  Pain Scale 1: Numeric (0 - 10)  Pain Intensity 1: 0              Activity Tolerance:   Good  Please refer to the flowsheet for vital signs taken during this treatment.   After treatment:   [X]    Patient left in no apparent distress sitting up in chair  [ ]    Patient left in no apparent distress in bed  [X]    Call bell left within reach  [X]    Nursing notified  [ ]    Caregiver present  [ ]    Bed alarm activated      COMMUNICATION/COLLABORATION:   The patients plan of care was discussed with: Registered Nurse     Adali Roth, PT   Time Calculation: 25 mins

## 2017-02-17 NOTE — PROGRESS NOTES
Problem: Self Care Deficits Care Plan (Adult)  Goal: *Acute Goals and Plan of Care (Insert Text)  Occupational Therapy Goals  2/16/2017  All goals reviewed and revised below. . Continue to follow for 7 days. Initiated 2/3/2017  1. Patient will perform ADLs standing 5 mins without fatigue or LOB with independence within 7 day(s). 2/16/2017 goal met increase time to 12 minutes. Continue Goal met and pt was able to stand for 12 to 13 minutes performing Ub dressing and exercises. 2. Patient will perform lower body ADLs with independence within 7 day(s). 2/16/2017 Progressing. continue  3. Patient will perform bathing with independence within 7 day(s). 2/16/2017 Progressing. continue  4. Patient will perform toilet transfers with independence within 7 day(s). 2/16/2017 Progressing continue  5. Patient will perform all aspects of toileting with independence within 7 day(s). 2/16/2017. Progressing. Continue met 2/17/2017    6. Patient will participate in cardiac/sternal upper extremity therapeutic exercise/activities to increase independence with ADLs with independence for 5 minutes within 7 day(s). 2/16/2017 Ongoing. Add: perform 3X/day independently. Met 2/17/2017       OCCUPATIONAL THERAPY TREATMENT  Patient: Lieutenant Rodriguez (66 y.o. male)  Date: 2/17/2017  Diagnosis: aneursym  Aortic dissection (HCC)  melana <principal problem not specified>  Procedure(s) (LRB):  ESOPHAGOGASTRODUODENOSCOPY (EGD) (N/A)  SIGMOIDOSCOPY FLEXIBLE (N/A)  ESOPHAGOGASTRODUODENAL (EGD) BIOPSY (N/A) 12 Days Post-Op  Precautions: Sternal      ASSESSMENT:  Pt was sitting in recliner when OT arrived and wanted to walk prior to ADLs. OT walked with pt in the ray and once back in the room worked on donning pull over shirt and robe. Pt was issued handout on ADLs after heart surgery. He is performing his BUe ex 3 times at day and sometime 4 times a day. Pt was not able to doff his socks due to swelling in his legs, even with tailor sitting.   He is making good progress and was standing at the sink this am and bathe Ub and buttocks, and completed grooming with setup. Will assess pt with AE for LB dressing next session. Recommend that pt have further therapy at discharge at in pt rehab. Progression toward goals:  [X]       Improving appropriately and progressing toward goals  [ ]       Improving slowly and progressing toward goals  [ ]       Not making progress toward goals and plan of care will be adjusted       PLAN:  Patient continues to benefit from skilled intervention to address the above impairments. Continue treatment per established plan of care. Discharge Recommendations:  Inpatient Rehab  Further Equipment Recommendations for Discharge:  tbd       SUBJECTIVE:   Patient stated I just want this swelling to go down. Surendra Alcantara      OBJECTIVE DATA SUMMARY:   Cognitive/Behavioral Status:  Neurologic State: Alert  Orientation Level: Oriented X4  Cognition: Appropriate decision making; Appropriate for age attention/concentration; Appropriate safety awareness              Functional Mobility and Transfers for ADLs:  Bed Mobility:     Transfers:  Sit to Stand: Independent  Stand to Sit: Independent     Balance:  Sitting: Intact  Standing: Intact; Without support  Standing - Static: Good  Standing - Dynamic : Good     ADL Intervention:         pt was able to bathe Ub and stuart area, buttocks at the sink this am with setup per pt and nursing. He donned pull over shirt and robe with no assist after instruction. He is set up for grooming at the sink and needs assist with LB dressing due to swelling and will work with use of reacher at next session for Lb dressing. Pain:  Pain Scale 1: Numeric (0 - 10)  Pain Intensity 1: 0              Activity Tolerance:   vss  Please refer to the flowsheet for vital signs taken during this treatment.   After treatment:   [ ] Patient left in no apparent distress sitting up in chair  [ ] Patient left in no apparent distress in bed  [ ] Call bell left within reach  [ ] Nursing notified  [ ] Caregiver present  [ ] Bed alarm activated      COMMUNICATION/COLLABORATION:   The patients plan of care was discussed with: Physical Therapist and Registered Nurse     Zoila Parikh OT  Time Calculation: 25 mins

## 2017-02-17 NOTE — ROUTINE PROCESS
PCU SHIFT NURSING NOTE      Bedside shift change report given to [address]  rn (oncoming nurse) by dillon RN (offgoing nurse). Report included the following information SBAR, Kardex, Intake/Output, MAR and Recent Results. Shift Summary: Keiko Cantu Sleet into round with patient. No new orders noted. 1030- PT into work with patient. Patient refused bath at time. Did wash face and brush teeth. 1446- OT into work work with patient. Patient able to walk in hallway for the second time without difficulty. 1500- Reassessment complete. Young care and wound care complete. Mid line flushed well with positive blood return from ports complete. Patient denies further needs. None noted. 1930-Bedside shift change report given to [id number] [place] [address] (oncoming nurse) by Janet Danielson RN (offgoing nurse). Report included the following information SBAR, Kardex, Intake/Output and MAR. Admission Date 2/2/2017   Admission Diagnosis aneursym  Aortic dissection ([facility] [address]  melana   Consults IP CONSULT TO GASTROENTEROLOGY  IP CONSULT TO NEPHROLOGY        Consults   [x]PT   [x]OT   []Speech   []Case Management      [] Palliative      Cardiac Monitoring Order   [x]Yes   []No     IV drips   []Yes    Drip:                            Dose:  Drip:                            Dose:  Drip:                            Dose:   [x]No     GI Prophylaxis   []Yes   [x]No         DVT Prophylaxis   SCDs:  Sequential Compression Device: Bilateral          Prasanth stockings:         [] Medication   []Contraindicated   []None      Activity Level Activity Level: Up with Assistance     Activity Assistance: Partial (one person)   Purposeful Rounding every 1-2 hour?    [x]Yes   Lowry Score  Total Score: 1   Bed Alarm (If score 3 or >)   []Yes   [] Refused (See signed refusal form in chart)   Zi Score  Zi Score: 17   Zi Score (if score 14 or less)   []PMT consult   []Wound Care consult      []Specialty bed   [] Nutrition consult          Needs prior to discharge:   Home O2 required:    []Yes   [x]No    If yes, how much O2 required? Other:    Last Bowel Movement: Last Bowel Movement Date: 02/16/17      Influenza Vaccine Received Flu Vaccine for Current Season (usually Sept-March): No    Patient/Guardian Refused (Notify MD): No   Pneumonia Vaccine           Diet Active Orders   Diet    DIET DIABETIC CONSISTENT CARB Regular; AHA-LOW-CHOL FAT      LDAs         Midline Catheter, Dual 63/45/91 Right;Basilic (Active)   Criteria for Appropriate Use Limited/no vessel suitable for conventional peripheral access 2/17/2017  5:03 AM   Site Assessment Clean, dry, & intact 2/17/2017  5:03 AM   Phlebitis Assessment 0 2/17/2017  5:03 AM   Infiltration Assessment 0 2/17/2017  5:03 AM   Dressing Status Clean, dry, & intact 2/16/2017  7:04 PM   Dressing Type Transparent 2/17/2017  5:03 AM   Action Taken Blood drawn 2/17/2017  5:03 AM   Arm Circumference (cm) 38 cm 2/11/2017 10:42 AM   Date of Last Dressing Change 02/11/17 2/17/2017  5:03 AM   Proximal Hub Color/Line Status Red;Flushed 2/17/2017  5:03 AM   Distal Hub Color/Line Status Blue;Flushed 2/17/2017  5:03 AM   External Catheter Length (cm) 0 centimeters 2/15/2017 11:22 PM   Alcohol Cap Used Yes 2/14/2017 11:29 PM                            Urinary Catheter [REMOVED] Urinary Catheter 02/06/17 Young - Temperature-Criteria for Appropriate Use: Medically/surgically unstable  Urinary Catheter 02/10/17 Young-Criteria for Appropriate Use: Obstruction/retention  [REMOVED] Urinary Catheter 02/02/17 Young - Temperature-Criteria for Appropriate Use: Strict I/Os    Intake & Output   Date 02/16/17 0700 - 02/17/17 0659 02/17/17 0700 - 02/18/17 0659   Shift 3226-9879 6234-1573 24 Hour Total 9267-4245 2765-0834 24 Hour Total   I  N  T  A  K  E   P. O. 540  540         P. O. 540  540       Other  0 0         Irrigation Volume Input (mL) (Urinary Catheter 02/10/17 Young)  0 0       Shift Total  (mL/kg) 540  (3.9) 0  (0) 540  (3.9) O  U  T  P  U  T   Urine  (mL/kg/hr) 1950  (1.2) 3200  (1.9) 5150  (1.5)         Urine Output (mL) (Urinary Catheter 02/10/17 Young) 1950 3200 5150       Shift Total  (mL/kg) 1950  (14) 3200  (22.9) 5150  (36.9)      NET -1410 -3200 -4610      Weight (kg) 139.5 139.5 139.5 139.5 139.5 139.5         Readmission Risk Assessment Tool Score Low Risk            7       Total Score        3 Patient Length of Stay > 5    4 More than 1 Admission in calendar year        Criteria that do not apply:    Relationship with PCP    Patient Living Status    Patient Insurance is Medicare, Medicaid or Self Pay    Charlson Comorbidity Score       Expected Length of Stay 5d 14h   Actual Length of Stay 15

## 2017-02-17 NOTE — PROGRESS NOTES
NAME: Adam Huang        :  1965        MRN:  [id number]        Assessment :    Plan:  --FELICE  HTN  Anemia  Volume overload      ascending aortic dissection repair 2/3 HD initiated on  and HD/UF # 2 on ;  CTA on ;       Cr trending slightly up with needed diuresis  Ct Bumex -increase to 3 mg BID + oral KCL 20 meq every day  Metolazone added from 2-. Diuresed well last 24 hrs- 5 liters UOP    Would eventually stop Minoxidil- as it can cause significant edema as side effect and will muddy the picture. Would be then difficult to know how long he needs diuresis    Still has sig edema. Wt continues to improve  Leave gardner in for now, as he still has sig scrotal edema    Labs in AM             Subjective:     Chief Complaint:  oob in chair. Still with leg/scrotal edema    Objective:     VITALS:   Last 24hrs VS reviewed since prior progress note. Most recent are:  Visit Vitals    /57    Pulse 70    Temp 97.5 °F (36.4 °C)    Resp 20    Ht 6' (1.829 m)    Wt 137.9 kg (304 lb 0.2 oz)    SpO2 95%    BMI 41.23 kg/m2       Intake/Output Summary (Last 24 hours) at 17 1040  Last data filed at 17 0542   Gross per 24 hour   Intake              180 ml   Output             5150 ml   Net            -4970 ml      Telemetry Reviewed:     PHYSICAL EXAM:  General: Obese,  no acute distress  Resp:  Dec BS, no distress  CV:   RRR, ++ edema  : scrotal edema, gardner+  Alert awake     ________________________________________________________________________  Maura Orlando MD     Procedures: see electronic medical records for all procedures/Xrays and details which  were not copied into this note but were reviewed prior to creation of Plan.       LABS:  Recent Labs      02/15/17   0845   WBC  13.0*   HGB  8.9*   HCT  26.9*   PLT  230     Recent Labs      17   0450  02/15/17   0845   NA  137  139   K  3.5  4.3   CL 100  104   CO2  29  25   BUN  24*  25*   CREA  1.56*  1.42*   GLU  120*  110*   CA  8.3*  7.9*     No results for input(s): SGOT, GPT, AP, TBIL, TP, ALB, GLOB, GGT, AML, LPSE in the last 72 hours. No lab exists for component: AMYP, HLPSE  No results for input(s): INR, PTP, APTT in the last 72 hours. No lab exists for component: INREXT, INREXT   No results for input(s): FE, TIBC, PSAT, FERR in the last 72 hours. No results found for: FOL, RBCF   No results for input(s): PH, PCO2, PO2 in the last 72 hours. No results for input(s): CPK, CKMB in the last 72 hours.     No lab exists for component: TROPONINI  No components found for: Bj Point  Lab Results   Component Value Date/Time    Color DARK YELLOW 02/06/2017 01:03 PM    Color PENDING 02/06/2017 01:03 PM    Appearance CLEAR 02/06/2017 01:03 PM    Appearance PENDING 02/06/2017 01:03 PM    Specific gravity 1.019 02/06/2017 01:03 PM    Specific gravity PENDING 02/06/2017 01:03 PM    Specific gravity PENDING 02/06/2017 01:03 PM    pH (UA) 5.0 02/06/2017 01:03 PM    pH (UA) PENDING 02/06/2017 01:03 PM    Protein NEGATIVE  02/06/2017 01:03 PM    Protein PENDING 02/06/2017 01:03 PM    Glucose NEGATIVE  02/06/2017 01:03 PM    Glucose PENDING 02/06/2017 01:03 PM    Ketone NEGATIVE  02/06/2017 01:03 PM    Ketone PENDING 02/06/2017 01:03 PM    Bilirubin PENDING 02/06/2017 01:03 PM    Urobilinogen 0.2 02/06/2017 01:03 PM    Urobilinogen PENDING 02/06/2017 01:03 PM    Nitrites NEGATIVE  02/06/2017 01:03 PM    Nitrites PENDING 02/06/2017 01:03 PM    Leukocyte Esterase NEGATIVE  02/06/2017 01:03 PM    Leukocyte Esterase PENDING 02/06/2017 01:03 PM    Epithelial cells FEW 02/06/2017 01:03 PM    Epithelial cells DUPLICATE REQUEST 30/30/4919 01:03 PM    Bacteria NEGATIVE  02/06/2017 01:03 PM    Bacteria DUPLICATE REQUEST 49/32/9074 01:03 PM    WBC 0-4 02/06/2017 32:87 PM    WBC DUPLICATE REQUEST 48/20/6902 01:03 PM    RBC 0-5 02/06/2017 41:81 PM    RBC DUPLICATE REQUEST 02/06/2017 01:03 PM       MEDICATIONS:  Current Facility-Administered Medications   Medication Dose Route Frequency    metOLazone (ZAROXOLYN) tablet 2.5 mg  2.5 mg Oral DAILY    minoxidil (LONITEN) tablet 10 mg  10 mg Oral DAILY    bumetanide (BUMEX) tablet 2 mg  2 mg Oral BID    potassium chloride SR (KLOR-CON 10) tablet 20 mEq  20 mEq Oral DAILY    sodium chloride (NS) flush 5-10 mL  5-10 mL IntraVENous Q8H    sodium chloride (NS) flush 5-10 mL  5-10 mL IntraVENous PRN    acetaminophen (TYLENOL) tablet 650 mg  650 mg Oral Q4H PRN    oxyCODONE-acetaminophen (PERCOCET) 5-325 mg per tablet 1 Tab  1 Tab Oral Q4H PRN    naloxone (NARCAN) injection 0.4 mg  0.4 mg IntraVENous PRN    atorvastatin (LIPITOR) tablet 20 mg  20 mg Oral QPM    ondansetron (ZOFRAN) injection 4 mg  4 mg IntraVENous Q4H PRN    alum-mag hydroxide-simeth (MYLANTA) oral suspension 30 mL  30 mL Oral Q2H PRN    docusate sodium (COLACE) capsule 100 mg  100 mg Oral BID    zolpidem (AMBIEN) tablet 5 mg  5 mg Oral QHS PRN    polyethylene glycol (MIRALAX) packet 34 g  34 g Oral DAILY    traMADol (ULTRAM) tablet  mg   mg Oral Q6H PRN    hydrALAZINE (APRESOLINE) 20 mg/mL injection 10 mg  10 mg IntraVENous Q6H PRN    pantoprazole (PROTONIX) tablet 40 mg  40 mg Oral ACB&D    metoprolol tartrate (LOPRESSOR) tablet 50 mg  50 mg Oral Q12H    hydrALAZINE (APRESOLINE) tablet 100 mg  100 mg Oral Q6H    cloNIDine (CATAPRES) 0.3 mg/24 hr patch 1 Patch  1 Patch TransDERmal Q7D    amLODIPine (NORVASC) tablet 10 mg  10 mg Oral DAILY    influenza vaccine 2016-17 (36mos+)(PF) (FLUZONE/FLUARIX/FLULAVAL QUAD) injection 0.5 mL  0.5 mL IntraMUSCular PRIOR TO DISCHARGE    albuterol (PROVENTIL VENTOLIN) nebulizer solution 1.25-2.5 mg  1.25-2.5 mg Nebulization Q4H PRN    aspirin chewable tablet 81 mg  81 mg Oral DAILY    glucose chewable tablet 16 g  4 Tab Oral PRN    glucagon (GLUCAGEN) injection 1 mg  1 mg IntraMUSCular PRN    insulin lispro (HUMALOG) injection   SubCUTAneous AC&HS

## 2017-02-17 NOTE — PROGRESS NOTES
[facility] FLOOR Progress Note    Admit Date: 2017    POD: 12 Days Post-Op      Assessment:     Active Problems: Aortic dissection (HCC) (2017)      S/P ascending aortic replacement (2/3/2017)      Overview: EMERGENT ASCENDING AORTIC DISSECTION REPAIR       Right Femoral Artery Cannulation             Procedure(s):  ESOPHAGOGASTRODUODENOSCOPY (EGD)  SIGMOIDOSCOPY FLEXIBLE  ESOPHAGOGASTRODUODENAL (EGD) BIOPSY         Summary     Stable hemodynamics in sinus rhythm without ectopy on no support. Excellent diuresis with -4000 I/O. Still very swollen with penile swelling requiring gardner. Plan/Recommendations/Medical Decision Making:     Continue diuresis  Renal followup  Sling for scrotum  BP under better control with Minoxidil  Increase activity  Increase I/S effort and frequency  Sternal precautions  Stimulate bowel movement  Patient seen and reviewed with Dr. Alondra Holm MD      Objective:       CXR Results  (Last 48 hours)    None          Activity:    Diet/ BM:     Visit Vitals    /57    Pulse 70    Temp 97.5 °F (36.4 °C)    Resp 20    Ht 6' (1.829 m)    Wt 304 lb 0.2 oz (137.9 kg)    SpO2 95%    BMI 41.23 kg/m2       Temp (24hrs), Av.2 °F (36.8 °C), Min:97.5 °F (36.4 °C), Max:98.5 °F (36.9 °C)      Lab Data Reviewed: Recent Labs      17   0758  17   0450   02/15/17   0845   WBC   --    --    --   13.0*   HGB   --    --    --   8.9*   HCT   --    --    --   26.9*   PLT   --    --    --   230   NA   --   137   --   139   K   --   3.5   --   4.3   BUN   --   24*   --   25*   CREA   --   1.56*   --   1.42*   GLU   --   120*   --   110*   GLUCPOC  124*   --    < >   --     < > = values in this interval not displayed.        Last 24hr Input/Output:    Intake/Output Summary (Last 24 hours) at 17 0833  Last data filed at 17 0542   Gross per 24 hour   Intake              540 ml   Output             5150 ml   Net            -4610 ml        Admission Weight: Last Weight   Weight: 270 lb (122.5 kg) Weight: 304 lb 0.2 oz (137.9 kg)       EXAM:  General:      Chest: stable sternum      Incisions: dry and intact      Lungs:   Clear to auscultation bilaterally. Heart:  Regular rate and rhythm, S1, S2 normal, no murmur, no click, no rub      Abdomen:   Soft, non-tender. Bowel sounds present. Extremities:  slow improvement in edema     Neurologic:  Gross motor and sensory apparatus intact.        Signed By: GAETANO Vargas

## 2017-02-18 LAB
ANION GAP BLD CALC-SCNC: 8 MMOL/L (ref 5–15)
BUN SERPL-MCNC: 31 MG/DL (ref 6–20)
BUN/CREAT SERPL: 16 (ref 12–20)
CALCIUM SERPL-MCNC: 8.4 MG/DL (ref 8.5–10.1)
CHLORIDE SERPL-SCNC: 98 MMOL/L (ref 97–108)
CO2 SERPL-SCNC: 30 MMOL/L (ref 21–32)
CREAT SERPL-MCNC: 1.99 MG/DL (ref 0.7–1.3)
FERRITIN SERPL-MCNC: 358 NG/ML (ref 26–388)
GLUCOSE BLD STRIP.AUTO-MCNC: 134 MG/DL (ref 65–100)
GLUCOSE BLD STRIP.AUTO-MCNC: 135 MG/DL (ref 65–100)
GLUCOSE BLD STRIP.AUTO-MCNC: 158 MG/DL (ref 65–100)
GLUCOSE BLD STRIP.AUTO-MCNC: 166 MG/DL (ref 65–100)
GLUCOSE SERPL-MCNC: 117 MG/DL (ref 65–100)
IRON SATN MFR SERPL: 12 % (ref 20–50)
IRON SERPL-MCNC: 32 UG/DL (ref 35–150)
MAGNESIUM SERPL-MCNC: 2.1 MG/DL (ref 1.6–2.4)
PHOSPHATE SERPL-MCNC: 5.1 MG/DL (ref 2.6–4.7)
POTASSIUM SERPL-SCNC: 3.1 MMOL/L (ref 3.5–5.1)
SERVICE CMNT-IMP: ABNORMAL
SODIUM SERPL-SCNC: 136 MMOL/L (ref 136–145)
TIBC SERPL-MCNC: 260 UG/DL (ref 250–450)

## 2017-02-18 PROCEDURE — 83540 ASSAY OF IRON: CPT | Performed by: INTERNAL MEDICINE

## 2017-02-18 PROCEDURE — 82962 GLUCOSE BLOOD TEST: CPT

## 2017-02-18 PROCEDURE — 84100 ASSAY OF PHOSPHORUS: CPT | Performed by: INTERNAL MEDICINE

## 2017-02-18 PROCEDURE — 74011250637 HC RX REV CODE- 250/637: Performed by: INTERNAL MEDICINE

## 2017-02-18 PROCEDURE — 97116 GAIT TRAINING THERAPY: CPT

## 2017-02-18 PROCEDURE — 36415 COLL VENOUS BLD VENIPUNCTURE: CPT | Performed by: INTERNAL MEDICINE

## 2017-02-18 PROCEDURE — 80048 BASIC METABOLIC PNL TOTAL CA: CPT | Performed by: INTERNAL MEDICINE

## 2017-02-18 PROCEDURE — 83735 ASSAY OF MAGNESIUM: CPT | Performed by: INTERNAL MEDICINE

## 2017-02-18 PROCEDURE — 74011250637 HC RX REV CODE- 250/637: Performed by: THORACIC SURGERY (CARDIOTHORACIC VASCULAR SURGERY)

## 2017-02-18 PROCEDURE — 65660000000 HC RM CCU STEPDOWN

## 2017-02-18 PROCEDURE — 74011250637 HC RX REV CODE- 250/637: Performed by: PHYSICIAN ASSISTANT

## 2017-02-18 PROCEDURE — 74011636637 HC RX REV CODE- 636/637: Performed by: PHYSICIAN ASSISTANT

## 2017-02-18 PROCEDURE — 82728 ASSAY OF FERRITIN: CPT | Performed by: INTERNAL MEDICINE

## 2017-02-18 PROCEDURE — 74011250636 HC RX REV CODE- 250/636: Performed by: INTERNAL MEDICINE

## 2017-02-18 RX ORDER — POTASSIUM CHLORIDE 750 MG/1
40 TABLET, FILM COATED, EXTENDED RELEASE ORAL 2 TIMES DAILY
Status: DISCONTINUED | OUTPATIENT
Start: 2017-02-18 | End: 2017-02-19

## 2017-02-18 RX ORDER — METOLAZONE 2.5 MG/1
2.5 TABLET ORAL DAILY
Status: DISCONTINUED | OUTPATIENT
Start: 2017-02-19 | End: 2017-02-26

## 2017-02-18 RX ADMIN — POTASSIUM CHLORIDE 40 MEQ: 750 TABLET, FILM COATED, EXTENDED RELEASE ORAL at 17:10

## 2017-02-18 RX ADMIN — BUMETANIDE 3 MG: 1 TABLET ORAL at 08:58

## 2017-02-18 RX ADMIN — ASPIRIN 81 MG 81 MG: 81 TABLET ORAL at 08:54

## 2017-02-18 RX ADMIN — Medication 10 ML: at 15:49

## 2017-02-18 RX ADMIN — PANTOPRAZOLE SODIUM 40 MG: 40 TABLET, DELAYED RELEASE ORAL at 08:53

## 2017-02-18 RX ADMIN — METOPROLOL TARTRATE 50 MG: 50 TABLET ORAL at 08:53

## 2017-02-18 RX ADMIN — PANTOPRAZOLE SODIUM 40 MG: 40 TABLET, DELAYED RELEASE ORAL at 17:11

## 2017-02-18 RX ADMIN — AMLODIPINE BESYLATE 10 MG: 5 TABLET ORAL at 08:58

## 2017-02-18 RX ADMIN — INSULIN LISPRO 4 UNITS: 100 INJECTION, SOLUTION INTRAVENOUS; SUBCUTANEOUS at 13:02

## 2017-02-18 RX ADMIN — METOPROLOL TARTRATE 50 MG: 50 TABLET ORAL at 21:21

## 2017-02-18 RX ADMIN — INSULIN LISPRO 4 UNITS: 100 INJECTION, SOLUTION INTRAVENOUS; SUBCUTANEOUS at 21:22

## 2017-02-18 RX ADMIN — HYDRALAZINE HYDROCHLORIDE 100 MG: 25 TABLET, FILM COATED ORAL at 00:28

## 2017-02-18 RX ADMIN — HYDRALAZINE HYDROCHLORIDE 100 MG: 25 TABLET, FILM COATED ORAL at 07:11

## 2017-02-18 RX ADMIN — INSULIN LISPRO 2 UNITS: 100 INJECTION, SOLUTION INTRAVENOUS; SUBCUTANEOUS at 17:11

## 2017-02-18 RX ADMIN — MINOXIDIL 10 MG: 2.5 TABLET ORAL at 08:54

## 2017-02-18 RX ADMIN — INSULIN LISPRO 2 UNITS: 100 INJECTION, SOLUTION INTRAVENOUS; SUBCUTANEOUS at 08:54

## 2017-02-18 RX ADMIN — LINAGLIPTIN 5 MG: 5 TABLET, FILM COATED ORAL at 08:54

## 2017-02-18 RX ADMIN — ATORVASTATIN CALCIUM 20 MG: 20 TABLET, FILM COATED ORAL at 21:22

## 2017-02-18 RX ADMIN — POTASSIUM CHLORIDE 40 MEQ: 750 TABLET, FILM COATED, EXTENDED RELEASE ORAL at 08:54

## 2017-02-18 RX ADMIN — HYDRALAZINE HYDROCHLORIDE 100 MG: 25 TABLET, FILM COATED ORAL at 17:10

## 2017-02-18 RX ADMIN — HYDRALAZINE HYDROCHLORIDE 100 MG: 25 TABLET, FILM COATED ORAL at 13:02

## 2017-02-18 RX ADMIN — Medication 10 ML: at 07:12

## 2017-02-18 RX ADMIN — ERYTHROPOIETIN 10000 UNITS: 10000 INJECTION, SOLUTION INTRAVENOUS; SUBCUTANEOUS at 17:15

## 2017-02-18 RX ADMIN — BUMETANIDE 3 MG: 1 TABLET ORAL at 17:11

## 2017-02-18 RX ADMIN — Medication 10 ML: at 21:22

## 2017-02-18 NOTE — PROGRESS NOTES
PCU SHIFT NURSING NOTE      Bedside shift change report given to [address] rn (oncoming nurse) by dillon rn (offgoing nurse). Report included the following information SBAR, Kardex, Intake/Output and MAR. Shift Summary: 6572  0900- AM medications given. Patient refused stool softeners and laxatives. Patient having normal formed BM's Patient denies further questions or concerns. Call bell in reach. 1020-  Labs sent. 1030- Wound care complete. 1118- PT into work with patient. 1315- Patient walked in hallway without difficulty. 1500- Dr. Gee Abebe into round. No new orders noted, MD aware  proximal groin incision site slightly irritated with  no drainage. Site cleaned with chlorhexidene twice today. 1900-Bedside shift change report given to ba SANCHEZ (oncoming nurse) by Nav Durbin RN (offgoing nurse). Report included the following information SBAR, Kardex, Intake/Output, MAR and Recent Results. Admission Date 2/2/2017   Admission Diagnosis aneursym  Aortic dissection ([facility] [address]  melana   Consults IP CONSULT TO GASTROENTEROLOGY  IP CONSULT TO NEPHROLOGY        Consults   [x]PT   [x]OT   []Speech   []Case Management      [] Palliative      Cardiac Monitoring Order   [x]Yes   []No     IV drips   []Yes    Drip:                            Dose:  Drip:                            Dose:  Drip:                            Dose:   [x]No     GI Prophylaxis   [x]Yes   []No         DVT Prophylaxis   SCDs:  Sequential Compression Device: Bilateral          Prasanth stockings:         [x] Medication   []Contraindicated   []None      Activity Level Activity Level: Up ad melisa     Activity Assistance: Partial (one person)   Purposeful Rounding every 1-2 hour?    [x]Yes   Lowry Score  Total Score: 1   Bed Alarm (If score 3 or >)   []Yes   [] Refused (See signed refusal form in chart)   Zi Score  Zi Score: 22   Zi Score (if score 14 or less)   []PMT consult   []Wound Care consult      []Specialty bed   [] Nutrition consult Needs prior to discharge:   Home O2 required:    []Yes   [x]No    If yes, how much O2 required? Other:    Last Bowel Movement: Last Bowel Movement Date: 02/16/17      Influenza Vaccine Received Flu Vaccine for Current Season (usually Sept-March): No    Patient/Guardian Refused (Notify MD): No   Pneumonia Vaccine           Diet Active Orders   Diet    DIET DIABETIC CONSISTENT CARB Regular; AHA-LOW-CHOL FAT      LDAs         Midline Catheter, Dual 27/42/55 Right;Basilic (Active)   Criteria for Appropriate Use Hemodynamically unstable, requiring monitoring lines, vasopressors, or volume resuscitation 2/17/2017  8:34 PM   Site Assessment Clean, dry, & intact 2/17/2017  8:34 PM   Phlebitis Assessment 0 2/17/2017  8:34 PM   Infiltration Assessment 0 2/17/2017  8:34 PM   Dressing Status Clean, dry, & intact 2/17/2017  8:34 PM   Dressing Type Disk with Chlorhexadine Gluconate (CHG) 2/17/2017  8:34 PM   Action Taken Blood drawn 2/17/2017  5:03 AM   Arm Circumference (cm) 38 cm 2/11/2017 10:42 AM   Date of Last Dressing Change 02/11/17 2/17/2017  8:34 PM   Proximal Hub Color/Line Status Red;Capped 2/17/2017  8:34 PM   Distal Hub Color/Line Status Blue;Capped 2/17/2017  8:34 PM   External Catheter Length (cm) 0 centimeters 2/17/2017  8:34 PM   Alcohol Cap Used Yes 2/17/2017 12:25 PM                            Urinary Catheter [REMOVED] Urinary Catheter 02/06/17 Young - Temperature-Criteria for Appropriate Use: Medically/surgically unstable  Urinary Catheter 02/10/17 Yuong-Criteria for Appropriate Use: Obstruction/retention  [REMOVED] Urinary Catheter 02/02/17 Young - Temperature-Criteria for Appropriate Use: Strict I/Os    Intake & Output   Date 02/17/17 0700 - 02/18/17 0659 02/18/17 0700 - 02/19/17 0659   Shift 9253-5156 9899-0193 24 Hour Total 1199-1717 4300-8205 24 Hour Total   I  N  T  A  K  E   P. O. 600  600         P. O. 600  600       Shift Total  (mL/kg) 600  (4.4)  600  (4.4)      O  U  T  P  U  T Urine  (mL/kg/hr) 1350  (0.8) 950  (0.6) 2300  (0.7)         Urine Output (mL) (Urinary Catheter 02/10/17 Young) 6627 162 6315       Stool            Stool Occurrence(s) 1 x  1 x       Shift Total  (mL/kg) 1350  (9.8) 950  (6.9) 2300  (16.8)      NET -750 -950 -1700      Weight (kg) 137.9 137.3 137.3 137.3 137.3 137.3         Readmission Risk Assessment Tool Score Low Risk            7       Total Score        3 Patient Length of Stay > 5    4 More than 1 Admission in calendar year        Criteria that do not apply:    Relationship with PCP    Patient Living Status    Patient Insurance is Medicare, Medicaid or Self Pay    Charlson Comorbidity Score       Expected Length of Stay 5d 14h   Actual Length of Stay 16

## 2017-02-18 NOTE — PROGRESS NOTES
Problem: Mobility Impaired (Adult and Pediatric)  Goal: *Acute Goals and Plan of Care (Insert Text)  Physical Therapy Goals  Goals reviewed and remain appropriate 2/10/17  Initiated 2/3/2017  1. Patient will move from supine to sit and sit to supine , scoot up and down and roll side to side in bed with modified independence within 7 days. 2. Patient will perform sit to/from stand with modified independence within 7 days. 3. Patient will ambulate 250 feet with least restrictive assistive device and modified independence within 7 days. 4. Patient will ascend/descend 5 stairs with 1 handrail(s) with modified independence within 7 days. 5. Patient will perform cardiac exercises per protocol with independence within 7 days. 6. Patient will verbally and functionally recall 3/3 sternal precautions within 7 days. PHYSICAL THERAPY TREATMENT INCLUDING A FUNCTIONAL MEASURE  Patient: Adam Huang (25 y.o. male)  Date: 2/18/2017  Diagnosis: aneursym  Aortic dissection (HCC)  melana <principal problem not specified>  Procedure(s) (LRB):  ESOPHAGOGASTRODUODENOSCOPY (EGD) (N/A)  SIGMOIDOSCOPY FLEXIBLE (N/A)  ESOPHAGOGASTRODUODENAL (EGD) BIOPSY (N/A) 13 Days Post-Op  Precautions: Sternal  Chart, physical therapy assessment, plan of care and goals were reviewed. ASSESSMENT:  On arrival patient reporting feeling \"groggy\". Patient able to ambulate without AD with wide denis and slow pace and slight SOB. Kohler balance test done and patient scored 39/56 which places patient at medium fall risk. 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 Patient very motivated and would benefit from continued therapy in inpatient rehab due to home situation.     Progression toward goals:  [ ]      Improving appropriately and progressing toward goals  [X]      Improving slowly and progressing toward goals  [ ]      Not making progress toward goals and plan of care will be adjusted       PLAN:  Patient continues to benefit from skilled intervention to address the above impairments. Continue treatment per established plan of care. Discharge Recommendations:  Inpatient Rehab  Further Equipment Recommendations for Discharge:  tbd at facility, if home no DME needs       SUBJECTIVE:   Patient stated I am feeling groggy.       OBJECTIVE DATA SUMMARY:   Critical Behavior:  Neurologic State: Alert  Orientation Level: Oriented X4  Cognition: Appropriate decision making, Appropriate for age attention/concentration, Appropriate safety awareness     Functional Mobility Training:  Bed Mobility:         Transfers:  Sit to Stand: Contact guard assistance               Balance:     Ambulation/Gait Training:  Distance (ft): 120 Feet (ft)  Assistive Device: Gait belt  Ambulation - Level of Assistance: Contact guard assistance      Base of Support: Widened     Speed/Alva: Pace decreased (<100 feet/min)   Functional Measure:  Kohler Balance Test:      Sitting to Standin  Standing Unsupported: 4  Sitting with Back Unsupported: 4  Standing to Sittin  Transfers: 4  Standing Unsupported with Eyes Closed: 3  Standing Unsupported with Feet Together: 1  Reach Forward with Outstretched Arm: 1   Object: 3  Turn to Look Over Shoulders: 4  Turn 360 Degrees: 2  Alternate Foot on Step/Stool: 3  Standing Unsupported One Foot in Front: 1  Stand on One Le  Total: 39             56=Maximum possible score;   0-20=High fall risk  21-40=Moderate fall risk   41-56=Low fall risk      Kohler Balance Test and G-code impairment scale:  Percentage of Impairment CH     0%    CI     1-19% CJ     20-39% CK     40-59% CL     60-79% CM     80-99% CN      100%   Kohler   Score 0-56 56 45-55 34-44 23-33 12-22 1-11 0            Pain:  Pain Scale 1: Numeric (0 - 10)  Pain Intensity 1: 0      Activity Tolerance:   good  Please refer to the flowsheet for vital signs taken during this treatment.   After treatment:   [X] Patient left in no apparent distress sitting up in chair  [ ] Patient left in no apparent distress in bed  [X] Call bell left within reach  [X] Nursing notified  [ ] Caregiver present  [ ] Bed alarm activated       COMMUNICATION/COLLABORATION:   The patients plan of care was discussed with: Registered Nurse and [address]   Time Calculation: 16 mins

## 2017-02-18 NOTE — PROGRESS NOTES
No complaints. Fluid balance negative. Weight same today. BUN 31. Creat up 2.0.  K 3.1. Still anasarca. This most troubling to him in genitals. Keeping Young because of this problem. Continuing diuresis and antihypertensives while recognizing their contribution to azotemia and fluid retention.

## 2017-02-18 NOTE — PROGRESS NOTES
NAME: Juliette Iqbal        :  1965        MRN:  [id number]        Assessment :    Plan:  --FELICE  HTN  Anemia  Volume overload      ascending aortic dissection repair 2/3 HD initiated on  and HD/UF # 2 on ;  CTA on       Cr  up to 2 with needed diuresis  Ct Bumex -increased to 3 mg BID + oral KCL 40 meq BID  Change timing of metolozone to be 30 min prior to am bumex  Diuresed well last 24 hrs-       Still has sig edema. Wt continues to improve  Leave gardner in for now, as he still has sig scrotal edema, minoxidil, hydralazine and norvasc contributing     Add epo  Labs in AM             Subjective:     Chief Complaint:  Slept well, worried about his scrotal swelling    Objective:     VITALS:   Last 24hrs VS reviewed since prior progress note. Most recent are:  Visit Vitals    /58 (BP 1 Location: Left arm, BP Patient Position: At rest)    Pulse 71    Temp 98 °F (36.7 °C)    Resp 18    Ht 6' (1.829 m)    Wt 137.9 kg (304 lb 0.2 oz)    SpO2 94%    BMI 41.23 kg/m2       Intake/Output Summary (Last 24 hours) at 17 0650  Last data filed at 17 1911   Gross per 24 hour   Intake              600 ml   Output             2300 ml   Net            -1700 ml      Telemetry Reviewed:     PHYSICAL EXAM:  General: Obese,  no acute distress  Resp:  Dec BS, no distress  CV:   RRR, ++ edema  : scrotal edema, gardner+  Alert awake     ________________________________________________________________________  Senait Osman MD     Procedures: see electronic medical records for all procedures/Xrays and details which  were not copied into this note but were reviewed prior to creation of Plan.       LABS:  Recent Labs      02/15/17   0845   WBC  13.0*   HGB  8.9*   HCT  26.9*   PLT  230     Recent Labs      17   0413  17   0450  02/15/17   0845   NA  136  137  139   K  3.1*  3.5  4.3   CL  98  100  104   CO2  30 29  25   BUN  31*  24*  25*   CREA  1.99*  1.56*  1.42*   GLU  117*  120*  110*   CA  8.4*  8.3*  7.9*   MG  2.1   --    --    PHOS  5.1*   --    --      No results for input(s): SGOT, GPT, AP, TBIL, TP, ALB, GLOB, GGT, AML, LPSE in the last 72 hours. No lab exists for component: AMYP, HLPSE  No results for input(s): INR, PTP, APTT in the last 72 hours. No lab exists for component: INREXT, INREXT   No results for input(s): FE, TIBC, PSAT, FERR in the last 72 hours. No results found for: FOL, RBCF   No results for input(s): PH, PCO2, PO2 in the last 72 hours. No results for input(s): CPK, CKMB in the last 72 hours.     No lab exists for component: TROPONINI  No components found for: Bj Point  Lab Results   Component Value Date/Time    Color DARK YELLOW 02/06/2017 01:03 PM    Color PENDING 02/06/2017 01:03 PM    Appearance CLEAR 02/06/2017 01:03 PM    Appearance PENDING 02/06/2017 01:03 PM    Specific gravity 1.019 02/06/2017 01:03 PM    Specific gravity PENDING 02/06/2017 01:03 PM    Specific gravity PENDING 02/06/2017 01:03 PM    pH (UA) 5.0 02/06/2017 01:03 PM    pH (UA) PENDING 02/06/2017 01:03 PM    Protein NEGATIVE  02/06/2017 01:03 PM    Protein PENDING 02/06/2017 01:03 PM    Glucose NEGATIVE  02/06/2017 01:03 PM    Glucose PENDING 02/06/2017 01:03 PM    Ketone NEGATIVE  02/06/2017 01:03 PM    Ketone PENDING 02/06/2017 01:03 PM    Bilirubin PENDING 02/06/2017 01:03 PM    Urobilinogen 0.2 02/06/2017 01:03 PM    Urobilinogen PENDING 02/06/2017 01:03 PM    Nitrites NEGATIVE  02/06/2017 01:03 PM    Nitrites PENDING 02/06/2017 01:03 PM    Leukocyte Esterase NEGATIVE  02/06/2017 01:03 PM    Leukocyte Esterase PENDING 02/06/2017 01:03 PM    Epithelial cells FEW 02/06/2017 01:03 PM    Epithelial cells DUPLICATE REQUEST 70/35/9873 01:03 PM    Bacteria NEGATIVE  02/06/2017 01:03 PM    Bacteria DUPLICATE REQUEST 89/40/5004 01:03 PM    WBC 0-4 02/06/2017 98:31 PM    WBC DUPLICATE REQUEST 93/41/1688 01:03 PM    RBC 0-5 02/06/2017 91:59 PM    RBC DUPLICATE REQUEST 29/70/9104 01:03 PM       MEDICATIONS:  Current Facility-Administered Medications   Medication Dose Route Frequency    potassium chloride SR (KLOR-CON 10) tablet 40 mEq  40 mEq Oral BID    bumetanide (BUMEX) tablet 3 mg  3 mg Oral BID    linagliptin (TRADJENTA) tablet 5 mg  5 mg Oral ACB    metOLazone (ZAROXOLYN) tablet 2.5 mg  2.5 mg Oral DAILY    minoxidil (LONITEN) tablet 10 mg  10 mg Oral DAILY    sodium chloride (NS) flush 5-10 mL  5-10 mL IntraVENous Q8H    sodium chloride (NS) flush 5-10 mL  5-10 mL IntraVENous PRN    acetaminophen (TYLENOL) tablet 650 mg  650 mg Oral Q4H PRN    oxyCODONE-acetaminophen (PERCOCET) 5-325 mg per tablet 1 Tab  1 Tab Oral Q4H PRN    naloxone (NARCAN) injection 0.4 mg  0.4 mg IntraVENous PRN    atorvastatin (LIPITOR) tablet 20 mg  20 mg Oral QPM    ondansetron (ZOFRAN) injection 4 mg  4 mg IntraVENous Q4H PRN    alum-mag hydroxide-simeth (MYLANTA) oral suspension 30 mL  30 mL Oral Q2H PRN    docusate sodium (COLACE) capsule 100 mg  100 mg Oral BID    zolpidem (AMBIEN) tablet 5 mg  5 mg Oral QHS PRN    polyethylene glycol (MIRALAX) packet 34 g  34 g Oral DAILY    traMADol (ULTRAM) tablet  mg   mg Oral Q6H PRN    hydrALAZINE (APRESOLINE) 20 mg/mL injection 10 mg  10 mg IntraVENous Q6H PRN    pantoprazole (PROTONIX) tablet 40 mg  40 mg Oral ACB&D    metoprolol tartrate (LOPRESSOR) tablet 50 mg  50 mg Oral Q12H    hydrALAZINE (APRESOLINE) tablet 100 mg  100 mg Oral Q6H    cloNIDine (CATAPRES) 0.3 mg/24 hr patch 1 Patch  1 Patch TransDERmal Q7D    amLODIPine (NORVASC) tablet 10 mg  10 mg Oral DAILY    influenza vaccine 2016-17 (36mos+)(PF) (FLUZONE/FLUARIX/FLULAVAL QUAD) injection 0.5 mL  0.5 mL IntraMUSCular PRIOR TO DISCHARGE    albuterol (PROVENTIL VENTOLIN) nebulizer solution 1.25-2.5 mg  1.25-2.5 mg Nebulization Q4H PRN    aspirin chewable tablet 81 mg  81 mg Oral DAILY    glucose chewable tablet 16 g  4 Tab Oral PRN    glucagon (GLUCAGEN) injection 1 mg  1 mg IntraMUSCular PRN    insulin lispro (HUMALOG) injection   SubCUTAneous AC&HS

## 2017-02-19 LAB
ALBUMIN SERPL BCP-MCNC: 2.8 G/DL (ref 3.5–5)
ALBUMIN/GLOB SERPL: 0.8 {RATIO} (ref 1.1–2.2)
ALP SERPL-CCNC: 83 U/L (ref 45–117)
ALT SERPL-CCNC: 51 U/L (ref 12–78)
ANION GAP BLD CALC-SCNC: 10 MMOL/L (ref 5–15)
AST SERPL W P-5'-P-CCNC: 23 U/L (ref 15–37)
BILIRUB SERPL-MCNC: 0.4 MG/DL (ref 0.2–1)
BUN SERPL-MCNC: 37 MG/DL (ref 6–20)
BUN/CREAT SERPL: 17 (ref 12–20)
CALCIUM SERPL-MCNC: 8.2 MG/DL (ref 8.5–10.1)
CHLORIDE SERPL-SCNC: 97 MMOL/L (ref 97–108)
CO2 SERPL-SCNC: 29 MMOL/L (ref 21–32)
CREAT SERPL-MCNC: 2.21 MG/DL (ref 0.7–1.3)
GLOBULIN SER CALC-MCNC: 3.6 G/DL (ref 2–4)
GLUCOSE BLD STRIP.AUTO-MCNC: 133 MG/DL (ref 65–100)
GLUCOSE BLD STRIP.AUTO-MCNC: 146 MG/DL (ref 65–100)
GLUCOSE BLD STRIP.AUTO-MCNC: 155 MG/DL (ref 65–100)
GLUCOSE BLD STRIP.AUTO-MCNC: 170 MG/DL (ref 65–100)
GLUCOSE SERPL-MCNC: 121 MG/DL (ref 65–100)
MAGNESIUM SERPL-MCNC: 2.6 MG/DL (ref 1.6–2.4)
PHOSPHATE SERPL-MCNC: 5 MG/DL (ref 2.6–4.7)
POTASSIUM SERPL-SCNC: 3.1 MMOL/L (ref 3.5–5.1)
PROT SERPL-MCNC: 6.4 G/DL (ref 6.4–8.2)
SERVICE CMNT-IMP: ABNORMAL
SODIUM SERPL-SCNC: 136 MMOL/L (ref 136–145)

## 2017-02-19 PROCEDURE — 74011250637 HC RX REV CODE- 250/637: Performed by: INTERNAL MEDICINE

## 2017-02-19 PROCEDURE — 84100 ASSAY OF PHOSPHORUS: CPT | Performed by: INTERNAL MEDICINE

## 2017-02-19 PROCEDURE — 83735 ASSAY OF MAGNESIUM: CPT | Performed by: INTERNAL MEDICINE

## 2017-02-19 PROCEDURE — 74011250637 HC RX REV CODE- 250/637: Performed by: THORACIC SURGERY (CARDIOTHORACIC VASCULAR SURGERY)

## 2017-02-19 PROCEDURE — 65660000000 HC RM CCU STEPDOWN

## 2017-02-19 PROCEDURE — 36415 COLL VENOUS BLD VENIPUNCTURE: CPT | Performed by: INTERNAL MEDICINE

## 2017-02-19 PROCEDURE — 74011250637 HC RX REV CODE- 250/637: Performed by: PHYSICIAN ASSISTANT

## 2017-02-19 PROCEDURE — 82962 GLUCOSE BLOOD TEST: CPT

## 2017-02-19 PROCEDURE — 74011636637 HC RX REV CODE- 636/637: Performed by: PHYSICIAN ASSISTANT

## 2017-02-19 PROCEDURE — 97116 GAIT TRAINING THERAPY: CPT

## 2017-02-19 PROCEDURE — 80053 COMPREHEN METABOLIC PANEL: CPT | Performed by: INTERNAL MEDICINE

## 2017-02-19 RX ORDER — POTASSIUM CHLORIDE 750 MG/1
40 TABLET, FILM COATED, EXTENDED RELEASE ORAL 3 TIMES DAILY
Status: DISCONTINUED | OUTPATIENT
Start: 2017-02-19 | End: 2017-02-21

## 2017-02-19 RX ADMIN — METOPROLOL TARTRATE 50 MG: 50 TABLET ORAL at 08:35

## 2017-02-19 RX ADMIN — PANTOPRAZOLE SODIUM 40 MG: 40 TABLET, DELAYED RELEASE ORAL at 08:35

## 2017-02-19 RX ADMIN — Medication 10 ML: at 22:31

## 2017-02-19 RX ADMIN — METOLAZONE 2.5 MG: 2.5 TABLET ORAL at 08:36

## 2017-02-19 RX ADMIN — POTASSIUM CHLORIDE 40 MEQ: 750 TABLET, FILM COATED, EXTENDED RELEASE ORAL at 22:31

## 2017-02-19 RX ADMIN — MINOXIDIL 10 MG: 2.5 TABLET ORAL at 08:33

## 2017-02-19 RX ADMIN — POTASSIUM CHLORIDE 40 MEQ: 750 TABLET, FILM COATED, EXTENDED RELEASE ORAL at 08:34

## 2017-02-19 RX ADMIN — AMLODIPINE BESYLATE 10 MG: 5 TABLET ORAL at 08:34

## 2017-02-19 RX ADMIN — INSULIN LISPRO 4 UNITS: 100 INJECTION, SOLUTION INTRAVENOUS; SUBCUTANEOUS at 17:02

## 2017-02-19 RX ADMIN — LINAGLIPTIN 5 MG: 5 TABLET, FILM COATED ORAL at 08:35

## 2017-02-19 RX ADMIN — METOPROLOL TARTRATE 50 MG: 50 TABLET ORAL at 22:30

## 2017-02-19 RX ADMIN — HYDRALAZINE HYDROCHLORIDE 100 MG: 25 TABLET, FILM COATED ORAL at 23:40

## 2017-02-19 RX ADMIN — Medication 10 ML: at 06:13

## 2017-02-19 RX ADMIN — ASPIRIN 81 MG 81 MG: 81 TABLET ORAL at 08:35

## 2017-02-19 RX ADMIN — INSULIN LISPRO 2 UNITS: 100 INJECTION, SOLUTION INTRAVENOUS; SUBCUTANEOUS at 12:10

## 2017-02-19 RX ADMIN — BUMETANIDE 3 MG: 1 TABLET ORAL at 08:33

## 2017-02-19 RX ADMIN — INSULIN LISPRO 4 UNITS: 100 INJECTION, SOLUTION INTRAVENOUS; SUBCUTANEOUS at 22:31

## 2017-02-19 RX ADMIN — HYDRALAZINE HYDROCHLORIDE 100 MG: 25 TABLET, FILM COATED ORAL at 12:10

## 2017-02-19 RX ADMIN — POTASSIUM CHLORIDE 40 MEQ: 750 TABLET, FILM COATED, EXTENDED RELEASE ORAL at 16:56

## 2017-02-19 RX ADMIN — Medication 10 ML: at 14:02

## 2017-02-19 RX ADMIN — ATORVASTATIN CALCIUM 20 MG: 20 TABLET, FILM COATED ORAL at 22:30

## 2017-02-19 RX ADMIN — INSULIN LISPRO 2 UNITS: 100 INJECTION, SOLUTION INTRAVENOUS; SUBCUTANEOUS at 07:45

## 2017-02-19 RX ADMIN — PANTOPRAZOLE SODIUM 40 MG: 40 TABLET, DELAYED RELEASE ORAL at 16:56

## 2017-02-19 RX ADMIN — HYDRALAZINE HYDROCHLORIDE 100 MG: 25 TABLET, FILM COATED ORAL at 17:39

## 2017-02-19 RX ADMIN — BUMETANIDE 3 MG: 1 TABLET ORAL at 17:39

## 2017-02-19 RX ADMIN — HYDRALAZINE HYDROCHLORIDE 100 MG: 25 TABLET, FILM COATED ORAL at 06:12

## 2017-02-19 NOTE — PROGRESS NOTES
PCU SHIFT NURSING NOTE      Bedside and Verbal shift change report given to Abelino Valadez RN (oncoming nurse) by Quiana Jovel RN (offgoing nurse). Report included the following information SBAR, Kardex, ED Summary, OR Summary, Procedure Summary, Intake/Output, MAR, Recent Results, Med Rec Status, Cardiac Rhythm NSR and Alarm Parameters . Shift Summary:         Admission Date 2/2/2017   Admission Diagnosis aneursym  Aortic dissection ([address]  melana   Consults IP CONSULT TO GASTROENTEROLOGY  IP CONSULT TO NEPHROLOGY        Consults   [x]PT   [x]OT   []Speech   [x]Case Management      [] Palliative      Cardiac Monitoring Order   [x]Yes   []No     IV drips   []Yes    Drip:                            Dose:  Drip:                            Dose:  Drip:                            Dose:   [x]No     GI Prophylaxis   [x]Yes   []No         DVT Prophylaxis   SCDs:  Sequential Compression Device: Bilateral          Prasanth stockings:         [x] Medication   []Contraindicated   []None      Activity Level Activity Level: Head of bed elevated (degrees)     Activity Assistance: No assistance needed   Purposeful Rounding every 1-2 hour? [x]Yes   Lowry Score  Total Score: 1   Bed Alarm (If score 3 or >)   [x]Yes   [] Refused (See signed refusal form in chart)   Zi Score  Zi Score: 22   Zi Score (if score 14 or less)   [x]PMT consult   [x]Wound Care consult      []Specialty bed   [] Nutrition consult          Needs prior to discharge:   Home O2 required:    []Yes   [x]No    If yes, how much O2 required?     Other:    Last Bowel Movement: Last Bowel Movement Date: 02/18/17      Influenza Vaccine Received Flu Vaccine for Current Season (usually Sept-March): No    Patient/Guardian Refused (Notify MD): No   Pneumonia Vaccine           Diet Active Orders   Diet    DIET DIABETIC CONSISTENT CARB Regular; AHA-LOW-CHOL FAT      LDAs         Midline Catheter, Dual 52/45/24 Right;Basilic (Active)   Criteria for Appropriate Use Hemodynamically unstable, requiring monitoring lines, vasopressors, or volume resuscitation 2/19/2017  4:50 AM   Site Assessment Clean, dry, & intact 2/19/2017  4:50 AM   Phlebitis Assessment 0 2/19/2017  4:50 AM   Infiltration Assessment 0 2/19/2017  4:50 AM   Dressing Status Clean, dry, & intact 2/19/2017  4:50 AM   Dressing Type Disk with Chlorhexadine Gluconate (CHG); Transparent 2/19/2017  4:50 AM   Action Taken Blood drawn 2/17/2017  5:03 AM   Arm Circumference (cm) 38 cm 2/11/2017 10:42 AM   Date of Last Dressing Change 02/11/17 2/17/2017  8:34 PM   Proximal Hub Color/Line Status Red;Capped 2/19/2017  4:50 AM   Distal Hub Color/Line Status Blue;Capped 2/19/2017  4:50 AM   External Catheter Length (cm) 0 centimeters 2/17/2017  8:34 PM   Alcohol Cap Used Yes 2/19/2017  4:50 AM                            Urinary Catheter [REMOVED] Urinary Catheter 02/06/17 Young - Temperature-Criteria for Appropriate Use: Medically/surgically unstable  Urinary Catheter 02/10/17 Young-Criteria for Appropriate Use: Obstruction/retention  [REMOVED] Urinary Catheter 02/02/17 Young - Temperature-Criteria for Appropriate Use: Strict I/Os    Intake & Output   Date 02/18/17 0700 - 02/19/17 0659 02/19/17 0700 - 02/20/17 0659   Shift 3666-2937 2841-1625 24 Hour Total 7935-2638 3461-5726 24 Hour Total   I  N  T  A  K  E   P.O. 480  480         P. O. 480  480       Shift Total  (mL/kg) 480  (3.5)  480  (3.8)      O  U  T  P  U  T   Urine  (mL/kg/hr) 1600  (1) 1300  (0.9) 2900  (1)         Urine Output (mL) (Urinary Catheter 02/10/17 Young) 1600 1300 2900       Shift Total  (mL/kg) 1600  (11.7) 1300  (10.3) 2900  (22.9)      NET -1120 1300 -5300      Weight (kg) 137.3 126.8 126.8 126.8 126.8 126.8         Readmission Risk Assessment Tool Score Low Risk            7       Total Score        3 Patient Length of Stay > 5    4 More than 1 Admission in calendar year        Criteria that do not apply:    Relationship with PCP    Patient Living Status    Patient Insurance is Medicare, Medicaid or Self Pay    Charlson Comorbidity Score       Expected Length of Stay 5d 14h   Actual Length of Stay 17

## 2017-02-19 NOTE — PROGRESS NOTES
NAME: Virginia Smith        :  1965        MRN:  [id number]        Assessment :    Plan:  --FELICE  HTN  Anemia  Volume overload      ascending aortic dissection repair 2/3 HD initiated on  and HD/UF # 2 on ;  CTA on       Cr  up to 2.2 with needed diuresis  Ct Bumex -increased to 3 mg BID + oral KCL 40 meq TID  Changed timing of metolozone to be 30 min prior to am bumex  Diuresed well last 24 hrs-     Still with massive scrotal swelling-      Still has sig edema. Wt continues to improve  Leave gardner in for now, as he still has sig scrotal edema, minoxidil, hydralazine and norvasc contributing     Added epo  Labs in AM             Subjective:     Chief Complaint:  Slept well, worried about his scrotal swelling    Objective:     VITALS:   Last 24hrs VS reviewed since prior progress note. Most recent are:  Visit Vitals    /58    Pulse 69    Temp 97.9 °F (36.6 °C)    Resp 18    Ht 6' (1.829 m)    Wt 126.8 kg (279 lb 8.7 oz)    SpO2 95%    BMI 37.91 kg/m2       Intake/Output Summary (Last 24 hours) at 17 0711  Last data filed at 17 0450   Gross per 24 hour   Intake              480 ml   Output             2900 ml   Net            -2420 ml      Telemetry Reviewed:     PHYSICAL EXAM:  General: Obese,  no acute distress  Resp:  Dec BS, no distress  CV:   RRR, ++ edema  : scrotal edema, gardner+  Alert awake     ________________________________________________________________________  Ai Saleem MD     Procedures: see electronic medical records for all procedures/Xrays and details which  were not copied into this note but were reviewed prior to creation of Plan. LABS:  No results for input(s): WBC, HGB, HCT, PLT, HGBEXT, HCTEXT, PLTEXT, HGBEXT, HCTEXT, PLTEXT in the last 72 hours.   Recent Labs      17   0408  17   0413  17   0450   NA  136  136  137   K  3.1*  3.1*  3.5   CL  97 98  100   CO2  29  30  29   BUN  37*  31*  24*   CREA  2.21*  1.99*  1.56*   GLU  121*  117*  120*   CA  8.2*  8.4*  8.3*   MG  2.6*  2.1   --    PHOS  5.0*  5.1*   --      Recent Labs      02/19/17   0408   SGOT  23   AP  83   TP  6.4   ALB  2.8*   GLOB  3.6     No results for input(s): INR, PTP, APTT in the last 72 hours. No lab exists for component: INREXT, INREXT   Recent Labs      02/18/17   1015   FE  32*   TIBC  260   PSAT  12*   FERR  358      No results found for: FOL, RBCF   No results for input(s): PH, PCO2, PO2 in the last 72 hours. No results for input(s): CPK, CKMB in the last 72 hours.     No lab exists for component: TROPONINI  No components found for: Bj Point  Lab Results   Component Value Date/Time    Color DARK YELLOW 02/06/2017 01:03 PM    Color PENDING 02/06/2017 01:03 PM    Appearance CLEAR 02/06/2017 01:03 PM    Appearance PENDING 02/06/2017 01:03 PM    Specific gravity 1.019 02/06/2017 01:03 PM    Specific gravity PENDING 02/06/2017 01:03 PM    Specific gravity PENDING 02/06/2017 01:03 PM    pH (UA) 5.0 02/06/2017 01:03 PM    pH (UA) PENDING 02/06/2017 01:03 PM    Protein NEGATIVE  02/06/2017 01:03 PM    Protein PENDING 02/06/2017 01:03 PM    Glucose NEGATIVE  02/06/2017 01:03 PM    Glucose PENDING 02/06/2017 01:03 PM    Ketone NEGATIVE  02/06/2017 01:03 PM    Ketone PENDING 02/06/2017 01:03 PM    Bilirubin PENDING 02/06/2017 01:03 PM    Urobilinogen 0.2 02/06/2017 01:03 PM    Urobilinogen PENDING 02/06/2017 01:03 PM    Nitrites NEGATIVE  02/06/2017 01:03 PM    Nitrites PENDING 02/06/2017 01:03 PM    Leukocyte Esterase NEGATIVE  02/06/2017 01:03 PM    Leukocyte Esterase PENDING 02/06/2017 01:03 PM    Epithelial cells FEW 02/06/2017 01:03 PM    Epithelial cells DUPLICATE REQUEST 93/59/3754 01:03 PM    Bacteria NEGATIVE  02/06/2017 01:03 PM    Bacteria DUPLICATE REQUEST 02/64/3494 01:03 PM    WBC 0-4 02/06/2017 07:67 PM    WBC DUPLICATE REQUEST 22/59/2218 01:03 PM    RBC 0-5 02/06/2017 01:03 PM    RBC DUPLICATE REQUEST 09/56/4042 01:03 PM       MEDICATIONS:  Current Facility-Administered Medications   Medication Dose Route Frequency    potassium chloride SR (KLOR-CON 10) tablet 40 mEq  40 mEq Oral TID    metOLazone (ZAROXOLYN) tablet 2.5 mg  2.5 mg Oral DAILY    epoetin luis miguel (EPOGEN;PROCRIT) injection 10,000 Units  10,000 Units SubCUTAneous Once per day on Tue Sat    bumetanide (BUMEX) tablet 3 mg  3 mg Oral BID    linagliptin (TRADJENTA) tablet 5 mg  5 mg Oral ACB    minoxidil (LONITEN) tablet 10 mg  10 mg Oral DAILY    sodium chloride (NS) flush 5-10 mL  5-10 mL IntraVENous Q8H    sodium chloride (NS) flush 5-10 mL  5-10 mL IntraVENous PRN    acetaminophen (TYLENOL) tablet 650 mg  650 mg Oral Q4H PRN    oxyCODONE-acetaminophen (PERCOCET) 5-325 mg per tablet 1 Tab  1 Tab Oral Q4H PRN    naloxone (NARCAN) injection 0.4 mg  0.4 mg IntraVENous PRN    atorvastatin (LIPITOR) tablet 20 mg  20 mg Oral QPM    ondansetron (ZOFRAN) injection 4 mg  4 mg IntraVENous Q4H PRN    alum-mag hydroxide-simeth (MYLANTA) oral suspension 30 mL  30 mL Oral Q2H PRN    docusate sodium (COLACE) capsule 100 mg  100 mg Oral BID    zolpidem (AMBIEN) tablet 5 mg  5 mg Oral QHS PRN    polyethylene glycol (MIRALAX) packet 34 g  34 g Oral DAILY    traMADol (ULTRAM) tablet  mg   mg Oral Q6H PRN    hydrALAZINE (APRESOLINE) 20 mg/mL injection 10 mg  10 mg IntraVENous Q6H PRN    pantoprazole (PROTONIX) tablet 40 mg  40 mg Oral ACB&D    metoprolol tartrate (LOPRESSOR) tablet 50 mg  50 mg Oral Q12H    hydrALAZINE (APRESOLINE) tablet 100 mg  100 mg Oral Q6H    cloNIDine (CATAPRES) 0.3 mg/24 hr patch 1 Patch  1 Patch TransDERmal Q7D    amLODIPine (NORVASC) tablet 10 mg  10 mg Oral DAILY    influenza vaccine 2016-17 (36mos+)(PF) (FLUZONE/FLUARIX/FLULAVAL QUAD) injection 0.5 mL  0.5 mL IntraMUSCular PRIOR TO DISCHARGE    albuterol (PROVENTIL VENTOLIN) nebulizer solution 1.25-2.5 mg  1.25-2.5 mg Nebulization Q4H PRN    aspirin chewable tablet 81 mg  81 mg Oral DAILY    glucose chewable tablet 16 g  4 Tab Oral PRN    glucagon (GLUCAGEN) injection 1 mg  1 mg IntraMUSCular PRN    insulin lispro (HUMALOG) injection   SubCUTAneous AC&HS

## 2017-02-19 NOTE — PROGRESS NOTES
PCU SHIFT NURSING NOTE      Bedside and Verbal shift change report given to Gemma Valenzuela RN (oncoming nurse) by Ema Calderon (offgoing nurse). Report included the following information SBAR, Kardex, Intake/Output, MAR, Recent Results and Cardiac Rhythm NSR. Shift Summary:       Admission Date 2/2/2017   Admission Diagnosis aneursym  Aortic dissection ([address]  melana   Consults IP CONSULT TO GASTROENTEROLOGY  IP CONSULT TO NEPHROLOGY        Consults   [x]PT   [x]OT   []Speech   []Case Management      [] Palliative      Cardiac Monitoring Order   [x]Yes   []No     IV drips   []Yes    Drip:                            Dose:  Drip:                            Dose:  Drip:                            Dose:   [x]No     GI Prophylaxis   []Yes   []No         DVT Prophylaxis   SCDs:  Sequential Compression Device: Bilateral          Prasanth stockings:         [] Medication   []Contraindicated   []None      Activity Level Activity Level: Up ad melisa     Activity Assistance: No assistance needed   Purposeful Rounding every 1-2 hour? [x]Yes   Lowry Score  Total Score: 1   Bed Alarm (If score 3 or >)   []Yes   [] Refused (See signed refusal form in chart)   Zi Score  Zi Score: 22   Zi Score (if score 14 or less)   []PMT consult   []Wound Care consult      []Specialty bed   [] Nutrition consult          Needs prior to discharge:   Home O2 required:    []Yes   [x]No    If yes, how much O2 required?     Other:    Last Bowel Movement: Last Bowel Movement Date: 02/18/17      Influenza Vaccine Received Flu Vaccine for Current Season (usually Sept-March): No    Patient/Guardian Refused (Notify MD): No   Pneumonia Vaccine           Diet Active Orders   Diet    DIET DIABETIC CONSISTENT CARB Regular; AHA-LOW-CHOL FAT      LDAs         Midline Catheter, Dual 29/33/25 Right;Basilic (Active)   Criteria for Appropriate Use Hemodynamically unstable, requiring monitoring lines, vasopressors, or volume resuscitation 2/18/2017  4:10 PM   Site Assessment Clean, dry, & intact 2/18/2017  4:10 PM   Phlebitis Assessment 0 2/18/2017  7:25 AM   Infiltration Assessment 0 2/18/2017  7:25 AM   Dressing Status Clean, dry, & intact 2/18/2017  7:25 AM   Dressing Type Disk with Chlorhexadine Gluconate (CHG) 2/18/2017  4:10 PM   Action Taken Blood drawn 2/17/2017  5:03 AM   Arm Circumference (cm) 38 cm 2/11/2017 10:42 AM   Date of Last Dressing Change 02/11/17 2/17/2017  8:34 PM   Proximal Hub Color/Line Status Red;Capped;Flushed 2/18/2017  4:10 PM   Distal Hub Color/Line Status Blue;Capped;Flushed 2/18/2017  4:10 PM   External Catheter Length (cm) 0 centimeters 2/17/2017  8:34 PM   Alcohol Cap Used Yes 2/18/2017  4:10 PM                            Urinary Catheter [REMOVED] Urinary Catheter 02/06/17 Young - Temperature-Criteria for Appropriate Use: Medically/surgically unstable  Urinary Catheter 02/10/17 Young-Criteria for Appropriate Use: Obstruction/retention  [REMOVED] Urinary Catheter 02/02/17 Young - Temperature-Criteria for Appropriate Use: Strict I/Os    Intake & Output   Date 02/17/17 1900 - 02/18/17 0659 02/18/17 0700 - 02/19/17 0659   Shift 9633-1668 24 Hour Total 7333-2087 8097-9701 24 Hour Total   I  N  T  A  K  E   P. O.  600 480  480      P. O.  600 480  480    Shift Total  (mL/kg)  600  (4.4) 480  (3.5)  480  (3.5)   O  U  T  P  U  T   Urine  (mL/kg/hr) 950 2300 1600  (1)  1600      Urine Output (mL) (Urinary Catheter 02/10/17 Young) 950 2300 1600  1600    Stool           Stool Occurrence(s)  1 x       Shift Total  (mL/kg) 950  (6.9) 2300  (16.8) 1600  (11.7)  1600  (11.7)   NET -950 -1700 -1120  -1120   Weight (kg) 137.3 137.3 137.3 137.3 137.3         Readmission Risk Assessment Tool Score Low Risk            7       Total Score        3 Patient Length of Stay > 5    4 More than 1 Admission in calendar year        Criteria that do not apply:    Relationship with PCP    Patient Living Status    Patient Insurance is Medicare, Medicaid or Self Pay Charlson Comorbidity Score       Expected Length of Stay 5d 14h   Actual Length of Stay 16

## 2017-02-19 NOTE — PROGRESS NOTES
Problem: Mobility Impaired (Adult and Pediatric)  Goal: *Acute Goals and Plan of Care (Insert Text)  Physical Therapy Goals  Goals reviewed and remain appropriate 2/19/17  Goals reviewed and remain appropriate 2/10/17  Initiated 2/3/2017  1. Patient will move from supine to sit and sit to supine , scoot up and down and roll side to side in bed with modified independence within 7 days. 2. Patient will perform sit to/from stand with modified independence within 7 days. 3. Patient will ambulate 250 feet with least restrictive assistive device and modified independence within 7 days. 4. Patient will ascend/descend 5 stairs with 1 handrail(s) with modified independence within 7 days. 5. Patient will perform cardiac exercises per protocol with independence within 7 days. 6. Patient will verbally and functionally recall 3/3 sternal precautions within 7 days. PHYSICAL THERAPY TREATMENT: WEEKLY REASSESSMENT  Patient: Hoa Tavares (89 y.o. male)  Date: 2/19/2017  Diagnosis: aneursym  Aortic dissection (HCC)  melana <principal problem not specified>  Procedure(s) (LRB):  ESOPHAGOGASTRODUODENOSCOPY (EGD) (N/A)  SIGMOIDOSCOPY FLEXIBLE (N/A)  ESOPHAGOGASTRODUODENAL (EGD) BIOPSY (N/A) 14 Days Post-Op  Precautions: Sternal      ASSESSMENT:  Patient received sitting in bedside chair and agreeable to PT intervention. Patient cleared by nursing for mobility. Patient reporting having just completed cardiac exercises prior to PT's arrival. Patient continues to demonstrate limited functional mobility secondary to decreased activity tolerance, impaired gait, and generalized weakness. VSS. Pt performed transfers with CGA-SBA. Pt able to ambulate 160' with CGA x 1, demonstrating slow gait speed, increased trunk sway, mild path deviations, and overall mildly unsteady gait throughout. Pt with good adherence to sternal precautions during mobility.  Patient was assisted back into bedside chair with VSS and left with all needs met and nursing informed. Continue to recommend patient discharge to inpatient rehab to improve overall mobility, gait, and activity tolerance. Patient will continue to benefit from skilled acute PT to increase functional mobility. Patient's progression toward goals since last assessment: Slow progress       PLAN:  Goals have been updated based on progression since last assessment. Patient continues to benefit from skilled intervention to address the above impairments. Continue to follow the patient daily to address goals. Planned Interventions:  [X]              Bed Mobility Training             [ ]       Neuromuscular Re-Education  [X]              Transfer Training                   [ ]       Orthotic/Prosthetic Training  [X]              Gait Training                         [ ]       Modalities  [X]              Therapeutic Exercises           [ ]       Edema Management/Control  [X]              Therapeutic Activities            [X]       Patient and Family Training/Education  [ ]              Other (comment):  Discharge Recommendations: Rehab  Further Equipment Recommendations for Discharge: TBD by rehab       SUBJECTIVE:   Patient stated I just did the exercises.       OBJECTIVE DATA SUMMARY:   Critical Behavior:  Neurologic State: Alert  Orientation Level: Oriented X4  Cognition: Follows commands     Functional Mobility Training:  Bed Mobility:           Scooting: Supervision        Transfers:  Sit to Stand: Contact guard assistance  Stand to Sit: Stand-by asssistance                             Balance:  Sitting: Intact  Standing: Without support; Intact  Standing - Static: Good  Standing - Dynamic : Good  Ambulation/Gait Training:  Distance (ft): 160 Feet (ft)  Assistive Device: Gait belt  Ambulation - Level of Assistance: Contact guard assistance        Gait Abnormalities: Decreased step clearance;Trunk sway increased        Base of Support: Widened     Speed/Alva: Pace decreased (<100 feet/min)  Step Length: Left shortened;Right shortened  Therapeutic Exercises:   Pt reported performing cardiac exercises prior to PT arrival.  Pain:  Pain Scale 1: Numeric (0 - 10)  Pain Intensity 1: 0              Activity Tolerance:   Improving - pt with mild fatigue; VSS prior to and post-mobility  After treatment:   [X]  Patient left in no apparent distress sitting up in chair  [ ]  Patient left in no apparent distress in bed  [X]  Call bell left within reach  [X]  Nursing notified  [ ]  Caregiver present  [ ]  Bed alarm activated      COMMUNICATION/COLLABORATION:   The patients plan of care was discussed with: Physical Therapist and Registered Nurse     Khanh Payne PT, DPT   Time Calculation: 11 mins

## 2017-02-20 LAB
ALBUMIN SERPL BCP-MCNC: 2.8 G/DL (ref 3.5–5)
ALBUMIN/GLOB SERPL: 0.7 {RATIO} (ref 1.1–2.2)
ALP SERPL-CCNC: 86 U/L (ref 45–117)
ALT SERPL-CCNC: 50 U/L (ref 12–78)
ANION GAP BLD CALC-SCNC: 12 MMOL/L (ref 5–15)
AST SERPL W P-5'-P-CCNC: 28 U/L (ref 15–37)
BILIRUB SERPL-MCNC: 0.5 MG/DL (ref 0.2–1)
BUN SERPL-MCNC: 41 MG/DL (ref 6–20)
BUN/CREAT SERPL: 18 (ref 12–20)
CALCIUM SERPL-MCNC: 8.1 MG/DL (ref 8.5–10.1)
CHLORIDE SERPL-SCNC: 97 MMOL/L (ref 97–108)
CO2 SERPL-SCNC: 30 MMOL/L (ref 21–32)
CREAT SERPL-MCNC: 2.27 MG/DL (ref 0.7–1.3)
ERYTHROCYTE [DISTWIDTH] IN BLOOD BY AUTOMATED COUNT: 14.6 % (ref 11.5–14.5)
GLOBULIN SER CALC-MCNC: 3.8 G/DL (ref 2–4)
GLUCOSE BLD STRIP.AUTO-MCNC: 146 MG/DL (ref 65–100)
GLUCOSE BLD STRIP.AUTO-MCNC: 149 MG/DL (ref 65–100)
GLUCOSE BLD STRIP.AUTO-MCNC: 162 MG/DL (ref 65–100)
GLUCOSE BLD STRIP.AUTO-MCNC: 182 MG/DL (ref 65–100)
GLUCOSE SERPL-MCNC: 123 MG/DL (ref 65–100)
HCT VFR BLD AUTO: 22.1 % (ref 36.6–50.3)
HGB BLD-MCNC: 7.5 G/DL (ref 12.1–17)
MAGNESIUM SERPL-MCNC: 2.2 MG/DL (ref 1.6–2.4)
MCH RBC QN AUTO: 28.5 PG (ref 26–34)
MCHC RBC AUTO-ENTMCNC: 33.9 G/DL (ref 30–36.5)
MCV RBC AUTO: 84 FL (ref 80–99)
PHOSPHATE SERPL-MCNC: 4.2 MG/DL (ref 2.6–4.7)
PLATELET # BLD AUTO: 328 K/UL (ref 150–400)
POTASSIUM SERPL-SCNC: 2.9 MMOL/L (ref 3.5–5.1)
PROT SERPL-MCNC: 6.6 G/DL (ref 6.4–8.2)
RBC # BLD AUTO: 2.63 M/UL (ref 4.1–5.7)
SERVICE CMNT-IMP: ABNORMAL
SODIUM SERPL-SCNC: 139 MMOL/L (ref 136–145)
WBC # BLD AUTO: 11.6 K/UL (ref 4.1–11.1)

## 2017-02-20 PROCEDURE — 74011250637 HC RX REV CODE- 250/637: Performed by: THORACIC SURGERY (CARDIOTHORACIC VASCULAR SURGERY)

## 2017-02-20 PROCEDURE — 36415 COLL VENOUS BLD VENIPUNCTURE: CPT | Performed by: INTERNAL MEDICINE

## 2017-02-20 PROCEDURE — 84100 ASSAY OF PHOSPHORUS: CPT | Performed by: INTERNAL MEDICINE

## 2017-02-20 PROCEDURE — 74011250637 HC RX REV CODE- 250/637: Performed by: PHYSICIAN ASSISTANT

## 2017-02-20 PROCEDURE — 97110 THERAPEUTIC EXERCISES: CPT

## 2017-02-20 PROCEDURE — 74011250637 HC RX REV CODE- 250/637: Performed by: INTERNAL MEDICINE

## 2017-02-20 PROCEDURE — 83735 ASSAY OF MAGNESIUM: CPT | Performed by: INTERNAL MEDICINE

## 2017-02-20 PROCEDURE — 74011636637 HC RX REV CODE- 636/637: Performed by: PHYSICIAN ASSISTANT

## 2017-02-20 PROCEDURE — 97116 GAIT TRAINING THERAPY: CPT

## 2017-02-20 PROCEDURE — 85027 COMPLETE CBC AUTOMATED: CPT | Performed by: INTERNAL MEDICINE

## 2017-02-20 PROCEDURE — 65660000000 HC RM CCU STEPDOWN

## 2017-02-20 PROCEDURE — 82962 GLUCOSE BLOOD TEST: CPT

## 2017-02-20 PROCEDURE — 80053 COMPREHEN METABOLIC PANEL: CPT | Performed by: INTERNAL MEDICINE

## 2017-02-20 RX ADMIN — HYDRALAZINE HYDROCHLORIDE 100 MG: 25 TABLET, FILM COATED ORAL at 17:02

## 2017-02-20 RX ADMIN — INSULIN LISPRO 4 UNITS: 100 INJECTION, SOLUTION INTRAVENOUS; SUBCUTANEOUS at 21:51

## 2017-02-20 RX ADMIN — METOLAZONE 2.5 MG: 2.5 TABLET ORAL at 08:42

## 2017-02-20 RX ADMIN — PANTOPRAZOLE SODIUM 40 MG: 40 TABLET, DELAYED RELEASE ORAL at 17:02

## 2017-02-20 RX ADMIN — POLYETHYLENE GLYCOL 3350 34 G: 17 POWDER, FOR SOLUTION ORAL at 08:41

## 2017-02-20 RX ADMIN — ATORVASTATIN CALCIUM 20 MG: 20 TABLET, FILM COATED ORAL at 21:50

## 2017-02-20 RX ADMIN — AMLODIPINE BESYLATE 10 MG: 5 TABLET ORAL at 08:42

## 2017-02-20 RX ADMIN — HYDRALAZINE HYDROCHLORIDE 100 MG: 25 TABLET, FILM COATED ORAL at 11:15

## 2017-02-20 RX ADMIN — METOPROLOL TARTRATE 50 MG: 50 TABLET ORAL at 08:41

## 2017-02-20 RX ADMIN — INSULIN LISPRO 2 UNITS: 100 INJECTION, SOLUTION INTRAVENOUS; SUBCUTANEOUS at 11:15

## 2017-02-20 RX ADMIN — ASPIRIN 81 MG 81 MG: 81 TABLET ORAL at 08:42

## 2017-02-20 RX ADMIN — HYDRALAZINE HYDROCHLORIDE 100 MG: 25 TABLET, FILM COATED ORAL at 06:06

## 2017-02-20 RX ADMIN — INSULIN LISPRO 2 UNITS: 100 INJECTION, SOLUTION INTRAVENOUS; SUBCUTANEOUS at 07:20

## 2017-02-20 RX ADMIN — Medication 10 ML: at 06:07

## 2017-02-20 RX ADMIN — BUMETANIDE 3 MG: 1 TABLET ORAL at 17:06

## 2017-02-20 RX ADMIN — Medication 10 ML: at 14:00

## 2017-02-20 RX ADMIN — POTASSIUM CHLORIDE 40 MEQ: 750 TABLET, FILM COATED, EXTENDED RELEASE ORAL at 17:02

## 2017-02-20 RX ADMIN — BUMETANIDE 3 MG: 1 TABLET ORAL at 08:41

## 2017-02-20 RX ADMIN — DOCUSATE SODIUM 100 MG: 100 CAPSULE, LIQUID FILLED ORAL at 17:06

## 2017-02-20 RX ADMIN — MINOXIDIL 10 MG: 2.5 TABLET ORAL at 08:42

## 2017-02-20 RX ADMIN — Medication 10 ML: at 21:51

## 2017-02-20 RX ADMIN — PANTOPRAZOLE SODIUM 40 MG: 40 TABLET, DELAYED RELEASE ORAL at 07:20

## 2017-02-20 RX ADMIN — POTASSIUM CHLORIDE 40 MEQ: 750 TABLET, FILM COATED, EXTENDED RELEASE ORAL at 21:50

## 2017-02-20 RX ADMIN — METOPROLOL TARTRATE 50 MG: 50 TABLET ORAL at 21:50

## 2017-02-20 RX ADMIN — DOCUSATE SODIUM 100 MG: 100 CAPSULE, LIQUID FILLED ORAL at 08:42

## 2017-02-20 RX ADMIN — INSULIN LISPRO 4 UNITS: 100 INJECTION, SOLUTION INTRAVENOUS; SUBCUTANEOUS at 17:01

## 2017-02-20 RX ADMIN — POTASSIUM CHLORIDE 40 MEQ: 750 TABLET, FILM COATED, EXTENDED RELEASE ORAL at 08:41

## 2017-02-20 RX ADMIN — LINAGLIPTIN 5 MG: 5 TABLET, FILM COATED ORAL at 07:20

## 2017-02-20 NOTE — PROGRESS NOTES
PCU SHIFT NURSING NOTE      Bedside and Verbal shift change report given to Andrez Dancer, RN (oncoming nurse) by Melany Martinez RN (offgoing nurse). Report included the following information SBAR, Kardex, Intake/Output, MAR, Recent Results and Cardiac Rhythm NSR. Shift Summary:     2030 - Pt ambulated 320ft in hallway without difficulty. Admission Date 2/2/2017   Admission Diagnosis aneursym  Aortic dissection ([address]  melana   Consults IP CONSULT TO GASTROENTEROLOGY  IP CONSULT TO NEPHROLOGY        Consults   [x]PT   [x]OT   []Speech   []Case Management      [] Palliative      Cardiac Monitoring Order   [x]Yes   []No     IV drips   []Yes    Drip:                            Dose:  Drip:                            Dose:  Drip:                            Dose:   [x]No     GI Prophylaxis   []Yes   []No         DVT Prophylaxis   SCDs:  Sequential Compression Device: Bilateral          Prasanth stockings:         [] Medication   []Contraindicated   []None      Activity Level Activity Level: Head of bed elevated (degrees), Up with Assistance     Activity Assistance: No assistance needed   Purposeful Rounding every 1-2 hour? [x]Yes   Lowry Score  Total Score: 1   Bed Alarm (If score 3 or >)   []Yes   [] Refused (See signed refusal form in chart)   Zi Score  Zi Score: 21   Zi Score (if score 14 or less)   []PMT consult   []Wound Care consult      []Specialty bed   [] Nutrition consult          Needs prior to discharge:   Home O2 required:    []Yes   []No    If yes, how much O2 required?     Other:    Last Bowel Movement: Last Bowel Movement Date: 02/19/17      Influenza Vaccine Received Flu Vaccine for Current Season (usually Sept-March): No    Patient/Guardian Refused (Notify MD): No   Pneumonia Vaccine           Diet Active Orders   Diet    DIET DIABETIC CONSISTENT CARB Regular; AHA-LOW-CHOL FAT      LDAs         Midline Catheter, Dual 55/31/52 Right;Basilic (Active)   Criteria for Appropriate Use Hemodynamically unstable, requiring monitoring lines, vasopressors, or volume resuscitation 2/19/2017  4:15 PM   Site Assessment Clean, dry, & intact 2/19/2017  4:15 PM   Phlebitis Assessment 0 2/19/2017  4:15 PM   Infiltration Assessment 0 2/19/2017  4:15 PM   Dressing Status Clean, dry, & intact 2/19/2017  4:15 PM   Dressing Type Disk with Chlorhexadine Gluconate (CHG); Transparent 2/19/2017  4:15 PM   Action Taken Blood drawn 2/17/2017  5:03 AM   Arm Circumference (cm) 38 cm 2/11/2017 10:42 AM   Date of Last Dressing Change 02/11/17 2/19/2017  4:15 PM   Proximal Hub Color/Line Status Red;Capped;Flushed 2/19/2017  4:15 PM   Distal Hub Color/Line Status Blue;Capped;Flushed 2/19/2017  4:15 PM   External Catheter Length (cm) 0 centimeters 2/17/2017  8:34 PM   Alcohol Cap Used Yes 2/19/2017  4:15 PM                            Urinary Catheter [REMOVED] Urinary Catheter 02/06/17 Young - Temperature-Criteria for Appropriate Use: Medically/surgically unstable  Urinary Catheter 02/10/17 Young-Criteria for Appropriate Use: Obstruction/retention, Strict I/Os  [REMOVED] Urinary Catheter 02/02/17 Young - Temperature-Criteria for Appropriate Use: Strict I/Os    Intake & Output   Date 02/18/17 1900 - 02/19/17 0659 02/19/17 0700 - 02/20/17 0659   Shift 2480-8206 24 Hour Total 0187-7546 1093-1464 24 Hour Total   I  N  T  A  K  E   P.O.  480         P. O.  480       Shift Total  (mL/kg)  480  (3.8)      O  U  T  P  U  T   Urine  (mL/kg/hr) 1300 2900 2000  (1.3)  2000      Urine Output (mL) (Urinary Catheter 02/10/17 Young) 1300 2900 2000 2000    Shift Total  (mL/kg) 1300  (10.3) 2900  (22.9) 2000  (15.8)  2000  (15.8)   NET -1300 -2420 -2000  -2000   Weight (kg) 126.8 126.8 126.8 126.8 126.8         Readmission Risk Assessment Tool Score Low Risk            7       Total Score        3 Patient Length of Stay > 5    4 More than 1 Admission in calendar year        Criteria that do not apply:    Relationship with PCP    Patient Living Status Patient Insurance is Medicare, Medicaid or Self Pay    Charlson Comorbidity Score       Expected Length of Stay 5d 14h   Actual Length of Stay 17

## 2017-02-20 NOTE — PROGRESS NOTES
Problem: Mobility Impaired (Adult and Pediatric)  Goal: *Acute Goals and Plan of Care (Insert Text)  Physical Therapy Goals  Goals reviewed and remain appropriate 2/19/17  Goals reviewed and remain appropriate 2/10/17  Initiated 2/3/2017  1. Patient will move from supine to sit and sit to supine , scoot up and down and roll side to side in bed with modified independence within 7 days. 2. Patient will perform sit to/from stand with modified independence within 7 days. 3. Patient will ambulate 250 feet with least restrictive assistive device and modified independence within 7 days. 4. Patient will ascend/descend 5 stairs with 1 handrail(s) with modified independence within 7 days. 5. Patient will perform cardiac exercises per protocol with independence within 7 days. 6. Patient will verbally and functionally recall 3/3 sternal precautions within 7 days. PHYSICAL THERAPY TREATMENT  Patient: Micah Griffiths (87 y.o. male)  Date: 2/20/2017  Diagnosis: aneursym  Aortic dissection (HCC)  melana <principal problem not specified>  Procedure(s) (LRB):  ESOPHAGOGASTRODUODENOSCOPY (EGD) (N/A)  SIGMOIDOSCOPY FLEXIBLE (N/A)  ESOPHAGOGASTRODUODENAL (EGD) BIOPSY (N/A) 15 Days Post-Op  Precautions: Sternal      ASSESSMENT:  Pt is up in chair , very motivated for all therapy activities as always. Pt's main c/o is persistent edema which is not yet resolving, pt also discouraged by decreased activity tolerance. ( PTA, pt was independent/employed ) Pt is overall SBA for mobility, RA VSS. However, pt's standing balance & activity tolerance remain significant  limiting factors in addition to his prolonged hospital stay & comorbities. PT continues to feel pt is an excellent in pt rehab candidate to return to his PLOF.   Progression toward goals:  [ ]    Improving appropriately and progressing toward goals  [X]    Improving slowly and progressing toward goals  [ ]    Not making progress toward goals and plan of care will be adjusted       PLAN:  Patient continues to benefit from skilled intervention to address the above impairments. Continue treatment per established plan of care. Discharge Recommendations: Re consult [facility]  Inpatient Rehab  Further Equipment Recommendations for Discharge:  None anticipated       SUBJECTIVE:   Patient stated I stay tired & feel weak.       OBJECTIVE DATA SUMMARY:   Critical Behavior:  Neurologic State: Alert  Orientation Level: Oriented X4  Cognition: Follows commands     Functional Mobility Training:  Bed Mobility:      up in chair              Transfers:  Sit to Stand: Stand-by asssistance  Stand to Sit: Stand-by asssistance                             Balance:  Sitting: Intact  Standing: Impaired (without device)  Standing - Static: Fair  Standing - Dynamic : Fair  Ambulation/Gait Training:  Distance (ft): 120 Feet (ft)  Assistive Device: Gait belt  Ambulation - Level of Assistance: Stand-by asssistance        Gait Abnormalities: Decreased step clearance;Trunk sway increased        Base of Support: Widened     Speed/Alva: Pace decreased (<100 feet/min)  Therapeutic Exercises:   Pt is independent with SP & cardiac exercises, also BLE active exer  Pain:  Pain Scale 1: Numeric (0 - 10)  Pain Intensity 1: 0              Activity Tolerance:   Poor to fair with RA VSS  Please refer to the flowsheet for vital signs taken during this treatment.   After treatment:   [X]    Patient left in no apparent distress sitting up in chair  [ ]    Patient left in no apparent distress in bed  [X]    Call bell left within reach  [X]    Nursing notified  [ ]    Caregiver present  [ ]    Bed alarm activated      COMMUNICATION/COLLABORATION:   The patients plan of care was discussed with: Registered Nurse and      Evelin Roth, PT   Time Calculation: 25 mins

## 2017-02-20 NOTE — PROGRESS NOTES
PCU SHIFT NURSING NOTE      Bedside and Verbal shift change report given to Petrona Noel RN (oncoming nurse) by Kimberly Carrera RN (offgoing nurse). Report included the following information SBAR, Kardex, ED Summary, OR Summary, Procedure Summary, Intake/Output, MAR, Recent Results, Med Rec Status, Cardiac Rhythm NSR and Alarm Parameters . Shift Summary:         Admission Date 2/2/2017   Admission Diagnosis aneursym  Aortic dissection ([address]  melana   Consults IP CONSULT TO GASTROENTEROLOGY  IP CONSULT TO NEPHROLOGY        Consults   [x]PT   [x]OT   []Speech   [x]Case Management      [] Palliative      Cardiac Monitoring Order   [x]Yes   []No     IV drips   []Yes    Drip:                            Dose:  Drip:                            Dose:  Drip:                            Dose:   [x]No     GI Prophylaxis   [x]Yes   []No         DVT Prophylaxis   SCDs:  Sequential Compression Device: Bilateral     Patient Refused VTE Prophylaxis: Yes    Prasanth stockings:         [x] Medication   []Contraindicated   []None      Activity Level Activity Level: Up with Assistance     Activity Assistance: Partial (one person)   Purposeful Rounding every 1-2 hour? [x]Yes   Lowry Score  Total Score: 1   Bed Alarm (If score 3 or >)   [x]Yes   [] Refused (See signed refusal form in chart)   Zi Score  Zi Score: 20   Zi Score (if score 14 or less)   []PMT consult   []Wound Care consult      []Specialty bed   [] Nutrition consult          Needs prior to discharge:   Home O2 required:    []Yes   [x]No    If yes, how much O2 required?     Other:    Last Bowel Movement: Last Bowel Movement Date: 02/19/17      Influenza Vaccine Received Flu Vaccine for Current Season (usually Sept-March): No    Patient/Guardian Refused (Notify MD): No   Pneumonia Vaccine           Diet Active Orders   Diet    DIET DIABETIC CONSISTENT CARB Regular; AHA-LOW-CHOL FAT      LDAs         Midline Catheter, Dual 17/87/76 Right;Basilic (Active) Criteria for Appropriate Use Limited/no vessel suitable for conventional peripheral access 2/20/2017  2:00 PM   Site Assessment Clean, dry, & intact 2/20/2017  2:00 PM   Phlebitis Assessment 0 2/20/2017  2:00 PM   Infiltration Assessment 0 2/20/2017  2:00 PM   Dressing Status Clean, dry, & intact 2/20/2017  2:00 PM   Dressing Type Disk with Chlorhexadine Gluconate (CHG); Transparent 2/20/2017  2:00 PM   Action Taken Blood drawn 2/17/2017  5:03 AM   Arm Circumference (cm) 38 cm 2/11/2017 10:42 AM   Date of Last Dressing Change 02/11/17 2/20/2017  2:00 PM   Proximal Hub Color/Line Status Red;Capped;Flushed 2/20/2017  2:00 PM   Distal Hub Color/Line Status Blue;Capped;Flushed 2/20/2017  2:00 PM   External Catheter Length (cm) 0 centimeters 2/17/2017  8:34 PM   Alcohol Cap Used Yes 2/20/2017  2:00 PM                            Urinary Catheter [REMOVED] Urinary Catheter 02/06/17 Young - Temperature-Criteria for Appropriate Use: Medically/surgically unstable  Urinary Catheter 02/10/17 Young-Criteria for Appropriate Use: Medically/surgically unstable, Obstruction/retention, Strict I/Os  [REMOVED] Urinary Catheter 02/02/17 Young - Temperature-Criteria for Appropriate Use: Strict I/Os    Intake & Output   Date 02/19/17 0700 - 02/20/17 0659 02/20/17 0700 - 02/21/17 0659   Shift 2750-0240 2691-8589 24 Hour Total 0700-1859 7544-7237 24 Hour Total   I  N  T  A  K  E   Shift Total  (mL/kg)         O  U  T  P  U  T   Urine  (mL/kg/hr) 2000  (1.2) 2400  (1.5) 4400  (1.3) 1375  1375      Urine Output (mL) (Urinary Catheter 02/10/17 Young) 2000 2400 4400 1375  1375    Shift Total  (mL/kg) 2000  (14.5) 2400  (17.6) 4400  (32.4) 1375  (10.1)  1375  (10.1)   NET -2000 -2400 -4400 -1375  -1375   Weight (kg) 137.8 136 136 136 136 136         Readmission Risk Assessment Tool Score Low Risk            7       Total Score        3 Patient Length of Stay > 5    4 More than 1 Admission in calendar year        Criteria that do not apply: Relationship with PCP    Patient Living Status    Patient Insurance is Medicare, Medicaid or Self Pay    Charlson Comorbidity Score       Expected Length of Stay 5d 14h   Actual Length of Stay 18

## 2017-02-20 NOTE — PROGRESS NOTES
NAME: Virginia Smith        :  1965        MRN:  [id number]        Assessment :    Plan:  --FELICE  HTN  Anemia  Volume overload      ascending aortic dissection repair 2/3  Creatinine up a little with needed diuresis. Continue bumex and metolazone. PO KCL increased yesterday    Continue EPO    Labs in AM             Subjective:     Chief Complaint:  \"I feel better today. \"  No N/V. Thinks edema is better. No dyspnea. Objective:     VITALS:   Last 24hrs VS reviewed since prior progress note. Most recent are:  Visit Vitals    /51 (BP 1 Location: Left arm, BP Patient Position: At rest)    Pulse 72    Temp 98.5 °F (36.9 °C)    Resp 18    Ht 6' (1.829 m)    Wt 136 kg (299 lb 13.2 oz)    SpO2 98%    BMI 40.66 kg/m2       Intake/Output Summary (Last 24 hours) at 17 1033  Last data filed at 17 0617   Gross per 24 hour   Intake                0 ml   Output             4100 ml   Net            -4100 ml      Telemetry Reviewed:     PHYSICAL EXAM:  General: Obese,  no acute distress  Resp:  Dec BS, no distress  CV:   RRR, ++ edema  : scrotal edema, gardner+  Alert awake     ________________________________________________________________________  Patti Nuñez MD     Procedures: see electronic medical records for all procedures/Xrays and details which  were not copied into this note but were reviewed prior to creation of Plan.       LABS:  Recent Labs      17   034   WBC  11.6*   HGB  7.5*   HCT  22.1*   PLT  328     Recent Labs      17   0347  17   0408  17   0413   NA  139  136  136   K  2.9*  3.1*  3.1*   CL  97  97  98   CO2  30  29  30   BUN  41*  37*  31*   CREA  2.27*  2.21*  1.99*   GLU  123*  121*  117*   CA  8.1*  8.2*  8.4*   MG  2.2  2.6*  2.1   PHOS  4.2  5.0*  5.1*     Recent Labs      17   0347  17   0408   SGOT  28  23   AP  86  83   TP  6.6  6.4 ALB  2.8*  2.8*   GLOB  3.8  3.6     No results for input(s): INR, PTP, APTT in the last 72 hours. No lab exists for component: INREXT, INREXT   Recent Labs      02/18/17   1015   FE  32*   TIBC  260   PSAT  12*   FERR  358      No results found for: FOL, RBCF   No results for input(s): PH, PCO2, PO2 in the last 72 hours. No results for input(s): CPK, CKMB in the last 72 hours.     No lab exists for component: TROPONINI  No components found for: Bj Point  Lab Results   Component Value Date/Time    Color DARK YELLOW 02/06/2017 01:03 PM    Color PENDING 02/06/2017 01:03 PM    Appearance CLEAR 02/06/2017 01:03 PM    Appearance PENDING 02/06/2017 01:03 PM    Specific gravity 1.019 02/06/2017 01:03 PM    Specific gravity PENDING 02/06/2017 01:03 PM    Specific gravity PENDING 02/06/2017 01:03 PM    pH (UA) 5.0 02/06/2017 01:03 PM    pH (UA) PENDING 02/06/2017 01:03 PM    Protein NEGATIVE  02/06/2017 01:03 PM    Protein PENDING 02/06/2017 01:03 PM    Glucose NEGATIVE  02/06/2017 01:03 PM    Glucose PENDING 02/06/2017 01:03 PM    Ketone NEGATIVE  02/06/2017 01:03 PM    Ketone PENDING 02/06/2017 01:03 PM    Bilirubin PENDING 02/06/2017 01:03 PM    Urobilinogen 0.2 02/06/2017 01:03 PM    Urobilinogen PENDING 02/06/2017 01:03 PM    Nitrites NEGATIVE  02/06/2017 01:03 PM    Nitrites PENDING 02/06/2017 01:03 PM    Leukocyte Esterase NEGATIVE  02/06/2017 01:03 PM    Leukocyte Esterase PENDING 02/06/2017 01:03 PM    Epithelial cells FEW 02/06/2017 01:03 PM    Epithelial cells DUPLICATE REQUEST 30/52/7853 01:03 PM    Bacteria NEGATIVE  02/06/2017 01:03 PM    Bacteria DUPLICATE REQUEST 36/31/9333 01:03 PM    WBC 0-4 02/06/2017 85:36 PM    WBC DUPLICATE REQUEST 94/49/4366 01:03 PM    RBC 0-5 02/06/2017 31:49 PM    RBC DUPLICATE REQUEST 70/35/5441 01:03 PM       MEDICATIONS:  Current Facility-Administered Medications   Medication Dose Route Frequency    potassium chloride SR (KLOR-CON 10) tablet 40 mEq  40 mEq Oral TID    metOLazone (ZAROXOLYN) tablet 2.5 mg  2.5 mg Oral DAILY    epoetin luis miguel (EPOGEN;PROCRIT) injection 10,000 Units  10,000 Units SubCUTAneous Once per day on Tue Sat    bumetanide (BUMEX) tablet 3 mg  3 mg Oral BID    linagliptin (TRADJENTA) tablet 5 mg  5 mg Oral ACB    minoxidil (LONITEN) tablet 10 mg  10 mg Oral DAILY    sodium chloride (NS) flush 5-10 mL  5-10 mL IntraVENous Q8H    sodium chloride (NS) flush 5-10 mL  5-10 mL IntraVENous PRN    acetaminophen (TYLENOL) tablet 650 mg  650 mg Oral Q4H PRN    oxyCODONE-acetaminophen (PERCOCET) 5-325 mg per tablet 1 Tab  1 Tab Oral Q4H PRN    naloxone (NARCAN) injection 0.4 mg  0.4 mg IntraVENous PRN    atorvastatin (LIPITOR) tablet 20 mg  20 mg Oral QPM    ondansetron (ZOFRAN) injection 4 mg  4 mg IntraVENous Q4H PRN    alum-mag hydroxide-simeth (MYLANTA) oral suspension 30 mL  30 mL Oral Q2H PRN    docusate sodium (COLACE) capsule 100 mg  100 mg Oral BID    zolpidem (AMBIEN) tablet 5 mg  5 mg Oral QHS PRN    polyethylene glycol (MIRALAX) packet 34 g  34 g Oral DAILY    traMADol (ULTRAM) tablet  mg   mg Oral Q6H PRN    hydrALAZINE (APRESOLINE) 20 mg/mL injection 10 mg  10 mg IntraVENous Q6H PRN    pantoprazole (PROTONIX) tablet 40 mg  40 mg Oral ACB&D    metoprolol tartrate (LOPRESSOR) tablet 50 mg  50 mg Oral Q12H    hydrALAZINE (APRESOLINE) tablet 100 mg  100 mg Oral Q6H    cloNIDine (CATAPRES) 0.3 mg/24 hr patch 1 Patch  1 Patch TransDERmal Q7D    amLODIPine (NORVASC) tablet 10 mg  10 mg Oral DAILY    influenza vaccine 2016-17 (36mos+)(PF) (FLUZONE/FLUARIX/FLULAVAL QUAD) injection 0.5 mL  0.5 mL IntraMUSCular PRIOR TO DISCHARGE    albuterol (PROVENTIL VENTOLIN) nebulizer solution 1.25-2.5 mg  1.25-2.5 mg Nebulization Q4H PRN    aspirin chewable tablet 81 mg  81 mg Oral DAILY    glucose chewable tablet 16 g  4 Tab Oral PRN    glucagon (GLUCAGEN) injection 1 mg  1 mg IntraMUSCular PRN    insulin lispro (HUMALOG) injection SubCUTAneous AC&HS

## 2017-02-20 NOTE — PROGRESS NOTES
[facility] FLOOR Progress Note    Admit Date: 2017    POD: 15 Days Post-Op      Assessment:     Active Problems: Aortic dissection (HCC) (2017)      S/P ascending aortic replacement (2/3/2017)      Overview: EMERGENT ASCENDING AORTIC DISSECTION REPAIR       Right Femoral Artery Cannulation             Procedure(s):  ESOPHAGOGASTRODUODENOSCOPY (EGD)  SIGMOIDOSCOPY FLEXIBLE  ESOPHAGOGASTRODUODENAL (EGD) BIOPSY         Summary     Stable hemodynamics in sinus rhythm without ectopy on no support. Good BP control. Loss 2 lbs. Plan/Recommendations/Medical Decision Making:     Diuresis   ACE held for RF  Increase activity  Increase I/S effort and frequency  Sternal precautions  Stimulate bowel movement  Patient seen and reviewed with Dr. Chris Arnett MD      Objective:       CXR Results  (Last 48 hours)    None          Visit Vitals    /58 (BP 1 Location: Left arm, BP Patient Position: At rest)    Pulse 67    Temp 98.3 °F (36.8 °C)    Resp 20    Ht 6' (1.829 m)    Wt 299 lb 13.2 oz (136 kg)    SpO2 97%    BMI 40.66 kg/m2       Temp (24hrs), Av.3 °F (36.8 °C), Min:98 °F (36.7 °C), Max:98.5 °F (36.9 °C)      Lab Data Reviewed: Recent Labs      17   1649   17   0347   WBC   --    --   11.6*   HGB   --    --   7.5*   HCT   --    --   22.1*   PLT   --    --   328   NA   --    --   139   K   --    --   2.9*   BUN   --    --   41*   CREA   --    --   2.27*   GLU   --    --   123*   GLUCPOC  162*   < >   --     < > = values in this interval not displayed.        Last 24hr Input/Output:    Intake/Output Summary (Last 24 hours) at 17 1732  Last data filed at 17 1400   Gross per 24 hour   Intake                0 ml   Output             3775 ml   Net            -3775 ml        Admission Weight: Last Weight   Weight: 270 lb (122.5 kg) Weight: 299 lb 13.2 oz (136 kg)       EXAM:  General:      Chest: stable sternum      Incisions: dry and intact      Lungs:   Clear to auscultation bilaterally. Heart:  Regular rate and rhythm, S1, S2 normal, no murmur, no click, no rub      Abdomen:   Soft, non-tender. Bowel sounds present. Extremities:  edema     Neurologic:  Gross motor and sensory apparatus intact.        Signed By: GAETANO Jeffries

## 2017-02-21 LAB
ANION GAP BLD CALC-SCNC: 10 MMOL/L (ref 5–15)
ANION GAP BLD CALC-SCNC: 11 MMOL/L (ref 5–15)
BUN SERPL-MCNC: 42 MG/DL (ref 6–20)
BUN SERPL-MCNC: 42 MG/DL (ref 6–20)
BUN/CREAT SERPL: 20 (ref 12–20)
BUN/CREAT SERPL: 20 (ref 12–20)
CALCIUM SERPL-MCNC: 8.2 MG/DL (ref 8.5–10.1)
CALCIUM SERPL-MCNC: 8.6 MG/DL (ref 8.5–10.1)
CHLORIDE SERPL-SCNC: 96 MMOL/L (ref 97–108)
CHLORIDE SERPL-SCNC: 96 MMOL/L (ref 97–108)
CO2 SERPL-SCNC: 32 MMOL/L (ref 21–32)
CO2 SERPL-SCNC: 33 MMOL/L (ref 21–32)
CREAT SERPL-MCNC: 2.12 MG/DL (ref 0.7–1.3)
CREAT SERPL-MCNC: 2.15 MG/DL (ref 0.7–1.3)
GLUCOSE BLD STRIP.AUTO-MCNC: 121 MG/DL (ref 65–100)
GLUCOSE BLD STRIP.AUTO-MCNC: 148 MG/DL (ref 65–100)
GLUCOSE BLD STRIP.AUTO-MCNC: 153 MG/DL (ref 65–100)
GLUCOSE BLD STRIP.AUTO-MCNC: 180 MG/DL (ref 65–100)
GLUCOSE SERPL-MCNC: 125 MG/DL (ref 65–100)
GLUCOSE SERPL-MCNC: 135 MG/DL (ref 65–100)
POTASSIUM SERPL-SCNC: 3 MMOL/L (ref 3.5–5.1)
POTASSIUM SERPL-SCNC: 3 MMOL/L (ref 3.5–5.1)
SERVICE CMNT-IMP: ABNORMAL
SODIUM SERPL-SCNC: 138 MMOL/L (ref 136–145)
SODIUM SERPL-SCNC: 140 MMOL/L (ref 136–145)

## 2017-02-21 PROCEDURE — 74011250637 HC RX REV CODE- 250/637: Performed by: INTERNAL MEDICINE

## 2017-02-21 PROCEDURE — 80048 BASIC METABOLIC PNL TOTAL CA: CPT | Performed by: INTERNAL MEDICINE

## 2017-02-21 PROCEDURE — 74011250637 HC RX REV CODE- 250/637: Performed by: PHYSICIAN ASSISTANT

## 2017-02-21 PROCEDURE — 97535 SELF CARE MNGMENT TRAINING: CPT | Performed by: OCCUPATIONAL THERAPIST

## 2017-02-21 PROCEDURE — 97116 GAIT TRAINING THERAPY: CPT

## 2017-02-21 PROCEDURE — 74011250637 HC RX REV CODE- 250/637: Performed by: THORACIC SURGERY (CARDIOTHORACIC VASCULAR SURGERY)

## 2017-02-21 PROCEDURE — 74011636637 HC RX REV CODE- 636/637: Performed by: PHYSICIAN ASSISTANT

## 2017-02-21 PROCEDURE — 82962 GLUCOSE BLOOD TEST: CPT

## 2017-02-21 PROCEDURE — 36415 COLL VENOUS BLD VENIPUNCTURE: CPT | Performed by: INTERNAL MEDICINE

## 2017-02-21 PROCEDURE — 65660000000 HC RM CCU STEPDOWN

## 2017-02-21 PROCEDURE — 74011250636 HC RX REV CODE- 250/636: Performed by: INTERNAL MEDICINE

## 2017-02-21 RX ORDER — POTASSIUM CHLORIDE 750 MG/1
40 TABLET, FILM COATED, EXTENDED RELEASE ORAL 4 TIMES DAILY
Status: DISCONTINUED | OUTPATIENT
Start: 2017-02-21 | End: 2017-02-23

## 2017-02-21 RX ADMIN — Medication 10 ML: at 21:20

## 2017-02-21 RX ADMIN — INSULIN LISPRO 4 UNITS: 100 INJECTION, SOLUTION INTRAVENOUS; SUBCUTANEOUS at 17:13

## 2017-02-21 RX ADMIN — POLYETHYLENE GLYCOL 3350 34 G: 17 POWDER, FOR SOLUTION ORAL at 09:27

## 2017-02-21 RX ADMIN — INSULIN LISPRO 2 UNITS: 100 INJECTION, SOLUTION INTRAVENOUS; SUBCUTANEOUS at 11:45

## 2017-02-21 RX ADMIN — DOCUSATE SODIUM 100 MG: 100 CAPSULE, LIQUID FILLED ORAL at 17:13

## 2017-02-21 RX ADMIN — HYDRALAZINE HYDROCHLORIDE 100 MG: 25 TABLET, FILM COATED ORAL at 23:39

## 2017-02-21 RX ADMIN — LINAGLIPTIN 5 MG: 5 TABLET, FILM COATED ORAL at 09:27

## 2017-02-21 RX ADMIN — METOLAZONE 2.5 MG: 2.5 TABLET ORAL at 09:27

## 2017-02-21 RX ADMIN — AMLODIPINE BESYLATE 10 MG: 5 TABLET ORAL at 09:26

## 2017-02-21 RX ADMIN — MINOXIDIL 10 MG: 2.5 TABLET ORAL at 09:27

## 2017-02-21 RX ADMIN — PANTOPRAZOLE SODIUM 40 MG: 40 TABLET, DELAYED RELEASE ORAL at 17:13

## 2017-02-21 RX ADMIN — METOPROLOL TARTRATE 50 MG: 50 TABLET ORAL at 21:20

## 2017-02-21 RX ADMIN — POTASSIUM CHLORIDE 40 MEQ: 750 TABLET, FILM COATED, EXTENDED RELEASE ORAL at 09:26

## 2017-02-21 RX ADMIN — ERYTHROPOIETIN 10000 UNITS: 10000 INJECTION, SOLUTION INTRAVENOUS; SUBCUTANEOUS at 21:23

## 2017-02-21 RX ADMIN — INSULIN LISPRO 4 UNITS: 100 INJECTION, SOLUTION INTRAVENOUS; SUBCUTANEOUS at 21:23

## 2017-02-21 RX ADMIN — METOPROLOL TARTRATE 50 MG: 50 TABLET ORAL at 09:26

## 2017-02-21 RX ADMIN — POTASSIUM CHLORIDE 40 MEQ: 750 TABLET, FILM COATED, EXTENDED RELEASE ORAL at 12:48

## 2017-02-21 RX ADMIN — HYDRALAZINE HYDROCHLORIDE 100 MG: 25 TABLET, FILM COATED ORAL at 17:13

## 2017-02-21 RX ADMIN — HYDRALAZINE HYDROCHLORIDE 100 MG: 25 TABLET, FILM COATED ORAL at 12:48

## 2017-02-21 RX ADMIN — BUMETANIDE 3 MG: 1 TABLET ORAL at 17:13

## 2017-02-21 RX ADMIN — DOCUSATE SODIUM 100 MG: 100 CAPSULE, LIQUID FILLED ORAL at 09:27

## 2017-02-21 RX ADMIN — Medication 10 ML: at 09:28

## 2017-02-21 RX ADMIN — INSULIN LISPRO 2 UNITS: 100 INJECTION, SOLUTION INTRAVENOUS; SUBCUTANEOUS at 07:15

## 2017-02-21 RX ADMIN — POTASSIUM CHLORIDE 40 MEQ: 750 TABLET, FILM COATED, EXTENDED RELEASE ORAL at 21:19

## 2017-02-21 RX ADMIN — ATORVASTATIN CALCIUM 20 MG: 20 TABLET, FILM COATED ORAL at 21:19

## 2017-02-21 RX ADMIN — ASPIRIN 81 MG 81 MG: 81 TABLET ORAL at 09:26

## 2017-02-21 RX ADMIN — POTASSIUM CHLORIDE 40 MEQ: 750 TABLET, FILM COATED, EXTENDED RELEASE ORAL at 17:13

## 2017-02-21 RX ADMIN — BUMETANIDE 3 MG: 1 TABLET ORAL at 09:25

## 2017-02-21 RX ADMIN — PANTOPRAZOLE SODIUM 40 MG: 40 TABLET, DELAYED RELEASE ORAL at 09:27

## 2017-02-21 RX ADMIN — Medication 40 ML: at 14:25

## 2017-02-21 NOTE — PROGRESS NOTES
PCU SHIFT NURSING NOTE      Bedside and Verbal shift change report given to Williemae Bosworth, RN (oncoming nurse) by Tiara Rosado RN (offgoing nurse). Report included the following information SBAR, Kardex, Intake/Output, MAR, Recent Results and Cardiac Rhythm NSR. Shift Summary:       Admission Date 2/2/2017   Admission Diagnosis aneursym  Aortic dissection ([address]  melana   Consults IP CONSULT TO GASTROENTEROLOGY  IP CONSULT TO NEPHROLOGY        Consults   [x]PT   [x]OT   []Speech   []Case Management      [] Palliative      Cardiac Monitoring Order   [x]Yes   []No     IV drips   []Yes    Drip:                            Dose:  Drip:                            Dose:  Drip:                            Dose:   [x]No     GI Prophylaxis   []Yes   []No         DVT Prophylaxis   SCDs:  Sequential Compression Device: Bilateral     Patient Refused VTE Prophylaxis: Yes    Prasanth stockings:         [] Medication   []Contraindicated   []None      Activity Level Activity Level: Up with Assistance     Activity Assistance: Partial (one person)   Purposeful Rounding every 1-2 hour? [x]Yes   Lowry Score  Total Score: 1   Bed Alarm (If score 3 or >)   []Yes   [] Refused (See signed refusal form in chart)   Zi Score  Zi Score: 20   Zi Score (if score 14 or less)   []PMT consult   []Wound Care consult      []Specialty bed   [] Nutrition consult          Needs prior to discharge:   Home O2 required:    []Yes   [x]No    If yes, how much O2 required?     Other:    Last Bowel Movement: Last Bowel Movement Date: 02/19/17      Influenza Vaccine Received Flu Vaccine for Current Season (usually Sept-March): No    Patient/Guardian Refused (Notify MD): No   Pneumonia Vaccine           Diet Active Orders   Diet    DIET DIABETIC CONSISTENT CARB Regular; AHA-LOW-CHOL FAT      LDAs         Midline Catheter, Dual 95/40/69 Right;Basilic (Active)   Criteria for Appropriate Use Limited/no vessel suitable for conventional peripheral access 2/20/2017 2:00 PM   Site Assessment Clean, dry, & intact 2/20/2017  2:00 PM   Phlebitis Assessment 0 2/20/2017  2:00 PM   Infiltration Assessment 0 2/20/2017  2:00 PM   Dressing Status Clean, dry, & intact 2/20/2017  2:00 PM   Dressing Type Disk with Chlorhexadine Gluconate (CHG); Transparent 2/20/2017  2:00 PM   Action Taken Blood drawn 2/17/2017  5:03 AM   Arm Circumference (cm) 38 cm 2/11/2017 10:42 AM   Date of Last Dressing Change 02/11/17 2/20/2017  2:00 PM   Proximal Hub Color/Line Status Red;Capped;Flushed 2/20/2017  2:00 PM   Distal Hub Color/Line Status Blue;Capped;Flushed 2/20/2017  2:00 PM   External Catheter Length (cm) 0 centimeters 2/17/2017  8:34 PM   Alcohol Cap Used Yes 2/20/2017  2:00 PM                            Urinary Catheter [REMOVED] Urinary Catheter 02/06/17 Young - Temperature-Criteria for Appropriate Use: Medically/surgically unstable  Urinary Catheter 02/10/17 Young-Criteria for Appropriate Use: Medically/surgically unstable, Obstruction/retention, Strict I/Os  [REMOVED] Urinary Catheter 02/02/17 Young - Temperature-Criteria for Appropriate Use: Strict I/Os    Intake & Output   Date 02/19/17 1900 - 02/20/17 0659 02/20/17 0700 - 02/21/17 0659   Shift 5015-1706 24 Hour Total 1000-0453 2591-8860 24 Hour Total   I  N  T  A  K  E   Shift Total  (mL/kg)        O  U  T  P  U  T   Urine  (mL/kg/hr) 2400 4400 1925  (1.2)  1925      Urine Output (mL) (Urinary Catheter 02/10/17 Young) 2400 4400 1925  1925    Shift Total  (mL/kg) 2400  (17.6) 4400  (32.4) 1925  (14.2)  1925  (14.2)   NET -2400 -4400 -1925  -1925   Weight (kg) 136 136 136 136 136         Readmission Risk Assessment Tool Score Low Risk            7       Total Score        3 Patient Length of Stay > 5    4 More than 1 Admission in calendar year        Criteria that do not apply:    Relationship with PCP    Patient Living Status    Patient Insurance is Medicare, Medicaid or Self Pay    Charlson Comorbidity Score       Expected Length of Stay 5d 14h   Actual Length of Stay 18

## 2017-02-21 NOTE — PROGRESS NOTES
PCU SHIFT NURSING NOTE      Bedside and Verbal shift change report given to Mina King RN (oncoming nurse) by Tanya Leahy RN (offgoing nurse). Report included the following information SBAR, Kardex, ED Summary, OR Summary, Procedure Summary, Intake/Output, MAR, Recent Results, Med Rec Status, Cardiac Rhythm NSR and Alarm Parameters . Shift Summary:         Admission Date 2/2/2017   Admission Diagnosis aneursym  Aortic dissection ([address]  melana   Consults IP CONSULT TO GASTROENTEROLOGY  IP CONSULT TO NEPHROLOGY        Consults   [x]PT   [x]OT   []Speech   [x]Case Management      [] Palliative      Cardiac Monitoring Order   [x]Yes   []No     IV drips   []Yes    Drip:                            Dose:  Drip:                            Dose:  Drip:                            Dose:   [x]No     GI Prophylaxis   [x]Yes   []No         DVT Prophylaxis   SCDs:  Sequential Compression Device: Bilateral     Patient Refused VTE Prophylaxis: Yes    Prasanth stockings:         [x] Medication   []Contraindicated   []None      Activity Level Activity Level: Up with Assistance     Activity Assistance: Partial (one person)   Purposeful Rounding every 1-2 hour? [x]Yes   Lowry Score  Total Score: 1   Bed Alarm (If score 3 or >)   [x]Yes   [] Refused (See signed refusal form in chart)   Zi Score  Zi Score: 20   Zi Score (if score 14 or less)   [x]PMT consult   [x]Wound Care consult      []Specialty bed   [] Nutrition consult          Needs prior to discharge:   Home O2 required:    []Yes   [x]No    If yes, how much O2 required?     Other:    Last Bowel Movement: Last Bowel Movement Date: 02/20/17      Influenza Vaccine Received Flu Vaccine for Current Season (usually Sept-March): No    Patient/Guardian Refused (Notify MD): No   Pneumonia Vaccine           Diet Active Orders   Diet    DIET DIABETIC CONSISTENT CARB Regular; AHA-LOW-CHOL FAT      LDAs         Midline Catheter, Dual 14/44/56 Right;Basilic (Active) Criteria for Appropriate Use Hemodynamically unstable, requiring monitoring lines, vasopressors, or volume resuscitation 2/21/2017 11:00 AM   Site Assessment Clean, dry, & intact 2/21/2017 11:00 AM   Phlebitis Assessment 0 2/21/2017 11:00 AM   Infiltration Assessment 0 2/21/2017 11:00 AM   Dressing Status Clean, dry, & intact 2/21/2017 11:00 AM   Dressing Type Disk with Chlorhexadine Gluconate (CHG); Transparent 2/21/2017 11:00 AM   Action Taken Blood drawn 2/17/2017  5:03 AM   Arm Circumference (cm) 38 cm 2/11/2017 10:42 AM   Date of Last Dressing Change 02/11/17 2/20/2017  2:00 PM   Proximal Hub Color/Line Status Red;Cap end changed; Flushed 2/21/2017 11:00 AM   Distal Hub Color/Line Status Blue;Capped;Flushed 2/21/2017 11:00 AM   External Catheter Length (cm) 0 centimeters 2/17/2017  8:34 PM   Alcohol Cap Used Yes 2/21/2017 11:00 AM                            Urinary Catheter [REMOVED] Urinary Catheter 02/06/17 Young - Temperature-Criteria for Appropriate Use: Medically/surgically unstable  Urinary Catheter 02/10/17 Young-Criteria for Appropriate Use: Medically/surgically unstable, Obstruction/retention, Strict I/Os  [REMOVED] Urinary Catheter 02/02/17 Young - Temperature-Criteria for Appropriate Use: Strict I/Os    Intake & Output   Date 02/20/17 0700 - 02/21/17 0659 02/21/17 0700 - 02/22/17 0659   Shift 5027-8128 9973-0512 24 Hour Total 2451-7616 8770-5482 24 Hour Total   I  N  T  A  K  E   Shift Total  (mL/kg)         O  U  T  P  U  T   Urine  (mL/kg/hr) 1925  (1.2)  1925  (0.6) 2000 2000      Urine Output (mL) (Urinary Catheter 02/10/17 Young) 1925 1925 2000 2000    Shift Total  (mL/kg) 1925  (14.2)  1925  (14.3) 2000  (14.8)  2000  (14.8)   NET -1925  -1925 -2000  -2000   Weight (kg) 136 135 135 135 135 135         Readmission Risk Assessment Tool Score Low Risk            7       Total Score        3 Patient Length of Stay > 5    4 More than 1 Admission in calendar year        Criteria that do not apply: Relationship with PCP    Patient Living Status    Patient Insurance is Medicare, Medicaid or Self Pay    Charlson Comorbidity Score       Expected Length of Stay 5d 14h   Actual Length of Stay 19

## 2017-02-21 NOTE — PROGRESS NOTES
CM met with patient to discuss discharge planning. Patient's first choice would be to go to [facility] first.  His goal is to be able to return to baseline to include working. Patient was fully independent prior to admission. A  for a local facility. Updated referral sent to [facility]. CM explained that they had thought he was too high functioning at time of most recent review. Patient verbalized understanding. SNF list provided to patient. He would like to meet with Liaisons from [facility] and [address] [place] [address],[address] at this time only to discuss rehab programs they provide at each facility. Referral sent via ecin with note explaining above. FOC copy in hand and on chart.      Kelsie Narvaez RN CM  Ext [phone]

## 2017-02-21 NOTE — PROGRESS NOTES
Follow up visit to check on Mr. Libia Torres due to his length-of-stay. He was sitting up in his chair and affirmed he had no needs. He was visited today by an  from his Religious [employer] - and that appeared to have lifted his spirits. I assured him of pastoral care support as needed. Patient expressed no needs. Please page if needed/desired. [phone]. Visit by: Candance Jo. Dung Johnson.  Aaron Trevino MA, [facility]    Lead  [employer] Advancement

## 2017-02-21 NOTE — PROGRESS NOTES
Problem: Mobility Impaired (Adult and Pediatric)  Goal: *Acute Goals and Plan of Care (Insert Text)  Physical Therapy Goals  Goals reviewed and remain appropriate 2/19/17  Goals reviewed and remain appropriate 2/10/17  Initiated 2/3/2017  1. Patient will move from supine to sit and sit to supine , scoot up and down and roll side to side in bed with modified independence within 7 days. 2. Patient will perform sit to/from stand with modified independence within 7 days. 3. Patient will ambulate 250 feet with least restrictive assistive device and modified independence within 7 days. 4. Patient will ascend/descend 5 stairs with 1 handrail(s) with modified independence within 7 days. 5. Patient will perform cardiac exercises per protocol with independence within 7 days. 6. Patient will verbally and functionally recall 3/3 sternal precautions within 7 days. PHYSICAL THERAPY TREATMENT  Patient: Mika Andrade (33 y.o. male)  Date: 2/21/2017  Diagnosis: aneursym  Aortic dissection (HCC)  melana <principal problem not specified>  Procedure(s) (LRB):  ESOPHAGOGASTRODUODENOSCOPY (EGD) (N/A)  SIGMOIDOSCOPY FLEXIBLE (N/A)  ESOPHAGOGASTRODUODENAL (EGD) BIOPSY (N/A) 16 Days Post-Op  Precautions: Sternal      ASSESSMENT:  Pt up in chair, reports he has walked once & performed all his exercises. Pt remains amazingly upbeat  in spite of prolonged hospital stay. Pt is able to amb 140 ft with SBA, needing one standing rest break, RA VSS. Pt's PLOF (& his goal ), however, was independent/employed/active, & he has neither the strength or activity tolerance to return to that as yet. Pt will benefit from rehab for return to his independent, functional life.    Progression toward goals:  [ ]    Improving appropriately and progressing toward goals  [ ]    Improving slowly and progressing toward goals  [ ]    Not making progress toward goals and plan of care will be adjusted       PLAN:  Patient continues to benefit from skilled intervention to address the above impairments. Continue treatment per established plan of care. Discharge Recommendations:  Inpatient Rehab  Further Equipment Recommendations for Discharge:  TBD       SUBJECTIVE:   Patient stated The swelling is finally going down some.       OBJECTIVE DATA SUMMARY:   Critical Behavior:  Neurologic State: Alert, Appropriate for age  Orientation Level: Oriented X4  Cognition: Appropriate decision making, Follows commands     Functional Mobility Training:  Bed Mobility:      Up in chair              Transfers:  Sit to Stand: Stand-by asssistance  Stand to Sit: Stand-by asssistance        Bed to Chair: Stand-by asssistance                    Balance:  Sitting: Intact  Standing: Intact  Ambulation/Gait Training:  Distance (ft): 140 Feet (ft)  Assistive Device: Gait belt;Walker, rolling  Ambulation - Level of Assistance: Stand-by asssistance                 Base of Support: Widened     Pain:  Pain Scale 1: Numeric (0 - 10)  Pain Intensity 1: 0              Activity Tolerance:   RA VSS  Please refer to the flowsheet for vital signs taken during this treatment.   After treatment:   [X]    Patient left in no apparent distress sitting up in chair  [ ]    Patient left in no apparent distress in bed  [X]    Call bell left within reach  [X]    Nursing notified  [ ]    Caregiver present  [ ]    Bed alarm activated      COMMUNICATION/COLLABORATION:   The patients plan of care was discussed with: Registered Nurse and      Evelin Roth, PT   Time Calculation: 15 mins

## 2017-02-21 NOTE — PROGRESS NOTES
Chart review. PT is recommending inpatient rehab. [facility] has deferred to SNF facility. Failed attempt to meet with patient to discuss. Patient is sleeping and did not waken to calling his name out. CM will continue to follow.     Jenn Biggs  Ext [phone]

## 2017-02-21 NOTE — PROGRESS NOTES
Nutrition Assessment:    INTERVENTIONS/RECOMMENDATIONS:   Meals/Snacks: General/healthful diet: Continue consistent carbohydrate/cardiac diet     ASSESSMENT:   Patient receiving a consistent carbohydrate/cardiac diet. Tolerating PO well. Good appetite reported. Eating % of meals. Using menu and room service appropriately. Patient reports his blood sugars have been high. Encouraged use of carbohydrate counts on menu. Continue current nutrition plan of care. Monitor appetite/PO and BG. Diet Order: Consistent carb, Cardiac  % Eaten:  Patient Vitals for the past 72 hrs:   % Diet Eaten   02/18/17 1303 75 %     Pertinent Medications: [x] Reviewed []Other: norvasc, atorvastatin, bumex, colace, epogen, hydralazine, humalog, tradjenta, lopressor, protonix, miralax, KCl   Pertinent Labs: [x]Reviewed  []Other: K+ 3.0 (replaced), BUN 42, Cr 2.15, -[phone]  Food Allergies: [x]None []Other:     Last BM: 2/19   [x]Active     []Hyperactive  []Hypoactive       [] Absent  BS  Skin:    [] Intact   [x] Incision  [] Breakdown   []Edema   []Other:    Anthropometrics: Height: 6' (182.9 cm) Weight: 135 kg (297 lb 9.9 oz)    IBW (%IBW):   ( ) UBW (%UBW):   (  %)    BMI: Body mass index is 40.36 kg/(m^2). This BMI is indicative of:  []Underweight   []Normal   []Overweight   [] Obesity   [x] Extreme Obesity (BMI>40)  Last Weight Metrics:  Weight Loss Metrics 2/21/2017   Today's Wt 297 lb 9.9 oz   BMI 40.36 kg/m2       Estimated Nutrition Needs (Based on): 6026 Kcals/day (BMR (2238) x 1. 3AF -500kcal) , 97 g (1.2gPro/kg IBW) Protein  Carbohydrate:  At Least 130 g/day  Fluids: 2000 mL/day     Pt expected to meet estimated nutrient needs: [x]Yes []No    NUTRITION DIAGNOSES:   Problem:   (No nutrition diagnosis at this time )      Etiology:   Signs/Symptoms:     NUTRITION INTERVENTIONS:  Meals/Snacks: General/healthful diet                  GOAL:   Continue PO intake >75% of meals next 4-6 days     NUTRITION MONITORING AND EVALUATION   Food/Nutrient Intake Outcomes:  Total energy intake  Physical Signs/Symptoms Outcomes: Weight/weight change, Electrolyte and renal profile, Glucose profile    Previous Goal Met:   [x] Met              [] Progressing Towards Goal              [] Not Progressing Towards Goal   Previous Recommendations:   [x] Implemented          [] Not Implemented          [] Not Applicable    LEARNING NEEDS (Diet, Food/Nutrient-Drug Interaction):    [x] None Identified   [] Identified and Education Provided/Documented   [] Identified and Pt declined/was not appropriate     Cultural, Latter day, OR Ethnic Dietary Needs:    [x] None Identified   [] Identified and Addressed     [x] Interdisciplinary Care Plan Reviewed/Documented    [x] Discharge Planning:Consistent carbohydrate/cardiac diet    [] Participated in Interdisciplinary Rounds    NUTRITION RISK:    [] High              [] Moderate           [x]  Low  []  Minimal/Uncompromised      Temple Grate  Pager [phone]              Weekend Pager [phone]

## 2017-02-21 NOTE — PROGRESS NOTES
NAME: Ricardo Peter        :  1965        MRN:  [id number]        Assessment :    Plan:  --FELICE  HTN  Anemia  Volume overload      ascending aortic dissection repair 2/3  Creatinine down a little despite needed diuresis. Continue bumex and metolazone. Increase po KCL    Continue EPO    Labs in AM             Subjective:     Chief Complaint:  \"I feel better today. \"  No N/V. Thinks edema is better. No dyspnea. Objective:     VITALS:   Last 24hrs VS reviewed since prior progress note. Most recent are:  Visit Vitals    /48 (BP 1 Location: Left arm, BP Patient Position: At rest)    Pulse 68    Temp 98.4 °F (36.9 °C)    Resp 20    Ht 6' (1.829 m)    Wt 135 kg (297 lb 9.9 oz)    SpO2 98%    BMI 40.36 kg/m2       Intake/Output Summary (Last 24 hours) at 17 1005  Last data filed at 17 1800   Gross per 24 hour   Intake                0 ml   Output             1200 ml   Net            -1200 ml      Telemetry Reviewed:     PHYSICAL EXAM:  General: Obese,  no acute distress  Resp:  Dec BS, no distress  CV:   RRR, ++ edema  : scrotal edema, gardner+  Alert awake     ________________________________________________________________________  Iglesia Padron MD     Procedures: see electronic medical records for all procedures/Xrays and details which  were not copied into this note but were reviewed prior to creation of Plan.       LABS:  Recent Labs      17   0347   WBC  11.6*   HGB  7.5*   HCT  22.1*   PLT  328     Recent Labs      17   0545  17   0347  17   0408   NA  138  139  136   K  3.0*  2.9*  3.1*   CL  96*  97  97   CO2  32  30  29   BUN  42*  41*  37*   CREA  2.15*  2.27*  2.21*   GLU  125*  123*  121*   CA  8.2*  8.1*  8.2*   MG   --   2.2  2.6*   PHOS   --   4.2  5.0*     Recent Labs      17   0347  17   0408   SGOT  28  23   AP  86  83   TP  6.6  6.4 ALB  2.8*  2.8*   GLOB  3.8  3.6     No results for input(s): INR, PTP, APTT in the last 72 hours. No lab exists for component: INREXT, INREXT   Recent Labs      02/18/17   1015   FE  32*   TIBC  260   PSAT  12*   FERR  358      No results found for: FOL, RBCF   No results for input(s): PH, PCO2, PO2 in the last 72 hours. No results for input(s): CPK, CKMB in the last 72 hours.     No lab exists for component: TROPONINI  No components found for: Bj Point  Lab Results   Component Value Date/Time    Color DARK YELLOW 02/06/2017 01:03 PM    Color PENDING 02/06/2017 01:03 PM    Appearance CLEAR 02/06/2017 01:03 PM    Appearance PENDING 02/06/2017 01:03 PM    Specific gravity 1.019 02/06/2017 01:03 PM    Specific gravity PENDING 02/06/2017 01:03 PM    Specific gravity PENDING 02/06/2017 01:03 PM    pH (UA) 5.0 02/06/2017 01:03 PM    pH (UA) PENDING 02/06/2017 01:03 PM    Protein NEGATIVE  02/06/2017 01:03 PM    Protein PENDING 02/06/2017 01:03 PM    Glucose NEGATIVE  02/06/2017 01:03 PM    Glucose PENDING 02/06/2017 01:03 PM    Ketone NEGATIVE  02/06/2017 01:03 PM    Ketone PENDING 02/06/2017 01:03 PM    Bilirubin PENDING 02/06/2017 01:03 PM    Urobilinogen 0.2 02/06/2017 01:03 PM    Urobilinogen PENDING 02/06/2017 01:03 PM    Nitrites NEGATIVE  02/06/2017 01:03 PM    Nitrites PENDING 02/06/2017 01:03 PM    Leukocyte Esterase NEGATIVE  02/06/2017 01:03 PM    Leukocyte Esterase PENDING 02/06/2017 01:03 PM    Epithelial cells FEW 02/06/2017 01:03 PM    Epithelial cells DUPLICATE REQUEST 21/38/1991 01:03 PM    Bacteria NEGATIVE  02/06/2017 01:03 PM    Bacteria DUPLICATE REQUEST 00/73/1140 01:03 PM    WBC 0-4 02/06/2017 23:59 PM    WBC DUPLICATE REQUEST 58/55/9356 01:03 PM    RBC 0-5 02/06/2017 31:29 PM    RBC DUPLICATE REQUEST 39/80/5885 01:03 PM       MEDICATIONS:  Current Facility-Administered Medications   Medication Dose Route Frequency    potassium chloride SR (KLOR-CON 10) tablet 40 mEq  40 mEq Oral QID    metOLazone (ZAROXOLYN) tablet 2.5 mg  2.5 mg Oral DAILY    epoetin luis miguel (EPOGEN;PROCRIT) injection 10,000 Units  10,000 Units SubCUTAneous Once per day on Tue Sat    bumetanide (BUMEX) tablet 3 mg  3 mg Oral BID    linagliptin (TRADJENTA) tablet 5 mg  5 mg Oral ACB    minoxidil (LONITEN) tablet 10 mg  10 mg Oral DAILY    sodium chloride (NS) flush 5-10 mL  5-10 mL IntraVENous Q8H    sodium chloride (NS) flush 5-10 mL  5-10 mL IntraVENous PRN    acetaminophen (TYLENOL) tablet 650 mg  650 mg Oral Q4H PRN    oxyCODONE-acetaminophen (PERCOCET) 5-325 mg per tablet 1 Tab  1 Tab Oral Q4H PRN    naloxone (NARCAN) injection 0.4 mg  0.4 mg IntraVENous PRN    atorvastatin (LIPITOR) tablet 20 mg  20 mg Oral QPM    ondansetron (ZOFRAN) injection 4 mg  4 mg IntraVENous Q4H PRN    alum-mag hydroxide-simeth (MYLANTA) oral suspension 30 mL  30 mL Oral Q2H PRN    docusate sodium (COLACE) capsule 100 mg  100 mg Oral BID    zolpidem (AMBIEN) tablet 5 mg  5 mg Oral QHS PRN    polyethylene glycol (MIRALAX) packet 34 g  34 g Oral DAILY    traMADol (ULTRAM) tablet  mg   mg Oral Q6H PRN    hydrALAZINE (APRESOLINE) 20 mg/mL injection 10 mg  10 mg IntraVENous Q6H PRN    pantoprazole (PROTONIX) tablet 40 mg  40 mg Oral ACB&D    metoprolol tartrate (LOPRESSOR) tablet 50 mg  50 mg Oral Q12H    hydrALAZINE (APRESOLINE) tablet 100 mg  100 mg Oral Q6H    cloNIDine (CATAPRES) 0.3 mg/24 hr patch 1 Patch  1 Patch TransDERmal Q7D    amLODIPine (NORVASC) tablet 10 mg  10 mg Oral DAILY    influenza vaccine 2016-17 (36mos+)(PF) (FLUZONE/FLUARIX/FLULAVAL QUAD) injection 0.5 mL  0.5 mL IntraMUSCular PRIOR TO DISCHARGE    albuterol (PROVENTIL VENTOLIN) nebulizer solution 1.25-2.5 mg  1.25-2.5 mg Nebulization Q4H PRN    aspirin chewable tablet 81 mg  81 mg Oral DAILY    glucose chewable tablet 16 g  4 Tab Oral PRN    glucagon (GLUCAGEN) injection 1 mg  1 mg IntraMUSCular PRN    insulin lispro (HUMALOG) injection SubCUTAneous AC&HS

## 2017-02-21 NOTE — PROGRESS NOTES
[facility] FLOOR Progress Note    Admit Date: 2017    POD: 16 Days Post-Op      Assessment:     Active Problems: Aortic dissection (HCC) (2017)      S/P ascending aortic replacement (2/3/2017)      Overview: EMERGENT ASCENDING AORTIC DISSECTION REPAIR       Right Femoral Artery Cannulation             Procedure(s):  ESOPHAGOGASTRODUODENOSCOPY (EGD)  SIGMOIDOSCOPY FLEXIBLE  ESOPHAGOGASTRODUODENAL (EGD) BIOPSY         Summary     Stable hemodynamics in sinus rhythm without ectopy on no support. Good BP control. Diuresing well. Plan/Recommendations/Medical Decision Making:     Steadily losing weight   Increase activity  Increase I/S effort and frequency  Sternal precautions  Stimulate bowel movement  Patient seen and reviewed with Dr. Jorge A Barker MD      Objective:     Oxygen:    CXR Results  (Last 48 hours)    None        Visit Vitals    /48 (BP 1 Location: Left arm, BP Patient Position: At rest)    Pulse 68    Temp 98.4 °F (36.9 °C)    Resp 20    Ht 6' (1.829 m)    Wt 297 lb 9.9 oz (135 kg)    SpO2 98%    BMI 40.36 kg/m2       Temp (24hrs), Av.3 °F (36.8 °C), Min:98 °F (36.7 °C), Max:98.4 °F (36.9 °C)      Lab Data Reviewed: Recent Labs      17   0741  17   0545   17   0347   WBC   --    --    --   11.6*   HGB   --    --    --   7.5*   HCT   --    --    --   22.1*   PLT   --    --    --   328   NA   --   138   --   139   K   --   3.0*   --   2.9*   BUN   --   42*   --   41*   CREA   --   2.15*   --   2.27*   GLU   --   125*   --   123*   GLUCPOC  148*   --    < >   --     < > = values in this interval not displayed.        Last 24hr Input/Output:    Intake/Output Summary (Last 24 hours) at 17 0951  Last data filed at 17 1800   Gross per 24 hour   Intake                0 ml   Output             1800 ml   Net            -1800 ml        Admission Weight: Last Weight   Weight: 270 lb (122.5 kg) Weight: 297 lb 9.9 oz (135 kg)       EXAM:  General:      Chest: stable sternum      Incisions: dry and intact      Lungs:   Clear to auscultation bilaterally. Heart:  Regular rate and rhythm, S1, S2 normal, no murmur, no click, no rub      Abdomen:   Soft, non-tender. Bowel sounds present. Extremities:  improved edema     Neurologic:  Gross motor and sensory apparatus intact.        Signed By: GAETANO Klein

## 2017-02-22 LAB
ANION GAP BLD CALC-SCNC: 14 MMOL/L (ref 5–15)
BUN SERPL-MCNC: 47 MG/DL (ref 6–20)
BUN/CREAT SERPL: 20 (ref 12–20)
CALCIUM SERPL-MCNC: 8.5 MG/DL (ref 8.5–10.1)
CHLORIDE SERPL-SCNC: 96 MMOL/L (ref 97–108)
CO2 SERPL-SCNC: 30 MMOL/L (ref 21–32)
CREAT SERPL-MCNC: 2.38 MG/DL (ref 0.7–1.3)
ERYTHROCYTE [DISTWIDTH] IN BLOOD BY AUTOMATED COUNT: 14.7 % (ref 11.5–14.5)
GLUCOSE BLD STRIP.AUTO-MCNC: 142 MG/DL (ref 65–100)
GLUCOSE BLD STRIP.AUTO-MCNC: 145 MG/DL (ref 65–100)
GLUCOSE BLD STRIP.AUTO-MCNC: 159 MG/DL (ref 65–100)
GLUCOSE BLD STRIP.AUTO-MCNC: 172 MG/DL (ref 65–100)
GLUCOSE SERPL-MCNC: 118 MG/DL (ref 65–100)
HCT VFR BLD AUTO: 22.8 % (ref 36.6–50.3)
HGB BLD-MCNC: 7.7 G/DL (ref 12.1–17)
MCH RBC QN AUTO: 28.5 PG (ref 26–34)
MCHC RBC AUTO-ENTMCNC: 33.8 G/DL (ref 30–36.5)
MCV RBC AUTO: 84.4 FL (ref 80–99)
PLATELET # BLD AUTO: 339 K/UL (ref 150–400)
POTASSIUM SERPL-SCNC: 3.1 MMOL/L (ref 3.5–5.1)
RBC # BLD AUTO: 2.7 M/UL (ref 4.1–5.7)
SERVICE CMNT-IMP: ABNORMAL
SODIUM SERPL-SCNC: 140 MMOL/L (ref 136–145)
WBC # BLD AUTO: 9.2 K/UL (ref 4.1–11.1)

## 2017-02-22 PROCEDURE — 74011250637 HC RX REV CODE- 250/637: Performed by: INTERNAL MEDICINE

## 2017-02-22 PROCEDURE — 82962 GLUCOSE BLOOD TEST: CPT

## 2017-02-22 PROCEDURE — 74011636637 HC RX REV CODE- 636/637: Performed by: PHYSICIAN ASSISTANT

## 2017-02-22 PROCEDURE — 74011250637 HC RX REV CODE- 250/637: Performed by: THORACIC SURGERY (CARDIOTHORACIC VASCULAR SURGERY)

## 2017-02-22 PROCEDURE — 74011250637 HC RX REV CODE- 250/637: Performed by: PHYSICIAN ASSISTANT

## 2017-02-22 PROCEDURE — 80048 BASIC METABOLIC PNL TOTAL CA: CPT | Performed by: INTERNAL MEDICINE

## 2017-02-22 PROCEDURE — 97116 GAIT TRAINING THERAPY: CPT

## 2017-02-22 PROCEDURE — 36415 COLL VENOUS BLD VENIPUNCTURE: CPT | Performed by: INTERNAL MEDICINE

## 2017-02-22 PROCEDURE — 65660000000 HC RM CCU STEPDOWN

## 2017-02-22 PROCEDURE — 85027 COMPLETE CBC AUTOMATED: CPT | Performed by: THORACIC SURGERY (CARDIOTHORACIC VASCULAR SURGERY)

## 2017-02-22 RX ADMIN — PANTOPRAZOLE SODIUM 40 MG: 40 TABLET, DELAYED RELEASE ORAL at 08:48

## 2017-02-22 RX ADMIN — POTASSIUM CHLORIDE 40 MEQ: 750 TABLET, FILM COATED, EXTENDED RELEASE ORAL at 17:42

## 2017-02-22 RX ADMIN — POTASSIUM CHLORIDE 40 MEQ: 750 TABLET, FILM COATED, EXTENDED RELEASE ORAL at 21:51

## 2017-02-22 RX ADMIN — POTASSIUM CHLORIDE 40 MEQ: 750 TABLET, FILM COATED, EXTENDED RELEASE ORAL at 12:56

## 2017-02-22 RX ADMIN — HYDRALAZINE HYDROCHLORIDE 100 MG: 25 TABLET, FILM COATED ORAL at 12:56

## 2017-02-22 RX ADMIN — INSULIN LISPRO 2 UNITS: 100 INJECTION, SOLUTION INTRAVENOUS; SUBCUTANEOUS at 08:49

## 2017-02-22 RX ADMIN — ASPIRIN 81 MG 81 MG: 81 TABLET ORAL at 08:49

## 2017-02-22 RX ADMIN — LINAGLIPTIN 5 MG: 5 TABLET, FILM COATED ORAL at 08:49

## 2017-02-22 RX ADMIN — PANTOPRAZOLE SODIUM 40 MG: 40 TABLET, DELAYED RELEASE ORAL at 17:41

## 2017-02-22 RX ADMIN — INSULIN LISPRO 2 UNITS: 100 INJECTION, SOLUTION INTRAVENOUS; SUBCUTANEOUS at 21:51

## 2017-02-22 RX ADMIN — AMLODIPINE BESYLATE 10 MG: 5 TABLET ORAL at 08:49

## 2017-02-22 RX ADMIN — Medication 10 ML: at 05:49

## 2017-02-22 RX ADMIN — HYDRALAZINE HYDROCHLORIDE 100 MG: 25 TABLET, FILM COATED ORAL at 17:42

## 2017-02-22 RX ADMIN — METOLAZONE 2.5 MG: 2.5 TABLET ORAL at 08:49

## 2017-02-22 RX ADMIN — POTASSIUM CHLORIDE 40 MEQ: 750 TABLET, FILM COATED, EXTENDED RELEASE ORAL at 08:48

## 2017-02-22 RX ADMIN — Medication 10 ML: at 05:48

## 2017-02-22 RX ADMIN — INSULIN LISPRO 4 UNITS: 100 INJECTION, SOLUTION INTRAVENOUS; SUBCUTANEOUS at 12:56

## 2017-02-22 RX ADMIN — BUMETANIDE 3 MG: 1 TABLET ORAL at 08:49

## 2017-02-22 RX ADMIN — BUMETANIDE 3 MG: 1 TABLET ORAL at 17:41

## 2017-02-22 RX ADMIN — Medication 10 ML: at 12:57

## 2017-02-22 RX ADMIN — Medication 10 ML: at 21:52

## 2017-02-22 RX ADMIN — INSULIN LISPRO 4 UNITS: 100 INJECTION, SOLUTION INTRAVENOUS; SUBCUTANEOUS at 17:40

## 2017-02-22 RX ADMIN — METOPROLOL TARTRATE 50 MG: 50 TABLET ORAL at 08:49

## 2017-02-22 RX ADMIN — MINOXIDIL 10 MG: 2.5 TABLET ORAL at 08:49

## 2017-02-22 RX ADMIN — METOPROLOL TARTRATE 50 MG: 50 TABLET ORAL at 21:51

## 2017-02-22 RX ADMIN — ATORVASTATIN CALCIUM 20 MG: 20 TABLET, FILM COATED ORAL at 21:52

## 2017-02-22 NOTE — PROGRESS NOTES
Problem: Self Care Deficits Care Plan (Adult)  Goal: *Acute Goals and Plan of Care (Insert Text)  Occupational Therapy Goals  2/16/2017  All goals reviewed and revised below. . Continue to follow for 7 days. Initiated 2/3/2017  1. Patient will perform ADLs standing 5 mins without fatigue or LOB with independence within 7 day(s). 2/16/2017 goal met increase time to 12 minutes. Continue Goal met and pt was able to stand for 12 to 13 minutes performing Ub dressing and exercises. 2. Patient will perform lower body ADLs with independence within 7 day(s). 2/16/2017 Progressing. continue  3. Patient will perform bathing with independence within 7 day(s). 2/16/2017 Progressing. continue  4. Patient will perform toilet transfers with independence within 7 day(s). 2/16/2017 Progressing continue  5. Patient will perform all aspects of toileting with independence within 7 day(s). 2/16/2017. Progressing. Continue met 2/17/2017    6. Patient will participate in cardiac/sternal upper extremity therapeutic exercise/activities to increase independence with ADLs with independence for 5 minutes within 7 day(s). 2/16/2017 Ongoing. Add: perform 3X/day independently. Met 2/17/2017       OCCUPATIONAL THERAPY TREATMENT  Patient: Farzana Whelan (61 y.o. male)  Date: 2/21/2017  Diagnosis: aneursym  Aortic dissection (HCC)  melana <principal problem not specified>  Procedure(s) (LRB):  ESOPHAGOGASTRODUODENOSCOPY (EGD) (N/A)  SIGMOIDOSCOPY FLEXIBLE (N/A)  ESOPHAGOGASTRODUODENAL (EGD) BIOPSY (N/A) 16 Days Post-Op  Precautions: Sternal      ASSESSMENT:  Pt reports that he would rather walk and be up from his chair  later today. He was agreeable to working with adaptive aids for lower body adls. Pt verbalized and demonstrated understanding. Will reevaluate need for equipment as edema decreases. Pt reports that the edema is limiting his return to prior level of independent functioning.   Pt verbalizes and understands sternal precautions related to adls. Will continue to follow. May benefit from rehab at discharge. Progression toward goals:  [ ]       Improving appropriately and progressing toward goals  [X]       Improving slowly and progressing toward goals  [ ]       Not making progress toward goals and plan of care will be adjusted       PLAN:  Patient continues to benefit from skilled intervention to address the above impairments. Continue treatment per established plan of care. Discharge Recommendations:  Home Health vs. Rehab  Further Equipment Recommendations for Discharge:  tbd       SUBJECTIVE:   Patient stated I'm waiting for this swelling to g o down.       OBJECTIVE DATA SUMMARY:   Cognitive/Behavioral Status:  Neurologic State: Alert; Appropriate for age  Orientation Level: Oriented X4  Cognition: Appropriate decision making; Follows commands              Functional Mobility and Transfers for ADLs:  Bed Mobility:   not assessed this date     Transfers:  Pt elected to remain seated during tx session. Balance:  Sitting: Intact        ADL Intervention:     Educated in using sock aide and reacher for adls. Pt was able to don socks using the sock aide independently post demonstration and minimal cues. Pt declined to further dress due to significant edema. Pt reports that he has a family member staying with his mother who is able to assist her as needed (mostly with IADL tasks per pt report)  Reinforced sternal precautions, pt able to state 3/3                                                     Therapeutic Exercises:   Encouraged sitting up and performing exercise program as well as ambulating with nursing at least one more time today. Pt verbalized understanding.    Pain:  Pain Scale 1: Numeric (0 - 10)  Pain Intensity 1: 0              After treatment:   [X] Patient left in no apparent distress sitting up in chair  [ ] Patient left in no apparent distress in bed  [X] Call bell left within reach  [X] Nursing notified  [ ] Caregiver present  [ ] Bed alarm activated      COMMUNICATION/COLLABORATION:   The patients plan of care was discussed with: Registered Nurse     Shea Hooks OTR/L  Time Calculation: 18 mins

## 2017-02-22 NOTE — PROGRESS NOTES
Occupational Therapy  Second Attempt to see pt. Upon arrival, pt was standing and performing cardiac exercises close to his chair. Pt declined OT. Suggested standing adl tasks for increasing strength and endurance, however, pt declined. Will defer and follow up tomorrow.

## 2017-02-22 NOTE — PROGRESS NOTES
NAME: Dain Hooker        :  1965        MRN:  [id number]        Assessment :    Plan:  --FELICE  HTN  Anemia  Volume overload      ascending aortic dissection repair 2/3  Creatinine up a little with needed diuresis. Continue bumex and metolazone. Continue po KCL    Continue EPO    Labs in AM             Subjective:     Chief Complaint:  \"I'm still very swollen. \"  No N/V. No dyspnea. No pain. Objective:     VITALS:   Last 24hrs VS reviewed since prior progress note. Most recent are:  Visit Vitals    /48 (BP 1 Location: Left arm, BP Patient Position: At rest)    Pulse 63    Temp 98.3 °F (36.8 °C)    Resp 18    Ht 6' (1.829 m)    Wt 133.4 kg (294 lb 1.5 oz)    SpO2 99%    BMI 39.89 kg/m2       Intake/Output Summary (Last 24 hours) at 17 1139  Last data filed at 17 0549   Gross per 24 hour   Intake                0 ml   Output             2900 ml   Net            -2900 ml      Telemetry Reviewed:     PHYSICAL EXAM:  General: Obese,  no acute distress  Resp:  Dec BS, no distress  CV:   RRR, ++ edema  : scrotal edema, gardner+  Alert awake     ________________________________________________________________________  Ova MD Ryder     Procedures: see electronic medical records for all procedures/Xrays and details which  were not copied into this note but were reviewed prior to creation of Plan.       LABS:  Recent Labs      17   0440  17   0347   WBC  9.2  11.6*   HGB  7.7*  7.5*   HCT  22.8*  22.1*   PLT  339  328     Recent Labs      17   0435  17   1614  17   0545  17   0347   NA  140  140  138  139   K  3.1*  3.0*  3.0*  2.9*   CL  96*  96*  96*  97   CO2  30  33*  32  30   BUN  47*  42*  42*  41*   CREA  2.38*  2.12*  2.15*  2.27*   GLU  118*  135*  125*  123*   CA  8.5  8.6  8.2*  8.1*   MG   --    --    --   2.2   PHOS   --    --    --   4.2 Recent Labs      02/20/17   0347   SGOT  28   AP  86   TP  6.6   ALB  2.8*   GLOB  3.8     No results for input(s): INR, PTP, APTT in the last 72 hours. No lab exists for component: INREXT, INREXT   No results for input(s): FE, TIBC, PSAT, FERR in the last 72 hours. No results found for: FOL, RBCF   No results for input(s): PH, PCO2, PO2 in the last 72 hours. No results for input(s): CPK, CKMB in the last 72 hours.     No lab exists for component: TROPONINI  No components found for: Bj Point  Lab Results   Component Value Date/Time    Color DARK YELLOW 02/06/2017 01:03 PM    Color PENDING 02/06/2017 01:03 PM    Appearance CLEAR 02/06/2017 01:03 PM    Appearance PENDING 02/06/2017 01:03 PM    Specific gravity 1.019 02/06/2017 01:03 PM    Specific gravity PENDING 02/06/2017 01:03 PM    Specific gravity PENDING 02/06/2017 01:03 PM    pH (UA) 5.0 02/06/2017 01:03 PM    pH (UA) PENDING 02/06/2017 01:03 PM    Protein NEGATIVE  02/06/2017 01:03 PM    Protein PENDING 02/06/2017 01:03 PM    Glucose NEGATIVE  02/06/2017 01:03 PM    Glucose PENDING 02/06/2017 01:03 PM    Ketone NEGATIVE  02/06/2017 01:03 PM    Ketone PENDING 02/06/2017 01:03 PM    Bilirubin PENDING 02/06/2017 01:03 PM    Urobilinogen 0.2 02/06/2017 01:03 PM    Urobilinogen PENDING 02/06/2017 01:03 PM    Nitrites NEGATIVE  02/06/2017 01:03 PM    Nitrites PENDING 02/06/2017 01:03 PM    Leukocyte Esterase NEGATIVE  02/06/2017 01:03 PM    Leukocyte Esterase PENDING 02/06/2017 01:03 PM    Epithelial cells FEW 02/06/2017 01:03 PM    Epithelial cells DUPLICATE REQUEST 70/69/4926 01:03 PM    Bacteria NEGATIVE  02/06/2017 01:03 PM    Bacteria DUPLICATE REQUEST 07/15/4418 01:03 PM    WBC 0-4 02/06/2017 69:61 PM    WBC DUPLICATE REQUEST 67/08/8922 01:03 PM    RBC 0-5 02/06/2017 99:01 PM    RBC DUPLICATE REQUEST 80/31/4765 01:03 PM       MEDICATIONS:  Current Facility-Administered Medications   Medication Dose Route Frequency    potassium chloride SR (KLOR-CON 10) tablet 40 mEq  40 mEq Oral QID    metOLazone (ZAROXOLYN) tablet 2.5 mg  2.5 mg Oral DAILY    epoetin luis miguel (EPOGEN;PROCRIT) injection 10,000 Units  10,000 Units SubCUTAneous Once per day on Tue Sat    bumetanide (BUMEX) tablet 3 mg  3 mg Oral BID    linagliptin (TRADJENTA) tablet 5 mg  5 mg Oral ACB    minoxidil (LONITEN) tablet 10 mg  10 mg Oral DAILY    sodium chloride (NS) flush 5-10 mL  5-10 mL IntraVENous Q8H    sodium chloride (NS) flush 5-10 mL  5-10 mL IntraVENous PRN    acetaminophen (TYLENOL) tablet 650 mg  650 mg Oral Q4H PRN    oxyCODONE-acetaminophen (PERCOCET) 5-325 mg per tablet 1 Tab  1 Tab Oral Q4H PRN    naloxone (NARCAN) injection 0.4 mg  0.4 mg IntraVENous PRN    atorvastatin (LIPITOR) tablet 20 mg  20 mg Oral QPM    ondansetron (ZOFRAN) injection 4 mg  4 mg IntraVENous Q4H PRN    alum-mag hydroxide-simeth (MYLANTA) oral suspension 30 mL  30 mL Oral Q2H PRN    docusate sodium (COLACE) capsule 100 mg  100 mg Oral BID    zolpidem (AMBIEN) tablet 5 mg  5 mg Oral QHS PRN    polyethylene glycol (MIRALAX) packet 34 g  34 g Oral DAILY    traMADol (ULTRAM) tablet  mg   mg Oral Q6H PRN    hydrALAZINE (APRESOLINE) 20 mg/mL injection 10 mg  10 mg IntraVENous Q6H PRN    pantoprazole (PROTONIX) tablet 40 mg  40 mg Oral ACB&D    metoprolol tartrate (LOPRESSOR) tablet 50 mg  50 mg Oral Q12H    hydrALAZINE (APRESOLINE) tablet 100 mg  100 mg Oral Q6H    cloNIDine (CATAPRES) 0.3 mg/24 hr patch 1 Patch  1 Patch TransDERmal Q7D    amLODIPine (NORVASC) tablet 10 mg  10 mg Oral DAILY    influenza vaccine 2016-17 (36mos+)(PF) (FLUZONE/FLUARIX/FLULAVAL QUAD) injection 0.5 mL  0.5 mL IntraMUSCular PRIOR TO DISCHARGE    albuterol (PROVENTIL VENTOLIN) nebulizer solution 1.25-2.5 mg  1.25-2.5 mg Nebulization Q4H PRN    aspirin chewable tablet 81 mg  81 mg Oral DAILY    glucose chewable tablet 16 g  4 Tab Oral PRN    glucagon (GLUCAGEN) injection 1 mg  1 mg IntraMUSCular PRN    insulin lispro (HUMALOG) injection   SubCUTAneous AC&HS

## 2017-02-22 NOTE — PROGRESS NOTES
Occupational Therapy  Medical record reviewed. Attempting to initiate OT tx session. Pt requests returning later as he'd just finished his lunch. Pt also reports sleeping poorly last night due to the light coming in from the hallway blind. Will defer as pt requests, and continue to follow.

## 2017-02-22 NOTE — PROGRESS NOTES
[facility] FLO[facility] Progress Note    Admit Date: 2017    POD: 17 Days Post-Op      Assessment:     Active Problems: Aortic dissection (HCC) (2017)      S/P ascending aortic replacement (2/3/2017)      Overview: EMERGENT ASCENDING AORTIC DISSECTION REPAIR       Right Femoral Artery Cannulation             Procedure(s):  ESOPHAGOGASTRODUODENOSCOPY (EGD)  SIGMOIDOSCOPY FLEXIBLE  ESOPHAGOGASTRODUODENAL (EGD) BIOPSY         Summary     Stable hemodynamics in sinus rhythm without ectopy on no support. Continues to lose weight on diuretic therapy. Cr 2.38 UOP 4900. Weight down. Plan/Recommendations/Medical Decision Making:     Discharge planning discussed  Increase activity  Increase I/S effort and frequency  Sternal precautions  Stimulate bowel movement  Patient seen and reviewed with Dr. Purvi Gallagher MD      Objective:       CXR Results  (Last 48 hours)    None          Activity:    Diet/ BM:     Visit Vitals    /54 (BP 1 Location: Left arm, BP Patient Position: At rest)    Pulse 63    Temp 98.3 °F (36.8 °C)    Resp 18    Ht 6' (1.829 m)    Wt 294 lb 1.5 oz (133.4 kg)    SpO2 96%    BMI 39.89 kg/m2       Temp (24hrs), Av.3 °F (36.8 °C), Min:98 °F (36.7 °C), Max:98.4 °F (36.9 °C)      Lab Data Reviewed: Recent Labs      17   1142   17   0440  17   0435   WBC   --    --   9.2   --    HGB   --    --   7.7*   --    HCT   --    --   22.8*   --    PLT   --    --   339   --    NA   --    --    --   140   K   --    --    --   3.1*   BUN   --    --    --   47*   CREA   --    --    --   2.38*   GLU   --    --    --   118*   GLUCPOC  172*   < >   --    --     < > = values in this interval not displayed.        Last 24hr Input/Output:    Intake/Output Summary (Last 24 hours) at 17 1439  Last data filed at 17 1302   Gross per 24 hour   Intake                0 ml   Output             4500 ml   Net            -4500 ml        Admission Weight: Last Weight   Weight: 270 lb (122.5 kg) Weight: 294 lb 1.5 oz (133.4 kg)       EXAM:  General:      Chest: stable sternum      Incisions: dry and intact      Lungs:   Clear to auscultation bilaterally. Heart:  Regular rate and rhythm, S1, S2 normal, no murmur, no click, no rub      Abdomen:   Soft, non-tender. Bowel sounds present. Extremities:  edematous, no improvement in penile swelling     Neurologic:  Gross motor and sensory apparatus intact.        Signed By: GAETANO Rizvi

## 2017-02-22 NOTE — PROGRESS NOTES
Brinda Kawasaki RN 1 Nir Lim called to inform CM that records was reassessed. Patient is too high functioning for insurance to authorize SAH at this time. CM will review with patient. 944am - CM reviewed SAH outcome with patient. Patient verbalized understanding. Mike Jones RN Liaison from [facility] has met with patient. CM will continue to follow.      Kalli Clifton RN CM  Ext [phone]

## 2017-02-22 NOTE — PROGRESS NOTES
Bedside shift change report [facility]. SBAR, MAR, VS, and plan of care reviewed. 1955  Assessment completed as charted. VSS. Pt sitting up in chair, denies pain or discomfort. 2130  Assisted pt to bathroom for hs care, then assisted to bed.    2315 Resting quietly, denies pain or discomfort. Young is patent and draining clear yellow urine. No acute change in assessment. Oxygen placed on 4L via nc with humidification per request.   0400 Resting quietly. Uses call light for assistance. No acute change in assessment. 0545 Weighed on standing scale, then up to recliner chair. Young bag emptied. 0708 Bedside shift change report given to [facility].

## 2017-02-22 NOTE — PROGRESS NOTES
Problem: Mobility Impaired (Adult and Pediatric)  Goal: *Acute Goals and Plan of Care (Insert Text)  Physical Therapy Goals  Goals reviewed and remain appropriate 2/19/17  Goals reviewed and remain appropriate 2/10/17  Initiated 2/3/2017  1. Patient will move from supine to sit and sit to supine , scoot up and down and roll side to side in bed with modified independence within 7 days. 2. Patient will perform sit to/from stand with modified independence within 7 days. 3. Patient will ambulate 250 feet with least restrictive assistive device and modified independence within 7 days. 4. Patient will ascend/descend 5 stairs with 1 handrail(s) with modified independence within 7 days. 5. Patient will perform cardiac exercises per protocol with independence within 7 days. 6. Patient will verbally and functionally recall 3/3 sternal precautions within 7 days. PHYSICAL THERAPY TREATMENT  Patient: Taylor Alfred (16 y.o. male)  Date: 2/22/2017  Diagnosis: aneursym  Aortic dissection (HCC)  melana <principal problem not specified>  Procedure(s) (LRB):  ESOPHAGOGASTRODUODENOSCOPY (EGD) (N/A)  SIGMOIDOSCOPY FLEXIBLE (N/A)  ESOPHAGOGASTRODUODENAL (EGD) BIOPSY (N/A) 17 Days Post-Op  Precautions: Sternal      ASSESSMENT:  Patient is making good progress towards goals. Received sitting in bedside chair and agreeable to PT. Patient able to recall 3/3 sternal precautions. Transfers SBA with good static standing balance. Ambulated 100 ft x 2 CGA without use of AD. Patient has decreased pace and WBOS during ambulation with slight unsteadiness but no LOB noted. Required 1 standing rest break during with slight SOB noted. Returned to sitting in bedside chair with O2 levels on RA stable (98%). Recommend d/c to skilled nursing level rehab for continued rehab efforts to return patient to high PLOF. Patient continues to be limited in activity tolerance which is limiting his mobility at this time.  Will continue to follow for progression of mobility. Progression toward goals:  [X]    Improving appropriately and progressing toward goals  [ ]    Improving slowly and progressing toward goals  [ ]    Not making progress toward goals and plan of care will be adjusted       PLAN:  Patient continues to benefit from skilled intervention to address the above impairments. Continue treatment per established plan of care. Discharge Recommendations:  Skilled Nursing Facility  Further Equipment Recommendations for Discharge:  TBD       SUBJECTIVE:   Patient stated I didn't sleep that well last night.       OBJECTIVE DATA SUMMARY:   Critical Behavior:  Neurologic State: Alert, Eyes open spontaneously  Orientation Level: Oriented X4  Cognition: Appropriate decision making, Appropriate safety awareness, Follows commands     Functional Mobility Training:  Bed Mobility:      Received/retuned to sitting in bedside chair      Transfers:  Sit to Stand: Stand-by asssistance  Stand to Sit: Stand-by asssistance        Bed to Chair: Stand-by asssistance        Balance:  Sitting: Intact  Standing: Intact  Standing - Static: Good  Standing - Dynamic : Fair  Ambulation/Gait Training:  Distance (ft): 100 Feet (ft) (x 2; 1 standing rest break)  Assistive Device: Walker, rolling;Gait belt  Ambulation - Level of Assistance: Stand-by asssistance;Assist x1        Gait Abnormalities: Decreased step clearance;Trunk sway increased        Base of Support: Widened     Speed/Alva: Pace decreased (<100 feet/min)  Step Length: Left shortened;Right shortened     Stairs - Level of Assistance: Contact guard assistance;Assist X1     Pain:  Pain Scale 1: Numeric (0 - 10)  Pain Intensity 1: 0      Activity Tolerance:   VSS  Please refer to the flowsheet for vital signs taken during this treatment.   After treatment:   [X]    Patient left in no apparent distress sitting up in chair  [ ]    Patient left in no apparent distress in bed  [X]    Call bell left within reach  [X] Nursing notified  [ ]    Caregiver present  [ ]    Bed alarm activated      COMMUNICATION/COLLABORATION:   The patients plan of care was discussed with: Physical Therapist and Registered Nurse  Regarding student involvement in patient care:  A student participated in this treatment session. Per CMS Medicare statements and APTA guidelines I certify that the following was true:  1. I was present and directly observed the entire session. 2. I made all skilled judgments and clinical decisions regarding care. 3. I am the practitioner responsible for assessment, treatment, and documentation. Alta Tang, [facility]   Time Calculation: 8 mins

## 2017-02-23 LAB
ANION GAP BLD CALC-SCNC: 10 MMOL/L (ref 5–15)
BUN SERPL-MCNC: 46 MG/DL (ref 6–20)
BUN/CREAT SERPL: 21 (ref 12–20)
CALCIUM SERPL-MCNC: 8 MG/DL (ref 8.5–10.1)
CHLORIDE SERPL-SCNC: 99 MMOL/L (ref 97–108)
CO2 SERPL-SCNC: 33 MMOL/L (ref 21–32)
CREAT SERPL-MCNC: 2.15 MG/DL (ref 0.7–1.3)
ERYTHROCYTE [DISTWIDTH] IN BLOOD BY AUTOMATED COUNT: 14.4 % (ref 11.5–14.5)
GLUCOSE BLD STRIP.AUTO-MCNC: 135 MG/DL (ref 65–100)
GLUCOSE BLD STRIP.AUTO-MCNC: 149 MG/DL (ref 65–100)
GLUCOSE BLD STRIP.AUTO-MCNC: 149 MG/DL (ref 65–100)
GLUCOSE BLD STRIP.AUTO-MCNC: 162 MG/DL (ref 65–100)
GLUCOSE SERPL-MCNC: 116 MG/DL (ref 65–100)
HCT VFR BLD AUTO: 22.5 % (ref 36.6–50.3)
HGB BLD-MCNC: 7.5 G/DL (ref 12.1–17)
MCH RBC QN AUTO: 28.4 PG (ref 26–34)
MCHC RBC AUTO-ENTMCNC: 33.3 G/DL (ref 30–36.5)
MCV RBC AUTO: 85.2 FL (ref 80–99)
PLATELET # BLD AUTO: 315 K/UL (ref 150–400)
POTASSIUM SERPL-SCNC: 2.9 MMOL/L (ref 3.5–5.1)
RBC # BLD AUTO: 2.64 M/UL (ref 4.1–5.7)
SERVICE CMNT-IMP: ABNORMAL
SODIUM SERPL-SCNC: 142 MMOL/L (ref 136–145)
WBC # BLD AUTO: 8.7 K/UL (ref 4.1–11.1)

## 2017-02-23 PROCEDURE — 36415 COLL VENOUS BLD VENIPUNCTURE: CPT | Performed by: INTERNAL MEDICINE

## 2017-02-23 PROCEDURE — 74011636637 HC RX REV CODE- 636/637: Performed by: PHYSICIAN ASSISTANT

## 2017-02-23 PROCEDURE — 65660000000 HC RM CCU STEPDOWN

## 2017-02-23 PROCEDURE — 74011250637 HC RX REV CODE- 250/637: Performed by: PHYSICIAN ASSISTANT

## 2017-02-23 PROCEDURE — 82962 GLUCOSE BLOOD TEST: CPT

## 2017-02-23 PROCEDURE — 74011250637 HC RX REV CODE- 250/637: Performed by: THORACIC SURGERY (CARDIOTHORACIC VASCULAR SURGERY)

## 2017-02-23 PROCEDURE — 74011250637 HC RX REV CODE- 250/637: Performed by: INTERNAL MEDICINE

## 2017-02-23 PROCEDURE — 97116 GAIT TRAINING THERAPY: CPT

## 2017-02-23 PROCEDURE — 80048 BASIC METABOLIC PNL TOTAL CA: CPT | Performed by: INTERNAL MEDICINE

## 2017-02-23 PROCEDURE — 97535 SELF CARE MNGMENT TRAINING: CPT | Performed by: OCCUPATIONAL THERAPIST

## 2017-02-23 PROCEDURE — 85027 COMPLETE CBC AUTOMATED: CPT | Performed by: THORACIC SURGERY (CARDIOTHORACIC VASCULAR SURGERY)

## 2017-02-23 RX ORDER — POTASSIUM CHLORIDE 20 MEQ/1
40 TABLET, EXTENDED RELEASE ORAL
Status: DISCONTINUED | OUTPATIENT
Start: 2017-02-23 | End: 2017-02-27

## 2017-02-23 RX ADMIN — MINOXIDIL 10 MG: 2.5 TABLET ORAL at 08:53

## 2017-02-23 RX ADMIN — METOLAZONE 2.5 MG: 2.5 TABLET ORAL at 08:54

## 2017-02-23 RX ADMIN — PANTOPRAZOLE SODIUM 40 MG: 40 TABLET, DELAYED RELEASE ORAL at 17:15

## 2017-02-23 RX ADMIN — LINAGLIPTIN 5 MG: 5 TABLET, FILM COATED ORAL at 08:53

## 2017-02-23 RX ADMIN — POTASSIUM CHLORIDE 40 MEQ: 20 TABLET, EXTENDED RELEASE ORAL at 11:48

## 2017-02-23 RX ADMIN — METOPROLOL TARTRATE 50 MG: 50 TABLET ORAL at 21:52

## 2017-02-23 RX ADMIN — INSULIN LISPRO 2 UNITS: 100 INJECTION, SOLUTION INTRAVENOUS; SUBCUTANEOUS at 21:59

## 2017-02-23 RX ADMIN — INSULIN LISPRO 4 UNITS: 100 INJECTION, SOLUTION INTRAVENOUS; SUBCUTANEOUS at 11:48

## 2017-02-23 RX ADMIN — POTASSIUM CHLORIDE 40 MEQ: 750 TABLET, FILM COATED, EXTENDED RELEASE ORAL at 08:54

## 2017-02-23 RX ADMIN — BUMETANIDE 3 MG: 1 TABLET ORAL at 17:15

## 2017-02-23 RX ADMIN — AMLODIPINE BESYLATE 10 MG: 5 TABLET ORAL at 08:53

## 2017-02-23 RX ADMIN — Medication 10 ML: at 21:52

## 2017-02-23 RX ADMIN — ATORVASTATIN CALCIUM 20 MG: 20 TABLET, FILM COATED ORAL at 21:52

## 2017-02-23 RX ADMIN — INSULIN LISPRO 2 UNITS: 100 INJECTION, SOLUTION INTRAVENOUS; SUBCUTANEOUS at 17:15

## 2017-02-23 RX ADMIN — BUMETANIDE 3 MG: 1 TABLET ORAL at 08:53

## 2017-02-23 RX ADMIN — ASPIRIN 81 MG 81 MG: 81 TABLET ORAL at 08:54

## 2017-02-23 RX ADMIN — POTASSIUM CHLORIDE 40 MEQ: 20 TABLET, EXTENDED RELEASE ORAL at 17:15

## 2017-02-23 RX ADMIN — HYDRALAZINE HYDROCHLORIDE 100 MG: 25 TABLET, FILM COATED ORAL at 11:48

## 2017-02-23 RX ADMIN — HYDRALAZINE HYDROCHLORIDE 100 MG: 25 TABLET, FILM COATED ORAL at 17:15

## 2017-02-23 RX ADMIN — HYDRALAZINE HYDROCHLORIDE 100 MG: 25 TABLET, FILM COATED ORAL at 05:40

## 2017-02-23 RX ADMIN — POTASSIUM CHLORIDE 40 MEQ: 20 TABLET, EXTENDED RELEASE ORAL at 21:52

## 2017-02-23 RX ADMIN — METOPROLOL TARTRATE 50 MG: 50 TABLET ORAL at 08:54

## 2017-02-23 RX ADMIN — PANTOPRAZOLE SODIUM 40 MG: 40 TABLET, DELAYED RELEASE ORAL at 08:54

## 2017-02-23 RX ADMIN — INSULIN LISPRO 2 UNITS: 100 INJECTION, SOLUTION INTRAVENOUS; SUBCUTANEOUS at 08:53

## 2017-02-23 NOTE — PROGRESS NOTES
NAME: Sp Enriquez        :  1965        MRN:  [id number]        Assessment :    Plan:  --FELICE  HTN  Anemia  Volume overload      ascending aortic dissection repair 2/3  Creatinine down despite needed diuresis. Continue bumex and metolazone. Increase po KCL    Continue EPO    Labs in AM             Subjective:     Chief Complaint:  \"My swelling is getting better. \"  No N/V. No dyspnea. No pain. Objective:     VITALS:   Last 24hrs VS reviewed since prior progress note. Most recent are:  Visit Vitals    /51 (BP 1 Location: Left arm, BP Patient Position: At rest)    Pulse 71    Temp 98.2 °F (36.8 °C)    Resp 18    Ht 6' (1.829 m)    Wt 130.4 kg (287 lb 7.7 oz)    SpO2 96%    BMI 38.99 kg/m2       Intake/Output Summary (Last 24 hours) at 17 0914  Last data filed at 17 0555   Gross per 24 hour   Intake             1080 ml   Output             5350 ml   Net            -4270 ml      Telemetry Reviewed:     PHYSICAL EXAM:  General: Obese,  no acute distress  Resp:  Dec BS, no distress  CV:   RRR, ++ edema  : scrotal edema, gardner+  Alert awake     ________________________________________________________________________  Boby Shankar MD     Procedures: see electronic medical records for all procedures/Xrays and details which  were not copied into this note but were reviewed prior to creation of Plan. LABS:  Recent Labs      17   0327  17   0440   WBC  8.7  9.2   HGB  7.5*  7.7*   HCT  22.5*  22.8*   PLT  315  339     Recent Labs      17   0328  17   0435  17   1614   NA  142  140  140   K  2.9*  3.1*  3.0*   CL  99  96*  96*   CO2  33*  30  33*   BUN  46*  47*  42*   CREA  2.15*  2.38*  2.12*   GLU  116*  118*  135*   CA  8.0*  8.5  8.6     No results for input(s): SGOT, GPT, AP, TBIL, TP, ALB, GLOB, GGT, AML, LPSE in the last 72 hours.     No lab exists for component: AMYP, HLPSE  No results for input(s): INR, PTP, APTT in the last 72 hours. No lab exists for component: INREXT, INREXT   No results for input(s): FE, TIBC, PSAT, FERR in the last 72 hours. No results found for: FOL, RBCF   No results for input(s): PH, PCO2, PO2 in the last 72 hours. No results for input(s): CPK, CKMB in the last 72 hours.     No lab exists for component: TROPONINI  No components found for: Bj Point  Lab Results   Component Value Date/Time    Color DARK YELLOW 02/06/2017 01:03 PM    Color PENDING 02/06/2017 01:03 PM    Appearance CLEAR 02/06/2017 01:03 PM    Appearance PENDING 02/06/2017 01:03 PM    Specific gravity 1.019 02/06/2017 01:03 PM    Specific gravity PENDING 02/06/2017 01:03 PM    Specific gravity PENDING 02/06/2017 01:03 PM    pH (UA) 5.0 02/06/2017 01:03 PM    pH (UA) PENDING 02/06/2017 01:03 PM    Protein NEGATIVE  02/06/2017 01:03 PM    Protein PENDING 02/06/2017 01:03 PM    Glucose NEGATIVE  02/06/2017 01:03 PM    Glucose PENDING 02/06/2017 01:03 PM    Ketone NEGATIVE  02/06/2017 01:03 PM    Ketone PENDING 02/06/2017 01:03 PM    Bilirubin PENDING 02/06/2017 01:03 PM    Urobilinogen 0.2 02/06/2017 01:03 PM    Urobilinogen PENDING 02/06/2017 01:03 PM    Nitrites NEGATIVE  02/06/2017 01:03 PM    Nitrites PENDING 02/06/2017 01:03 PM    Leukocyte Esterase NEGATIVE  02/06/2017 01:03 PM    Leukocyte Esterase PENDING 02/06/2017 01:03 PM    Epithelial cells FEW 02/06/2017 01:03 PM    Epithelial cells DUPLICATE REQUEST 64/71/7851 01:03 PM    Bacteria NEGATIVE  02/06/2017 01:03 PM    Bacteria DUPLICATE REQUEST 53/39/4918 01:03 PM    WBC 0-4 02/06/2017 14:85 PM    WBC DUPLICATE REQUEST 43/66/3916 01:03 PM    RBC 0-5 02/06/2017 10:24 PM    RBC DUPLICATE REQUEST 32/09/0244 01:03 PM       MEDICATIONS:  Current Facility-Administered Medications   Medication Dose Route Frequency    potassium chloride SR (KLOR-CON 10) tablet 40 mEq  40 mEq Oral 5XD    metOLazone (ZAROXOLYN) tablet 2.5 mg  2.5 mg Oral DAILY    epoetin luis miguel (EPOGEN;PROCRIT) injection 10,000 Units  10,000 Units SubCUTAneous Once per day on Tue Sat    bumetanide (BUMEX) tablet 3 mg  3 mg Oral BID    linagliptin (TRADJENTA) tablet 5 mg  5 mg Oral ACB    minoxidil (LONITEN) tablet 10 mg  10 mg Oral DAILY    sodium chloride (NS) flush 5-10 mL  5-10 mL IntraVENous Q8H    sodium chloride (NS) flush 5-10 mL  5-10 mL IntraVENous PRN    acetaminophen (TYLENOL) tablet 650 mg  650 mg Oral Q4H PRN    oxyCODONE-acetaminophen (PERCOCET) 5-325 mg per tablet 1 Tab  1 Tab Oral Q4H PRN    naloxone (NARCAN) injection 0.4 mg  0.4 mg IntraVENous PRN    atorvastatin (LIPITOR) tablet 20 mg  20 mg Oral QPM    ondansetron (ZOFRAN) injection 4 mg  4 mg IntraVENous Q4H PRN    alum-mag hydroxide-simeth (MYLANTA) oral suspension 30 mL  30 mL Oral Q2H PRN    docusate sodium (COLACE) capsule 100 mg  100 mg Oral BID    zolpidem (AMBIEN) tablet 5 mg  5 mg Oral QHS PRN    polyethylene glycol (MIRALAX) packet 34 g  34 g Oral DAILY    traMADol (ULTRAM) tablet  mg   mg Oral Q6H PRN    hydrALAZINE (APRESOLINE) 20 mg/mL injection 10 mg  10 mg IntraVENous Q6H PRN    pantoprazole (PROTONIX) tablet 40 mg  40 mg Oral ACB&D    metoprolol tartrate (LOPRESSOR) tablet 50 mg  50 mg Oral Q12H    hydrALAZINE (APRESOLINE) tablet 100 mg  100 mg Oral Q6H    cloNIDine (CATAPRES) 0.3 mg/24 hr patch 1 Patch  1 Patch TransDERmal Q7D    amLODIPine (NORVASC) tablet 10 mg  10 mg Oral DAILY    influenza vaccine 2016-17 (36mos+)(PF) (FLUZONE/FLUARIX/FLULAVAL QUAD) injection 0.5 mL  0.5 mL IntraMUSCular PRIOR TO DISCHARGE    albuterol (PROVENTIL VENTOLIN) nebulizer solution 1.25-2.5 mg  1.25-2.5 mg Nebulization Q4H PRN    aspirin chewable tablet 81 mg  81 mg Oral DAILY    glucose chewable tablet 16 g  4 Tab Oral PRN    glucagon (GLUCAGEN) injection 1 mg  1 mg IntraMUSCular PRN    insulin lispro (HUMALOG) injection   SubCUTAneous AC&HS

## 2017-02-23 NOTE — PROGRESS NOTES
Assessment completed as charted. VSS, pt denies pain or discomfort. Young patent and draining clear yellow urine. Siting up in recliner chair. Call light in reach. 0000 VSS, no acute change in assessment . 0530  Assisted up oob to standing scale, VSS.    0730 Bedside shift change report given to [facility].

## 2017-02-23 NOTE — PROGRESS NOTES
Problem: Self Care Deficits Care Plan (Adult)  Goal: *Acute Goals and Plan of Care (Insert Text)  Occupational Therapy Goals  2/23/2017: All established goals were reviewed and revised. 1. Update: Patient will tolerate standing adls for at least 15 minutes with independence to be achieved within 7 days. 2. Patient will perform lower body ADLs with independence within 7 day(s). 2/16/2017 Progressing. continue  2/16/2017--pt is modified independent. Continue goal for 7 more days. 3. Patient will perform bathing with independence, including set up within 7 days. 4. Patient will perform simple home management with independence within 7 days. All goals reviewed and revised below. . Continue to follow for 7 days. Initiated 2/3/2017  1. Patient will perform ADLs standing 5 mins without fatigue or LOB with independence within 7 day(s). 2/16/2017 goal met increase time to 12 minutes. Continue Goal met and pt was able to stand for 12 to 13 minutes performing Ub dressing and exercises. 2. Patient will perform lower body ADLs with independence within 7 day(s). 2/16/2017 Progressing. continue  2. Patient will perform lower body ADLs with independence within 7 day(s). 2/16/2017 Progressing. Continue  4. Patient will perform toilet transfers with independence within 7 day(s). 2/16/2017 Progressing continue. 2/23/2017 achieved. 5. Patient will perform all aspects of toileting with independence within 7 day(s). 2/16/2017. Progressing. Continue met 2/17/2017  6. Patient will participate in cardiac/sternal upper extremity therapeutic exercise/activities to increase independence with ADLs with independence for 5 minutes within 7 day(s). 2/16/2017 Ongoing. Add: perform 3X/day independently.  Met 2/17/2017       OCCUPATIONAL THERAPY TREATMENT: WEEKLY REASSESSMENT  Patient: Sanaz Larsen (30 y.o. male)  Date: 2/23/2017  Diagnosis: aneursym  Aortic dissection ([facility] [address]  melana <principal problem not specified>  Procedure(s) (LRB):  ESOPHAGOGASTRODUODENOSCOPY (EGD) (N/A)  SIGMOIDOSCOPY FLEXIBLE (N/A)  ESOPHAGOGASTRODUODENAL (EGD) BIOPSY (N/A) 18 Days Post-Op  Precautions: Sternal      ASSESSMENT:  Pt complains of feeling very tired today (hgb is 7.5) but is agreeable to standing adls tolerating 5 minutes this session. Pt is slowly progressing towards goals, his edema and decreased functional tolerance continues to limit his adl performance. He continues to benefit from OT. He is feeling that he needs intensive rehab  To reach his goals of returning to work. Progression toward goals:  [ ]            Improving appropriately and progressing toward goals  [X]            Improving slowly and progressing toward goals  [ ]            Not making progress toward goals and plan of care will be adjusted       PLAN:  Goals have been updated based on progression since last assessment. Patient continues to benefit from skilled intervention to address the above impairments. Continue to follow patient 4 times a week to address goals. Planned Interventions:  [X]                    Self Care Training                  [X]             Therapeutic Activities  [X]                    Functional Mobility Training    [ ]             Cognitive Retraining  [X]                    Therapeutic Exercises           [X]             Endurance Activities  [X]                    Balance Training                   [ ]             Neuromuscular Re-Education  [ ]                    Visual/Perceptual Training     [X]        Home Safety Training  [X]                    Patient Education                 [ ]             Family Training/Education  [ ]                    Other (comment):  Discharge Recommendations: Rehab vs. Home with Home Health therapy progressing to outpatient therapy and cardiac rehab. Further Equipment Recommendations for Discharge: tbd       SUBJECTIVE:   Patient stated I'm so tired today. Bhavin Dewitt I don't know why.   (pt reports sleeping last night) OBJECTIVE DATA SUMMARY:   Cognitive/Behavioral Status:  Neurologic State: Alert  Orientation Level: Appropriate for age  Cognition: Appropriate decision making; Appropriate safety awareness; Follows commands  Perception: Appears intact  Perseveration: No perseveration noted  Safety/Judgement: Awareness of environment; Fall prevention; Insight into deficits     Functional Mobility and Transfers for ADLs:  Bed Mobility:     Pt seated upon arrival  Transfers:  Sit to Stand: Stand-by asssistance  Stand to Sit: Stand-by asssistance     Balance:  Sitting: Intact  Standing: Intact  Standing - Static: Good  Standing - Dynamic : Good (sways during ambulation/edema limits rom)     ADL Intervention:     Pt ambulated to the [employer] to get himself a pitcher of icy water. Discussed sternal precautions--pitcher generally weighs 2# which was in his sternal precaution guidelines. Pt had no LOB, did not use a RW, but ambulated at a slow pace, reporting that his edema is limiting him. Discussed plan of care going forward to increase endurance and activity tolerance as well as independence and safety. Pt is agreeable to plan. Cognitive Retraining  Safety/Judgement: Awareness of environment; Fall prevention; Insight into deficits           Therapeutic Exercises:   Pt is doing his cardiac exercises independently and reports in standing. Pain:  Pain Scale 1: Numeric (0 - 10)  Pain Intensity 1: 0              Activity Tolerance:   Fair. HR increased to 120s during ambulation to and from [employer]. Quickly decreased with rest.  Please refer to the flowsheet for vital signs taken during this treatment.   After treatment:   [X] Patient left in no apparent distress sitting up in chair  [ ] Patient left in no apparent distress in bed  [X] Call bell left within reach  [X] Nursing notified  [ ] Caregiver present  [ ] Bed alarm activated      COMMUNICATION/COLLABORATION:   The patients plan of care was discussed with: Registered Nurse     Dain Quinn OTR/L  Time Calculation: 13 mins

## 2017-02-23 NOTE — PROGRESS NOTES
CM informed Auth received for [facility]. CM informed patient Moy Morrison was received. Patient's understanding was that he not ready for discharge? CM placed call to [facility]. Justin RN informed. 1521 - CM reviewed with Candido SALTER. Patient is to be re-evaluated for discharge on 2/24/2017. Kristy Quinones RN Liason from [facility] informed. Justin SANCHEZ informed. Patient informed. Reviewed transportation needs when discharge ready. Patient will need to participate with [facility]. Authorization received 2/23/2017 good [facility] Saturday 2/25/2017        Cm will continue to follow.     Zara Basurto, RN CM  Ext [phone]

## 2017-02-23 NOTE — PROGRESS NOTES
Problem: Mobility Impaired (Adult and Pediatric)  Goal: *Acute Goals and Plan of Care (Insert Text)  Physical Therapy Goals  Goals reviewed and remain appropriate 2/19/17  Goals reviewed and remain appropriate 2/10/17  Initiated 2/3/2017  1. Patient will move from supine to sit and sit to supine , scoot up and down and roll side to side in bed with modified independence within 7 days. 2. Patient will perform sit to/from stand with modified independence within 7 days. 3. Patient will ambulate 250 feet with least restrictive assistive device and modified independence within 7 days. 4. Patient will ascend/descend 5 stairs with 1 handrail(s) with modified independence within 7 days. 5. Patient will perform cardiac exercises per protocol with independence within 7 days. 6. Patient will verbally and functionally recall 3/3 sternal precautions within 7 days. PHYSICAL THERAPY TREATMENT  Patient: Sarah Stinson (39 y.o. male)  Date: 2/23/2017  Diagnosis: aneursym  Aortic dissection (HCC)  melana <principal problem not specified>  Procedure(s) (LRB):  ESOPHAGOGASTRODUODENOSCOPY (EGD) (N/A)  SIGMOIDOSCOPY FLEXIBLE (N/A)  ESOPHAGOGASTRODUODENAL (EGD) BIOPSY (N/A) 18 Days Post-Op  Precautions: Sternal      ASSESSMENT:  Pt motivated for therapy activities as always. Pt is overall SBA for mobility, including amb without a device & up/down 6 steps ( step to pattern ) today. RA VSS . Pt states & adheres to SP independently & is independent with exercise program. Pt remains limited by decreased activity tolerance with frequent rest breaks needed; pt continues to be anxious re returning home without the benefit of rehab. Pt's goal is full recovery with  return to work/PLOF . Recommend rehab for optimum recovery.   Progression toward goals:  [ ]    Improving appropriately and progressing toward goals  [X]    Improving slowly and progressing toward goals  [ ]    Not making progress toward goals and plan of care will be adjusted       PLAN:  Patient continues to benefit from skilled intervention to address the above impairments. Continue treatment per established plan of care. Discharge Recommendations:  Rehab  Further Equipment Recommendations for Discharge:  None anticipated       SUBJECTIVE:   Patient stated I'm just not strong enough yet.       OBJECTIVE DATA SUMMARY:   Critical Behavior:  Neurologic State: Alert, Eyes open spontaneously  Orientation Level: Oriented X4  Cognition: Appropriate decision making, Appropriate safety awareness, Follows commands     Functional Mobility Training:  Bed Mobility:      Up in chair              Transfers:  Sit to Stand: Stand-by asssistance  Stand to Sit: Stand-by asssistance                             Balance:  Sitting: Intact  Standing: Impaired/fair only  Ambulation/Gait Training:  Distance (ft): 160 Feet (ft)  Assistive Device: Gait belt  Ambulation - Level of Assistance: Stand-by asssistance        Gait Abnormalities: Trunk sway increased        Base of Support: Widened     Stairs:  Number of Stairs Trained: 6 (Step to gt)  Stairs - Level of Assistance: Contact guard assistance              Rail Use: Right   Pain:  Pain Scale 1: Numeric (0 - 10)  Pain Intensity 1: 0              Activity Tolerance:   Fair  Please refer to the flowsheet for vital signs taken during this treatment.   After treatment:   [X]    Patient left in no apparent distress sitting up in chair  [ ]    Patient left in no apparent distress in bed  [X]    Call bell left within reach  [X]    Nursing notified  [ ]    Caregiver present  [ ]    Bed alarm activated      COMMUNICATION/COLLABORATION:   The patients plan of care was discussed with: Registered Nurse and      Evelin Roth, PT   Time Calculation: 20 mins

## 2017-02-23 NOTE — PROGRESS NOTES
[facility] FLOOR Progress Note    Admit Date: 2017    POD: 18 Days Post-Op      Assessment:     Active Problems: Aortic dissection (HCC) (2017)      S/P ascending aortic replacement (2/3/2017)      Overview: EMERGENT ASCENDING AORTIC DISSECTION REPAIR       Right Femoral Artery Cannulation             Procedure(s):  ESOPHAGOGASTRODUODENOSCOPY (EGD)  SIGMOIDOSCOPY FLEXIBLE  ESOPHAGOGASTRODUODENAL (EGD) BIOPSY         Summary     Stable hemodynamics in sinus rhythm without ectopy on no support. Continued weight loss with Bumex 3 mg BID. Penile swelling preventing gardner DC.       Plan/Recommendations/Medical Decision Making:     Reviewed intermediate care options  Increase activity  Increase I/S effort and frequency  Sternal precautions  Stimulate bowel movement  Patient seen and reviewed with Dr. Glenna Altamirano MD      Objective:     Last 3 Recorded Weights in this Encounter    17 0636 17 0549 17 0500   Weight: 297 lb 9.9 oz (135 kg) 294 lb 1.5 oz (133.4 kg) 287 lb 7.7 oz (130.4 kg)       CXR Results  (Last 48 hours)    None          Visit Vitals    /51 (BP 1 Location: Left arm, BP Patient Position: At rest)    Pulse 71    Temp 98.2 °F (36.8 °C)    Resp 18    Ht 6' (1.829 m)    Wt 287 lb 7.7 oz (130.4 kg)    SpO2 96%    BMI 38.99 kg/m2       Temp (24hrs), Av.1 °F (36.7 °C), Min:97.8 °F (36.6 °C), Max:98.4 °F (36.9 °C)      Lab Data Reviewed: Recent Labs      17   0722  17   0328  17   0327   WBC   --    --   8.7   HGB   --    --   7.5*   HCT   --    --   22.5*   PLT   --    --   315   NA   --   142   --    K   --   2.9*   --    BUN   --   46*   --    CREA   --   2.15*   --    GLU   --   116*   --    GLUCPOC  149*   --    --        Last 24hr Input/Output:    Intake/Output Summary (Last 24 hours) at 17 0910  Last data filed at 17 0555   Gross per 24 hour   Intake             1080 ml   Output             5350 ml   Net            -4270 ml        Admission Weight: Last Weight   Weight: 270 lb (122.5 kg) Weight: 287 lb 7.7 oz (130.4 kg)       EXAM:  General:      Chest: stable sternum      Incisions: dry and intact      Lungs:   Clear to auscultation bilaterally. Heart:  Regular rate and rhythm, S1, S2 normal, no murmur, no click, no rub      Abdomen:   Soft, non-tender. Bowel sounds present. Extremities:  slow improvement in edema     Neurologic:  Gross motor and sensory apparatus intact.        Signed By: GAETANO Britton

## 2017-02-24 LAB
ANION GAP BLD CALC-SCNC: 11 MMOL/L (ref 5–15)
BUN SERPL-MCNC: 51 MG/DL (ref 6–20)
BUN/CREAT SERPL: 23 (ref 12–20)
CALCIUM SERPL-MCNC: 8.9 MG/DL (ref 8.5–10.1)
CHLORIDE SERPL-SCNC: 97 MMOL/L (ref 97–108)
CO2 SERPL-SCNC: 34 MMOL/L (ref 21–32)
CREAT SERPL-MCNC: 2.25 MG/DL (ref 0.7–1.3)
GLUCOSE BLD STRIP.AUTO-MCNC: 141 MG/DL (ref 65–100)
GLUCOSE BLD STRIP.AUTO-MCNC: 144 MG/DL (ref 65–100)
GLUCOSE BLD STRIP.AUTO-MCNC: 154 MG/DL (ref 65–100)
GLUCOSE BLD STRIP.AUTO-MCNC: 174 MG/DL (ref 65–100)
GLUCOSE SERPL-MCNC: 118 MG/DL (ref 65–100)
POTASSIUM SERPL-SCNC: 3.2 MMOL/L (ref 3.5–5.1)
SERVICE CMNT-IMP: ABNORMAL
SODIUM SERPL-SCNC: 142 MMOL/L (ref 136–145)

## 2017-02-24 PROCEDURE — 74011250637 HC RX REV CODE- 250/637: Performed by: PHYSICIAN ASSISTANT

## 2017-02-24 PROCEDURE — 74011250637 HC RX REV CODE- 250/637: Performed by: THORACIC SURGERY (CARDIOTHORACIC VASCULAR SURGERY)

## 2017-02-24 PROCEDURE — 74011250637 HC RX REV CODE- 250/637: Performed by: INTERNAL MEDICINE

## 2017-02-24 PROCEDURE — 97116 GAIT TRAINING THERAPY: CPT

## 2017-02-24 PROCEDURE — 80048 BASIC METABOLIC PNL TOTAL CA: CPT | Performed by: INTERNAL MEDICINE

## 2017-02-24 PROCEDURE — 74011636637 HC RX REV CODE- 636/637: Performed by: PHYSICIAN ASSISTANT

## 2017-02-24 PROCEDURE — 82962 GLUCOSE BLOOD TEST: CPT

## 2017-02-24 PROCEDURE — 36415 COLL VENOUS BLD VENIPUNCTURE: CPT | Performed by: INTERNAL MEDICINE

## 2017-02-24 PROCEDURE — 65660000000 HC RM CCU STEPDOWN

## 2017-02-24 RX ADMIN — Medication 10 ML: at 21:14

## 2017-02-24 RX ADMIN — POTASSIUM CHLORIDE 40 MEQ: 20 TABLET, EXTENDED RELEASE ORAL at 09:02

## 2017-02-24 RX ADMIN — METOPROLOL TARTRATE 50 MG: 50 TABLET ORAL at 21:13

## 2017-02-24 RX ADMIN — METOPROLOL TARTRATE 50 MG: 50 TABLET ORAL at 09:05

## 2017-02-24 RX ADMIN — PANTOPRAZOLE SODIUM 40 MG: 40 TABLET, DELAYED RELEASE ORAL at 16:09

## 2017-02-24 RX ADMIN — BUMETANIDE 3 MG: 1 TABLET ORAL at 17:15

## 2017-02-24 RX ADMIN — AMLODIPINE BESYLATE 10 MG: 5 TABLET ORAL at 09:03

## 2017-02-24 RX ADMIN — Medication 10 ML: at 21:13

## 2017-02-24 RX ADMIN — POTASSIUM CHLORIDE 40 MEQ: 20 TABLET, EXTENDED RELEASE ORAL at 16:09

## 2017-02-24 RX ADMIN — ATORVASTATIN CALCIUM 20 MG: 20 TABLET, FILM COATED ORAL at 21:13

## 2017-02-24 RX ADMIN — INSULIN LISPRO 2 UNITS: 100 INJECTION, SOLUTION INTRAVENOUS; SUBCUTANEOUS at 09:03

## 2017-02-24 RX ADMIN — PANTOPRAZOLE SODIUM 40 MG: 40 TABLET, DELAYED RELEASE ORAL at 09:03

## 2017-02-24 RX ADMIN — METOLAZONE 2.5 MG: 2.5 TABLET ORAL at 09:03

## 2017-02-24 RX ADMIN — POTASSIUM CHLORIDE 40 MEQ: 20 TABLET, EXTENDED RELEASE ORAL at 17:15

## 2017-02-24 RX ADMIN — INSULIN LISPRO 2 UNITS: 100 INJECTION, SOLUTION INTRAVENOUS; SUBCUTANEOUS at 11:45

## 2017-02-24 RX ADMIN — BUMETANIDE 3 MG: 1 TABLET ORAL at 09:03

## 2017-02-24 RX ADMIN — LINAGLIPTIN 5 MG: 5 TABLET, FILM COATED ORAL at 09:03

## 2017-02-24 RX ADMIN — INSULIN LISPRO 4 UNITS: 100 INJECTION, SOLUTION INTRAVENOUS; SUBCUTANEOUS at 21:13

## 2017-02-24 RX ADMIN — POTASSIUM CHLORIDE 40 MEQ: 20 TABLET, EXTENDED RELEASE ORAL at 21:13

## 2017-02-24 RX ADMIN — Medication 40 ML: at 04:20

## 2017-02-24 RX ADMIN — ASPIRIN 81 MG 81 MG: 81 TABLET ORAL at 09:03

## 2017-02-24 RX ADMIN — INSULIN LISPRO 4 UNITS: 100 INJECTION, SOLUTION INTRAVENOUS; SUBCUTANEOUS at 17:16

## 2017-02-24 RX ADMIN — POTASSIUM CHLORIDE 40 MEQ: 20 TABLET, EXTENDED RELEASE ORAL at 11:46

## 2017-02-24 RX ADMIN — MINOXIDIL 10 MG: 2.5 TABLET ORAL at 09:03

## 2017-02-24 RX ADMIN — Medication 10 ML: at 13:03

## 2017-02-24 NOTE — ROUTINE PROCESS
PCU SHIFT NURSING NOTE      Bedside shift change report given to [address] rn (oncoming nurse) by Lenard Edwards (offgoing nurse). Report included the following information SBAR, Kardex, Intake/Output and MAR. Shift Summary: 6574  0900- AM medications given. Young care and wound care complete. Patient sitting up in chair. Denies further needs none noted. 1100- PT into work with patient. 1303- Patient walked in hallway without difficulty. 1920-Bedside shift change report given to [facility] (oncoming nurse) by Shanthi Lopez RN (offgoing nurse). Report included the following information SBAR, Kardex, Intake/Output, MAR and Recent Results. Admission Date 2/2/2017   Admission Diagnosis aneursym  Aortic dissection ([facility] [address]  melana   Consults IP CONSULT TO GASTROENTEROLOGY  IP CONSULT TO NEPHROLOGY        Consults   []PT   []OT   []Speech   []Case Management      [] Palliative      Cardiac Monitoring Order   []Yes   []No     IV drips   []Yes    Drip:                            Dose:  Drip:                            Dose:  Drip:                            Dose:   []No     GI Prophylaxis   []Yes   []No         DVT Prophylaxis   SCDs:  Sequential Compression Device: Bilateral     Patient Refused VTE Prophylaxis: Yes    Prasanth stockings:         [] Medication   []Contraindicated   []None      Activity Level Activity Level: Up with Assistance     Activity Assistance: Partial (one person)   Purposeful Rounding every 1-2 hour? []Yes   Lowry Score  Total Score: 1   Bed Alarm (If score 3 or >)   []Yes   [] Refused (See signed refusal form in chart)   Zi Score  Zi Score: 20   Zi Score (if score 14 or less)   []PMT consult   []Wound Care consult      []Specialty bed   [] Nutrition consult          Needs prior to discharge:   Home O2 required:    []Yes   []No    If yes, how much O2 required?     Other:    Last Bowel Movement: Last Bowel Movement Date: 02/22/17      Influenza Vaccine Received Flu Vaccine for Current Season (usually Sept-March): No    Patient/Guardian Refused (Notify MD): No   Pneumonia Vaccine           Diet Active Orders   Diet    DIET DIABETIC CONSISTENT CARB Regular; AHA-LOW-CHOL FAT      LDAs         Midline Catheter, Dual 15/99/16 Right;Basilic (Active)   Criteria for Appropriate Use Limited/no vessel suitable for conventional peripheral access 2/23/2017  8:35 PM   Site Assessment Clean 2/23/2017  8:35 PM   Phlebitis Assessment 0 2/23/2017  8:35 PM   Infiltration Assessment 0 2/23/2017  8:35 PM   Dressing Status Clean, dry, & intact 2/23/2017  8:35 PM   Dressing Type Disk with Chlorhexadine Gluconate (CHG); Transparent 2/23/2017  8:35 PM   Action Taken Open ports on tubing capped 2/22/2017  8:28 PM   Arm Circumference (cm) 38 cm 2/11/2017 10:42 AM   Date of Last Dressing Change 02/11/17 2/20/2017  2:00 PM   Proximal Hub Color/Line Status Flushed 2/23/2017 10:31 PM   Distal Hub Color/Line Status Flushed 2/23/2017 10:31 PM   External Catheter Length (cm) 0 centimeters 2/22/2017  7:42 AM   Alcohol Cap Used Yes 2/23/2017  8:35 PM                            Urinary Catheter [REMOVED] Urinary Catheter 02/06/17 Young - Temperature-Criteria for Appropriate Use: Medically/surgically unstable  Urinary Catheter 02/10/17 Young-Criteria for Appropriate Use: Obstruction/retention  [REMOVED] Urinary Catheter 02/02/17 Young - Temperature-Criteria for Appropriate Use: Strict I/Os    Intake & Output   Date 02/23/17 0700 - 02/24/17 0659 02/24/17 0700 - 02/25/17 0659   Shift 8662-0521 8563-8549 24 Hour Total 3045-2688 0080-7769 24 Hour Total   I  N  T  A  K  E   P. O. 720  720         P. O. 720  720       Shift Total  (mL/kg) 720  (5.5)  720  (5.6)      O  U  T  P  U  T   Urine  (mL/kg/hr) 2550  (1.6) 1650  (1.1) 4200  (1.4)         Urine Output (mL) (Urinary Catheter 02/10/17 Young) 2550 1650 4200       Shift Total  (mL/kg) 2550  (19.6) 1650  (12.8) 4200  (32.5)      NET -1830 -1650 -5920      Weight (kg) 130.4 129.1 129.1 129.1 129.1 129.1         Readmission Risk Assessment Tool Score Low Risk            7       Total Score        3 Patient Length of Stay > 5    4 More than 1 Admission in calendar year        Criteria that do not apply:    Relationship with PCP    Patient Living Status    Patient Insurance is Medicare, Medicaid or Self Pay    Charlson Comorbidity Score       Expected Length of Stay 5d 14h   Actual Length of Stay 22

## 2017-02-24 NOTE — PROGRESS NOTES
NAME: Sarah Stinson        :  1965        MRN:  [id number]        Assessment :    Plan:  --FELICE  HTN  Anemia  Volume overload      ascending aortic dissection repair 2/3  Creatinine fairly stable with  needed diuresis. Continue bumex and metolazone. Increased po KCL    Continue EPO    Labs in AM             Subjective:     Chief Complaint:  \"It's still swollen down there. \"  No N/V. No dyspnea. No pain. Objective:     VITALS:   Last 24hrs VS reviewed since prior progress note. Most recent are:  Visit Vitals    /47    Pulse 65    Temp 97.7 °F (36.5 °C)    Resp 18    Ht 6' (1.829 m)    Wt 129.1 kg (284 lb 9.8 oz)    SpO2 96%    BMI 38.6 kg/m2       Intake/Output Summary (Last 24 hours) at 17 0856  Last data filed at 17 7220   Gross per 24 hour   Intake              480 ml   Output             4200 ml   Net            -3720 ml      Telemetry Reviewed:     PHYSICAL EXAM:  General: Obese,  no acute distress  Resp:  Dec BS, no distress  CV:   RRR, ++ edema  : scrotal edema, gardner+  Alert awake     ________________________________________________________________________  Rosie Castrejon MD     Procedures: see electronic medical records for all procedures/Xrays and details which  were not copied into this note but were reviewed prior to creation of Plan. LABS:  Recent Labs      17   0327  17   0440   WBC  8.7  9.2   HGB  7.5*  7.7*   HCT  22.5*  22.8*   PLT  315  339     Recent Labs      17   0402  17   0328  17   0435   NA  142  142  140   K  3.2*  2.9*  3.1*   CL  97  99  96*   CO2  34*  33*  30   BUN  51*  46*  47*   CREA  2.25*  2.15*  2.38*   GLU  118*  116*  118*   CA  8.9  8.0*  8.5     No results for input(s): SGOT, GPT, AP, TBIL, TP, ALB, GLOB, GGT, AML, LPSE in the last 72 hours.     No lab exists for component: AMYP, HLPSE  No results for input(s): INR, PTP, APTT in the last 72 hours. No lab exists for component: INREXT, INREXT   No results for input(s): FE, TIBC, PSAT, FERR in the last 72 hours. No results found for: FOL, RBCF   No results for input(s): PH, PCO2, PO2 in the last 72 hours. No results for input(s): CPK, CKMB in the last 72 hours.     No lab exists for component: TROPONINI  No components found for: Bj Point  Lab Results   Component Value Date/Time    Color DARK YELLOW 02/06/2017 01:03 PM    Color PENDING 02/06/2017 01:03 PM    Appearance CLEAR 02/06/2017 01:03 PM    Appearance PENDING 02/06/2017 01:03 PM    Specific gravity 1.019 02/06/2017 01:03 PM    Specific gravity PENDING 02/06/2017 01:03 PM    Specific gravity PENDING 02/06/2017 01:03 PM    pH (UA) 5.0 02/06/2017 01:03 PM    pH (UA) PENDING 02/06/2017 01:03 PM    Protein NEGATIVE  02/06/2017 01:03 PM    Protein PENDING 02/06/2017 01:03 PM    Glucose NEGATIVE  02/06/2017 01:03 PM    Glucose PENDING 02/06/2017 01:03 PM    Ketone NEGATIVE  02/06/2017 01:03 PM    Ketone PENDING 02/06/2017 01:03 PM    Bilirubin PENDING 02/06/2017 01:03 PM    Urobilinogen 0.2 02/06/2017 01:03 PM    Urobilinogen PENDING 02/06/2017 01:03 PM    Nitrites NEGATIVE  02/06/2017 01:03 PM    Nitrites PENDING 02/06/2017 01:03 PM    Leukocyte Esterase NEGATIVE  02/06/2017 01:03 PM    Leukocyte Esterase PENDING 02/06/2017 01:03 PM    Epithelial cells FEW 02/06/2017 01:03 PM    Epithelial cells DUPLICATE REQUEST 51/18/2379 01:03 PM    Bacteria NEGATIVE  02/06/2017 01:03 PM    Bacteria DUPLICATE REQUEST 68/62/0658 01:03 PM    WBC 0-4 02/06/2017 28:35 PM    WBC DUPLICATE REQUEST 57/35/6040 01:03 PM    RBC 0-5 02/06/2017 02:81 PM    RBC DUPLICATE REQUEST 21/14/0486 01:03 PM       MEDICATIONS:  Current Facility-Administered Medications   Medication Dose Route Frequency    potassium chloride (K-DUR, KLOR-CON) SR tablet 40 mEq  40 mEq Oral 5XD    metOLazone (ZAROXOLYN) tablet 2.5 mg  2.5 mg Oral DAILY    epoetin luis miguel (EPOGEN;PROCRIT) injection 10,000 Units  10,000 Units SubCUTAneous Once per day on Tue Sat    bumetanide (BUMEX) tablet 3 mg  3 mg Oral BID    linagliptin (TRADJENTA) tablet 5 mg  5 mg Oral ACB    minoxidil (LONITEN) tablet 10 mg  10 mg Oral DAILY    sodium chloride (NS) flush 5-10 mL  5-10 mL IntraVENous Q8H    sodium chloride (NS) flush 5-10 mL  5-10 mL IntraVENous PRN    acetaminophen (TYLENOL) tablet 650 mg  650 mg Oral Q4H PRN    oxyCODONE-acetaminophen (PERCOCET) 5-325 mg per tablet 1 Tab  1 Tab Oral Q4H PRN    naloxone (NARCAN) injection 0.4 mg  0.4 mg IntraVENous PRN    atorvastatin (LIPITOR) tablet 20 mg  20 mg Oral QPM    ondansetron (ZOFRAN) injection 4 mg  4 mg IntraVENous Q4H PRN    alum-mag hydroxide-simeth (MYLANTA) oral suspension 30 mL  30 mL Oral Q2H PRN    docusate sodium (COLACE) capsule 100 mg  100 mg Oral BID    zolpidem (AMBIEN) tablet 5 mg  5 mg Oral QHS PRN    polyethylene glycol (MIRALAX) packet 34 g  34 g Oral DAILY    traMADol (ULTRAM) tablet  mg   mg Oral Q6H PRN    hydrALAZINE (APRESOLINE) 20 mg/mL injection 10 mg  10 mg IntraVENous Q6H PRN    pantoprazole (PROTONIX) tablet 40 mg  40 mg Oral ACB&D    metoprolol tartrate (LOPRESSOR) tablet 50 mg  50 mg Oral Q12H    hydrALAZINE (APRESOLINE) tablet 100 mg  100 mg Oral Q6H    cloNIDine (CATAPRES) 0.3 mg/24 hr patch 1 Patch  1 Patch TransDERmal Q7D    amLODIPine (NORVASC) tablet 10 mg  10 mg Oral DAILY    influenza vaccine 2016-17 (36mos+)(PF) (FLUZONE/FLUARIX/FLULAVAL QUAD) injection 0.5 mL  0.5 mL IntraMUSCular PRIOR TO DISCHARGE    albuterol (PROVENTIL VENTOLIN) nebulizer solution 1.25-2.5 mg  1.25-2.5 mg Nebulization Q4H PRN    aspirin chewable tablet 81 mg  81 mg Oral DAILY    glucose chewable tablet 16 g  4 Tab Oral PRN    glucagon (GLUCAGEN) injection 1 mg  1 mg IntraMUSCular PRN    insulin lispro (HUMALOG) injection   SubCUTAneous AC&HS

## 2017-02-24 NOTE — PROGRESS NOTES
1915:     PCU SHIFT NURSING NOTE      Bedside and Verbal shift change report given to LAUREN Lazar RN (oncoming nurse) by Cassie Coburn RN (offgoing nurse). Report included the following information SBAR, Kardex, OR Summary, Procedure Summary, Intake/Output, MAR, Recent Results, Cardiac Rhythm NSR and Alarm Parameters . Shift Summary:       Admission Date 2/2/2017   Admission Diagnosis aneursym  Aortic dissection ([address]  melana   Consults IP CONSULT TO GASTROENTEROLOGY  IP CONSULT TO NEPHROLOGY        Consults   [x]PT   [x]OT   []Speech   [x]Case Management      [] Palliative      Cardiac Monitoring Order   [x]Yes   []No     IV drips   []Yes    Drip:                            Dose:  Drip:                            Dose:  Drip:                            Dose:   [x]No     GI Prophylaxis   [x]Yes   []No         DVT Prophylaxis   SCDs:  Sequential Compression Device: Bilateral     Patient Refused VTE Prophylaxis: Yes    Prasanth stockings:         [] Medication   []Contraindicated   []None      Activity Level Activity Level: Up with Assistance     Activity Assistance: Partial (one person)   Purposeful Rounding every 1-2 hour? [x]Yes   Lowry Score  Total Score: 1   Bed Alarm (If score 3 or >)   []Yes   [] Refused (See signed refusal form in chart)   Zi Score  Zi Score: 20   Zi Score (if score 14 or less)   []PMT consult   []Wound Care consult      [x]Specialty bed   [] Nutrition consult          Needs prior to discharge:   Home O2 required:    []Yes   [x]No    If yes, how much O2 required?     Other:    Last Bowel Movement: Last Bowel Movement Date: 02/22/17      Influenza Vaccine Received Flu Vaccine for Current Season (usually Sept-March): No    Patient/Guardian Refused (Notify MD): No   Pneumonia Vaccine           Diet Active Orders   Diet    DIET DIABETIC CONSISTENT CARB Regular; AHA-LOW-CHOL FAT      LDAs         Midline Catheter, Dual 22/94/22 Right;Basilic (Active)   Criteria for Appropriate Use Limited/no vessel suitable for conventional peripheral access 2/23/2017  8:35 PM   Site Assessment Clean 2/23/2017  8:35 PM   Phlebitis Assessment 0 2/23/2017  8:35 PM   Infiltration Assessment 0 2/23/2017  8:35 PM   Dressing Status Clean, dry, & intact 2/23/2017  8:35 PM   Dressing Type Disk with Chlorhexadine Gluconate (CHG); Transparent 2/23/2017  8:35 PM   Action Taken Open ports on tubing capped 2/22/2017  8:28 PM   Arm Circumference (cm) 38 cm 2/11/2017 10:42 AM   Date of Last Dressing Change 02/11/17 2/20/2017  2:00 PM   Proximal Hub Color/Line Status Flushed 2/23/2017 10:31 PM   Distal Hub Color/Line Status Flushed 2/23/2017 10:31 PM   External Catheter Length (cm) 0 centimeters 2/22/2017  7:42 AM   Alcohol Cap Used Yes 2/23/2017  8:35 PM                            Urinary Catheter [REMOVED] Urinary Catheter 02/06/17 Young - Temperature-Criteria for Appropriate Use: Medically/surgically unstable  Urinary Catheter 02/10/17 Young-Criteria for Appropriate Use: Obstruction/retention  [REMOVED] Urinary Catheter 02/02/17 Young - Temperature-Criteria for Appropriate Use: Strict I/Os    Intake & Output   Date 02/23/17 0700 - 02/24/17 0659 02/24/17 0700 - 02/25/17 0659   Shift 5863-3350 8833-1830 24 Hour Total 4014-6078 2486-9371 24 Hour Total   I  N  T  A  K  E   P. O. 720  720         P. O. 720  720       Shift Total  (mL/kg) 720  (5.5)  720  (5.5)      O  U  T  P  U  T   Urine  (mL/kg/hr) 2550  (1.6)  2550         Urine Output (mL) (Urinary Catheter 02/10/17 Young) 2550  2550       Shift Total  (mL/kg) 2550  (19.6)  2550  (19.6)      NET -1830  -1830      Weight (kg) 130.4 130.4 130.4 130.4 130.4 130.4         Readmission Risk Assessment Tool Score Low Risk            7       Total Score        3 Patient Length of Stay > 5    4 More than 1 Admission in calendar year        Criteria that do not apply:    Relationship with PCP    Patient Living Status    Patient Insurance is Medicare, Medicaid or Self Pay    Charlson Comorbidity Score       Expected Length of Stay 5d 14h   Actual Length of Stay 22

## 2017-02-24 NOTE — PROGRESS NOTES
CSS FLOOR Progress Note    Admit Date: 2017    POD: 19 Days Post-Op      Assessment:     Active Problems: Aortic dissection (HCC) (2017)      S/P ascending aortic replacement (2/3/2017)      Overview: EMERGENT ASCENDING AORTIC DISSECTION REPAIR       Right Femoral Artery Cannulation             Procedure(s):  ESOPHAGOGASTRODUODENOSCOPY (EGD)  SIGMOIDOSCOPY FLEXIBLE  ESOPHAGOGASTRODUODENAL (EGD) BIOPSY         Summary     Stable hemodynamics in sinus rhythm without ectopy on no support. Young indwelling for penile swelling. Weight down another 3 lbs. Patient refused SNF transfer yesterday. Not a Shelter candidate. Plan/Recommendations/Medical Decision Making:     Continue diuresis  Increase activity  Increase I/S effort and frequency  Sternal precautions  Stimulate bowel movement  Patient seen and reviewed with Dr. Maninder Emanuel MD      Objective:       Visit Vitals    /47    Pulse 65    Temp 97.7 °F (36.5 °C)    Resp 18    Ht 6' (1.829 m)    Wt 284 lb 9.8 oz (129.1 kg)    SpO2 96%    BMI 38.6 kg/m2       Temp (24hrs), Av.1 °F (36.7 °C), Min:97.7 °F (36.5 °C), Max:98.3 °F (36.8 °C)      Last 3 Recorded Weights in this Encounter    17 0549 17 0500 17 0622   Weight: 294 lb 1.5 oz (133.4 kg) 287 lb 7.7 oz (130.4 kg) 284 lb 9.8 oz (129.1 kg)       CXR Results  (Last 48 hours)    None          Lab Data Reviewed: Recent Labs      17   0752  17   0402   17   0327   WBC   --    --    --   8.7   HGB   --    --    --   7.5*   HCT   --    --    --   22.5*   PLT   --    --    --   315   NA   --   142   < >   --    K   --   3.2*   < >   --    BUN   --   51*   < >   --    CREA   --   2.25*   < >   --    GLU   --   118*   < >   --    GLUCPOC  144*   --    < >   --     < > = values in this interval not displayed.        Last 24hr Input/Output:    Intake/Output Summary (Last 24 hours) at 17 0824  Last data filed at 17 1010   Gross per 24 hour   Intake 480 ml   Output             4200 ml   Net            -3720 ml        Admission Weight: Last Weight   Weight: 270 lb (122.5 kg) Weight: 284 lb 9.8 oz (129.1 kg)       EXAM:  General:      Chest: stable sternum      Incisions: dry and intact      Lungs:   Clear to auscultation bilaterally. Heart:  Regular rate and rhythm, S1, S2 normal, no murmur, no click, no rub      Abdomen:   Soft, non-tender. Bowel sounds present. Extremities: Overall improvement in edema     Neurologic:  Gross motor and sensory apparatus intact.        Signed By: GAETANO Caldwell

## 2017-02-25 LAB
ANION GAP BLD CALC-SCNC: 9 MMOL/L (ref 5–15)
BUN SERPL-MCNC: 48 MG/DL (ref 6–20)
BUN/CREAT SERPL: 22 (ref 12–20)
CALCIUM SERPL-MCNC: 8.9 MG/DL (ref 8.5–10.1)
CHLORIDE SERPL-SCNC: 97 MMOL/L (ref 97–108)
CO2 SERPL-SCNC: 37 MMOL/L (ref 21–32)
CREAT SERPL-MCNC: 2.16 MG/DL (ref 0.7–1.3)
GLUCOSE BLD STRIP.AUTO-MCNC: 131 MG/DL (ref 65–100)
GLUCOSE BLD STRIP.AUTO-MCNC: 137 MG/DL (ref 65–100)
GLUCOSE BLD STRIP.AUTO-MCNC: 145 MG/DL (ref 65–100)
GLUCOSE BLD STRIP.AUTO-MCNC: 148 MG/DL (ref 65–100)
GLUCOSE SERPL-MCNC: 111 MG/DL (ref 65–100)
MAGNESIUM SERPL-MCNC: 2.2 MG/DL (ref 1.6–2.4)
POTASSIUM SERPL-SCNC: 3.2 MMOL/L (ref 3.5–5.1)
SERVICE CMNT-IMP: ABNORMAL
SODIUM SERPL-SCNC: 143 MMOL/L (ref 136–145)

## 2017-02-25 PROCEDURE — 74011636637 HC RX REV CODE- 636/637: Performed by: PHYSICIAN ASSISTANT

## 2017-02-25 PROCEDURE — 97116 GAIT TRAINING THERAPY: CPT

## 2017-02-25 PROCEDURE — 36415 COLL VENOUS BLD VENIPUNCTURE: CPT | Performed by: INTERNAL MEDICINE

## 2017-02-25 PROCEDURE — 74011250637 HC RX REV CODE- 250/637: Performed by: PHYSICIAN ASSISTANT

## 2017-02-25 PROCEDURE — 74011250637 HC RX REV CODE- 250/637: Performed by: THORACIC SURGERY (CARDIOTHORACIC VASCULAR SURGERY)

## 2017-02-25 PROCEDURE — 83735 ASSAY OF MAGNESIUM: CPT | Performed by: INTERNAL MEDICINE

## 2017-02-25 PROCEDURE — 74011250636 HC RX REV CODE- 250/636: Performed by: INTERNAL MEDICINE

## 2017-02-25 PROCEDURE — 82962 GLUCOSE BLOOD TEST: CPT

## 2017-02-25 PROCEDURE — 74011250637 HC RX REV CODE- 250/637: Performed by: INTERNAL MEDICINE

## 2017-02-25 PROCEDURE — 80048 BASIC METABOLIC PNL TOTAL CA: CPT | Performed by: INTERNAL MEDICINE

## 2017-02-25 PROCEDURE — 36591 DRAW BLOOD OFF VENOUS DEVICE: CPT

## 2017-02-25 PROCEDURE — 65660000000 HC RM CCU STEPDOWN

## 2017-02-25 RX ADMIN — POTASSIUM CHLORIDE 40 MEQ: 20 TABLET, EXTENDED RELEASE ORAL at 12:23

## 2017-02-25 RX ADMIN — POTASSIUM CHLORIDE 40 MEQ: 20 TABLET, EXTENDED RELEASE ORAL at 14:02

## 2017-02-25 RX ADMIN — Medication 10 ML: at 21:36

## 2017-02-25 RX ADMIN — ATORVASTATIN CALCIUM 20 MG: 20 TABLET, FILM COATED ORAL at 20:57

## 2017-02-25 RX ADMIN — Medication 10 ML: at 14:02

## 2017-02-25 RX ADMIN — INSULIN LISPRO 2 UNITS: 100 INJECTION, SOLUTION INTRAVENOUS; SUBCUTANEOUS at 08:34

## 2017-02-25 RX ADMIN — HYDRALAZINE HYDROCHLORIDE 100 MG: 25 TABLET, FILM COATED ORAL at 17:24

## 2017-02-25 RX ADMIN — POTASSIUM CHLORIDE 40 MEQ: 20 TABLET, EXTENDED RELEASE ORAL at 17:24

## 2017-02-25 RX ADMIN — BUMETANIDE 3 MG: 1 TABLET ORAL at 17:24

## 2017-02-25 RX ADMIN — MINOXIDIL 10 MG: 2.5 TABLET ORAL at 08:35

## 2017-02-25 RX ADMIN — ERYTHROPOIETIN 10000 UNITS: 10000 INJECTION, SOLUTION INTRAVENOUS; SUBCUTANEOUS at 17:31

## 2017-02-25 RX ADMIN — METOPROLOL TARTRATE 50 MG: 50 TABLET ORAL at 20:58

## 2017-02-25 RX ADMIN — BUMETANIDE 3 MG: 1 TABLET ORAL at 08:35

## 2017-02-25 RX ADMIN — POTASSIUM CHLORIDE 40 MEQ: 20 TABLET, EXTENDED RELEASE ORAL at 08:35

## 2017-02-25 RX ADMIN — ASPIRIN 81 MG 81 MG: 81 TABLET ORAL at 08:35

## 2017-02-25 RX ADMIN — METOPROLOL TARTRATE 50 MG: 50 TABLET ORAL at 08:35

## 2017-02-25 RX ADMIN — Medication 40 ML: at 03:55

## 2017-02-25 RX ADMIN — LINAGLIPTIN 5 MG: 5 TABLET, FILM COATED ORAL at 08:35

## 2017-02-25 RX ADMIN — PANTOPRAZOLE SODIUM 40 MG: 40 TABLET, DELAYED RELEASE ORAL at 17:24

## 2017-02-25 RX ADMIN — METOLAZONE 2.5 MG: 2.5 TABLET ORAL at 08:35

## 2017-02-25 RX ADMIN — POTASSIUM CHLORIDE 40 MEQ: 20 TABLET, EXTENDED RELEASE ORAL at 21:06

## 2017-02-25 RX ADMIN — INSULIN LISPRO 2 UNITS: 100 INJECTION, SOLUTION INTRAVENOUS; SUBCUTANEOUS at 12:23

## 2017-02-25 RX ADMIN — INSULIN LISPRO 2 UNITS: 100 INJECTION, SOLUTION INTRAVENOUS; SUBCUTANEOUS at 17:24

## 2017-02-25 RX ADMIN — INSULIN LISPRO 2 UNITS: 100 INJECTION, SOLUTION INTRAVENOUS; SUBCUTANEOUS at 21:25

## 2017-02-25 RX ADMIN — PANTOPRAZOLE SODIUM 40 MG: 40 TABLET, DELAYED RELEASE ORAL at 08:35

## 2017-02-25 NOTE — PROGRESS NOTES
PCU SHIFT NURSING NOTE      Bedside shift change report given to [address] rn (oncoming nurse) by Marquez Munoz (offgoing nurse). Report included the following information SBAR, Kardex, Intake/Output and MAR. Shift Summary: 2839 5782- AM medications given wound care complete. Young care complete. 1045- PT into work with patient. 1400- Dr. Hoa Reyes into round. Patient walked in ray without difficulty. Midline flushed, positive blood return. [id number]- Patient able to walk in ray without difficulty. 1920-Bedside shift change report given to laila SANCHEZ (oncoming nurse) by Thea Saleh (offgoing nurse). Report included the following information SBAR, Kardex, Intake/Output, MAR and Recent Results. Admission Date 2/2/2017   Admission Diagnosis aneursym  Aortic dissection ([facility] [address]  melana   Consults IP CONSULT TO GASTROENTEROLOGY  IP CONSULT TO NEPHROLOGY        Consults   [x]PT   [x]OT   []Speech   []Case Management      [] Palliative      Cardiac Monitoring Order   []Yes   []No     IV drips   []Yes    Drip:                            Dose:  Drip:                            Dose:  Drip:                            Dose:   [x]No     GI Prophylaxis   [x]Yes   []No         DVT Prophylaxis   SCDs:  Sequential Compression Device: Bilateral     Patient Refused VTE Prophylaxis: Yes    Prasanth stockings:         [] Medication   []Contraindicated   [x]None      Activity Level Activity Level: Up with Assistance     Activity Assistance: Partial (one person)   Purposeful Rounding every 1-2 hour? []Yes   Lowry Score  Total Score: 1   Bed Alarm (If score 3 or >)   []Yes   [] Refused (See signed refusal form in chart)   Zi Score  Zi Score: 22   Zi Score (if score 14 or less)   []PMT consult   []Wound Care consult      []Specialty bed   [] Nutrition consult          Needs prior to discharge:   Home O2 required:    []Yes   [x]No    If yes, how much O2 required?     Other:    Last Bowel Movement: Last Bowel Movement Date: 02/22/17      Influenza Vaccine Received Flu Vaccine for Current Season (usually Sept-March): No    Patient/Guardian Refused (Notify MD): No   Pneumonia Vaccine           Diet Active Orders   Diet    DIET DIABETIC CONSISTENT CARB Regular; AHA-LOW-CHOL FAT      LDAs         Midline Catheter, Dual 55/95/10 Right;Basilic (Active)   Criteria for Appropriate Use Limited/no vessel suitable for conventional peripheral access 2/24/2017  7:30 PM   Site Assessment Clean 2/24/2017  7:30 PM   Phlebitis Assessment 0 2/24/2017  7:30 PM   Infiltration Assessment 0 2/24/2017  7:30 PM   Dressing Status Clean, dry, & intact 2/24/2017  7:30 PM   Dressing Type Disk with Chlorhexadine Gluconate (CHG); Transparent 2/24/2017  7:30 PM   Action Taken Open ports on tubing capped 2/24/2017  7:30 PM   Arm Circumference (cm) 38 cm 2/11/2017 10:42 AM   Date of Last Dressing Change 02/11/17 2/20/2017  2:00 PM   Proximal Hub Color/Line Status Flushed 2/25/2017  3:56 AM   Distal Hub Color/Line Status Flushed 2/25/2017  3:56 AM   External Catheter Length (cm) 0 centimeters 2/22/2017  7:42 AM   Alcohol Cap Used Yes 2/25/2017  3:56 AM                            Urinary Catheter [REMOVED] Urinary Catheter 02/06/17 Young - Temperature-Criteria for Appropriate Use: Medically/surgically unstable  Urinary Catheter 02/10/17 Young-Criteria for Appropriate Use: Obstruction/retention  [REMOVED] Urinary Catheter 02/02/17 Young - Temperature-Criteria for Appropriate Use: Strict I/Os    Intake & Output   Date 02/24/17 0700 - 02/25/17 0659 02/25/17 0700 - 02/26/17 0659   Shift 5428-6276 2000-0258 24 Hour Total 4004-2723 9888-2436 24 Hour Total   I  N  T  A  K  E   P. O. 720  720         P. O. 720  720       Shift Total  (mL/kg) 720  (5.6)  720  (5.7)      O  U  T  P  U  T   Urine  (mL/kg/hr) 2500  (1.6) 3150 5650         Urine Output (mL) (Urinary Catheter 02/10/17 Young) 2500 3150 5650       Shift Total  (mL/kg) 2500  (19.4) 3150  (25) 5650  (44.8)      NET -8169 -0684 -4212      Weight (kg) 129.1 126 126 126 126 126         Readmission Risk Assessment Tool Score Low Risk            7       Total Score        3 Patient Length of Stay > 5    4 More than 1 Admission in calendar year        Criteria that do not apply:    Relationship with PCP    Patient Living Status    Patient Insurance is Medicare, Medicaid or Self Pay    Charlson Comorbidity Score       Expected Length of Stay 5d 14h   Actual Length of Stay 23

## 2017-02-25 NOTE — PROGRESS NOTES
NAME: Niharika Bernabe        :  1965        MRN:  [id number]        Assessment :    Plan:  --FELICE  HTN  Anemia  Volume overload      ascending aortic dissection repair 2/3  Creatinine fairly stable with needed diuresis. Weights trending down     Continue bumex and metolazone. Starting to see some contraction alkalosis-> i may consider adding diamox tomorrow    Persistent hypokalemia despite Increased po KCL    Continue EPO    Labs in AM             Subjective:     Chief Complaint: No events overnight. No cramping. No N/V. No dyspnea. No pain. Objective:     VITALS:   Last 24hrs VS reviewed since prior progress note. Most recent are:  Visit Vitals    /61    Pulse 67    Temp 97.9 °F (36.6 °C)    Resp 18    Ht 6' (1.829 m)    Wt 126 kg (277 lb 12.5 oz)    SpO2 98%    BMI 37.67 kg/m2       Intake/Output Summary (Last 24 hours) at 17 1335  Last data filed at 17 1224   Gross per 24 hour   Intake              480 ml   Output             6250 ml   Net            -5770 ml      Telemetry Reviewed:     PHYSICAL EXAM:  General: Obese,  no acute distress  Resp:  Dec BS, no distress  CV:   RRR, ++ edema  : scrotal edema, gardner+  Alert awake     ________________________________________________________________________  Deep Juanetta Crigler, MD     Procedures: see electronic medical records for all procedures/Xrays and details which  were not copied into this note but were reviewed prior to creation of Plan.       LABS:  Recent Labs      17   0327   WBC  8.7   HGB  7.5*   HCT  22.5*   PLT  315     Recent Labs      17   0353  17   0402  17   0328   NA  143  142  142   K  3.2*  3.2*  2.9*   CL  97  97  99   CO2  37*  34*  33*   BUN  48*  51*  46*   CREA  2.16*  2.25*  2.15*   GLU  111*  118*  116*   CA  8.9  8.9  8.0*   MG  2.2   --    --      No results for input(s): SGOT, GPT, AP, TBIL, TP, ALB, GLOB, GGT, AML, LPSE in the last 72 hours. No lab exists for component: AMYP, HLPSE  No results for input(s): INR, PTP, APTT in the last 72 hours. No lab exists for component: INREXT, INREXT   No results for input(s): FE, TIBC, PSAT, FERR in the last 72 hours. No results found for: FOL, RBCF   No results for input(s): PH, PCO2, PO2 in the last 72 hours. No results for input(s): CPK, CKMB in the last 72 hours.     No lab exists for component: TROPONINI  No components found for: Bj Point  Lab Results   Component Value Date/Time    Color DARK YELLOW 02/06/2017 01:03 PM    Color PENDING 02/06/2017 01:03 PM    Appearance CLEAR 02/06/2017 01:03 PM    Appearance PENDING 02/06/2017 01:03 PM    Specific gravity 1.019 02/06/2017 01:03 PM    Specific gravity PENDING 02/06/2017 01:03 PM    Specific gravity PENDING 02/06/2017 01:03 PM    pH (UA) 5.0 02/06/2017 01:03 PM    pH (UA) PENDING 02/06/2017 01:03 PM    Protein NEGATIVE  02/06/2017 01:03 PM    Protein PENDING 02/06/2017 01:03 PM    Glucose NEGATIVE  02/06/2017 01:03 PM    Glucose PENDING 02/06/2017 01:03 PM    Ketone NEGATIVE  02/06/2017 01:03 PM    Ketone PENDING 02/06/2017 01:03 PM    Bilirubin PENDING 02/06/2017 01:03 PM    Urobilinogen 0.2 02/06/2017 01:03 PM    Urobilinogen PENDING 02/06/2017 01:03 PM    Nitrites NEGATIVE  02/06/2017 01:03 PM    Nitrites PENDING 02/06/2017 01:03 PM    Leukocyte Esterase NEGATIVE  02/06/2017 01:03 PM    Leukocyte Esterase PENDING 02/06/2017 01:03 PM    Epithelial cells FEW 02/06/2017 01:03 PM    Epithelial cells DUPLICATE REQUEST 47/64/8895 01:03 PM    Bacteria NEGATIVE  02/06/2017 01:03 PM    Bacteria DUPLICATE REQUEST 65/65/9284 01:03 PM    WBC 0-4 02/06/2017 23:63 PM    WBC DUPLICATE REQUEST 59/54/9227 01:03 PM    RBC 0-5 02/06/2017 38:31 PM    RBC DUPLICATE REQUEST 61/16/7602 01:03 PM       MEDICATIONS:  Current Facility-Administered Medications   Medication Dose Route Frequency    potassium chloride (K-DUR, KLOR-CON) SR tablet 40 mEq  40 mEq Oral 5XD    metOLazone (ZAROXOLYN) tablet 2.5 mg  2.5 mg Oral DAILY    epoetin luis miguel (EPOGEN;PROCRIT) injection 10,000 Units  10,000 Units SubCUTAneous Once per day on Tue Sat    bumetanide (BUMEX) tablet 3 mg  3 mg Oral BID    linagliptin (TRADJENTA) tablet 5 mg  5 mg Oral ACB    minoxidil (LONITEN) tablet 10 mg  10 mg Oral DAILY    sodium chloride (NS) flush 5-10 mL  5-10 mL IntraVENous Q8H    sodium chloride (NS) flush 5-10 mL  5-10 mL IntraVENous PRN    acetaminophen (TYLENOL) tablet 650 mg  650 mg Oral Q4H PRN    oxyCODONE-acetaminophen (PERCOCET) 5-325 mg per tablet 1 Tab  1 Tab Oral Q4H PRN    naloxone (NARCAN) injection 0.4 mg  0.4 mg IntraVENous PRN    atorvastatin (LIPITOR) tablet 20 mg  20 mg Oral QPM    ondansetron (ZOFRAN) injection 4 mg  4 mg IntraVENous Q4H PRN    alum-mag hydroxide-simeth (MYLANTA) oral suspension 30 mL  30 mL Oral Q2H PRN    docusate sodium (COLACE) capsule 100 mg  100 mg Oral BID    zolpidem (AMBIEN) tablet 5 mg  5 mg Oral QHS PRN    polyethylene glycol (MIRALAX) packet 34 g  34 g Oral DAILY    traMADol (ULTRAM) tablet  mg   mg Oral Q6H PRN    hydrALAZINE (APRESOLINE) 20 mg/mL injection 10 mg  10 mg IntraVENous Q6H PRN    pantoprazole (PROTONIX) tablet 40 mg  40 mg Oral ACB&D    metoprolol tartrate (LOPRESSOR) tablet 50 mg  50 mg Oral Q12H    hydrALAZINE (APRESOLINE) tablet 100 mg  100 mg Oral Q6H    cloNIDine (CATAPRES) 0.3 mg/24 hr patch 1 Patch  1 Patch TransDERmal Q7D    amLODIPine (NORVASC) tablet 10 mg  10 mg Oral DAILY    influenza vaccine 2016-17 (36mos+)(PF) (FLUZONE/FLUARIX/FLULAVAL QUAD) injection 0.5 mL  0.5 mL IntraMUSCular PRIOR TO DISCHARGE    albuterol (PROVENTIL VENTOLIN) nebulizer solution 1.25-2.5 mg  1.25-2.5 mg Nebulization Q4H PRN    aspirin chewable tablet 81 mg  81 mg Oral DAILY    glucose chewable tablet 16 g  4 Tab Oral PRN    glucagon (GLUCAGEN) injection 1 mg  1 mg IntraMUSCular PRN    insulin lispro (HUMALOG) injection   SubCUTAneous AC&HS

## 2017-02-25 NOTE — PROGRESS NOTES
[facility] FLOOR Progress Note    Admit Date: 2017  POD: 20 Days Post-Op      Procedure:  Procedure(s):  ESOPHAGOGASTRODUODENOSCOPY (EGD)  SIGMOIDOSCOPY FLEXIBLE  ESOPHAGOGASTRODUODENAL (EGD) BIOPSY    Subjective:     Looks ok this am     Objective:     Blood pressure 111/52, pulse 67, temperature 97.8 °F (36.6 °C), resp. rate 16, height 6' (1.829 m), weight 284 lb 9.8 oz (129.1 kg), SpO2 100 %.   Temp (24hrs), Av.8 °F (36.6 °C), Min:97.5 °F (36.4 °C), Max:98.5 °F (36.9 °C)        Oxygen:    CXR    Medications reviewed    Admission Weight: Last Weight   Weight: 270 lb (122.5 kg) Weight: 284 lb 9.8 oz (129.1 kg)     Intake / Output / Drain:  Current Shift: 1901 -  0700  In: -   Out: 1600 [Urine:1600]  Last 24 hrs.:  07 -  1900  In: 1440 [P.O.:1440]  Out: 6700 [Urine:6700]    EXAM:  Visit Vitals    /52 (BP 1 Location: Left arm, BP Patient Position: At rest)    Pulse 67    Temp 97.8 °F (36.6 °C)    Resp 16    Ht 6' (1.829 m)    Wt 284 lb 9.8 oz (129.1 kg)    SpO2 100%    BMI 38.6 kg/m2                           Labs:  Recent Results (from the past 24 hour(s))   GLUCOSE, POC    Collection Time: 17  7:52 AM   Result Value Ref Range    Glucose (POC) 144 (H) 65 - 100 mg/dL    Performed by [address], POC    Collection Time: 17 11:38 AM   Result Value Ref Range    Glucose (POC) 141 (H) 65 - 100 mg/dL    Performed by [address], POC    Collection Time: 17  4:08 PM   Result Value Ref Range    Glucose (POC) 174 (H) 65 - 100 mg/dL    Performed by Leandra Berrios    GLUCOSE, POC    Collection Time: 17  9:04 PM   Result Value Ref Range    Glucose (POC) 154 (H) 65 - 100 mg/dL    Performed by [address] [place] [address], [facility]    Collection Time: 17  3:53 AM   Result Value Ref Range    Sodium 143 136 - 145 mmol/L    Potassium 3.2 (L) 3.5 - 5.1 mmol/L    Chloride 97 97 - 108 mmol/L    CO2 37 (H) 21 - 32 mmol/L    Anion gap 9 5 - 15 mmol/L    Glucose 111 (H) 65 - 100 mg/dL    BUN 48 (H) 6 - 20 MG/DL    Creatinine 2.16 (H) 0.70 - 1.30 MG/DL    BUN/Creatinine ratio 22 (H) 12 - 20      GFR est AA 39 (L) >60 ml/min/1.73m2    GFR est non-AA 32 (L) >60 ml/min/1.73m2    Calcium 8.9 8.5 - 10.1 MG/DL   MAGNESIUM    Collection Time: 02/25/17  3:53 AM   Result Value Ref Range    Magnesium 2.2 1.6 - 2.4 mg/dL     A/P    Continue diuresis  Increase activity  Increase I/S effort and frequency  Sternal precautions  Stimulate bowel movement    Signed By: Chance Loving MD

## 2017-02-25 NOTE — PROGRESS NOTES
Problem: Mobility Impaired (Adult and Pediatric)  Goal: *Acute Goals and Plan of Care (Insert Text)  Physical Therapy Goals  Goals reviewed and remain appropriate 2/19/17  Goals reviewed and remain appropriate 2/10/17  Initiated 2/3/2017  1. Patient will move from supine to sit and sit to supine , scoot up and down and roll side to side in bed with modified independence within 7 days. 2. Patient will perform sit to/from stand with modified independence within 7 days. 3. Patient will ambulate 250 feet with least restrictive assistive device and modified independence within 7 days. 4. Patient will ascend/descend 5 stairs with 1 handrail(s) with modified independence within 7 days. 5. Patient will perform cardiac exercises per protocol with independence within 7 days. 6. Patient will verbally and functionally recall 3/3 sternal precautions within 7 days. PHYSICAL THERAPY TREATMENT  Patient: Ramos Miguel (83 y.o. male)  Date: 2/25/2017  Diagnosis: aneursym  Aortic dissection (HCC)  melana <principal problem not specified>  Procedure(s) (LRB):  ESOPHAGOGASTRODUODENOSCOPY (EGD) (N/A)  SIGMOIDOSCOPY FLEXIBLE (N/A)  ESOPHAGOGASTRODUODENAL (EGD) BIOPSY (N/A) 20 Days Post-Op  Precautions: Sternal  Chart, physical therapy assessment, plan of care and goals were reviewed. ASSESSMENT:  Pt eager to walk, noting he is hopeful to go to a short rehab stay to increase strength before returning home. He does have some lateral trunk sway with gait that requires stand by assist for balance. Motivated and very cooperative, eager to return to prior level of independence. Progression toward goals:  [X]      Improving appropriately and progressing toward goals  [ ]      Improving slowly and progressing toward goals  [ ]      Not making progress toward goals and plan of care will be adjusted       PLAN:  Patient continues to benefit from skilled intervention to address the above impairments.   Continue treatment per established plan of care. Discharge Recommendations:  Rehab  Further Equipment Recommendations for Discharge: To be determined at rehab       SUBJECTIVE:   Patient stated I am hopeful to go to rehab this week.       OBJECTIVE DATA SUMMARY:   Critical Behavior:  Neurologic State: Alert, Appropriate for age, Eyes open spontaneously  Orientation Level: Appropriate for age, Oriented X4  Cognition: Appropriate decision making, Appropriate for age attention/concentration, Appropriate safety awareness, Follows commands  Safety/Judgement: Awareness of environment, Fall prevention, Insight into deficits  Functional Mobility Training:  Bed Mobility:                                Transfers:  Sit to Stand: Supervision  Stand to Sit: Supervision                             Balance:  Sitting: Intact  Standing: Intact  Standing - Static: Good  Ambulation/Gait Training:  Distance (ft): 250 Feet (ft)  Assistive Device: Gait belt  Ambulation - Level of Assistance: Stand-by asssistance                 Base of Support: Widened                             Pain:  Pain Scale 1: Numeric (0 - 10)  Pain Intensity 1: 0              Activity Tolerance:   Light fatigue with ambulation. Please refer to the flowsheet for vital signs taken during this treatment.   After treatment:   [X] Patient left in no apparent distress sitting up in chair  [ ] Patient left in no apparent distress in bed  [X] Call bell left within reach  [X] Nursing notified  [ ] Caregiver present  [ ] Bed alarm activated      COMMUNICATION/COLLABORATION:   The patients plan of care was discussed with: Registered Nurse     Chaz Dotson PTA   Time Calculation: 15 mins

## 2017-02-25 NOTE — PROGRESS NOTES
1915:     PCU SHIFT NURSING NOTE      Bedside and Verbal shift change report given to LAUREN Kraft RN (oncoming nurse) by LAUREN Markham RN (offgoing nurse). Report included the following information SBAR, Kardex, OR Summary, Procedure Summary, Intake/Output, MAR, Recent Results, Cardiac Rhythm NSR and Alarm Parameters . Shift Summary:       Admission Date 2/2/2017   Admission Diagnosis aneursym  Aortic dissection ([address]  melana   Consults IP CONSULT TO GASTROENTEROLOGY  IP CONSULT TO NEPHROLOGY        Consults   [x]PT   [x]OT   []Speech   [x]Case Management      [] Palliative      Cardiac Monitoring Order   [x]Yes   []No     IV drips   []Yes    Drip:                            Dose:  Drip:                            Dose:  Drip:                            Dose:   [x]No     GI Prophylaxis   [x]Yes   []No         DVT Prophylaxis   SCDs:  Sequential Compression Device: Bilateral     Patient Refused VTE Prophylaxis: Yes    Prasanth stockings:         [] Medication   []Contraindicated   [x]None      Activity Level Activity Level: Up with Assistance     Activity Assistance: Partial (one person)   Purposeful Rounding every 1-2 hour? [x]Yes   Lowry Score  Total Score: 1   Bed Alarm (If score 3 or >)   []Yes   [] Refused (See signed refusal form in chart)   Zi Score  Zi Score: 22   Zi Score (if score 14 or less)   []PMT consult   []Wound Care consult      []Specialty bed   [] Nutrition consult          Needs prior to discharge:   Home O2 required:    []Yes   [x]No    If yes, how much O2 required?     Other:    Last Bowel Movement: Last Bowel Movement Date: 02/22/17      Influenza Vaccine Received Flu Vaccine for Current Season (usually Sept-March): No    Patient/Guardian Refused (Notify MD): No   Pneumonia Vaccine           Diet Active Orders   Diet    DIET DIABETIC CONSISTENT CARB Regular; AHA-LOW-CHOL FAT      LDAs         Midline Catheter, Dual 05/80/31 Right;Basilic (Active)   Criteria for Appropriate Use Limited/no vessel suitable for conventional peripheral access 2/24/2017  7:30 PM   Site Assessment Clean 2/24/2017  7:30 PM   Phlebitis Assessment 0 2/24/2017  7:30 PM   Infiltration Assessment 0 2/24/2017  7:30 PM   Dressing Status Clean, dry, & intact 2/24/2017  7:30 PM   Dressing Type Disk with Chlorhexadine Gluconate (CHG); Transparent 2/24/2017  7:30 PM   Action Taken Open ports on tubing capped 2/24/2017  7:30 PM   Arm Circumference (cm) 38 cm 2/11/2017 10:42 AM   Date of Last Dressing Change 02/11/17 2/20/2017  2:00 PM   Proximal Hub Color/Line Status Blue;Capped 2/24/2017  7:30 PM   Distal Hub Color/Line Status Red;Capped 2/24/2017  7:30 PM   External Catheter Length (cm) 0 centimeters 2/22/2017  7:42 AM   Alcohol Cap Used Yes 2/24/2017  7:30 PM                            Urinary Catheter [REMOVED] Urinary Catheter 02/06/17 Young - Temperature-Criteria for Appropriate Use: Medically/surgically unstable  Urinary Catheter 02/10/17 Young-Criteria for Appropriate Use: Obstruction/retention  [REMOVED] Urinary Catheter 02/02/17 Young - Temperature-Criteria for Appropriate Use: Strict I/Os    Intake & Output   Date 02/24/17 0700 - 02/25/17 0659 02/25/17 0700 - 02/26/17 0659   Shift 6996-6109 6372-0105 24 Hour Total 7351-3208 5029-2359 24 Hour Total   I  N  T  A  K  E   P. O. 720  720         P. O. 720  720       Shift Total  (mL/kg) 720  (5.6)  720  (5.6)      O  U  T  P  U  T   Urine  (mL/kg/hr) 2500  (1.6) 1600 4100         Urine Output (mL) (Urinary Catheter 02/10/17 Young) 2500 1600 4100       Shift Total  (mL/kg) 2500  (19.4) 1600  (12.4) 4100  (31.8)      NET -5366 -1810 -7413      Weight (kg) 129.1 129.1 129.1 129.1 129.1 129.1         Readmission Risk Assessment Tool Score Low Risk            7       Total Score        3 Patient Length of Stay > 5    4 More than 1 Admission in calendar year        Criteria that do not apply:    Relationship with PCP    Patient Living Status    Patient Insurance is Medicare, Medicaid or Self Pay    Charlson Comorbidity Score       Expected Length of Stay 5d 14h   Actual Length of Stay 23

## 2017-02-26 LAB
ALBUMIN SERPL BCP-MCNC: 3.1 G/DL (ref 3.5–5)
ALBUMIN/GLOB SERPL: 0.7 {RATIO} (ref 1.1–2.2)
ALP SERPL-CCNC: 94 U/L (ref 45–117)
ALT SERPL-CCNC: 33 U/L (ref 12–78)
ANION GAP BLD CALC-SCNC: 12 MMOL/L (ref 5–15)
AST SERPL W P-5'-P-CCNC: 20 U/L (ref 15–37)
BILIRUB SERPL-MCNC: 0.4 MG/DL (ref 0.2–1)
BUN SERPL-MCNC: 45 MG/DL (ref 6–20)
BUN/CREAT SERPL: 20 (ref 12–20)
CALCIUM SERPL-MCNC: 9 MG/DL (ref 8.5–10.1)
CHLORIDE SERPL-SCNC: 96 MMOL/L (ref 97–108)
CO2 SERPL-SCNC: 36 MMOL/L (ref 21–32)
CREAT SERPL-MCNC: 2.2 MG/DL (ref 0.7–1.3)
ERYTHROCYTE [DISTWIDTH] IN BLOOD BY AUTOMATED COUNT: 14.4 % (ref 11.5–14.5)
GLOBULIN SER CALC-MCNC: 4.6 G/DL (ref 2–4)
GLUCOSE BLD STRIP.AUTO-MCNC: 126 MG/DL (ref 65–100)
GLUCOSE BLD STRIP.AUTO-MCNC: 133 MG/DL (ref 65–100)
GLUCOSE BLD STRIP.AUTO-MCNC: 144 MG/DL (ref 65–100)
GLUCOSE BLD STRIP.AUTO-MCNC: 194 MG/DL (ref 65–100)
GLUCOSE SERPL-MCNC: 119 MG/DL (ref 65–100)
HCT VFR BLD AUTO: 26.4 % (ref 36.6–50.3)
HGB BLD-MCNC: 8.6 G/DL (ref 12.1–17)
MAGNESIUM SERPL-MCNC: 2.2 MG/DL (ref 1.6–2.4)
MCH RBC QN AUTO: 28.1 PG (ref 26–34)
MCHC RBC AUTO-ENTMCNC: 32.6 G/DL (ref 30–36.5)
MCV RBC AUTO: 86.3 FL (ref 80–99)
PHOSPHATE SERPL-MCNC: 4 MG/DL (ref 2.6–4.7)
PLATELET # BLD AUTO: 335 K/UL (ref 150–400)
POTASSIUM SERPL-SCNC: 3.5 MMOL/L (ref 3.5–5.1)
PROT SERPL-MCNC: 7.7 G/DL (ref 6.4–8.2)
RBC # BLD AUTO: 3.06 M/UL (ref 4.1–5.7)
SERVICE CMNT-IMP: ABNORMAL
SODIUM SERPL-SCNC: 144 MMOL/L (ref 136–145)
WBC # BLD AUTO: 8 K/UL (ref 4.1–11.1)

## 2017-02-26 PROCEDURE — 83735 ASSAY OF MAGNESIUM: CPT | Performed by: INTERNAL MEDICINE

## 2017-02-26 PROCEDURE — 74011250637 HC RX REV CODE- 250/637: Performed by: PHYSICIAN ASSISTANT

## 2017-02-26 PROCEDURE — 77010033678 HC OXYGEN DAILY

## 2017-02-26 PROCEDURE — 74011250637 HC RX REV CODE- 250/637: Performed by: THORACIC SURGERY (CARDIOTHORACIC VASCULAR SURGERY)

## 2017-02-26 PROCEDURE — 74011250637 HC RX REV CODE- 250/637: Performed by: INTERNAL MEDICINE

## 2017-02-26 PROCEDURE — 65660000000 HC RM CCU STEPDOWN

## 2017-02-26 PROCEDURE — 74011636637 HC RX REV CODE- 636/637: Performed by: PHYSICIAN ASSISTANT

## 2017-02-26 PROCEDURE — 80053 COMPREHEN METABOLIC PANEL: CPT | Performed by: INTERNAL MEDICINE

## 2017-02-26 PROCEDURE — 85027 COMPLETE CBC AUTOMATED: CPT | Performed by: INTERNAL MEDICINE

## 2017-02-26 PROCEDURE — 84100 ASSAY OF PHOSPHORUS: CPT | Performed by: INTERNAL MEDICINE

## 2017-02-26 PROCEDURE — 82962 GLUCOSE BLOOD TEST: CPT

## 2017-02-26 PROCEDURE — 36415 COLL VENOUS BLD VENIPUNCTURE: CPT | Performed by: INTERNAL MEDICINE

## 2017-02-26 RX ADMIN — LINAGLIPTIN 5 MG: 5 TABLET, FILM COATED ORAL at 08:42

## 2017-02-26 RX ADMIN — ASPIRIN 81 MG 81 MG: 81 TABLET ORAL at 08:42

## 2017-02-26 RX ADMIN — AMLODIPINE BESYLATE 10 MG: 5 TABLET ORAL at 08:42

## 2017-02-26 RX ADMIN — PANTOPRAZOLE SODIUM 40 MG: 40 TABLET, DELAYED RELEASE ORAL at 08:42

## 2017-02-26 RX ADMIN — POTASSIUM CHLORIDE 40 MEQ: 20 TABLET, EXTENDED RELEASE ORAL at 17:25

## 2017-02-26 RX ADMIN — POTASSIUM CHLORIDE 40 MEQ: 20 TABLET, EXTENDED RELEASE ORAL at 11:59

## 2017-02-26 RX ADMIN — INSULIN LISPRO 2 UNITS: 100 INJECTION, SOLUTION INTRAVENOUS; SUBCUTANEOUS at 17:26

## 2017-02-26 RX ADMIN — ATORVASTATIN CALCIUM 20 MG: 20 TABLET, FILM COATED ORAL at 21:01

## 2017-02-26 RX ADMIN — METOPROLOL TARTRATE 50 MG: 50 TABLET ORAL at 08:42

## 2017-02-26 RX ADMIN — POTASSIUM CHLORIDE 40 MEQ: 20 TABLET, EXTENDED RELEASE ORAL at 08:42

## 2017-02-26 RX ADMIN — Medication 10 ML: at 15:14

## 2017-02-26 RX ADMIN — INSULIN LISPRO 2 UNITS: 100 INJECTION, SOLUTION INTRAVENOUS; SUBCUTANEOUS at 08:42

## 2017-02-26 RX ADMIN — POTASSIUM CHLORIDE 40 MEQ: 20 TABLET, EXTENDED RELEASE ORAL at 15:14

## 2017-02-26 RX ADMIN — BUMETANIDE 3 MG: 1 TABLET ORAL at 17:26

## 2017-02-26 RX ADMIN — INSULIN LISPRO 4 UNITS: 100 INJECTION, SOLUTION INTRAVENOUS; SUBCUTANEOUS at 21:01

## 2017-02-26 RX ADMIN — INSULIN LISPRO 2 UNITS: 100 INJECTION, SOLUTION INTRAVENOUS; SUBCUTANEOUS at 11:59

## 2017-02-26 RX ADMIN — Medication 10 ML: at 21:02

## 2017-02-26 RX ADMIN — METOLAZONE 2.5 MG: 2.5 TABLET ORAL at 08:42

## 2017-02-26 RX ADMIN — MINOXIDIL 10 MG: 2.5 TABLET ORAL at 08:42

## 2017-02-26 RX ADMIN — METOPROLOL TARTRATE 50 MG: 50 TABLET ORAL at 21:01

## 2017-02-26 RX ADMIN — BUMETANIDE 3 MG: 1 TABLET ORAL at 08:42

## 2017-02-26 RX ADMIN — POTASSIUM CHLORIDE 40 MEQ: 20 TABLET, EXTENDED RELEASE ORAL at 21:01

## 2017-02-26 RX ADMIN — PANTOPRAZOLE SODIUM 40 MG: 40 TABLET, DELAYED RELEASE ORAL at 17:25

## 2017-02-26 NOTE — PROGRESS NOTES
Chart reviewed. Pt observed amb. In hallway without AD or difficulties today, no LOB. Also noted in nursing documentation, pt amb. Mod. I.  Will follow-up on Mon. For any further PT needs.

## 2017-02-26 NOTE — PROGRESS NOTES
[facility] FLOOR Progress Note    Admit Date: 2017  POD: 21 Days Post-Op      Procedure:  Procedure(s):  ESOPHAGOGASTRODUODENOSCOPY (EGD)  SIGMOIDOSCOPY FLEXIBLE  ESOPHAGOGASTRODUODENAL (EGD) BIOPSY  Ascending Aortic Dissection Repair    Subjective:     No issues overnight     Objective:     Blood pressure 112/57, pulse 71, temperature 98.2 °F (36.8 °C), resp. rate 18, height 6' (1.829 m), weight 277 lb 12.5 oz (126 kg), SpO2 97 %.   Temp (24hrs), Av °F (36.7 °C), Min:97.5 °F (36.4 °C), Max:98.4 °F (36.9 °C)          Admission Weight: Last Weight   Weight: 270 lb (122.5 kg) Weight: 277 lb 12.5 oz (126 kg)     Intake / Output / Drain:  Current Shift:    Last 24 hrs.:  1901 -  0700  In: 720 [P.O.:720]  Out: 95904 [[facility]:[id number]]    EXAM:  Visit Vitals    /57 (BP 1 Location: Left arm, BP Patient Position: At rest)    Pulse 71    Temp 98.2 °F (36.8 °C)    Resp 18    Ht 6' (1.829 m)    Wt 277 lb 12.5 oz (126 kg)    SpO2 97%    BMI 37.67 kg/m2                           Labs:  Recent Results (from the past 24 hour(s))   GLUCOSE, POC    Collection Time: 17  7:30 AM   Result Value Ref Range    Glucose (POC) 145 (H) 65 - 100 mg/dL    Performed by Ximena Gonzales, POC    Collection Time: 17 11:40 AM   Result Value Ref Range    Glucose (POC) 148 (H) 65 - 100 mg/dL    Performed by Shanna Patel    GLUCOSE, POC    Collection Time: 17  4:30 PM   Result Value Ref Range    Glucose (POC) 137 (H) 65 - 100 mg/dL    Performed by Shanna Patel    GLUCOSE, POC    Collection Time: 17  9:01 PM   Result Value Ref Range    Glucose (POC) 131 (H) 65 - 100 mg/dL    Performed by Unkown     CBC W/O DIFF    Collection Time: 17  5:30 AM   Result Value Ref Range    WBC 8.0 4.1 - 11.1 K/uL    RBC 3.06 (L) 4.10 - 5.70 M/uL    HGB 8.6 (L) 12.1 - 17.0 g/dL    HCT 26.4 (L) 36.6 - 50.3 %    MCV 86.3 80.0 - 99.0 FL    MCH 28.1 26.0 - 34.0 PG    MCHC 32.6 30.0 - 36.5 g/dL    RDW 14.4 11.5 - 14.5 %    PLATELET 276 609 - 320 K/uL   MAGNESIUM    Collection Time: 02/26/17  5:30 AM   Result Value Ref Range    Magnesium 2.2 1.6 - 2.4 mg/dL   PHOSPHORUS    Collection Time: 02/26/17  5:30 AM   Result Value Ref Range    Phosphorus 4.0 2.6 - 4.7 MG/DL   METABOLIC PANEL, COMPREHENSIVE    Collection Time: 02/26/17  5:30 AM   Result Value Ref Range    Sodium 144 136 - 145 mmol/L    Potassium 3.5 3.5 - 5.1 mmol/L    Chloride 96 (L) 97 - 108 mmol/L    CO2 36 (H) 21 - 32 mmol/L    Anion gap 12 5 - 15 mmol/L    Glucose 119 (H) 65 - 100 mg/dL    BUN 45 (H) 6 - 20 MG/DL    Creatinine 2.20 (H) 0.70 - 1.30 MG/DL    BUN/Creatinine ratio 20 12 - 20      GFR est AA 38 (L) >60 ml/min/1.73m2    GFR est non-AA 32 (L) >60 ml/min/1.73m2    Calcium 9.0 8.5 - 10.1 MG/DL    Bilirubin, total 0.4 0.2 - 1.0 MG/DL    ALT (SGPT) 33 12 - 78 U/L    AST (SGOT) 20 15 - 37 U/L    Alk.  phosphatase 94 45 - 117 U/L    Protein, total 7.7 6.4 - 8.2 g/dL    Albumin 3.1 (L) 3.5 - 5.0 g/dL    Globulin 4.6 (H) 2.0 - 4.0 g/dL    A-G Ratio 0.7 (L) 1.1 - 2.2       A/P     Continue diuresis  Increase activity  Increase I/S effort and frequency  Sternal precautions  Stimulate bowel movement  Signed By: Mahsa Mckoy MD

## 2017-02-26 NOTE — PROGRESS NOTES
NAME: Niharika Bernabe        :  1965        MRN:  [id number]        Assessment :    Plan:  --FELICE  HTN  Anemia  Volume overload      ascending aortic dissection repair 2/3  Creatinine fairly stable with needed diuresis. Weights trending down     Continue bumex . Stop metolazone now. Starting to see some contraction alkalosis and borderline hypernatremia    Improving K with aggressive po KCL-> can wean down by tomorrow since we are coming off metolazone    Continue EPO    Remove gardner in the morning  Labs in AM             Subjective:     Chief Complaint: No events overnight. UOP >7liters yesterday. No cramping. No N/V. No dyspnea. No pain. Objective:     VITALS:   Last 24hrs VS reviewed since prior progress note. Most recent are:  Visit Vitals    /56    Pulse 69    Temp 97.6 °F (36.4 °C)    Resp 18    Ht 6' (1.829 m)    Wt 123.6 kg (272 lb 7.8 oz)    SpO2 98%    BMI 36.96 kg/m2       Intake/Output Summary (Last 24 hours) at 17 1459  Last data filed at 17 1202   Gross per 24 hour   Intake              720 ml   Output             6875 ml   Net            -6155 ml      Telemetry Reviewed:     PHYSICAL EXAM:  General: Obese,  no acute distress  Resp:  Dec BS, no distress  CV:   RRR, ++ edema(improving)  : scrotal edema, gardner+  Alert awake     ________________________________________________________________________  Deep Juanetta Crigler, MD     Procedures: see electronic medical records for all procedures/Xrays and details which  were not copied into this note but were reviewed prior to creation of Plan.       LABS:  Recent Labs      17   0530   WBC  8.0   HGB  8.6*   HCT  26.4*   PLT  335     Recent Labs      17   0530  17   0353  17   0402   NA  144  143  142   K  3.5  3.2*  3.2*   CL  96*  97  97   CO2  36*  37*  34*   BUN  45*  48*  51*   CREA  2.20*  2.16*  2.25*   GLU  119*  111*  118* CA  9.0  8.9  8.9   MG  2.2  2.2   --    PHOS  4.0   --    --      Recent Labs      02/26/17   0530   SGOT  20   AP  94   TP  7.7   ALB  3.1*   GLOB  4.6*     No results for input(s): INR, PTP, APTT in the last 72 hours. No lab exists for component: INREXT, INREXT   No results for input(s): FE, TIBC, PSAT, FERR in the last 72 hours. No results found for: FOL, RBCF   No results for input(s): PH, PCO2, PO2 in the last 72 hours. No results for input(s): CPK, CKMB in the last 72 hours.     No lab exists for component: TROPONINI  No components found for: Bj Point  Lab Results   Component Value Date/Time    Color DARK YELLOW 02/06/2017 01:03 PM    Color PENDING 02/06/2017 01:03 PM    Appearance CLEAR 02/06/2017 01:03 PM    Appearance PENDING 02/06/2017 01:03 PM    Specific gravity 1.019 02/06/2017 01:03 PM    Specific gravity PENDING 02/06/2017 01:03 PM    Specific gravity PENDING 02/06/2017 01:03 PM    pH (UA) 5.0 02/06/2017 01:03 PM    pH (UA) PENDING 02/06/2017 01:03 PM    Protein NEGATIVE  02/06/2017 01:03 PM    Protein PENDING 02/06/2017 01:03 PM    Glucose NEGATIVE  02/06/2017 01:03 PM    Glucose PENDING 02/06/2017 01:03 PM    Ketone NEGATIVE  02/06/2017 01:03 PM    Ketone PENDING 02/06/2017 01:03 PM    Bilirubin PENDING 02/06/2017 01:03 PM    Urobilinogen 0.2 02/06/2017 01:03 PM    Urobilinogen PENDING 02/06/2017 01:03 PM    Nitrites NEGATIVE  02/06/2017 01:03 PM    Nitrites PENDING 02/06/2017 01:03 PM    Leukocyte Esterase NEGATIVE  02/06/2017 01:03 PM    Leukocyte Esterase PENDING 02/06/2017 01:03 PM    Epithelial cells FEW 02/06/2017 01:03 PM    Epithelial cells DUPLICATE REQUEST 11/40/2611 01:03 PM    Bacteria NEGATIVE  02/06/2017 01:03 PM    Bacteria DUPLICATE REQUEST 62/50/2047 01:03 PM    WBC 0-4 02/06/2017 26:42 PM    WBC DUPLICATE REQUEST 61/37/5777 01:03 PM    RBC 0-5 02/06/2017 03:82 PM    RBC DUPLICATE REQUEST 70/66/7683 01:03 PM       MEDICATIONS:  Current Facility-Administered Medications Medication Dose Route Frequency    potassium chloride (K-DUR, KLOR-CON) SR tablet 40 mEq  40 mEq Oral 5XD    epoetin luis miguel (EPOGEN;PROCRIT) injection 10,000 Units  10,000 Units SubCUTAneous Once per day on Tue Sat    bumetanide (BUMEX) tablet 3 mg  3 mg Oral BID    linagliptin (TRADJENTA) tablet 5 mg  5 mg Oral ACB    minoxidil (LONITEN) tablet 10 mg  10 mg Oral DAILY    sodium chloride (NS) flush 5-10 mL  5-10 mL IntraVENous Q8H    sodium chloride (NS) flush 5-10 mL  5-10 mL IntraVENous PRN    acetaminophen (TYLENOL) tablet 650 mg  650 mg Oral Q4H PRN    oxyCODONE-acetaminophen (PERCOCET) 5-325 mg per tablet 1 Tab  1 Tab Oral Q4H PRN    naloxone (NARCAN) injection 0.4 mg  0.4 mg IntraVENous PRN    atorvastatin (LIPITOR) tablet 20 mg  20 mg Oral QPM    ondansetron (ZOFRAN) injection 4 mg  4 mg IntraVENous Q4H PRN    alum-mag hydroxide-simeth (MYLANTA) oral suspension 30 mL  30 mL Oral Q2H PRN    docusate sodium (COLACE) capsule 100 mg  100 mg Oral BID    zolpidem (AMBIEN) tablet 5 mg  5 mg Oral QHS PRN    polyethylene glycol (MIRALAX) packet 34 g  34 g Oral DAILY    traMADol (ULTRAM) tablet  mg   mg Oral Q6H PRN    hydrALAZINE (APRESOLINE) 20 mg/mL injection 10 mg  10 mg IntraVENous Q6H PRN    pantoprazole (PROTONIX) tablet 40 mg  40 mg Oral ACB&D    metoprolol tartrate (LOPRESSOR) tablet 50 mg  50 mg Oral Q12H    hydrALAZINE (APRESOLINE) tablet 100 mg  100 mg Oral Q6H    cloNIDine (CATAPRES) 0.3 mg/24 hr patch 1 Patch  1 Patch TransDERmal Q7D    amLODIPine (NORVASC) tablet 10 mg  10 mg Oral DAILY    influenza vaccine 2016-17 (36mos+)(PF) (FLUZONE/FLUARIX/FLULAVAL QUAD) injection 0.5 mL  0.5 mL IntraMUSCular PRIOR TO DISCHARGE    albuterol (PROVENTIL VENTOLIN) nebulizer solution 1.25-2.5 mg  1.25-2.5 mg Nebulization Q4H PRN    aspirin chewable tablet 81 mg  81 mg Oral DAILY    glucose chewable tablet 16 g  4 Tab Oral PRN    glucagon (GLUCAGEN) injection 1 mg  1 mg IntraMUSCular PRN    insulin lispro (HUMALOG) injection   SubCUTAneous AC&HS

## 2017-02-26 NOTE — PROGRESS NOTES
PCU SHIFT NURSING NOTE      Bedside shift change report given to [address] rn (oncoming nurse) by Christiano Kelly (offgoing nurse). Report included the following information SBAR, Kardex, Intake/Output, MAR and Recent Results. Shift Summary: 5113  3243- AM medications given. Patient able to walk in hallway without difficulty. 12- Dr. Roberson Cable into round family at bedside. Patient able to walk in ray way without difficulty. 1911-Bedside shift change report given to ba george (oncoming nurse) by Mitra Sheets (offgoing nurse). Report included the following information SBAR, Kardex, Intake/Output, MAR and Accordion. Admission Date 2/2/2017   Admission Diagnosis aneursym  Aortic dissection ([facility] [address]  melana   Consults IP CONSULT TO GASTROENTEROLOGY  IP CONSULT TO NEPHROLOGY        Consults   [x]PT   []OT   []Speech   []Case Management      [] Palliative      Cardiac Monitoring Order   [x]Yes   []No     IV drips   []Yes    Drip:                            Dose:  Drip:                            Dose:  Drip:                            Dose:   []No     GI Prophylaxis   [x]Yes   []No         DVT Prophylaxis   SCDs:  Sequential Compression Device: Bilateral     Patient Refused VTE Prophylaxis: Yes    Prasanth stockings:         [] Medication   [x]Contraindicated   []None      Activity Level Activity Level: Other (comment)     Activity Assistance: No assistance needed   Purposeful Rounding every 1-2 hour? [x]Yes   Lowry Score  Total Score: 1   Bed Alarm (If score 3 or >)   []Yes   [] Refused (See signed refusal form in chart)   Zi Score  Zi Score: 23   Zi Score (if score 14 or less)   []PMT consult   []Wound Care consult      []Specialty bed   [] Nutrition consult          Needs prior to discharge:   Home O2 required:    []Yes   []No    If yes, how much O2 required?     Other:    Last Bowel Movement: Last Bowel Movement Date: 02/25/17      Influenza Vaccine Received Flu Vaccine for Current Season (usually Sept-March): No    Patient/Guardian Refused (Notify MD): No   Pneumonia Vaccine           Diet Active Orders   Diet    DIET DIABETIC CONSISTENT CARB Regular; AHA-LOW-CHOL FAT      LDAs         Midline Catheter, Dual 49/37/86 Right;Basilic (Active)   Criteria for Appropriate Use Limited/no vessel suitable for conventional peripheral access 2/26/2017  7:15 AM   Site Assessment Clean, dry, & intact 2/26/2017  7:15 AM   Phlebitis Assessment 0 2/26/2017  7:15 AM   Infiltration Assessment 0 2/26/2017  7:15 AM   Dressing Status Clean, dry, & intact 2/26/2017  7:15 AM   Dressing Type Tape;Transparent 2/26/2017  7:15 AM   Action Taken Open ports on tubing capped 2/24/2017  7:30 PM   Arm Circumference (cm) 38 cm 2/11/2017 10:42 AM   Date of Last Dressing Change 02/11/17 2/25/2017  9:30 PM   Proximal Hub Color/Line Status Blue;Capped 2/26/2017  7:15 AM   Distal Hub Color/Line Status Red;Capped 2/26/2017  7:15 AM   External Catheter Length (cm) 0 centimeters 2/22/2017  7:42 AM   Alcohol Cap Used Yes 2/26/2017  7:15 AM                            Urinary Catheter [REMOVED] Urinary Catheter 02/06/17 Young - Temperature-Criteria for Appropriate Use: Medically/surgically unstable  Urinary Catheter 02/10/17 Young-Criteria for Appropriate Use: Obstruction/retention  [REMOVED] Urinary Catheter 02/02/17 Young - Temperature-Criteria for Appropriate Use: Strict I/Os    Intake & Output   Date 02/25/17 0700 - 02/26/17 0659 02/26/17 0700 - 02/27/17 0659   Shift 4917-2427 2468-0378 24 Hour Total 0989-3427 0010-2406 24 Hour Total   I  N  T  A  K  E   P. O. 720  720 240  240      P. O. 720  720 240  240    Shift Total  (mL/kg) 720  (5.7)  720  (5.7) 240  (1.9)  240  (1.9)   O  U  T  P  U  T   Urine  (mL/kg/hr) 4450  (2.9) 2675  (1.8) 7125  (2.4)         Urine Voided 1400  1400         Urine Output (mL) (Urinary Catheter 02/10/17 Young) 3050 2675 5788       Shift Total  (mL/kg) 4450  (35.3) 2675  (21.2) 7125  (56.5)      NET -7760 -5932 -0486 608 240   Weight (kg) 126 126 126 123.6 123.6 123.6         Readmission Risk Assessment Tool Score Low Risk            7       Total Score        3 Patient Length of Stay > 5    4 More than 1 Admission in calendar year        Criteria that do not apply:    Relationship with PCP    Patient Living Status    Patient Insurance is Medicare, Medicaid or Self Pay    Charlson Comorbidity Score       Expected Length of Stay 5d 14h   Actual Length of Stay 24

## 2017-02-27 VITALS
WEIGHT: 267.86 LBS | BODY MASS INDEX: 36.28 KG/M2 | SYSTOLIC BLOOD PRESSURE: 121 MMHG | DIASTOLIC BLOOD PRESSURE: 59 MMHG | HEART RATE: 71 BPM | HEIGHT: 72 IN | OXYGEN SATURATION: 97 % | TEMPERATURE: 98.2 F | RESPIRATION RATE: 20 BRPM

## 2017-02-27 LAB
ALBUMIN SERPL BCP-MCNC: 3.2 G/DL (ref 3.5–5)
ALBUMIN/GLOB SERPL: 0.7 {RATIO} (ref 1.1–2.2)
ALP SERPL-CCNC: 97 U/L (ref 45–117)
ALT SERPL-CCNC: 34 U/L (ref 12–78)
ANION GAP BLD CALC-SCNC: 10 MMOL/L (ref 5–15)
AST SERPL W P-5'-P-CCNC: 21 U/L (ref 15–37)
BILIRUB SERPL-MCNC: 0.4 MG/DL (ref 0.2–1)
BUN SERPL-MCNC: 44 MG/DL (ref 6–20)
BUN/CREAT SERPL: 20 (ref 12–20)
CALCIUM SERPL-MCNC: 8.9 MG/DL (ref 8.5–10.1)
CHLORIDE SERPL-SCNC: 96 MMOL/L (ref 97–108)
CO2 SERPL-SCNC: 37 MMOL/L (ref 21–32)
CREAT SERPL-MCNC: 2.2 MG/DL (ref 0.7–1.3)
ERYTHROCYTE [DISTWIDTH] IN BLOOD BY AUTOMATED COUNT: 14.4 % (ref 11.5–14.5)
GLOBULIN SER CALC-MCNC: 4.7 G/DL (ref 2–4)
GLUCOSE BLD STRIP.AUTO-MCNC: 100 MG/DL (ref 65–100)
GLUCOSE BLD STRIP.AUTO-MCNC: 206 MG/DL (ref 65–100)
GLUCOSE SERPL-MCNC: 111 MG/DL (ref 65–100)
HCT VFR BLD AUTO: 27.3 % (ref 36.6–50.3)
HGB BLD-MCNC: 8.7 G/DL (ref 12.1–17)
MAGNESIUM SERPL-MCNC: 2.2 MG/DL (ref 1.6–2.4)
MCH RBC QN AUTO: 27.5 PG (ref 26–34)
MCHC RBC AUTO-ENTMCNC: 31.9 G/DL (ref 30–36.5)
MCV RBC AUTO: 86.4 FL (ref 80–99)
PHOSPHATE SERPL-MCNC: 4.1 MG/DL (ref 2.6–4.7)
PLATELET # BLD AUTO: 347 K/UL (ref 150–400)
POTASSIUM SERPL-SCNC: 3.7 MMOL/L (ref 3.5–5.1)
PROT SERPL-MCNC: 7.9 G/DL (ref 6.4–8.2)
RBC # BLD AUTO: 3.16 M/UL (ref 4.1–5.7)
SERVICE CMNT-IMP: ABNORMAL
SERVICE CMNT-IMP: NORMAL
SODIUM SERPL-SCNC: 143 MMOL/L (ref 136–145)
WBC # BLD AUTO: 8 K/UL (ref 4.1–11.1)

## 2017-02-27 PROCEDURE — 84100 ASSAY OF PHOSPHORUS: CPT | Performed by: INTERNAL MEDICINE

## 2017-02-27 PROCEDURE — 74011250637 HC RX REV CODE- 250/637: Performed by: PHYSICIAN ASSISTANT

## 2017-02-27 PROCEDURE — 74011636637 HC RX REV CODE- 636/637: Performed by: PHYSICIAN ASSISTANT

## 2017-02-27 PROCEDURE — 82962 GLUCOSE BLOOD TEST: CPT

## 2017-02-27 PROCEDURE — 74011250637 HC RX REV CODE- 250/637: Performed by: THORACIC SURGERY (CARDIOTHORACIC VASCULAR SURGERY)

## 2017-02-27 PROCEDURE — 74011250637 HC RX REV CODE- 250/637: Performed by: INTERNAL MEDICINE

## 2017-02-27 PROCEDURE — 85027 COMPLETE CBC AUTOMATED: CPT | Performed by: INTERNAL MEDICINE

## 2017-02-27 PROCEDURE — 80053 COMPREHEN METABOLIC PANEL: CPT | Performed by: INTERNAL MEDICINE

## 2017-02-27 PROCEDURE — 36415 COLL VENOUS BLD VENIPUNCTURE: CPT | Performed by: INTERNAL MEDICINE

## 2017-02-27 PROCEDURE — 83735 ASSAY OF MAGNESIUM: CPT | Performed by: INTERNAL MEDICINE

## 2017-02-27 RX ORDER — OXYCODONE AND ACETAMINOPHEN 5; 325 MG/1; MG/1
1 TABLET ORAL
Qty: 60 TAB | Refills: 0 | Status: SHIPPED | OUTPATIENT
Start: 2017-02-27 | End: 2017-03-21

## 2017-02-27 RX ORDER — ATORVASTATIN CALCIUM 20 MG/1
20 TABLET, FILM COATED ORAL EVERY EVENING
Qty: 30 TAB | Refills: 1 | Status: SHIPPED | OUTPATIENT
Start: 2017-02-27 | End: 2020-01-01

## 2017-02-27 RX ORDER — HYDRALAZINE HYDROCHLORIDE 100 MG/1
100 TABLET, FILM COATED ORAL EVERY 6 HOURS
Qty: 120 TAB | Refills: 1 | Status: SHIPPED | OUTPATIENT
Start: 2017-02-27 | End: 2018-03-14 | Stop reason: DRUGHIGH

## 2017-02-27 RX ORDER — AMLODIPINE BESYLATE 10 MG/1
10 TABLET ORAL DAILY
Qty: 30 TAB | Refills: 1 | Status: SHIPPED | OUTPATIENT
Start: 2017-02-27 | End: 2017-04-28

## 2017-02-27 RX ORDER — POTASSIUM CHLORIDE 20 MEQ/1
40 TABLET, EXTENDED RELEASE ORAL 3 TIMES DAILY
Status: DISCONTINUED | OUTPATIENT
Start: 2017-02-27 | End: 2017-02-27 | Stop reason: HOSPADM

## 2017-02-27 RX ORDER — METOPROLOL TARTRATE 50 MG/1
50 TABLET ORAL EVERY 12 HOURS
Qty: 60 TAB | Refills: 1 | Status: SHIPPED | OUTPATIENT
Start: 2017-02-27 | End: 2017-06-05 | Stop reason: SDUPTHER

## 2017-02-27 RX ORDER — MINOXIDIL 10 MG/1
10 TABLET ORAL DAILY
Qty: 30 TAB | Refills: 1 | Status: SHIPPED | OUTPATIENT
Start: 2017-02-27 | End: 2017-05-29 | Stop reason: SDUPTHER

## 2017-02-27 RX ORDER — BUMETANIDE 1 MG/1
3 TABLET ORAL 2 TIMES DAILY
Qty: 90 TAB | Refills: 0 | Status: SHIPPED | OUTPATIENT
Start: 2017-02-27 | End: 2017-03-21

## 2017-02-27 RX ORDER — CLONIDINE 0.3 MG/24H
1 PATCH, EXTENDED RELEASE TRANSDERMAL
Qty: 4 PATCH | Refills: 1 | Status: SHIPPED | OUTPATIENT
Start: 2017-02-27 | End: 2017-09-21 | Stop reason: SDUPTHER

## 2017-02-27 RX ORDER — POTASSIUM CHLORIDE 20 MEQ/1
40 TABLET, EXTENDED RELEASE ORAL 3 TIMES DAILY
Qty: 90 TAB | Refills: 0 | Status: SHIPPED | OUTPATIENT
Start: 2017-02-27 | End: 2017-03-22 | Stop reason: SDUPTHER

## 2017-02-27 RX ORDER — PANTOPRAZOLE SODIUM 40 MG/1
40 TABLET, DELAYED RELEASE ORAL DAILY
Qty: 30 TAB | Refills: 1 | Status: SHIPPED | OUTPATIENT
Start: 2017-02-27 | End: 2017-03-21

## 2017-02-27 RX ORDER — GUAIFENESIN 100 MG/5ML
81 LIQUID (ML) ORAL DAILY
Qty: 365 TAB | Refills: 0 | Status: SHIPPED
Start: 2017-02-27

## 2017-02-27 RX ADMIN — Medication 10 ML: at 06:34

## 2017-02-27 RX ADMIN — POTASSIUM CHLORIDE 40 MEQ: 20 TABLET, EXTENDED RELEASE ORAL at 08:57

## 2017-02-27 RX ADMIN — DOCUSATE SODIUM 100 MG: 100 CAPSULE, LIQUID FILLED ORAL at 08:57

## 2017-02-27 RX ADMIN — MINOXIDIL 10 MG: 2.5 TABLET ORAL at 08:56

## 2017-02-27 RX ADMIN — LINAGLIPTIN 5 MG: 5 TABLET, FILM COATED ORAL at 08:57

## 2017-02-27 RX ADMIN — AMLODIPINE BESYLATE 10 MG: 5 TABLET ORAL at 08:56

## 2017-02-27 RX ADMIN — INSULIN LISPRO 6 UNITS: 100 INJECTION, SOLUTION INTRAVENOUS; SUBCUTANEOUS at 08:57

## 2017-02-27 RX ADMIN — PANTOPRAZOLE SODIUM 40 MG: 40 TABLET, DELAYED RELEASE ORAL at 08:57

## 2017-02-27 RX ADMIN — POTASSIUM CHLORIDE 40 MEQ: 20 TABLET, EXTENDED RELEASE ORAL at 16:28

## 2017-02-27 RX ADMIN — BUMETANIDE 3 MG: 1 TABLET ORAL at 16:28

## 2017-02-27 RX ADMIN — BUMETANIDE 3 MG: 1 TABLET ORAL at 08:56

## 2017-02-27 RX ADMIN — METOPROLOL TARTRATE 50 MG: 50 TABLET ORAL at 08:56

## 2017-02-27 RX ADMIN — ASPIRIN 81 MG 81 MG: 81 TABLET ORAL at 08:56

## 2017-02-27 NOTE — DISCHARGE SUMMARY
[facility] CSS Discharge Summary     Patient ID:  Carlos Qureshi  [id number]  7 y.o.  1965    POD: 22 Days Post-Op      Admit date: 2/2/2017    Discharge date: 2/27/2017     Discharge to: HOME    Admitting Physician: Mahsa Mckoy MD     Referring Cardiologist:  none    PCP:  Erin Alston MD    Active Problems: Aortic dissection (HCC) (2/2/2017)      S/P ascending aortic replacement (2/3/2017)      Overview: EMERGENT ASCENDING AORTIC DISSECTION REPAIR       Right Femoral Artery Cannulation       Procedure(s):  ESOPHAGOGASTRODUODENOSCOPY (EGD)  SIGMOIDOSCOPY FLEXIBLE  ESOPHAGOGASTRODUODENAL (EGD) BIOPSY     Discharge Medications:   Current Discharge Medication List      START taking these medications    Details   amLODIPine (NORVASC) 10 mg tablet Take 1 Tab by mouth daily for 60 days. Qty: 30 Tab, Refills: 1      aspirin 81 mg chewable tablet Take 1 Tab by mouth daily. Qty: 365 Tab, Refills: 0      atorvastatin (LIPITOR) 20 mg tablet Take 1 Tab by mouth every evening. Qty: 30 Tab, Refills: 1      bumetanide (BUMEX) 1 mg tablet Take 3 Tabs by mouth two (2) times a day for 30 days. Qty: 90 Tab, Refills: 0      cloNIDine (CATAPRES) 0.3 mg/24 hr 1 Patch by TransDERmal route every seven (7) days. Qty: 4 Patch, Refills: 1      hydrALAZINE (APRESOLINE) 100 mg tablet Take 1 Tab by mouth every six (6) hours. Qty: 120 Tab, Refills: 1      linagliptin (TRADJENTA) 5 mg tablet Take 1 Tab by mouth Daily (before breakfast). Qty: 30 Tab, Refills: 1      metoprolol tartrate (LOPRESSOR) 50 mg tablet Take 1 Tab by mouth every twelve (12) hours. Qty: 60 Tab, Refills: 1      minoxidil (LONITEN) 10 mg tablet Take 1 Tab by mouth daily. Qty: 30 Tab, Refills: 1      oxyCODONE-acetaminophen (PERCOCET) 5-325 mg per tablet Take 1 Tab by mouth every four (4) hours as needed. Max Daily Amount: 6 Tabs. Qty: 60 Tab, Refills: 0      pantoprazole (PROTONIX) 40 mg tablet Take 1 Tab by mouth daily for 60 days.   Qty: 30 Tab, Refills: 1      potassium chloride (K-DUR, KLOR-CON) 20 mEq tablet Take 2 Tabs by mouth three (3) times daily. Qty: 90 Tab, Refills: 0             Hospital Course:   Sanaz Larsen is a 45 y.o. male who was referred from the ED for Acute Ascending Dissection. Pts chief complaint is chest pain. Patient describes 5/10 mid sternal non radiating chest pain that started approx 10 AM. There was associated diaphoresis and headache. He denies associated back pain. The patients father had a history of AAA and CAD so patient called EMS and while in ED CT Scan showed acute Type I Aortic dissection.     Cardiac Testing  CTA Chest  CTA of the chest, abdomen, and pelvis was performed. 100 mL Optiray-350 were  injected and scanning was performed during the arterial phase of contrast  administration. Post processing was performed. 3D reconstructions were  performed. CT dose reduction was achieved through use of a standardized  protocol tailored for this examination and automatic exposure control for dose  modulation.       There is an aortic dissection which extends from the aortic root into the common  iliac arteries bilaterally. There is no evidence for aortic dilatation. The  celiac artery, superior mesenteric artery, inferior mesenteric artery, and right  renal artery arise from the true lumen of the left renal artery arises from the  false lumen.      The heart and pulmonary arteries appear normal. There is no significant  lymphadenopathy. A pericardial effusion is not seen. The lungs are clear.      Evaluation of the cyst solid organs is limited due to phase of contrast  administration. The liver, gallbladder, spleen, adrenal glands, pancreas, and  kidneys are unremarkable. There is a right renal cyst. The bowel appears  unremarkable.  The appendix appears normal. The bladder is fluid-filled.     EMERGENT ASCENDING AORTIC DISSECTION REPAIR   Right Femoral Artery Cannulation    POD#1Weak when upright  Javi to 50s   on Cardene  Max help to keep upright and put into bed  ABG: acidosis, compensated  Two amps bicarb given  Patient remains awake/ alert but drowsy  Stat Echo: no tamponade, aortic valve, good function  Lactic Acid pending  Repeat ABG  Bumex  Kidney ultrasound  POD#2Stable hemodynamics in sinus rhythm without ectopy on no support. 's. / 24 hrs. Cr up to 3.7 with . LFTs up.  minimal. CXR: fluffy wet. WBC up to 25. Central line 11days old. POD#3Stable hemodynamics in sinus rhythm without ectopy on dialysis. 2000 cc removed yesterday. Cr 3.2 down going. UOP 1600/24 hrs. Remains HTN on intermittent Cardene; BB and Hydralazine. No further bleeding. Hypoactive BS without evidence of bleeding. WBC 27 without fever. POD#4Stable hemodynamics in sinus rhythm without ectopy on dialysis with HTN control issues. Now off Cardene. Cr down to 2.2 from 3.2. UOP 2000/24 hrs. CXR improving slowly. No evidence of bleeding  HH stable 8/25 WBC down 25. Looks well enough for stepdown.     POD#5Stable hemodynamics in sinus rhythm without ectopy on no support. BP control with Hydralazine, Clonidine, BB and Norvasc. Straight cathed once last night but voided this morning. Feels better overall.      POD#6Stable hemodynamics in sinus rhythm without ectopy on no support. CR improving 1.6 down from 1.9 yesterday. UOP 4400/24 hrs on IV Bumex dosed daily. CXR essential unchanged interstitual edema. Peripheral edema remains impressive. Responds to Bumex dosed by renal. Young replaced due to retention associated with penile swelling. POD#7Stable hemodynamics in sinus rhythm without ectopy on no support. Central lines out with midline placed. UOP 2500 yesterday. Daily dosing by renal    POD#9Stable hemodynamics in sinus rhythm without ectopy on no support. Cr 1.4/27BUN. 4000 UOP. Nose bleed this morning.      POD#10Stable hemodynamics in sinus rhythm without ectopy on no support. Excellent diuresis with -4000 I/O.  Still very swollen with penile swelling requiring gardner.      POD#12Stable hemodynamics in sinus rhythm without ectopy on no support. Excellent diuresis with -4000 I/O. Still very swollen with penile swelling requiring gardner.      POD#13  No complaints.     Fluid balance negative. Weight same today.     BUN 31. Creat up 2.0. K 3.1.     Still anasarca. This most troubling to him in genitals. Keeping Gardner because of this problem.     Continuing diuresis and antihypertensives while recognizing their contribution to azotemia and fluid retention.                  POD#15Stable hemodynamics in sinus rhythm without ectopy on no support. Good BP control. Loss 2 lbs  POD#16Stable hemodynamics in sinus rhythm without ectopy on no support. Good BP control. Diuresing well. POD#17Stable hemodynamics in sinus rhythm without ectopy on no support. Continues to lose weight on diuretic therapy. Cr 2.38 UOP 4900. Weight down.     POD#18Stable hemodynamics in sinus rhythm without ectopy on no support. Continued weight loss with Bumex 3 mg BID. Penile swelling preventing gardner DC.     POD#19Stable hemodynamics in sinus rhythm without ectopy on no support. Gardner indwelling for penile swelling. Weight down another 3 lbs. Patient refused SNF transfer yesterday. Not a Shelter candidate.       POD#20Looks ok this am   POD#21No issues overnight   POD#22Stable hemodynamics in sinus rhythm without ectopy on no support. Steadily losing weight. Gardner discontinued today. To [facility]:   Recent Labs      02/27/17   0336   WBC  8.0   HGB  8.7*   HCT  27.3*   CREA  2.20*       CXR Results  (Last 48 hours)    None            Patient Instructions: See Discharge Instructions  Patient was given a separate list of discharge instructions and prescriptions. At this time, all questions were answered and discharge medications were reviewed. The patient has a follow up appointment in one week.       Signed:  GAETANO Ford  2/27/2017  1:30 PM

## 2017-02-27 NOTE — PROGRESS NOTES
NAME: Micah Griffiths        :  1965        MRN:  [id number]        Assessment :    Plan:  --FELICE  HTN  Anemia  Volume overload      ascending aortic dissection repair 2/3  Creatinine stable at 2.2 with needed diuresis. Weights trending down. Young just out today, pending pvr     Continue bumex 3 mg po bid; off metolazone on     Improving K with aggressive po KCL 40 meq 5 x day-> K 3.7; decrease to tid today    hgb 8.7; Continue EPO    To SNF soon. My office will call him to arrange 1-2 week f/u. Subjective:     Chief Complaint: No events overnight. No cramping. No N/V. No dyspnea. No pain. Improving sharan. Objective:     VITALS:   Last 24hrs VS reviewed since prior progress note. Most recent are:  Visit Vitals    /55    Pulse 72    Temp 98.2 °F (36.8 °C)    Resp 18    Ht 6' (1.829 m)    Wt 123.6 kg (272 lb 7.8 oz)    SpO2 99%    BMI 36.96 kg/m2       Intake/Output Summary (Last 24 hours) at 17 0604  Last data filed at 17 0000   Gross per 24 hour   Intake              480 ml   Output             3700 ml   Net            -3220 ml      Telemetry Reviewed:     PHYSICAL EXAM:  General: Obese,  no acute distress  Resp:  cta leda, no distress  CV:   RRR, +1 lower leg edema(improving)  Alert awake     ________________________________________________________________________  Heike Del Cid MD     Procedures: see electronic medical records for all procedures/Xrays and details which  were not copied into this note but were reviewed prior to creation of Plan.       LABS:  Recent Labs      17   0530   WBC  8.0  8.0   HGB  8.7*  8.6*   HCT  27.3*  26.4*   PLT  347  335     Recent Labs      17   0336  17   0530  17   0353   NA  143  144  143   K  3.7  3.5  3.2*   CL  96*  96*  97   CO2  37*  36*  37*   BUN  44*  45*  48*   CREA  2.20*  2.20*  2.16*   GLU  111* 119*  111*   CA  8.9  9.0  8.9   MG  2.2  2.2  2.2   PHOS  4.1  4.0   --      Recent Labs      02/27/17   0336  02/26/17   0530   SGOT  21  20   AP  97  94   TP  7.9  7.7   ALB  3.2*  3.1*   GLOB  4.7*  4.6*     No results for input(s): INR, PTP, APTT in the last 72 hours. No lab exists for component: INREXT, INREXT   No results for input(s): FE, TIBC, PSAT, FERR in the last 72 hours. No results found for: FOL, RBCF   No results for input(s): PH, PCO2, PO2 in the last 72 hours. No results for input(s): CPK, CKMB in the last 72 hours.     No lab exists for component: TROPONINI  No components found for: Bj Point  Lab Results   Component Value Date/Time    Color DARK YELLOW 02/06/2017 01:03 PM    Color PENDING 02/06/2017 01:03 PM    Appearance CLEAR 02/06/2017 01:03 PM    Appearance PENDING 02/06/2017 01:03 PM    Specific gravity 1.019 02/06/2017 01:03 PM    Specific gravity PENDING 02/06/2017 01:03 PM    Specific gravity PENDING 02/06/2017 01:03 PM    pH (UA) 5.0 02/06/2017 01:03 PM    pH (UA) PENDING 02/06/2017 01:03 PM    Protein NEGATIVE  02/06/2017 01:03 PM    Protein PENDING 02/06/2017 01:03 PM    Glucose NEGATIVE  02/06/2017 01:03 PM    Glucose PENDING 02/06/2017 01:03 PM    Ketone NEGATIVE  02/06/2017 01:03 PM    Ketone PENDING 02/06/2017 01:03 PM    Bilirubin PENDING 02/06/2017 01:03 PM    Urobilinogen 0.2 02/06/2017 01:03 PM    Urobilinogen PENDING 02/06/2017 01:03 PM    Nitrites NEGATIVE  02/06/2017 01:03 PM    Nitrites PENDING 02/06/2017 01:03 PM    Leukocyte Esterase NEGATIVE  02/06/2017 01:03 PM    Leukocyte Esterase PENDING 02/06/2017 01:03 PM    Epithelial cells FEW 02/06/2017 01:03 PM    Epithelial cells DUPLICATE REQUEST 45/31/7086 01:03 PM    Bacteria NEGATIVE  02/06/2017 01:03 PM    Bacteria DUPLICATE REQUEST 92/41/2084 01:03 PM    WBC 0-4 02/06/2017 46:50 PM    WBC DUPLICATE REQUEST 11/70/0764 01:03 PM    RBC 0-5 02/06/2017 12:55 PM    RBC DUPLICATE REQUEST 10/02/1206 01:03 PM MEDICATIONS:  Current Facility-Administered Medications   Medication Dose Route Frequency    potassium chloride (K-DUR, KLOR-CON) SR tablet 40 mEq  40 mEq Oral 5XD    epoetin luis miguel (EPOGEN;PROCRIT) injection 10,000 Units  10,000 Units SubCUTAneous Once per day on Tue Sat    bumetanide (BUMEX) tablet 3 mg  3 mg Oral BID    linagliptin (TRADJENTA) tablet 5 mg  5 mg Oral ACB    minoxidil (LONITEN) tablet 10 mg  10 mg Oral DAILY    sodium chloride (NS) flush 5-10 mL  5-10 mL IntraVENous Q8H    sodium chloride (NS) flush 5-10 mL  5-10 mL IntraVENous PRN    acetaminophen (TYLENOL) tablet 650 mg  650 mg Oral Q4H PRN    oxyCODONE-acetaminophen (PERCOCET) 5-325 mg per tablet 1 Tab  1 Tab Oral Q4H PRN    naloxone (NARCAN) injection 0.4 mg  0.4 mg IntraVENous PRN    atorvastatin (LIPITOR) tablet 20 mg  20 mg Oral QPM    ondansetron (ZOFRAN) injection 4 mg  4 mg IntraVENous Q4H PRN    alum-mag hydroxide-simeth (MYLANTA) oral suspension 30 mL  30 mL Oral Q2H PRN    docusate sodium (COLACE) capsule 100 mg  100 mg Oral BID    zolpidem (AMBIEN) tablet 5 mg  5 mg Oral QHS PRN    polyethylene glycol (MIRALAX) packet 34 g  34 g Oral DAILY    traMADol (ULTRAM) tablet  mg   mg Oral Q6H PRN    hydrALAZINE (APRESOLINE) 20 mg/mL injection 10 mg  10 mg IntraVENous Q6H PRN    pantoprazole (PROTONIX) tablet 40 mg  40 mg Oral ACB&D    metoprolol tartrate (LOPRESSOR) tablet 50 mg  50 mg Oral Q12H    hydrALAZINE (APRESOLINE) tablet 100 mg  100 mg Oral Q6H    cloNIDine (CATAPRES) 0.3 mg/24 hr patch 1 Patch  1 Patch TransDERmal Q7D    amLODIPine (NORVASC) tablet 10 mg  10 mg Oral DAILY    influenza vaccine 2016-17 (36mos+)(PF) (FLUZONE/FLUARIX/FLULAVAL QUAD) injection 0.5 mL  0.5 mL IntraMUSCular PRIOR TO DISCHARGE    albuterol (PROVENTIL VENTOLIN) nebulizer solution 1.25-2.5 mg  1.25-2.5 mg Nebulization Q4H PRN    aspirin chewable tablet 81 mg  81 mg Oral DAILY    glucose chewable tablet 16 g  4 Tab Oral PRN    glucagon (GLUCAGEN) injection 1 mg  1 mg IntraMUSCular PRN    insulin lispro (HUMALOG) injection   SubCUTAneous AC&HS

## 2017-02-27 NOTE — PROGRESS NOTES
Pt noted to be up amb independently/safely in halls without device.  Pt is reliable with cardiac exer & SP.

## 2017-02-27 NOTE — DISCHARGE INSTRUCTIONS
Cardiac Surgery Specialist    [address] [facility] [address]                                          [facility], [place] [address]                                       [address], [address] [facility]  Office- [phone]  Fax- [phone]       Office- [phone]  Fax- [phone]  _____________________________________________________________  Dr. Anthony Rivers PA-C                                      Ericka Shock CSA         BJ Homero Pierce PA-C     Name:Luis Alberto Conner     Active Problems: Aortic dissection (HCC) (2/2/2017)      S/P ascending aortic replacement (2/3/2017)      Overview: EMERGENT ASCENDING AORTIC DISSECTION REPAIR       Right Femoral Artery Cannulation        Discharge Date: 2/27/2017     Discharge to: [facility]    INSTRUCTIONS:  NO SMOKING OR TOBACCO PRODUCTS  Follow all the instructions in your discharge book  You may shower. Wash all incisions twice daily with mild soap and water. No lotions, ointments or powder. Call the office immediately for any redness, swelling, or drainage from your incision. Take your temperature daily and call for a temperature of 101 degrees or higher or for any symptoms that make you think you have and infection. Weigh yourself each morning. Call if you gain more than 5 pounds in 48 hours. Use the incentive spirometer 6-8 times a day-10 breaths each time. Use a pillow or your bear to splint your breastbone when coughing or sneezing. If you feel your breast bone clicking or popping, notify the office immediately. Walk several hundred feet several times daily. DIET  2000 Kcal ADA diet. Check your blood sugars  And keep a log of your results. ACTIVITY  NO DRIVING--you will be evaluated to drive at your follow up visit.   Increase your activity by walking several times a day. Stay out of bed most of the day. When sitting, keep your legs elevated. You may ride in a car, but you must get out every hour and walk around. If you ride in a car with an airbag that can not be switched off, put the seat ALL the way back or ride in the back seat. NO LIFTING MORE THAN 5 POUND FOR 1 MONTH, THEN YOU CAN INCREASE TO NO MORE THAN 10 LBS FOR THE NEXT 2 MONTHS. AFTER 3 MONTHS, YOU WILL NO LONGER HAVE ANY LIFTING RESTRICTIONS. FOLLOW UP           4. Please call our office at [phone] appointment when discharged from [facility]. 5.  Our  will make an appointment with your cardiologist for you after your one month appointment with your surgeon. 6.  Consult you primary care physician regarding your influenza &   pneumovax vaccines. 7.   PLEASE bring your medication bottles to each appointment. 8. Please call Cardiac Rehab at [phone] if you do not already have an appointment. 9. Please sign up for My Chart. CALL [phone] FOR ANY QUESTIONS OR PROBLEMS.     Signature:___________________________________________________

## 2017-02-27 NOTE — PROGRESS NOTES
Zay Ruiz received. Friend/family can provide transportation at time of discharge. Patient is on Room Air. Room Assignment 418B. Call report to:  [phone] and ask for [facility]. [facility] fax d/c instructions to [phone]    Anticipated d/c time between 4-5pm.    Care Management Interventions  PCP Verified by CM: Yes  Mode of Transport at Discharge: Other (see comment) (freind/family)  Transition of Care Consult (CM Consult): SNF ([facility])  Discharge Durable Medical Equipment: No  Physical Therapy Consult: Yes  Occupational Therapy Consult: Yes  Speech Therapy Consult: No  Current Support Network: Other (lives with elderly mother )  Confirm Follow Up Transport: Family  Plan discussed with Pt/Family/Caregiver: Yes  Freedom of Choice Offered: Yes  Discharge Location  Discharge Placement: Skilled nursing facility     H&P, facesheet on chart. Rn informed.      Lisa Jj RN   Ext [phone]

## 2017-02-27 NOTE — PROGRESS NOTES
Cm requested Fly to process for new auth. Sent via ecin. Reviewed anticipated discharge plan with patient. He is in agreement. Patient has family/friends available to provide discharge. Patient is on room air. Cm will continue to follow.     Ivan Lemon RN CM  Ext [phone]

## 2017-02-27 NOTE — PROGRESS NOTES
PCU SHIFT NURSING NOTE      Bedside and Verbal shift change report given to Yuli Borja RN (oncoming nurse) by Srikanth Camara (offgoing nurse). Report included the following information SBAR, Kardex, Intake/Output, MAR, Recent Results and Cardiac Rhythm NSR. Shift Summary:   6873 - Young removed per Nephrology order; pt verbalizes understanding to void in urinal for accurate urine output. Admission Date 2/2/2017   Admission Diagnosis aneursym  Aortic dissection ([address]  melana   Consults IP CONSULT TO GASTROENTEROLOGY  IP CONSULT TO NEPHROLOGY        Consults   [x]PT   [x]OT   []Speech   []Case Management      [] Palliative      Cardiac Monitoring Order   [x]Yes   []No     IV drips   []Yes    Drip:                            Dose:  Drip:                            Dose:  Drip:                            Dose:   [x]No     GI Prophylaxis   []Yes   []No         DVT Prophylaxis   SCDs:  Sequential Compression Device: Bilateral     Patient Refused VTE Prophylaxis: Yes    Prasanth stockings:         [] Medication   []Contraindicated   []None      Activity Level Activity Level: Other (comment)     Activity Assistance: No assistance needed   Purposeful Rounding every 1-2 hour? [x]Yes   Lowry Score  Total Score: 1   Bed Alarm (If score 3 or >)   []Yes   [] Refused (See signed refusal form in chart)   Zi Score  Zi Score: 23   Zi Score (if score 14 or less)   []PMT consult   []Wound Care consult      []Specialty bed   [] Nutrition consult          Needs prior to discharge:   Home O2 required:    []Yes   [x]No    If yes, how much O2 required?     Other:    Last Bowel Movement: Last Bowel Movement Date: 02/25/17      Influenza Vaccine Received Flu Vaccine for Current Season (usually Sept-March): No    Patient/Guardian Refused (Notify MD): No   Pneumonia Vaccine           Diet Active Orders   Diet    DIET DIABETIC CONSISTENT CARB Regular; AHA-LOW-CHOL FAT      LDAs         Midline Catheter, Dual 28/39/21 Right;Basilic (Active)   Criteria for Appropriate Use Limited/no vessel suitable for conventional peripheral access 2/26/2017  3:16 PM   Site Assessment Clean, dry, & intact 2/26/2017  3:16 PM   Phlebitis Assessment 0 2/26/2017  3:16 PM   Infiltration Assessment 0 2/26/2017  3:16 PM   Dressing Status Clean, dry, & intact 2/26/2017  3:16 PM   Dressing Type Tape;Transparent 2/26/2017  7:15 AM   Action Taken Open ports on tubing capped 2/24/2017  7:30 PM   Arm Circumference (cm) 38 cm 2/11/2017 10:42 AM   Date of Last Dressing Change 02/11/17 2/25/2017  9:30 PM   Proximal Hub Color/Line Status Blue;Capped;Flushed 2/26/2017  3:16 PM   Distal Hub Color/Line Status Red;Capped;Flushed 2/26/2017  3:16 PM   External Catheter Length (cm) 0 centimeters 2/22/2017  7:42 AM   Alcohol Cap Used Yes 2/26/2017  7:15 AM                            Urinary Catheter [REMOVED] Urinary Catheter 02/06/17 Young - Temperature-Criteria for Appropriate Use: Medically/surgically unstable  Urinary Catheter 02/10/17 Young-Criteria for Appropriate Use: Obstruction/retention  [REMOVED] Urinary Catheter 02/02/17 Young - Temperature-Criteria for Appropriate Use: Strict I/Os    Intake & Output   Date 02/25/17 1900 - 02/26/17 0659 02/26/17 0700 - 02/27/17 0659   Shift 7539-4842 24 Hour Total 8180-4887 2468-5736 24 Hour Total   I  N  T  A  K  E   P. O.  720 480  480      P. O.  720 480  480    Shift Total  (mL/kg)  720  (5.7) 480  (3.9)  480  (3.9)   O  U  T  P  U  T   Urine  (mL/kg/hr) 2675 7125 2050  (1.4)  2050      Urine Voided  1400         Urine Output (mL) (Urinary Catheter 02/10/17 Young) 2675 5725 2050 2050    Shift Total  (mL/kg) 2675  (21.2) 7125  (56.5) 2050  (16.6)  2050  (16.6)   NET -2675 -6405 -0754  -6211   Weight (kg) 126 126 123.6 123.6 123.6         Readmission Risk Assessment Tool Score Low Risk            7       Total Score        3 Patient Length of Stay > 5    4 More than 1 Admission in calendar year        Criteria that do not apply: Relationship with PCP    Patient Living Status    Patient Insurance is Medicare, Medicaid or Self Pay    Charlson Comorbidity Score       Expected Length of Stay 5d 14h   Actual Length of Stay 24

## 2017-02-27 NOTE — PROGRESS NOTES
CSS FLOOR Progress Note    Admit Date: 2017    POD: 22 Days Post-Op      Assessment:     Active Problems: Aortic dissection (HCC) (2017)      S/P ascending aortic replacement (2/3/2017)      Overview: EMERGENT ASCENDING AORTIC DISSECTION REPAIR       Right Femoral Artery Cannulation             Procedure(s):  ESOPHAGOGASTRODUODENOSCOPY (EGD)  SIGMOIDOSCOPY FLEXIBLE  ESOPHAGOGASTRODUODENAL (EGD) BIOPSY         Summary     Stable hemodynamics in sinus rhythm without ectopy on no support. Steadily losing weight. Gardner discontinued today. Plan/Recommendations/Medical Decision Making:      To Remerge today if possible  Increase activity  Increase I/S effort and frequency  Sternal precautions  Stimulate bowel movement  Patient seen and reviewed with Dr. Dara Zavala MD      Objective:       Visit Vitals    /54    Pulse 75    Temp 98.4 °F (36.9 °C)    Resp 16    Ht 6' (1.829 m)    Wt 267 lb 13.7 oz (121.5 kg)    SpO2 98%    BMI 36.33 kg/m2       Temp (24hrs), Av °F (36.7 °C), Min:97.6 °F (36.4 °C), Max:98.4 °F (36.9 °C)      Last 3 Recorded Weights in this Encounter    17 0635 17 0715 17 0629   Weight: 277 lb 12.5 oz (126 kg) 272 lb 7.8 oz (123.6 kg) 267 lb 13.7 oz (121.5 kg)       CXR Results  (Last 48 hours)    None          Activity:    Diet/ BM:     Lab Data Reviewed: Recent Labs      17   0720  17   0336   WBC   --   8.0   HGB   --   8.7*   HCT   --   27.3*   PLT   --   347   NA   --   143   K   --   3.7   BUN   --   44*   CREA   --   2.20*   GLU   --   111*   GLUCPOC  206*   --        Last 24hr Input/Output:    Intake/Output Summary (Last 24 hours) at 17 0829  Last data filed at 17 7774   Gross per 24 hour   Intake              480 ml   Output             5200 ml   Net            -4720 ml        Admission Weight: Last Weight   Weight: 270 lb (122.5 kg) Weight: 267 lb 13.7 oz (121.5 kg)       EXAM:  General:gardner out this morning      Chest: stable sternum      Incisions: dry and intact      Lungs:   Clear to auscultation bilaterally. Heart:  Regular rate and rhythm, S1, S2 normal, no murmur, no click, no rub      Abdomen:   Soft, non-tender. Bowel sounds present. Extremities: improving edema     Neurologic:  Gross motor and sensory apparatus intact.        Signed By: GAETANO Smart

## 2017-02-27 NOTE — PROGRESS NOTES
Hospital to [address]                                                                        45 y.o.   male    [address]   Room: 2266/01    [facility] 2 PROGRESSIVE CARE  Unit Phone# :  [phone]      [address]  [facility] [address] [facility]  [address] [address]  Dept: [phone]  Loc: [phone]                    SITUATION     Admitted:  2/2/2017         Attending Provider:  Benitez Goel MD       Consultations:  IP CONSULT TO GASTROENTEROLOGY  IP CONSULT TO NEPHROLOGY    PCP:  Kayla Castellanos MD   [phone]    Treatment Team: Attending Provider: Benitez Goel MD; Consulting Provider: Reed Lorenzana MD; Consulting Provider: Benitez Goel MD; Utilization Review: Esteban Asper; Consulting Provider: Tiago Blanca MD; Consulting Provider:  Oksana Nathan MD    Admitting Dx:  aneursym  Aortic dissection [facility])  melana       Principal Problem: <principal problem not specified>    22 Days Post-Op of   Procedure(s):  ESOPHAGOGASTRODUODENOSCOPY (EGD)  SIGMOIDOSCOPY FLEXIBLE  ESOPHAGOGASTRODUODENAL (EGD) BIOPSY   BY: Damaris Colorado MD             ON: 2/5/2017                  Code Status: Full Code                Advance Directives:   Advance Care Planning 2/4/2017   Patient's Healthcare Decision Maker is: Verbal statement (Legal Next of Kin remains as decision maker)   Primary Decision Maker Name [id number] Directive None    (Send w/patient)   No Doesnt Have       Isolation:  There are currently no Active Isolations       MDRO: No current active infections    Pain Medications given:  n/a    Last dose: n/a    Special Equipment needed: no  Type of equipment:      (Not currently on dialysis)  (Not currently on dialysis)  (Not currently on dialysis)     BACKGROUND     Allergies:  No Known Allergies    Past Medical History:   Diagnosis Date    Ill-defined condition     sleep apnea       Past Surgical History:   Procedure Laterality Date    FLEXIBLE SIGMOIDOSCOPY N/A 2/5/2017    SIGMOIDOSCOPY FLEXIBLE performed by Dean Mary MD at [facility]       No prescriptions prior to admission. Hard scripts included in transfer packet yes    Vaccinations: There is no immunization history for the selected administration types on file for this patient. Readmission Risks:    Known Risks: renal failure volume overload          The Charlson CoMorbitiy Index tool is an evidenced based tool that has more automatic generated information. The tool looks at many different items such as the age of the patient, how many times they were admitted in the last calendar year, current length of stay in the hospital and their diagnosis. All of these items are pulled automatically from information documented in the chart from various places and will generate a score that predicts whether a patient is at low (less than 13), medium (13-20) or high (21 or greater) risk of being readmitted.         ASSESSMENT                Temp: 98.2 °F (36.8 °C) (02/27/17 1157) Pulse (Heart Rate): 71 (02/27/17 1157)     Resp Rate: 20 (02/27/17 1157)           BP: 121/59 (02/27/17 1157)     O2 Sat (%): 97 % (02/27/17 1157)     Weight: 121.5 kg (267 lb 13.7 oz)    Height: 6' (182.9 cm) (02/25/17 0439)       If above not within 1 hour of discharge:    BP:_____  P:____  R:____ T:_____ O2 Sat: ___%  O2: ______    Active Orders   Diet    DIET DIABETIC CONSISTENT CARB Regular; AHA-LOW-CHOL FAT         Orientation: oriented to time, place, person and situation     Active Behaviors: None                                   Active Lines/Drains:  (Peg Tube / Young / CL or S/L?): no    Urinary Status: Has not voided     Last BM: Last Bowel Movement Date: 02/25/17     Skin Integrity: Incision (comment)   Wound Groin Right-DRESSING STATUS: Intact  Wound Chest Mid-DRESSING STATUS: Clean, dry, and intact    Wound Groin Right-DRESSING TYPE: Open to air  Wound Chest Mid-DRESSING TYPE: Open to air    Mobility: No limitations   Weight Bearing Status: WBAT (Weight Bearing as Tolerated)      Gait Training  Assistive Device: Gait belt  Ambulation - Level of Assistance: Stand-by asssistance  Distance (ft): 250 Feet (ft)  Stairs - Level of Assistance: Contact guard assistance  Number of Stairs Trained: 6 (Step to gt)  Rail Use: Right          Lab Results   Component Value Date/Time    Glucose 111 02/27/2017 03:36 AM    Hemoglobin A1c 7.1 02/07/2017 04:14 AM    INR 1.1 02/04/2017 05:03 AM    HGB 8.7 02/27/2017 03:36 AM    HGB 8.6 02/26/2017 05:30 AM        RECOMMENDATION     See After Visit Summary (AVS) for:  · Discharge instructions  · After [address]   · Special equipment needed (entered pre-discharge by Care Management)  · Medication Reconciliation    · Follow up Appointment(s)         Report given/sent by:  Nicole Bergeron RN                    Verbal report given to: Juan Miguel Carmichael RN at [facility]  FAXED to:        Estimated discharge time:  2/27/2017 at [address] PATIENT TRANSPORTED BY BROTHER IN LAW. NO CONCERNS AT TIME OF DISCHARGE.

## 2017-03-07 NOTE — PROGRESS NOTES
Dear. Carlos Tejada:     I have reviewed the following case on 3/7/2017 :        Pt Name:  Jose J Nichols. MR#  [id number]   [facility]#   [id number]   Admit date  2/2/2017 10:41 AM   Discharge date  2/27/2017   Discharging doctor  Christine Krishna  No att. providers found   Admitting doctor Christine Krishna   Principal diagnosis  <principal problem not specified>        Insurance  Payor: BLUE CROSS / Plan: [facility] PPO / Product Type: PPO /      Clinicals    45 y.o. man who was admitted for dissection of thoracic aorta and appropriately treated and though complex post operative course, but showed signs of good recovery. The denied dates are from 02/23/17 through 02/27/17. Chart review shows that the patient was recommended for inpt rehab which was denied. And surgeon also mentions in his note that he is not a candidate for [facility]. He declined to go to SNF initially. Thus re-request sent to SNF and on 02/27/17 he was successfully discharged to SNF. During the denied period nothing acute was done that could not have been done at a lower level. Josefina Hope  Does not apply    Decision I would accept the denied days. We needed to do better with discharge plan.     Additional comments         Sincerely    Shabbir Puentes MD MPH FACP     Physician Advisor  [address] [place] [address]    President Medical Staff, [facility]      Cell  [phone]

## 2017-03-21 ENCOUNTER — OFFICE VISIT (OUTPATIENT)
Dept: CARDIOTHORACIC SURGERY | Age: 52
End: 2017-03-21

## 2017-03-21 VITALS
DIASTOLIC BLOOD PRESSURE: 64 MMHG | HEIGHT: 72 IN | BODY MASS INDEX: 37.59 KG/M2 | HEART RATE: 76 BPM | RESPIRATION RATE: 16 BRPM | SYSTOLIC BLOOD PRESSURE: 120 MMHG | TEMPERATURE: 97.8 F | OXYGEN SATURATION: 93 % | WEIGHT: 277.5 LBS

## 2017-03-21 DIAGNOSIS — Z95.828 S/P ASCENDING AORTIC REPLACEMENT: ICD-10-CM

## 2017-03-21 DIAGNOSIS — I71.019 DISSECTION OF THORACIC AORTA: Primary | ICD-10-CM

## 2017-03-21 LAB
BUN SERPL-MCNC: 22 MG/DL (ref 6–24)
BUN/CREAT SERPL: 12 (ref 9–20)
CALCIUM SERPL-MCNC: 9.5 MG/DL (ref 8.7–10.2)
CHLORIDE SERPL-SCNC: 98 MMOL/L (ref 96–106)
CO2 SERPL-SCNC: 27 MMOL/L (ref 18–29)
CREAT SERPL-MCNC: 1.85 MG/DL (ref 0.76–1.27)
GLUCOSE SERPL-MCNC: 98 MG/DL (ref 65–99)
POTASSIUM SERPL-SCNC: 4.7 MMOL/L (ref 3.5–5.2)
SODIUM SERPL-SCNC: 140 MMOL/L (ref 134–144)
SPECIMEN STATUS REPORT, ROLRST: NORMAL

## 2017-03-21 RX ORDER — BUMETANIDE 2 MG/1
1 TABLET ORAL 2 TIMES DAILY
COMMUNITY
End: 2018-03-14 | Stop reason: DRUGHIGH

## 2017-03-21 NOTE — MR AVS SNAPSHOT
Visit Information Date & Time Provider Department Dept. Phone Encounter #  
 3/21/2017  1:45 PM Pily Vela MD [facility] - [facility] [phone] Upcoming Health Maintenance Date Due Hepatitis C Screening 1965 DTaP/Tdap/Td series (1 - Tdap) 9/9/1985 FOBT Q 1 YEAR AGE 50-75 9/9/2014 INFLUENZA AGE 9 TO ADULT 8/1/2016 Allergies as of 3/21/2017  Review Complete On: 3/21/2017 By: Maliha Smith NP No Known Allergies Current Immunizations  Reviewed on 2/17/2017 No immunizations on file. Not reviewed this visit You Were Diagnosed With   
  
 Codes Comments Dissection of thoracic aorta (HCC)    -  Primary ICD-10-CM: I71.01 
ICD-9-CM: 441.01 S/P ascending aortic replacement     ICD-10-CM: P87.458 ICD-9-CM: V43.4 Vitals BP Pulse Temp Resp Height(growth percentile) Weight(growth percentile) 120/64 (BP 1 Location: Left arm, BP Patient Position: Sitting) 76 97.8 °F (36.6 °C) (Oral) 16 6' (1.829 m) 277 lb 8 oz (125.9 kg) SpO2 BMI Smoking Status 93% 37.64 kg/m2 Former Smoker Vitals History BMI and BSA Data Body Mass Index Body Surface Area  
 37.64 kg/m 2 2.53 m 2 Your Updated Medication List  
  
   
This list is accurate as of: 3/21/17  2:14 PM.  Always use your most recent med list. amLODIPine 10 mg tablet Commonly known as:  Marco Antonio Chafe Take 1 Tab by mouth daily for 60 days. aspirin 81 mg chewable tablet Take 1 Tab by mouth daily. atorvastatin 20 mg tablet Commonly known as:  LIPITOR Take 1 Tab by mouth every evening. bumetanide 2 mg tablet Commonly known as:  Cesar Hy Take 1 mg by mouth two (2) times a day. cloNIDine 0.3 mg/24 hr  
Commonly known as:  CATAPRES  
1 Patch by TransDERmal route every seven (7) days. hydrALAZINE 100 mg tablet Commonly known as:  APRESOLINE Take 1 Tab by mouth every six (6) hours. linagliptin 5 mg tablet Commonly known as:  Gar Flasher Take 1 Tab by mouth Daily (before breakfast). metoprolol tartrate 50 mg tablet Commonly known as:  LOPRESSOR Take 1 Tab by mouth every twelve (12) hours. minoxidil 10 mg tablet Commonly known as:  [place] Fort Take 1 Tab by mouth daily. potassium chloride 20 mEq tablet Commonly known as:  K-DUR, KLOR-CON Take 2 Tabs by mouth three (3) times daily. We Performed the Following METABOLIC PANEL, BASIC [88081 CPT(R)] Comments: STAT Introducing [facility]! Dorothy Titus introduces [employer] patient portal. Now you can access parts of your medical record, email your doctor's office, and request medication refills online. 1. In your internet browser, go to [email or web address] 2. Click on the First Time User? Click Here link in the Sign In box. You will see the New Member Sign Up page. 3. Enter your [employer] Access Code exactly as it appears below. You will not need to use this code after youve completed the sign-up process. If you do not sign up before the expiration date, you must request a new code. · [employer] Access Code: [email or web address] Expires: 5/3/2017 12:09 PM 
 
4. Enter the last four digits of your Social Security Number (xxxx) and Date of Birth (mm/dd/yyyy) as indicated and click Submit. You will be taken to the next sign-up page. 5. Create a [employer] ID. This will be your [employer] login ID and cannot be changed, so think of one that is secure and easy to remember. 6. Create a [employer] password. You can change your password at any time. 7. Enter your Password Reset Question and Answer. This can be used at a later time if you forget your password. 8. Enter your e-mail address. You will receive e-mail notification when new information is available in [address] 9. Click Sign Up. You can now view and download portions of your medical record. 10. Click the Download Summary menu link to download a portable copy of your medical information. If you have questions, please visit the Frequently Asked Questions section of the [employer] website. Remember, [employer] is NOT to be used for urgent needs. For medical emergencies, dial 911. Now available from your iPhone and Android! Please provide this summary of care documentation to your next provider. Your primary care clinician is listed as Mariah Shah. If you have any questions after today's visit, please call [phone].

## 2017-03-21 NOTE — PROGRESS NOTES
Patient: Eduardo Webster. Age: 45 y.o. Patient Care Team:  Cierra Bernstein MD as PCP - General (Family Practice)  Marlon Sharp MD as Surgeon (Cardiothoracic Surgery)  Roxy Goodpasture, MD as Surgeon (Cardiothoracic Surgery)    Diagnosis: The primary encounter diagnosis was Dissection of thoracic aorta [facility]). A diagnosis of S/P ascending aortic replacement was also pertinent to this visit. Problem List:   Patient Active Problem List   Diagnosis Code    Aortic dissection (HCC) I71.00    S/P ascending aortic replacement Z95.828        Date of Surgery: 2/2/17     Surgery: S/p emergent ascending thoracic aortic dissection repair    HPI: Pt is here for routine postop visit. Pt was discharged to rehab, there for about a week and then to home. Reports some swelling in his lower extremities. States he has not been taking his ASA because he \"hasn't gone to get any\", but states he will get some soon. Reported checking his BS x1 since d/c from rehab and was 126. Pt denies SOB, dizziness, palpitations, chest pain. Current Medications:   Current Outpatient Prescriptions   Medication Sig Dispense Refill    bumetanide (BUMEX) 2 mg tablet Take 1 mg by mouth two (2) times a day.  amLODIPine (NORVASC) 10 mg tablet Take 1 Tab by mouth daily for 60 days. 30 Tab 1    atorvastatin (LIPITOR) 20 mg tablet Take 1 Tab by mouth every evening. 30 Tab 1    cloNIDine (CATAPRES) 0.3 mg/24 hr 1 Patch by TransDERmal route every seven (7) days. 4 Patch 1    hydrALAZINE (APRESOLINE) 100 mg tablet Take 1 Tab by mouth every six (6) hours. 120 Tab 1    linagliptin (TRADJENTA) 5 mg tablet Take 1 Tab by mouth Daily (before breakfast). 30 Tab 1    metoprolol tartrate (LOPRESSOR) 50 mg tablet Take 1 Tab by mouth every twelve (12) hours. 60 Tab 1    minoxidil (LONITEN) 10 mg tablet Take 1 Tab by mouth daily. 30 Tab 1    potassium chloride (K-DUR, KLOR-CON) 20 mEq tablet Take 2 Tabs by mouth three (3) times daily. 90 Tab 0    aspirin 81 mg chewable tablet Take 1 Tab by mouth daily. 365 Tab 0       Vitals: Blood pressure 120/64, pulse 76, temperature 97.8 °F (36.6 °C), temperature source Oral, resp. rate 16, height 6' (1.829 m), weight 277 lb 8 oz (125.9 kg), SpO2 93 %. Allergies: has No Known Allergies. Physical Exam:  Wounds: clean, healed    Lungs: clear to auscultation bilaterally    Heart: regular rate and rhythm, S1, S2 normal, no murmur, click, rub or gallop    Extremities: +2 edema bilat    Assessment/Plan:   1. S/p aortic dissection repair: watch BP, stable. Cont current meds  2. Volume overload: cont bumex, will check K+ today  3. DM: not checking BS at home, advised him to f/u with PCP for management and education  4. HTN: appears controlled, cont metoprolol, hydralazine, clonidine patch, minoxidil, norvasc  5. Acute kidney injury, now appears to have CKD: check BMP today, PCP to f/u or refer to renal  6.  Set up appt w/ Dr. Quentin Rodriguez for cardiac workup and BP/fluid management, follow labs    Rehab - not covered for aortic dissection surgery  Walking: yes  Glucometer: intermittent   Antibiotic card for valves: n/a

## 2017-03-22 RX ORDER — POTASSIUM CHLORIDE 20 MEQ/1
20 TABLET, EXTENDED RELEASE ORAL 2 TIMES DAILY
Qty: 90 TAB | Refills: 0
Start: 2017-03-22

## 2017-04-14 ENCOUNTER — OFFICE VISIT (OUTPATIENT)
Dept: CARDIOLOGY CLINIC | Age: 52
End: 2017-04-14

## 2017-04-14 VITALS
HEIGHT: 72 IN | DIASTOLIC BLOOD PRESSURE: 80 MMHG | SYSTOLIC BLOOD PRESSURE: 140 MMHG | RESPIRATION RATE: 16 BRPM | WEIGHT: 259.9 LBS | OXYGEN SATURATION: 96 % | HEART RATE: 82 BPM | BODY MASS INDEX: 35.2 KG/M2

## 2017-04-14 DIAGNOSIS — E78.00 HIGH CHOLESTEROL: ICD-10-CM

## 2017-04-14 DIAGNOSIS — Z95.828 S/P ASCENDING AORTIC REPLACEMENT: Primary | ICD-10-CM

## 2017-04-14 DIAGNOSIS — I10 ESSENTIAL HYPERTENSION: ICD-10-CM

## 2017-04-14 NOTE — PROGRESS NOTES
Lennox Hind NP    Subjective/HPI:     Blanca Schneider is a 45 y.o. male is here for new patient consultation. The patient is post CT surgery for Acute Type A dissection. Released from CT surgery services to follow with cardiology. Mr Maggi Fisher reports feeling well, denies TORREZ, edema or chest pain. Tolerating HTn medications well without side effects. SUMMARY:  Procedure information: The study included limited 2D imaging, M-mode,  limited spectral Doppler, and color Doppler. This was a technically  difficult study. Left ventricle: Size was at the upper limits of normal. Systolic function  was normal. Ejection fraction was estimated in the range of 55 % to 60 %. Suboptimal endocardial visualization limits wall motion analysis. Wall  thickness was mildly to moderately increased. Hypertrophy was noted. Left atrium: The atrium was moderately dilated. Right atrium: The atrium was mildly to moderately dilated. INDICATIONS: Assess the aortic valve. PROCEDURE: This was a stat study. The study included limited 2D imaging,  M-mode, limited spectral Doppler, and color Doppler. Systolic blood  pressure was 97 mmHg, at the start of the study. Diastolic blood pressure  was 39 mmHg, at the start of the study. Echocardiographic views were  limited by restricted patient mobility, surgical dressings, and poor  acoustic window availability. This was a technically difficult study. LEFT VENTRICLE: Size was at the upper limits of normal. Systolic function  was normal. Ejection fraction was estimated in the range of 55 % to 60 %. Suboptimal endocardial visualization limits wall motion analysis. Wall  thickness was mildly to moderately increased. Hypertrophy was noted. RIGHT VENTRICLE: The size was normal. Systolic function was normal. Wall  thickness was normal.    LEFT ATRIUM: The atrium was moderately dilated. RIGHT ATRIUM: The atrium was mildly to moderately dilated.     MITRAL VALVE: Normal valve structure. AORTIC VALVE: The valve was trileaflet    Patient Active Problem List    Diagnosis Date Noted    Essential hypertension 04/14/2017    S/P ascending aortic replacement 02/03/2017    Aortic dissection ([address] 02/02/2017      Lucy Arriaga MD  Past Medical History:   Diagnosis Date    Ill-defined condition     sleep apnea      Past Surgical History:   Procedure Laterality Date    FLEXIBLE SIGMOIDOSCOPY N/A 2/5/2017    SIGMOIDOSCOPY FLEXIBLE performed by Elaina Rebolledo MD at [facility] ENDOSCOPY     No Known Allergies   History reviewed. No pertinent family history. Current Outpatient Prescriptions   Medication Sig    potassium chloride (K-DUR, KLOR-CON) 20 mEq tablet Take 1 Tab by mouth two (2) times a day.  bumetanide (BUMEX) 2 mg tablet Take 1 mg by mouth two (2) times a day.  amLODIPine (NORVASC) 10 mg tablet Take 1 Tab by mouth daily for 60 days.  aspirin 81 mg chewable tablet Take 1 Tab by mouth daily.  atorvastatin (LIPITOR) 20 mg tablet Take 1 Tab by mouth every evening.  cloNIDine (CATAPRES) 0.3 mg/24 hr 1 Patch by TransDERmal route every seven (7) days.  hydrALAZINE (APRESOLINE) 100 mg tablet Take 1 Tab by mouth every six (6) hours.  linagliptin (TRADJENTA) 5 mg tablet Take 1 Tab by mouth Daily (before breakfast).  metoprolol tartrate (LOPRESSOR) 50 mg tablet Take 1 Tab by mouth every twelve (12) hours.  minoxidil (LONITEN) 10 mg tablet Take 1 Tab by mouth daily. No current facility-administered medications for this visit. Vitals:    04/14/17 1001 04/14/17 1009   BP: 136/70 140/80   Pulse: 82    Resp: 16    SpO2: 96%    Weight: 259 lb 14.4 oz (117.9 kg)    Height: 6' (1.829 m)        I have reviewed the nurses notes, vitals, problem list, allergy list, medical history, family, social history and medications. Review of Symptoms:    General: Pt denies excessive weight gain or loss.  Pt is able to conduct ADL's  HEENT: Denies blurred vision, headaches, epistaxis and difficulty swallowing. Respiratory: Denies shortness of breath, TORREZ, wheezing or stridor. Cardiovascular: Denies precordial pain, palpitations, edema or PND  Gastrointestinal: Denies poor appetite, indigestion, abdominal pain or blood in stool  Musculoskeletal: Denies pain or swelling from muscles or joints  Neurologic: Denies tremor, paresthesias, or sensory motor disturbance  Skin: Denies rash, itching or texture change. Urinary: Denies difficulty with urination  Psyc/Social concerns: Denies depression      Physical Exam:      General: Well developed, cooperative, alert in no acute distress, appears states age. HEENT: Supple, No carotid bruits, no JVD, trach is midline. PERRL, EOM intact  Heart:  Normal S1/S2 negative S3 or S4. Regular, no murmur, gallop or rub. Sternal incision well healed  Respiratory: Clear bilaterally x 4, no wheezing or rales  Abdomen:   Soft, non-tender, no masses, bowel sounds are active.   Extremities:  No edema, normal cap refill, no cyanosis, atraumatic. Neuro: A&Ox3, speech clear, gait stable. Skin: Skin color is normal. No rashes or lesions.  Non diaphoretic  Vascular: 2+ pulses symmetric in all extremities    Cardiographics    ECG: Sinus   Results for orders placed or performed during the hospital encounter of 02/02/17   EKG, 12 LEAD, INITIAL   Result Value Ref Range    Ventricular Rate 68 BPM    Atrial Rate 68 BPM    P-R Interval 178 ms    QRS Duration 100 ms    Q-T Interval 418 ms    QTC Calculation (Bezet) 444 ms    Calculated P Axis 16 degrees    Calculated R Axis -21 degrees    Calculated T Axis 9 degrees    Diagnosis       Normal sinus rhythm  Voltage criteria for left ventricular hypertrophy  No previous ECGs available  Confirmed by Katerine Kent ([id number]) on 2/2/2017 12:08:14 PM           Cardiology Labs:  No results found for: CHOL, CHOLX, CHLST, CHOLV, 528393, HDL, LDL, DLDL, LDLC, DLDLP, TGL, TGLX, TRIGL, [id number], TRIGP, CHHD, CHHDX    Lab Results   Component Value Date/Time    Sodium 140 03/21/2017 02:49 PM    Potassium 4.7 03/21/2017 02:49 PM    Chloride 98 03/21/2017 02:49 PM    CO2 27 03/21/2017 02:49 PM    Anion gap 10 02/27/2017 03:36 AM    Glucose 98 03/21/2017 02:49 PM    BUN 22 03/21/2017 02:49 PM    Creatinine 1.85 03/21/2017 02:49 PM    BUN/Creatinine ratio 12 03/21/2017 02:49 PM    GFR est AA 47 03/21/2017 02:49 PM    GFR est non-AA 41 03/21/2017 02:49 PM    Calcium 9.5 03/21/2017 02:49 PM    Bilirubin, total 0.4 02/27/2017 03:36 AM    AST (SGOT) 21 02/27/2017 03:36 AM    Alk. phosphatase 97 02/27/2017 03:36 AM    Protein, total 7.9 02/27/2017 03:36 AM    Albumin 3.2 02/27/2017 03:36 AM    Globulin 4.7 02/27/2017 03:36 AM    A-G Ratio 0.7 02/27/2017 03:36 AM    ALT (SGPT) 34 02/27/2017 03:36 AM           Assessment:     Assessment:      Nia Gaona was seen today for new patient. Diagnoses and all orders for this visit:    S/P ascending aortic replacement  -     AMB POC EKG ROUTINE W/ 12 LEADS, INTER & REP    Essential hypertension    High cholesterol      Specialty Problems        Cardiology Problems    Essential hypertension              ICD-10-CM ICD-9-CM    1. S/P ascending aortic replacement Z95.828 V43.4 AMB POC EKG ROUTINE W/ 12 LEADS, INTER & REP   2. Essential hypertension I10 401.9    3. High cholesterol E78.00 272.0      Orders Placed This Encounter    AMB POC EKG ROUTINE W/ 12 LEADS, INTER & REP     Order Specific Question:   Reason for Exam:     Answer:   routine       PLAN:    Patient presents post TAA repair with graft. Feeling well, BP controlled. Follow up 3 months with lipids at that time.      Swapnil Valenzuela NP

## 2017-05-31 RX ORDER — AMLODIPINE BESYLATE 10 MG/1
TABLET ORAL
Qty: 30 TAB | Refills: 2 | Status: SHIPPED | OUTPATIENT
Start: 2017-05-31 | End: 2017-08-29 | Stop reason: SDUPTHER

## 2017-05-31 RX ORDER — MINOXIDIL 10 MG/1
TABLET ORAL
Qty: 30 TAB | Refills: 1 | Status: SHIPPED | OUTPATIENT
Start: 2017-05-31 | End: 2017-07-24 | Stop reason: SDUPTHER

## 2017-06-05 RX ORDER — METOPROLOL TARTRATE 50 MG/1
50 TABLET ORAL EVERY 12 HOURS
Qty: 60 TAB | Refills: 6 | Status: SHIPPED | OUTPATIENT
Start: 2017-06-05 | End: 2017-12-14 | Stop reason: SDUPTHER

## 2017-07-14 DIAGNOSIS — Z95.828 S/P ASCENDING AORTIC REPLACEMENT: ICD-10-CM

## 2017-07-14 DIAGNOSIS — I10 ESSENTIAL HYPERTENSION: ICD-10-CM

## 2017-07-14 DIAGNOSIS — E78.00 HIGH CHOLESTEROL: ICD-10-CM

## 2017-07-24 RX ORDER — MINOXIDIL 10 MG/1
TABLET ORAL
Qty: 30 TAB | Refills: 6 | Status: SHIPPED | OUTPATIENT
Start: 2017-07-24 | End: 2018-02-22 | Stop reason: SDUPTHER

## 2017-07-26 ENCOUNTER — OFFICE VISIT (OUTPATIENT)
Dept: CARDIOLOGY CLINIC | Age: 52
End: 2017-07-26

## 2017-07-26 VITALS
OXYGEN SATURATION: 95 % | HEART RATE: 66 BPM | SYSTOLIC BLOOD PRESSURE: 138 MMHG | BODY MASS INDEX: 36.59 KG/M2 | WEIGHT: 270.1 LBS | RESPIRATION RATE: 18 BRPM | HEIGHT: 72 IN | DIASTOLIC BLOOD PRESSURE: 74 MMHG

## 2017-07-26 DIAGNOSIS — I71.019 DISSECTION OF THORACIC AORTA: ICD-10-CM

## 2017-07-26 DIAGNOSIS — I10 ESSENTIAL HYPERTENSION: Primary | ICD-10-CM

## 2017-07-26 DIAGNOSIS — Z95.828 S/P ASCENDING AORTIC REPLACEMENT: ICD-10-CM

## 2017-07-26 RX ORDER — BUMETANIDE 1 MG/1
2 TABLET ORAL 2 TIMES DAILY
COMMUNITY
Start: 2017-07-23

## 2017-07-26 RX ORDER — METOLAZONE 2.5 MG/1
TABLET ORAL
COMMUNITY
Start: 2017-05-25 | End: 2019-01-01

## 2017-07-26 NOTE — PROGRESS NOTES
Subjective/HPI:     Anamaria Van is a 45 y.o. male is here for f/u appt. The patient denies chest pain/ shortness of breath, orthopnea, PND, LE edema, palpitations, syncope, presyncope or fatigue. Saw his kidney doctor a couple months ago and was told everything was stable. PCP Provider  Leslie Duarte MD  Past Medical History:   Diagnosis Date    Ill-defined condition     sleep apnea      Past Surgical History:   Procedure Laterality Date    FLEXIBLE SIGMOIDOSCOPY N/A 2/5/2017    SIGMOIDOSCOPY FLEXIBLE performed by Nusrat Badillo MD at [facility] ENDOSCOPY     No Known Allergies   No family history on file. Current Outpatient Prescriptions   Medication Sig    bumetanide (BUMEX) 1 mg tablet     metOLazone (ZAROXOLYN) 2.5 mg tablet     minoxidil (LONITEN) 10 mg tablet TAKE 1 TABLET EVERY DAY    metoprolol tartrate (LOPRESSOR) 50 mg tablet Take 1 Tab by mouth every twelve (12) hours.  amLODIPine (NORVASC) 10 mg tablet TAKE 1 TABLET EVERY DAY    potassium chloride (K-DUR, KLOR-CON) 20 mEq tablet Take 1 Tab by mouth two (2) times a day.  bumetanide (BUMEX) 2 mg tablet Take 1 mg by mouth two (2) times a day.  aspirin 81 mg chewable tablet Take 1 Tab by mouth daily.  atorvastatin (LIPITOR) 20 mg tablet Take 1 Tab by mouth every evening.  cloNIDine (CATAPRES) 0.3 mg/24 hr 1 Patch by TransDERmal route every seven (7) days.  hydrALAZINE (APRESOLINE) 100 mg tablet Take 1 Tab by mouth every six (6) hours.  linagliptin (TRADJENTA) 5 mg tablet Take 1 Tab by mouth Daily (before breakfast). No current facility-administered medications for this visit.        Vitals:    07/26/17 1036 07/26/17 1046   BP: 130/80 138/74   Pulse: 66    Resp: 18    SpO2: 95%    Weight: 270 lb 1.6 oz (122.5 kg)    Height: 6' (1.829 m)      Social History     Social History    Marital status: SINGLE     Spouse name: N/A    Number of children: N/A    Years of education: N/A Occupational History    Not on file. Social History Main Topics    Smoking status: Former Smoker    Smokeless tobacco: Not on file    Alcohol use No    Drug use: No    Sexual activity: Not on file     Other Topics Concern    Not on file     Social History Narrative       I have reviewed the nurses notes, vitals, problem list, allergy list, medical history, family, social history and medications. Review of Symptoms:    General: Pt denies excessive weight gain or loss. Pt is able to conduct ADL's  HEENT: Denies blurred vision, headaches, epistaxis and difficulty swallowing. Respiratory: Denies shortness of breath, TORREZ, wheezing or stridor. Cardiovascular: Denies precordial pain, palpitations, edema or PND  Gastrointestinal: Denies poor appetite, indigestion, abdominal pain or blood in stool  Urinary: Denies dysuria, pyuria  Musculoskeletal: Denies pain or swelling from muscles or joints  Neurologic: Denies tremor, paresthesias, or sensory motor disturbance  Skin: Denies rash, itching or texture change. Psych: Denies depression        Physical Exam:      General: Well developed, in no acute distress, cooperative and alert  HEENT: No carotid bruits, no JVD, trach is midline. Neck Supple, PEERL, EOM intact. Heart:  Normal S1/S2 negative S3 or S4. Regular, no murmur, gallop or rub.   Respiratory: Clear bilaterally x 4, no wheezing or rales  Abdomen:   Soft, non-tender, no masses, bowel sounds are active.   Extremities:  No edema, normal cap refill, no cyanosis, atraumatic. Neuro: A&Ox3, speech clear, gait stable. Skin: Skin color is normal. No rashes or lesions. Non diaphoretic  Vascular: 2+ pulses symmetric in all extremities    Cardiographics    ECG: Sinus  Rhythm HR 64  -Left atrial enlargement.     Voltage criteria for LVH    Results for orders placed or performed during the hospital encounter of 02/02/17   EKG, 12 LEAD, INITIAL   Result Value Ref Range    Ventricular Rate 68 BPM    Atrial Rate 68 BPM    P-R Interval 178 ms    QRS Duration 100 ms    Q-T Interval 418 ms    QTC Calculation (Bezet) 444 ms    Calculated P Axis 16 degrees    Calculated R Axis -21 degrees    Calculated T Axis 9 degrees    Diagnosis       Normal sinus rhythm  Voltage criteria for left ventricular hypertrophy  No previous ECGs available  Confirmed by Messi Tatum ([id number]) on 2/2/2017 12:08:14 PM           Cardiology Labs:  No results found for: CHOL, CHOLX, CHLST, CHOLV, 319705, HDL, LDL, LDLC, DLDLP, Angel Mari, CHHD, [facility]    Lab Results   Component Value Date/Time    Sodium 140 03/21/2017 02:49 PM    Potassium 4.7 03/21/2017 02:49 PM    Chloride 98 03/21/2017 02:49 PM    CO2 27 03/21/2017 02:49 PM    Anion gap 10 02/27/2017 03:36 AM    Glucose 98 03/21/2017 02:49 PM    BUN 22 03/21/2017 02:49 PM    Creatinine 1.85 03/21/2017 02:49 PM    BUN/Creatinine ratio 12 03/21/2017 02:49 PM    GFR est AA 47 03/21/2017 02:49 PM    GFR est non-AA 41 03/21/2017 02:49 PM    Calcium 9.5 03/21/2017 02:49 PM    AST (SGOT) 21 02/27/2017 03:36 AM    Alk. phosphatase 97 02/27/2017 03:36 AM    Protein, total 7.9 02/27/2017 03:36 AM    Albumin 3.2 02/27/2017 03:36 AM    Globulin 4.7 02/27/2017 03:36 AM    A-G Ratio 0.7 02/27/2017 03:36 AM    ALT (SGPT) 34 02/27/2017 03:36 AM           Assessment:     Assessment:     Diagnoses and all orders for this visit:    1. Essential hypertension  -     AMB POC EKG ROUTINE W/ 12 LEADS, INTER & REP  -     LIPID PANEL  -     CK  -     METABOLIC PANEL, COMPREHENSIVE    2. Dissection of thoracic aorta (HCC)  -     AMB POC EKG ROUTINE W/ 12 LEADS, INTER & REP  -     LIPID PANEL  -     CK  -     METABOLIC PANEL, COMPREHENSIVE    3. S/P ascending aortic replacement  -     AMB POC EKG ROUTINE W/ 12 LEADS, INTER & REP  -     LIPID PANEL  -     CK  -     METABOLIC PANEL, COMPREHENSIVE        ICD-10-CM ICD-9-CM    1.  Essential hypertension I10 401.9 bumetanide (BUMEX) 1 mg tablet      metOLazone (ZAROXOLYN) 2.5 mg tablet      AMB POC EKG ROUTINE W/ 12 LEADS, INTER & REP      LIPID PANEL      CK      METABOLIC PANEL, COMPREHENSIVE   2. Dissection of thoracic aorta (HCC) I71.01 441.01 bumetanide (BUMEX) 1 mg tablet      metOLazone (ZAROXOLYN) 2.5 mg tablet      AMB POC EKG ROUTINE W/ 12 LEADS, INTER & REP      LIPID PANEL      CK      METABOLIC PANEL, COMPREHENSIVE   3. S/P ascending aortic replacement Z95.828 V43.4 bumetanide (BUMEX) 1 mg tablet      metOLazone (ZAROXOLYN) 2.5 mg tablet      AMB POC EKG ROUTINE W/ 12 LEADS, INTER & REP      LIPID PANEL      CK      METABOLIC PANEL, COMPREHENSIVE     Orders Placed This Encounter    LIPID PANEL    CK    METABOLIC PANEL, COMPREHENSIVE    AMB POC EKG ROUTINE W/ 12 LEADS, INTER & REP     Order Specific Question:   Reason for Exam:     Answer:   Routine    bumetanide (BUMEX) 1 mg tablet    metOLazone (ZAROXOLYN) 2.5 mg tablet        Plan:     Patient presents today for f/u appt and denies cardiac complaints. BP is normotensive. EKG remains SR. I will evaluate with CK, CMP and lipids. Continue current care and f/u in 6 months.     David Limon MD

## 2017-07-26 NOTE — MR AVS SNAPSHOT
Visit Information Date & Time Provider Department Dept. Phone Encounter #  
 7/26/2017 10:15 AM Odilon Rosado, [address] [facility] [phone] Upcoming Health Maintenance Date Due Hepatitis C Screening 1965 DTaP/Tdap/Td series (1 - Tdap) 9/9/1985 FOBT Q 1 YEAR AGE 50-75 9/9/2014 INFLUENZA AGE 9 TO ADULT 8/1/2017 Allergies as of 7/26/2017  Review Complete On: 7/26/2017 By: Amando Ramirez NP No Known Allergies Current Immunizations  Reviewed on 2/17/2017 No immunizations on file. Not reviewed this visit You Were Diagnosed With   
  
 Codes Comments Essential hypertension    -  Primary ICD-10-CM: I10 
ICD-9-CM: 401.9 Dissection of thoracic aorta (HCC)     ICD-10-CM: I71.01 
ICD-9-CM: 441.01 S/P ascending aortic replacement     ICD-10-CM: W25.476 ICD-9-CM: V43.4 Vitals BP Pulse Resp Height(growth percentile) Weight(growth percentile) SpO2  
 138/74 (BP 1 Location: Left arm, BP Patient Position: Sitting) 66 18 6' (1.829 m) 270 lb 1.6 oz (122.5 kg) 95% BMI Smoking Status 36.63 kg/m2 Former Smoker Vitals History BMI and BSA Data Body Mass Index Body Surface Area  
 36.63 kg/m 2 2.49 m 2 Preferred Pharmacy Pharmacy Name Phone [facility] [phone] Your Updated Medication List  
  
   
This list is accurate as of: 7/26/17 11:25 AM.  Always use your most recent med list. amLODIPine 10 mg tablet Commonly known as:  Caralee Dupes TAKE 1 TABLET EVERY DAY  
  
 aspirin 81 mg chewable tablet Take 1 Tab by mouth daily. atorvastatin 20 mg tablet Commonly known as:  LIPITOR Take 1 Tab by mouth every evening. * bumetanide 2 mg tablet Commonly known as:  Willia Maid Take 1 mg by mouth two (2) times a day. * bumetanide 1 mg tablet Commonly known as:  Willia Maid cloNIDine 0.3 mg/24 hr  
Commonly known as:  CATAPRES  
1 Patch by TransDERmal route every seven (7) days. hydrALAZINE 100 mg tablet Commonly known as:  APRESOLINE Take 1 Tab by mouth every six (6) hours. linagliptin 5 mg tablet Commonly known as:  Oliver Raring Take 1 Tab by mouth Daily (before breakfast). metOLazone 2.5 mg tablet Commonly known as:  ZAROXOLYN  
  
 metoprolol tartrate 50 mg tablet Commonly known as:  LOPRESSOR Take 1 Tab by mouth every twelve (12) hours. minoxidil 10 mg tablet Commonly known as:  LONITEN  
TAKE 1 TABLET EVERY DAY  
  
 potassium chloride 20 mEq tablet Commonly known as:  K-DUR, KLOR-CON Take 1 Tab by mouth two (2) times a day. * Notice: This list has 2 medication(s) that are the same as other medications prescribed for you. Read the directions carefully, and ask your doctor or other care provider to review them with you. We Performed the Following AMB POC EKG ROUTINE W/ 12 LEADS, INTER & REP [83892 CPT(R)] CK [id number] CPT(R)] LIPID PANEL [86922 CPT(R)] METABOLIC PANEL, COMPREHENSIVE [74923 CPT(R)] Introducing [facility]! [facility] introduces [employer] patient portal. Now you can access parts of your medical record, email your doctor's office, and request medication refills online. 1. In your internet browser, go to [email or web address] 2. Click on the First Time User? Click Here link in the Sign In box. You will see the New Member Sign Up page. 3. Enter your [employer] Access Code exactly as it appears below. You will not need to use this code after youve completed the sign-up process. If you do not sign up before the expiration date, you must request a new code. · [employer] Access Code: [email or web address] Expires: 10/24/2017 10:33 AM 
 
4.  Enter the last four digits of your Social Security Number (xxxx) and Date of Birth (mm/dd/yyyy) as indicated and click Submit. You will be taken to the next sign-up page. 5. Create a [employer] ID. This will be your [employer] login ID and cannot be changed, so think of one that is secure and easy to remember. 6. Create a [employer] password. You can change your password at any time. 7. Enter your Password Reset Question and Answer. This can be used at a later time if you forget your password. 8. Enter your e-mail address. You will receive e-mail notification when new information is available in [address] 9. Click Sign Up. You can now view and download portions of your medical record. 10. Click the Download Summary menu link to download a portable copy of your medical information. If you have questions, please visit the Frequently Asked Questions section of the [employer] website. Remember, [employer] is NOT to be used for urgent needs. For medical emergencies, dial 911. Now available from your iPhone and Android! Please provide this summary of care documentation to your next provider. Your primary care clinician is listed as Talia Sanchez. If you have any questions after today's visit, please call [phone].

## 2017-07-26 NOTE — PROGRESS NOTES
Chief Complaint   Patient presents with    Hypertension     3 mo f/u    Cholesterol Problem     \"

## 2017-07-27 LAB
ALBUMIN SERPL-MCNC: 4.5 G/DL (ref 3.5–5.5)
ALBUMIN/GLOB SERPL: 1.5 {RATIO} (ref 1.2–2.2)
ALP SERPL-CCNC: 81 IU/L (ref 39–117)
ALT SERPL-CCNC: 21 IU/L (ref 0–44)
AST SERPL-CCNC: 22 IU/L (ref 0–40)
BILIRUB SERPL-MCNC: 0.4 MG/DL (ref 0–1.2)
BUN SERPL-MCNC: 31 MG/DL (ref 6–24)
BUN/CREAT SERPL: 19 (ref 9–20)
CALCIUM SERPL-MCNC: 10.1 MG/DL (ref 8.7–10.2)
CHLORIDE SERPL-SCNC: 98 MMOL/L (ref 96–106)
CHOLEST SERPL-MCNC: 181 MG/DL (ref 100–199)
CK SERPL-CCNC: 287 U/L (ref 24–204)
CO2 SERPL-SCNC: 29 MMOL/L (ref 18–29)
CREAT SERPL-MCNC: 1.62 MG/DL (ref 0.76–1.27)
GLOBULIN SER CALC-MCNC: 3 G/DL (ref 1.5–4.5)
GLUCOSE SERPL-MCNC: 98 MG/DL (ref 65–99)
HDLC SERPL-MCNC: 34 MG/DL
INTERPRETATION, 910389: NORMAL
INTERPRETATION: NORMAL
LDLC SERPL CALC-MCNC: 113 MG/DL (ref 0–99)
PDF IMAGE, 910387: NORMAL
POTASSIUM SERPL-SCNC: 3.8 MMOL/L (ref 3.5–5.2)
PROT SERPL-MCNC: 7.5 G/DL (ref 6–8.5)
SODIUM SERPL-SCNC: 143 MMOL/L (ref 134–144)
TRIGL SERPL-MCNC: 172 MG/DL (ref 0–149)
VLDLC SERPL CALC-MCNC: 34 MG/DL (ref 5–40)

## 2017-08-29 RX ORDER — AMLODIPINE BESYLATE 10 MG/1
TABLET ORAL
Qty: 30 TAB | Refills: 2 | Status: SHIPPED | OUTPATIENT
Start: 2017-08-29 | End: 2017-11-21 | Stop reason: SDUPTHER

## 2017-09-10 NOTE — PROGRESS NOTES
1900- Bedside and verbal received from [address] [place] [address]- Assessment complete as documented. Pt resting in bed, dialysis nurse at bedside preparing for HD. Cardene currently infusing @ 3mg. VSS. Oriented, NSR, O2 @ 6L, lungs coarse and diminished. Abd round & distended, hypoactive BS. Moves all extremities equal. 4+ pitting edema to BLE, scrotum, 2+ edema to BUE. Young to gravity, cloudy yellow urine. Will continue to monitor. 2006- HD initiated, cardene stopped. 2037- Cardene restarted @ 2mg. /65.  2145- PO meds given, cardene titrated down. Will continue to monitor. 2215- HD complete. 2L removed per HD nurse. Cardene titrated throughout HD to maintain SBP <150.   2330- Reassess unchanged. 0145- Pacer wire care done, dressing changed over chest tube site. 0230- Cardene drip stopped. 0430- Reassess unchanged. 0710- Bedside and verbal report given [place] [facility] Statement Selected

## 2017-09-21 ENCOUNTER — TELEPHONE (OUTPATIENT)
Dept: CARDIOLOGY CLINIC | Age: 52
End: 2017-09-21

## 2017-09-21 RX ORDER — CLONIDINE 0.3 MG/24H
1 PATCH, EXTENDED RELEASE TRANSDERMAL
Qty: 4 PATCH | Refills: 3 | Status: SHIPPED | OUTPATIENT
Start: 2017-09-21 | End: 2018-01-20 | Stop reason: SDUPTHER

## 2017-09-21 NOTE — TELEPHONE ENCOUNTER
Cielo Lang says he's wearing the last patch of Clonidine and needs a refill sent to pharmacy on file. Please leave  to advise this has been completed.      Thanks

## 2017-09-29 NOTE — PROGRESS NOTES
Nutrition Assessment:    INTERVENTIONS/RECOMMENDATIONS:   Meals/Snacks: General/healthful diet: Advance diet beyond GI Lite. ASSESSMENT:   Patient medically noted for aortic dissection. Patient continues to tolerate a GI lite diet. He reports a good appetite and enjoying meals. 100% PO intake noted. Recommend advancing diet to cardiac diet to better meet estimated nutritional needs. Encourage PO. Diet Order:  (GI Lite )  % Eaten:  Patient Vitals for the past 72 hrs:   % Diet Eaten   02/15/17 0938 100 %   02/14/17 1005 100 %   02/13/17 0858 100 %     Pertinent Medications: [x] Reviewed []Other: norvasc, atorvastatin, bumex, colace, glipizide, hydralazine, humalog, lopressor, protonix, miralax, KCl   Pertinent Labs: [x]Reviewed  []Other:  [phone]  Food Allergies: [x]None []Other:     Last BM: 2/13   [x]Active     []Hyperactive  []Hypoactive       [] Absent  BS  Skin:    [] Intact   [x] Incision  [] Breakdown   []Edema   []Other:    Anthropometrics: Height: 6' (182.9 cm) Weight: 143.2 kg (315 lb 11.2 oz)    IBW (%IBW):   ( ) UBW (%UBW):   (  %)    BMI: Body mass index is 42.82 kg/(m^2). This BMI is indicative of:  []Underweight   []Normal   []Overweight   [] Obesity   [x] Extreme Obesity (BMI>40)  Last Weight Metrics:  Weight Loss Metrics 2/15/2017   Today's Wt 315 lb 11.2 oz   BMI 42.82 kg/m2       Estimated Nutrition Needs (Based on): 2579 Kcals/day (MSJ 2113 x 1.3 (-500 for obesity)) , 97 g (1.2gPro/kg IBW) Protein  Carbohydrate: At Least 130 g/day  Fluids: 2250 mL/day     Pt expected to meet estimated nutrient needs: [x]Yes []No    NUTRITION DIAGNOSES:   Problem:   (No nutrition diagnosis at this time )      Etiology: related to  Signs/Symptoms:       NUTRITION INTERVENTIONS:  Meals/Snacks: General/healthful diet                  GOAL:   PO intake >70% of meals next 4-6 days     NUTRITION MONITORING AND EVALUATION   Food/Nutrient Intake Outcomes:  Total energy intake  Physical Signs/Symptoms Outcomes: Weight/weight change, Electrolyte and renal profile, Glucose profile, GI profile    Previous Goal Met:   [x] Met              [] Progressing Towards Goal              [] Not Progressing Towards Goal   Previous Recommendations:   [x] Implemented          [] Not Implemented          [] Not Applicable    LEARNING NEEDS (Diet, Food/Nutrient-Drug Interaction):    [x] None Identified   [] Identified and Education Provided/Documented   [] Identified and Pt declined/was not appropriate     Cultural, Hoahaoism, OR Ethnic Dietary Needs:    [x] None Identified   [] Identified and Addressed     [x] Interdisciplinary Care Plan Reviewed/Documented    [x] Discharge Planning: Cardiac/heart healthy diet    [] Participated in Interdisciplinary Rounds    NUTRITION RISK:    [] High              [] Moderate           [x]  Low  []  Minimal/Uncompromised      Юлия Sam  Pager [phone]              Weekend Pager [phone] no nausea/no vomiting

## 2017-11-21 RX ORDER — AMLODIPINE BESYLATE 10 MG/1
TABLET ORAL
Qty: 30 TAB | Refills: 2 | Status: SHIPPED | OUTPATIENT
Start: 2017-11-21 | End: 2018-02-22 | Stop reason: SDUPTHER

## 2017-12-15 RX ORDER — METOPROLOL TARTRATE 50 MG/1
TABLET ORAL
Qty: 60 TAB | Refills: 6 | Status: SHIPPED | OUTPATIENT
Start: 2017-12-15 | End: 2018-01-31 | Stop reason: SDUPTHER

## 2018-01-02 RX ORDER — HYDRALAZINE HYDROCHLORIDE 50 MG/1
TABLET, FILM COATED ORAL
Qty: 240 TAB | Refills: 1 | Status: SHIPPED | OUTPATIENT
Start: 2018-01-02 | End: 2018-03-30 | Stop reason: SDUPTHER

## 2018-01-30 RX ORDER — CLONIDINE 0.3 MG/D
PATCH TRANSDERMAL
Qty: 4 PATCH | Refills: 3 | Status: SHIPPED | OUTPATIENT
Start: 2018-01-30 | End: 2018-06-19 | Stop reason: SDUPTHER

## 2018-01-31 ENCOUNTER — OFFICE VISIT (OUTPATIENT)
Dept: CARDIOLOGY CLINIC | Age: 53
End: 2018-01-31

## 2018-01-31 VITALS
HEIGHT: 72 IN | DIASTOLIC BLOOD PRESSURE: 78 MMHG | RESPIRATION RATE: 20 BRPM | WEIGHT: 281.1 LBS | SYSTOLIC BLOOD PRESSURE: 150 MMHG | BODY MASS INDEX: 38.07 KG/M2 | HEART RATE: 66 BPM | OXYGEN SATURATION: 97 %

## 2018-01-31 DIAGNOSIS — Z95.828 S/P ASCENDING AORTIC REPLACEMENT: ICD-10-CM

## 2018-01-31 DIAGNOSIS — I71.019 DISSECTION OF THORACIC AORTA: ICD-10-CM

## 2018-01-31 DIAGNOSIS — I10 ESSENTIAL HYPERTENSION: Primary | ICD-10-CM

## 2018-01-31 RX ORDER — METOPROLOL TARTRATE 100 MG/1
100 TABLET ORAL 2 TIMES DAILY
Qty: 60 TAB | Refills: 12 | Status: SHIPPED | OUTPATIENT
Start: 2018-01-31 | End: 2019-02-19 | Stop reason: SDUPTHER

## 2018-01-31 NOTE — PROGRESS NOTES
Slim Forrest MD          NAME:  Gulshan Jameson. :   1965   MRN:   [id number]   PCP:  Messi Guerrero MD           Subjective: The patient is a 47y.o. year old male  who returns for a routine follow-up. Since the last visit, patient reports no change in exercise tolerance, chest pain, edema, medication intolerance, palpitations, shortness of breath, PND/orthopnea wheezing, sputum, syncope, dizziness or light headedness. Doing well. Past Medical History:   Diagnosis Date    Ill-defined condition     sleep apnea        ICD-10-CM ICD-9-CM    1. Essential hypertension I10 401.9 AMB POC EKG ROUTINE W/ 12 LEADS, INTER & REP   2. S/P ascending aortic replacement Z95.828 V43.4    3. Dissection of thoracic aorta (HCC) I71.01 441.01       Social History   Substance Use Topics    Smoking status: Former Smoker    Smokeless tobacco: Former User    Alcohol use No      No family history on file. Review of Systems  Cardiovascular: Negative except as noted in HPI      Objective:       Vitals:    18 1111 18 1125   BP: 130/68 150/78   Pulse: 66    Resp: 20    SpO2: 97%    Weight: 281 lb 1.6 oz (127.5 kg)    Height: 6' (1.829 m)     Body mass index is 38.12 kg/(m^2). General PE  Mental Status - Alert. General Appearance - Not in acute distress. Chest and Lung Exam   Inspection: Accessory muscles - No use of accessory muscles in breathing. Auscultation:   Breath sounds: - Normal.    Cardiovascular   Inspection: Jugular vein - Bilateral - Inspection Normal.  Palpation/Percussion:   Apical Impulse: - Normal.  Auscultation: Rhythm - Regular. Heart Sounds - S1 WNL and S2 WNL. No S3 or S4. Murmurs & Other Heart Sounds: Auscultation of the heart reveals - No Murmurs. Peripheral Vascular   Upper Extremity: Inspection - Bilateral - No Cyanotic nailbeds or Digital clubbing. Lower Extremity:   Palpation: Edema - Bilateral - No edema.         Data Review:     EKG -  EKG: normal EKG, normal sinus rhythm, unchanged from previous tracings. LABS- @brieflabs@      Allergies reviewed  No Known Allergies    Medications reviewed  Current Outpatient Prescriptions   Medication Sig    metoprolol tartrate (LOPRESSOR) 100 mg IR tablet Take 1 Tab by mouth two (2) times a day.  CATAPRES-TTS-3 0.3 mg/24 hr APPLY 1 PATCH EVERY 7 DAYS    hydrALAZINE (APRESOLINE) 50 mg tablet TAKE 2 TABLETS FOUR TIMES DAILY    amLODIPine (NORVASC) 10 mg tablet TAKE 1 TABLET EVERY DAY FOR BLOOD PRESSURE    bumetanide (BUMEX) 1 mg tablet Take 3 mg by mouth two (2) times a day.  minoxidil (LONITEN) 10 mg tablet TAKE 1 TABLET EVERY DAY    potassium chloride (K-DUR, KLOR-CON) 20 mEq tablet Take 1 Tab by mouth two (2) times a day. (Patient taking differently: Take 40 mEq by mouth two (2) times a day.)    aspirin 81 mg chewable tablet Take 1 Tab by mouth daily.  metOLazone (ZAROXOLYN) 2.5 mg tablet     bumetanide (BUMEX) 2 mg tablet Take 1 mg by mouth two (2) times a day.  atorvastatin (LIPITOR) 20 mg tablet Take 1 Tab by mouth every evening.  hydrALAZINE (APRESOLINE) 100 mg tablet Take 1 Tab by mouth every six (6) hours.  linagliptin (TRADJENTA) 5 mg tablet Take 1 Tab by mouth Daily (before breakfast). No current facility-administered medications for this visit. Assessment:       ICD-10-CM ICD-9-CM    1. Essential hypertension I10 401.9 AMB POC EKG ROUTINE W/ 12 LEADS, INTER & REP   2. S/P ascending aortic replacement Z95.828 V43.4    3. Dissection of thoracic aorta (HCC) I71.01 441.01         Orders Placed This Encounter    AMB POC EKG ROUTINE W/ 12 LEADS, INTER & REP     Order Specific Question:   Reason for Exam:     Answer:   Routine    metoprolol tartrate (LOPRESSOR) 100 mg IR tablet     Sig: Take 1 Tab by mouth two (2) times a day. Dispense:  60 Tab     Refill:  12     Generic For:LOPRESSOR 50MG       Plan:     No CP. Up 20 # and BP higher. Increase metoprolol and f/u 1 mo.   Advised to lose the weight.     Ken Meade MD

## 2018-01-31 NOTE — MR AVS SNAPSHOT
[address] [facility] 
[phone] Patient: Adithya Davila MRN: [id number]:[id number] Visit Information Date & Time Provider Department Dept. Phone Encounter #  
 1/31/2018 11:00 AM Radha Perez, [address] [place] [address] [facility] [phone] [id number] Upcoming Health Maintenance Date Due Hepatitis C Screening 1965 DTaP/Tdap/Td series (1 - Tdap) 9/9/1985 FOBT Q 1 YEAR AGE 50-75 9/9/2014 Influenza Age 5 to Adult 8/1/2017 Allergies as of 1/31/2018  Review Complete On: 1/31/2018 By: Radha Perez MD  
 No Known Allergies Current Immunizations  Reviewed on 2/17/2017 No immunizations on file. Not reviewed this visit You Were Diagnosed With   
  
 Codes Comments Essential hypertension    -  Primary ICD-10-CM: I10 
ICD-9-CM: 401.9 S/P ascending aortic replacement     ICD-10-CM: V97.296 ICD-9-CM: V43.4 Dissection of thoracic aorta (HCC)     ICD-10-CM: I71.01 
ICD-9-CM: 441.01 Vitals BP Pulse Resp Height(growth percentile) Weight(growth percentile) SpO2  
 150/78 (BP 1 Location: Right arm, BP Patient Position: Sitting) 66 20 6' (1.829 m) 281 lb 1.6 oz (127.5 kg) 97% BMI Smoking Status 38.12 kg/m2 Former Smoker Vitals History BMI and BSA Data Body Mass Index Body Surface Area  
 38.12 kg/m 2 2.54 m 2 Preferred Pharmacy Pharmacy Name Phone [facility] [phone] Your Updated Medication List  
  
   
This list is accurate as of: 1/31/18 12:04 PM.  Always use your most recent med list. amLODIPine 10 mg tablet Commonly known as:  Rulon Myrna TAKE 1 TABLET EVERY DAY FOR BLOOD PRESSURE  
  
 aspirin 81 mg chewable tablet Take 1 Tab by mouth daily. atorvastatin 20 mg tablet Commonly known as:  LIPITOR Take 1 Tab by mouth every evening. * bumetanide 2 mg tablet Commonly known as:  Clent Lunch Take 1 mg by mouth two (2) times a day. * bumetanide 1 mg tablet Commonly known as:  Clent Lunch Take 3 mg by mouth two (2) times a day. CATAPRES-TTS-3 0.3 mg/24 hr  
Generic drug:  cloNIDine APPLY 1 PATCH EVERY 7 DAYS * hydrALAZINE 100 mg tablet Commonly known as:  APRESOLINE Take 1 Tab by mouth every six (6) hours. * hydrALAZINE 50 mg tablet Commonly known as:  APRESOLINE  
TAKE 2 TABLETS FOUR TIMES DAILY  
  
 linagliptin 5 mg tablet Commonly known as:  Durenda Andrea Take 1 Tab by mouth Daily (before breakfast). metOLazone 2.5 mg tablet Commonly known as:  ZAROXOLYN  
  
 metoprolol tartrate 100 mg IR tablet Commonly known as:  LOPRESSOR Take 1 Tab by mouth two (2) times a day. minoxidil 10 mg tablet Commonly known as:  LONITEN  
TAKE 1 TABLET EVERY DAY  
  
 potassium chloride 20 mEq tablet Commonly known as:  K-DUR, KLOR-CON Take 1 Tab by mouth two (2) times a day. * Notice: This list has 4 medication(s) that are the same as other medications prescribed for you. Read the directions carefully, and ask your doctor or other care provider to review them with you. Prescriptions Sent to Pharmacy Refills  
 metoprolol tartrate (LOPRESSOR) 100 mg IR tablet 12 Sig: Take 1 Tab by mouth two (2) times a day. Class: Normal  
 [facility]acy: [facility]  Ph #: [phone] Route: Oral  
  
We Performed the Following AMB POC EKG ROUTINE W/ 12 LEADS, INTER & REP [52920 CPT(R)] Introducing [facility]! [facility] introduces [employer] patient portal. Now you can access parts of your medical record, email your doctor's office, and request medication refills online. 1. In your internet browser, go to [email or web address] 2. Click on the First Time User? Click Here link in the Sign In box. You will see the New Member Sign Up page. 3. Enter your [employer] Access Code exactly as it appears below. You will not need to use this code after youve completed the sign-up process. If you do not sign up before the expiration date, you must request a new code. · [employer] Access Code: [email or web address] Expires: 2/24/2018 12:22 PM 
 
4. Enter the last four digits of your Social Security Number (xxxx) and Date of Birth (mm/dd/yyyy) as indicated and click Submit. You will be taken to the next sign-up page. 5. Create a Adormo ID. This will be your [employer] login ID and cannot be changed, so think of one that is secure and easy to remember. 6. Create a [employer] password. You can change your password at any time. 7. Enter your Password Reset Question and Answer. This can be used at a later time if you forget your password. 8. Enter your e-mail address. You will receive e-mail notification when new information is available in [address] 9. Click Sign Up. You can now view and download portions of your medical record. 10. Click the Download Summary menu link to download a portable copy of your medical information. If you have questions, please visit the Frequently Asked Questions section of the [employer] website. Remember, [employer] is NOT to be used for urgent needs. For medical emergencies, dial 911. Now available from your iPhone and Android! Please provide this summary of care documentation to your next provider. Your primary care clinician is listed as Laurie Stanley. If you have any questions after today's visit, please call [phone].

## 2018-01-31 NOTE — PROGRESS NOTES
1. Have you been to the ER, urgent care clinic since your last visit? Hospitalized since your last visit? No    2. Have you seen or consulted any other health care providers outside of the [address] [place] [address] since your last visit? Include any pap smears or colon screening. Yes, Dr. Kalpesh Miner, nephrologist     Chief Complaint   Patient presents with    Hypertension     6 mo f/u     Pt denies any cardiac complaints.

## 2018-02-23 RX ORDER — AMLODIPINE BESYLATE 10 MG/1
TABLET ORAL
Qty: 30 TAB | Refills: 2 | Status: SHIPPED | OUTPATIENT
Start: 2018-02-23 | End: 2018-05-21 | Stop reason: SDUPTHER

## 2018-02-23 RX ORDER — MINOXIDIL 10 MG/1
TABLET ORAL
Qty: 30 TAB | Refills: 6 | Status: SHIPPED | OUTPATIENT
Start: 2018-02-23 | End: 2018-09-17 | Stop reason: SDUPTHER

## 2018-03-14 ENCOUNTER — OFFICE VISIT (OUTPATIENT)
Dept: CARDIOLOGY CLINIC | Age: 53
End: 2018-03-14

## 2018-03-14 VITALS
OXYGEN SATURATION: 97 % | WEIGHT: 276.6 LBS | HEART RATE: 60 BPM | RESPIRATION RATE: 16 BRPM | BODY MASS INDEX: 37.46 KG/M2 | SYSTOLIC BLOOD PRESSURE: 140 MMHG | DIASTOLIC BLOOD PRESSURE: 70 MMHG | HEIGHT: 72 IN

## 2018-03-14 DIAGNOSIS — I10 ESSENTIAL HYPERTENSION: ICD-10-CM

## 2018-03-14 DIAGNOSIS — I71.019 DISSECTION OF THORACIC AORTA: ICD-10-CM

## 2018-03-14 DIAGNOSIS — E78.00 HIGH CHOLESTEROL: ICD-10-CM

## 2018-03-14 DIAGNOSIS — Z95.828 S/P ASCENDING AORTIC REPLACEMENT: Primary | ICD-10-CM

## 2018-03-14 NOTE — PROGRESS NOTES
Tawana Pace MD          NAME:  Tal Nuñez. :   1965   MRN:   [id number]   PCP:  Matthew Pacheco MD           Subjective: The patient is a 47y.o. year old male  who returns for a routine follow-up. Since the last visit, patient reports no change in exercise tolerance, chest pain, edema, medication intolerance, palpitations, shortness of breath, PND/orthopnea wheezing, sputum, syncope, dizziness or light headedness. Doing well. Past Medical History:   Diagnosis Date    Ill-defined condition     sleep apnea        ICD-10-CM ICD-9-CM    1. S/P ascending aortic replacement Z95.828 V43.4    2. Essential hypertension I10 401.9    3. Dissection of thoracic aorta (HCC) I71.01 441.01    4. High cholesterol E78.00 272.0    5. Class 2 obesity with serious comorbidity and body mass index (BMI) of 37.0 to 37.9 in adult, unspecified obesity type E66.9 278.00     Z68.37 V85.37       Social History   Substance Use Topics    Smoking status: Former Smoker    Smokeless tobacco: Former User    Alcohol use No      No family history on file. Review of Systems  Cardiovascular: Negative except as noted in HPI      Objective:       Vitals:    18 1023 18 1024   BP: 120/60 140/70   Pulse: 60    Resp: 16    SpO2: 97%    Weight: 276 lb 9.6 oz (125.5 kg)    Height: 6' (1.829 m)     Body mass index is 37.51 kg/(m^2). General PE  Mental Status - Alert. General Appearance - Not in acute distress. Chest and Lung Exam   Inspection: Accessory muscles - No use of accessory muscles in breathing. Auscultation:   Breath sounds: - Normal.    Cardiovascular   Inspection: Jugular vein - Bilateral - Inspection Normal.  Palpation/Percussion:   Apical Impulse: - Normal.  Auscultation: Rhythm - Regular. Heart Sounds - S1 WNL and S2 WNL. No S3 or S4. Murmurs & Other Heart Sounds: Auscultation of the heart reveals - No Murmurs.     Peripheral Vascular   Upper Extremity: Inspection - Bilateral - No Cyanotic nailbeds or Digital clubbing. Lower Extremity:   Palpation: Edema - Bilateral - No edema. Data Review:     EKG -  EKG: . LABS- @brieflabs@      Allergies reviewed  No Known Allergies    Medications reviewed  Current Outpatient Prescriptions   Medication Sig    minoxidil (LONITEN) 10 mg tablet TAKE 1 TABLET EVERY DAY    amLODIPine (NORVASC) 10 mg tablet TAKE 1 TABLET EVERY DAY FOR BLOOD PRESSURE    metoprolol tartrate (LOPRESSOR) 100 mg IR tablet Take 1 Tab by mouth two (2) times a day.  CATAPRES-TTS-3 0.3 mg/24 hr APPLY 1 PATCH EVERY 7 DAYS    hydrALAZINE (APRESOLINE) 50 mg tablet TAKE 2 TABLETS FOUR TIMES DAILY    bumetanide (BUMEX) 1 mg tablet Take 3 mg by mouth two (2) times a day.  potassium chloride (K-DUR, KLOR-CON) 20 mEq tablet Take 1 Tab by mouth two (2) times a day. (Patient taking differently: Take 40 mEq by mouth two (2) times a day.)    aspirin 81 mg chewable tablet Take 1 Tab by mouth daily.  metOLazone (ZAROXOLYN) 2.5 mg tablet     atorvastatin (LIPITOR) 20 mg tablet Take 1 Tab by mouth every evening.  linagliptin (TRADJENTA) 5 mg tablet Take 1 Tab by mouth Daily (before breakfast). No current facility-administered medications for this visit. Assessment:       ICD-10-CM ICD-9-CM    1. S/P ascending aortic replacement Z95.828 V43.4    2. Essential hypertension I10 401.9    3. Dissection of thoracic aorta (HCC) I71.01 441.01    4. High cholesterol E78.00 272.0    5. Class 2 obesity with serious comorbidity and body mass index (BMI) of 37.0 to 37.9 in adult, unspecified obesity type E66.9 278.00     Z68.37 V85.37         No orders of the defined types were placed in this encounter. Plan:     Down 5 pounds; BP better. Continue weight loss and BP monitoring.   F/U 6 mo    Alen Marie MD

## 2018-03-14 NOTE — MR AVS SNAPSHOT
[address] [facility] 
[phone] Patient: Paulie Masterson. MRN: [id number] Visit Information Date & Time Provider Department Dept. Phone Encounter #  
 3/14/2018 10:15 AM Brandie Calderon, [facility] [phone] [id number] Your Appointments 2018  9:15 AM  
ESTABLISHED PATIENT with Brandie Calderon MD  
[facility] [address] [place] [address]) Appt Note: 6 month f/u  
 [address]  
[phone] [address] Upcoming Health Maintenance Date Due Hepatitis C Screening 1965 DTaP/Tdap/Td series (1 - Tdap) 1985 FOBT Q 1 YEAR AGE 50-75 2014 Influenza Age 5 to Adult 2017 Allergies as of 3/14/2018  Review Complete On: 3/14/2018 By: Darinel Winters LPN No Known Allergies Current Immunizations  Reviewed on 2017 No immunizations on file. Not reviewed this visit Vitals BP Pulse Resp Height(growth percentile) Weight(growth percentile) SpO2  
 140/70 (BP 1 Location: Left arm, BP Patient Position: Sitting) 60 16 6' (1.829 m) 276 lb 9.6 oz (125.5 kg) 97% BMI Smoking Status 37.51 kg/m2 Former Smoker Vitals History BMI and BSA Data Body Mass Index Body Surface Area  
 37.51 kg/m 2 2.52 m 2 Preferred Pharmacy Pharmacy Name Phone [facility] [phone] Your Updated Medication List  
  
   
This list is accurate as of 3/14/18 11:20 AM.  Always use your most recent med list. amLODIPine 10 mg tablet Commonly known as:  Prem Garza TAKE 1 TABLET EVERY DAY FOR BLOOD PRESSURE  
  
 aspirin 81 mg chewable tablet Take 1 Tab by mouth daily. atorvastatin 20 mg tablet Commonly known as:  LIPITOR Take 1 Tab by mouth every evening. bumetanide 1 mg tablet Commonly known as:  Neelam Books Take 3 mg by mouth two (2) times a day. CATAPRES-TTS-3 0.3 mg/24 hr  
Generic drug:  cloNIDine APPLY 1 PATCH EVERY 7 DAYS  
  
 hydrALAZINE 50 mg tablet Commonly known as:  APRESOLINE  
TAKE 2 TABLETS FOUR TIMES DAILY  
  
 linagliptin 5 mg tablet Commonly known as:  Mertie Bicker Take 1 Tab by mouth Daily (before breakfast). metOLazone 2.5 mg tablet Commonly known as:  ZAROXOLYN  
  
 metoprolol tartrate 100 mg IR tablet Commonly known as:  LOPRESSOR Take 1 Tab by mouth two (2) times a day. minoxidil 10 mg tablet Commonly known as:  LONITEN  
TAKE 1 TABLET EVERY DAY  
  
 potassium chloride 20 mEq tablet Commonly known as:  K-DUR, KLOR-CON Take 1 Tab by mouth two (2) times a day. Introducing [facility]! [facility] introduces [employer] patient portal. Now you can access parts of your medical record, email your doctor's office, and request medication refills online. 1. In your internet browser, go to [email or web address] 2. Click on the First Time User? Click Here link in the Sign In box. You will see the New Member Sign Up page. 3. Enter your [employer] Access Code exactly as it appears below. You will not need to use this code after youve completed the sign-up process. If you do not sign up before the expiration date, you must request a new code. · [employer] Access Code: [email or web address] Expires: 6/12/2018 11:20 AM 
 
4. Enter the last four digits of your Social Security Number (xxxx) and Date of Birth (mm/dd/yyyy) as indicated and click Submit. You will be taken to the next sign-up page. 5. Create a [employer] ID. This will be your [employer] login ID and cannot be changed, so think of one that is secure and easy to remember. 6. Create a [employer] password. You can change your password at any time. 7. Enter your Password Reset Question and Answer. This can be used at a later time if you forget your password. 8. Enter your e-mail address. You will receive e-mail notification when new information is available in [address] 9. Click Sign Up. You can now view and download portions of your medical record. 10. Click the Download Summary menu link to download a portable copy of your medical information. If you have questions, please visit the Frequently Asked Questions section of the [employer] website. Remember, [employer] is NOT to be used for urgent needs. For medical emergencies, dial 911. Now available from your iPhone and Android! Please provide this summary of care documentation to your next provider. Your primary care clinician is listed as Abdirashid Nicolas. If you have any questions after today's visit, please call [phone].

## 2018-03-14 NOTE — PROGRESS NOTES
1. Have you been to the ER, urgent care clinic since your last visit? Hospitalized since your last visit? No    2. Have you seen or consulted any other health care providers outside of the [facility] since your last visit? Include any pap smears or colon screening. No     Patient C/O Blood pressure elevation readings 150-120/84-70 . Has trip planned to [place] wants to know if this is ok.

## 2018-03-30 RX ORDER — HYDRALAZINE HYDROCHLORIDE 50 MG/1
TABLET, FILM COATED ORAL
Qty: 240 TAB | Refills: 1 | Status: SHIPPED | OUTPATIENT
Start: 2018-03-30 | End: 2018-07-08 | Stop reason: SDUPTHER

## 2018-05-22 RX ORDER — AMLODIPINE BESYLATE 10 MG/1
TABLET ORAL
Qty: 30 TAB | Refills: 2 | Status: SHIPPED | OUTPATIENT
Start: 2018-05-22 | End: 2018-08-19 | Stop reason: SDUPTHER

## 2018-06-25 ENCOUNTER — TELEPHONE (OUTPATIENT)
Dept: CARDIOTHORACIC SURGERY | Age: 53
End: 2018-06-25

## 2018-06-25 DIAGNOSIS — I71.019 DISSECTION OF THORACIC AORTA: Primary | ICD-10-CM

## 2018-06-25 RX ORDER — CLONIDINE 0.3 MG/D
PATCH TRANSDERMAL
Qty: 4 PATCH | Refills: 3 | Status: SHIPPED | OUTPATIENT
Start: 2018-06-25 | End: 2019-02-27 | Stop reason: SDUPTHER

## 2018-06-25 NOTE — TELEPHONE ENCOUNTER
Pt wants to make an appt for CT scan and a yearly f/u appt with Shena. He hasn't been seen since  March 2017. Dr. Teague Blend office told patient to us to give us a call. His can be reach is [phone].

## 2018-07-09 ENCOUNTER — HOSPITAL ENCOUNTER (OUTPATIENT)
Dept: MRI IMAGING | Age: 53
Discharge: HOME | End: 2018-07-09
Attending: NURSE PRACTITIONER
Payer: COMMERCIAL

## 2018-07-09 DIAGNOSIS — I71.019 DISSECTION OF THORACIC AORTA: ICD-10-CM

## 2018-07-09 PROCEDURE — 71550 MRI CHEST W/O DYE: CPT

## 2018-07-09 RX ORDER — HYDRALAZINE HYDROCHLORIDE 50 MG/1
TABLET, FILM COATED ORAL
Qty: 240 TAB | Refills: 1 | Status: SHIPPED | OUTPATIENT
Start: 2018-07-09 | End: 2018-10-12 | Stop reason: SDUPTHER

## 2018-08-20 RX ORDER — AMLODIPINE BESYLATE 10 MG/1
TABLET ORAL
Qty: 30 TAB | Refills: 2 | Status: SHIPPED | OUTPATIENT
Start: 2018-08-20 | End: 2018-11-18 | Stop reason: SDUPTHER

## 2018-09-10 NOTE — PROGRESS NOTES
NAME: Adam Huang        :  1965        MRN:  [id number]        Assessment :    Plan:  --FELICE  HTN  Anemia  Volume overload  High Mg    ascending aortic dissection repair /3 HD initiated on  and HD/UF # 2 on ;  CTA on ;       Cr up from 1.38>>1. 42. Might be levelling off. This may be his new baseline  Ct IV Bumex- wt and edema continues to improve  After today, we can start him on oral Bumex 1 mg BID    BP controlled; on amlodipine 10, catapres tts3 patch, hydralazine 100 qid, metoprolol 50 bid         Subjective:     Chief Complaint:  oob in chair. No events overnight. No acute c/o    Objective:     VITALS:   Last 24hrs VS reviewed since prior progress note. Most recent are:  Visit Vitals    /54    Pulse 65    Temp 98.1 °F (36.7 °C)    Resp 16    Ht 6' (1.829 m)    Wt 144.9 kg (319 lb 7.1 oz)    SpO2 98%    BMI 43.32 kg/m2       Intake/Output Summary (Last 24 hours) at 17 1116  Last data filed at 17 1116   Gross per 24 hour   Intake              120 ml   Output             5100 ml   Net            -4980 ml      Telemetry Reviewed:     PHYSICAL EXAM:  General: Obese,  no acute distress  Resp:  Dec BS, no distress  CV:   RRR, + edema  GI:  Soft,  Distended,   Alert awake     ________________________________________________________________________  Maura Orlando MD     Procedures: see electronic medical records for all procedures/Xrays and details which  were not copied into this note but were reviewed prior to creation of Plan.       LABS:  Recent Labs      17   0440   WBC  18.6*   HGB  8.6*   HCT  25.9*   PLT  202     Recent Labs      17   0440  17   0425   NA  138  139   K  4.1  4.3   CL  103  105   CO2  28  28   BUN  27*  31*   CREA  1.42*  1.38*   GLU  90  79   CA  8.1*  8.2*   MG   --   2.9*   PHOS   --   3.2     Recent Labs      17   0425   SGOT  46*   AP  124* Dr. Ma[facility] TP  5.8*   ALB  2.6*   GLOB  3.2     No results for input(s): INR, PTP, APTT in the last 72 hours. No lab exists for component: INREXT, INREXT   No results for input(s): FE, TIBC, PSAT, FERR in the last 72 hours. No results found for: FOL, RBCF   No results for input(s): PH, PCO2, PO2 in the last 72 hours. No results for input(s): CPK, CKMB in the last 72 hours.     No lab exists for component: TROPONINI  No components found for: Bj Point  Lab Results   Component Value Date/Time    Color DARK YELLOW 02/06/2017 01:03 PM    Color PENDING 02/06/2017 01:03 PM    Appearance CLEAR 02/06/2017 01:03 PM    Appearance PENDING 02/06/2017 01:03 PM    Specific gravity 1.019 02/06/2017 01:03 PM    Specific gravity PENDING 02/06/2017 01:03 PM    Specific gravity PENDING 02/06/2017 01:03 PM    pH (UA) 5.0 02/06/2017 01:03 PM    pH (UA) PENDING 02/06/2017 01:03 PM    Protein NEGATIVE  02/06/2017 01:03 PM    Protein PENDING 02/06/2017 01:03 PM    Glucose NEGATIVE  02/06/2017 01:03 PM    Glucose PENDING 02/06/2017 01:03 PM    Ketone NEGATIVE  02/06/2017 01:03 PM    Ketone PENDING 02/06/2017 01:03 PM    Bilirubin PENDING 02/06/2017 01:03 PM    Urobilinogen 0.2 02/06/2017 01:03 PM    Urobilinogen PENDING 02/06/2017 01:03 PM    Nitrites NEGATIVE  02/06/2017 01:03 PM    Nitrites PENDING 02/06/2017 01:03 PM    Leukocyte Esterase NEGATIVE  02/06/2017 01:03 PM    Leukocyte Esterase PENDING 02/06/2017 01:03 PM    Epithelial cells FEW 02/06/2017 01:03 PM    Epithelial cells DUPLICATE REQUEST 27/91/4482 01:03 PM    Bacteria NEGATIVE  02/06/2017 01:03 PM    Bacteria DUPLICATE REQUEST 14/48/6057 01:03 PM    WBC 0-4 02/06/2017 19:04 PM    WBC DUPLICATE REQUEST 61/95/3001 01:03 PM    RBC 0-5 02/06/2017 18:46 PM    RBC DUPLICATE REQUEST 31/80/1004 01:03 PM       MEDICATIONS:  Current Facility-Administered Medications   Medication Dose Route Frequency    bumetanide (BUMEX) injection 1 mg  1 mg IntraVENous BID    sodium chloride (NS) flush 5-10 mL  5-10 mL IntraVENous Q8H    sodium chloride (NS) flush 5-10 mL  5-10 mL IntraVENous PRN    acetaminophen (TYLENOL) tablet 650 mg  650 mg Oral Q4H PRN    oxyCODONE-acetaminophen (PERCOCET) 5-325 mg per tablet 1 Tab  1 Tab Oral Q4H PRN    naloxone (NARCAN) injection 0.4 mg  0.4 mg IntraVENous PRN    atorvastatin (LIPITOR) tablet 20 mg  20 mg Oral QPM    ondansetron (ZOFRAN) injection 4 mg  4 mg IntraVENous Q4H PRN    alum-mag hydroxide-simeth (MYLANTA) oral suspension 30 mL  30 mL Oral Q2H PRN    docusate sodium (COLACE) capsule 100 mg  100 mg Oral BID    zolpidem (AMBIEN) tablet 5 mg  5 mg Oral QHS PRN    polyethylene glycol (MIRALAX) packet 34 g  34 g Oral DAILY    traMADol (ULTRAM) tablet  mg   mg Oral Q6H PRN    hydrALAZINE (APRESOLINE) 20 mg/mL injection 10 mg  10 mg IntraVENous Q6H PRN    pantoprazole (PROTONIX) tablet 40 mg  40 mg Oral ACB&D    glipiZIDE (GLUCOTROL) tablet 5 mg  5 mg Oral ACB    metoprolol tartrate (LOPRESSOR) tablet 50 mg  50 mg Oral Q12H    hydrALAZINE (APRESOLINE) tablet 100 mg  100 mg Oral Q6H    cloNIDine (CATAPRES) 0.3 mg/24 hr patch 1 Patch  1 Patch TransDERmal Q7D    amLODIPine (NORVASC) tablet 10 mg  10 mg Oral DAILY    influenza vaccine 2016-17 (36mos+)(PF) (FLUZONE/FLUARIX/FLULAVAL QUAD) injection 0.5 mL  0.5 mL IntraMUSCular PRIOR TO DISCHARGE    albuterol (PROVENTIL VENTOLIN) nebulizer solution 1.25-2.5 mg  1.25-2.5 mg Nebulization Q4H PRN    aspirin chewable tablet 81 mg  81 mg Oral DAILY    glucose chewable tablet 16 g  4 Tab Oral PRN    glucagon (GLUCAGEN) injection 1 mg  1 mg IntraMUSCular PRN    insulin lispro (HUMALOG) injection   SubCUTAneous [facility] Medicine ed team

## 2018-09-12 ENCOUNTER — OFFICE VISIT (OUTPATIENT)
Dept: CARDIOLOGY CLINIC | Age: 53
End: 2018-09-12

## 2018-09-12 VITALS
HEART RATE: 58 BPM | BODY MASS INDEX: 36.56 KG/M2 | SYSTOLIC BLOOD PRESSURE: 120 MMHG | RESPIRATION RATE: 20 BRPM | OXYGEN SATURATION: 94 % | HEIGHT: 72 IN | WEIGHT: 269.9 LBS | DIASTOLIC BLOOD PRESSURE: 70 MMHG

## 2018-09-12 DIAGNOSIS — I71.019 DISSECTION OF THORACIC AORTA: ICD-10-CM

## 2018-09-12 DIAGNOSIS — Z95.828 S/P ASCENDING AORTIC REPLACEMENT: ICD-10-CM

## 2018-09-12 DIAGNOSIS — I10 ESSENTIAL HYPERTENSION: Primary | ICD-10-CM

## 2018-09-12 PROBLEM — N18.9 CKD (CHRONIC KIDNEY DISEASE): Status: ACTIVE | Noted: 2018-09-12

## 2018-09-12 PROBLEM — E66.01 SEVERE OBESITY (BMI 35.0-39.9): Status: ACTIVE | Noted: 2018-09-12

## 2018-09-12 NOTE — PROGRESS NOTES
Kary Hardy DNP, ANP-BC Subjective/HPI:  
 
Reginald Shaw. is a 46 y.o. male is here for routine f/u. The patient denies chest pain/ shortness of breath, orthopnea, PND, LE edema, palpitations, syncope, presyncope or fatigue. Patient has a history of type a thoracic aortic aneurysm greater than 1 year post repair, cardiac MRI performed by CT surgery June shows stable findings with persistent flap. Blood pressure per patient has been well controlled. Chronic kidney disease with persistent bilateral dependent edema followed by [facility] nephrology. PCP Provider Ruy Banks MD 
Past Medical History:  
Diagnosis Date  Ill-defined condition   
 sleep apnea Past Surgical History:  
Procedure Laterality Date  FLEXIBLE SIGMOIDOSCOPY N/A 2/5/2017 SIGMOIDOSCOPY FLEXIBLE performed by Noris Giles MD at [facility] ENDOSCOPY No Known Allergies No family history on file. Current Outpatient Prescriptions Medication Sig  
 amLODIPine (NORVASC) 10 mg tablet TAKE 1 TABLET EVERY DAY FOR BLOOD PRESSURE  
 hydrALAZINE (APRESOLINE) 50 mg tablet TAKE 2 TABLETS FOUR TIMES DAILY  CATAPRES-TTS-3 0.3 mg/24 hr APPLY 1 PATCH EVERY 7 DAYS  minoxidil (LONITEN) 10 mg tablet TAKE 1 TABLET EVERY DAY  metoprolol tartrate (LOPRESSOR) 100 mg IR tablet Take 1 Tab by mouth two (2) times a day.  bumetanide (BUMEX) 1 mg tablet Take 3 mg by mouth two (2) times a day.  potassium chloride (K-DUR, KLOR-CON) 20 mEq tablet Take 1 Tab by mouth two (2) times a day. (Patient taking differently: Take 40 mEq by mouth two (2) times a day.)  aspirin 81 mg chewable tablet Take 1 Tab by mouth daily.  metOLazone (ZAROXOLYN) 2.5 mg tablet  atorvastatin (LIPITOR) 20 mg tablet Take 1 Tab by mouth every evening.  linagliptin (TRADJENTA) 5 mg tablet Take 1 Tab by mouth Daily (before breakfast). No current facility-administered medications for this visit. Vitals: 09/12/18 0920 09/12/18 0652 BP: 120/68 120/70 Pulse: (!) 58 Resp: 20 SpO2: 94% Weight: 269 lb 14.4 oz (122.4 kg) Height: 6' (1.829 m) Social History Social History  Marital status: SINGLE Spouse name: N/A  
 Number of children: N/A  
 Years of education: N/A Occupational History  Not on file. Social History Main Topics  Smoking status: Former Smoker  Smokeless tobacco: Former User  Alcohol use No  
 Drug use: No  
 Sexual activity: Not on file Other Topics Concern  Not on file Social History Narrative I have reviewed the nurses notes, vitals, problem list, allergy list, medical history, family, social history and medications. Review of Symptoms: 
 
General: Pt denies excessive weight gain or loss. Pt is able to conduct ADL's HEENT: Denies blurred vision, headaches, epistaxis and difficulty swallowing. Respiratory: Denies shortness of breath, TORREZ, wheezing or stridor. Cardiovascular: Denies precordial pain, palpitations, edema or PND Gastrointestinal: Denies poor appetite, indigestion, abdominal pain or blood in stool Musculoskeletal: Denies pain or swelling from muscles or joints Neurologic: Denies tremor, paresthesias, or sensory motor disturbance Skin: Denies rash, itching or texture change. Physical Exam:   
 
General: Well developed, in no acute distress, cooperative and alert HEENT: No carotid bruits, no JVD, trach is midline. Neck Supple, PEERL, EOM intact. Heart:  Normal S1/S2 negative S3 or S4. Regular, no murmur, gallop or rub.  
Respiratory: Clear bilaterally x 4, no wheezing or rales Abdomen:   Soft, non-tender, no masses, bowel sounds are active.  
Extremities: Bilateral lower extremity chronic dependent edema, normal cap refill, no cyanosis, atraumatic. Neuro: A&Ox3, speech clear, gait stable. Skin: Skin color is normal. No rashes or lesions. Non diaphoretic Vascular: 2+ pulses symmetric bilateral radials Cardiographics ECG: Sinus Results for orders placed or performed during the hospital encounter of 02/02/17 EKG, 12 LEAD, INITIAL Result Value Ref Range Ventricular Rate 68 BPM  
 Atrial Rate 68 BPM  
 P-R Interval 178 ms QRS Duration 100 ms Q-T Interval 418 ms QTC Calculation (Bezet) 444 ms Calculated P Axis 16 degrees Calculated R Axis -21 degrees Calculated T Axis 9 degrees Diagnosis Normal sinus rhythm Voltage criteria for left ventricular hypertrophy No previous ECGs available Confirmed by Juan Jose Dupree ([id number]) on 2/2/2017 12:08:14 PM 
  
 
 
 
Cardiology Labs: 
Lab Results Component Value Date/Time Cholesterol, total 181 07/26/2017 11:40 AM  
 HDL Cholesterol 34 (L) 07/26/2017 11:40 AM  
 LDL, calculated 113 (H) 07/26/2017 11:40 AM  
 Triglyceride 172 (H) 07/26/2017 11:40 AM  
 
 
Lab Results Component Value Date/Time Sodium 143 07/26/2017 11:40 AM  
 Potassium 3.8 07/26/2017 11:40 AM  
 Chloride 98 07/26/2017 11:40 AM  
 CO2 29 07/26/2017 11:40 AM  
 Anion gap 10 02/27/2017 03:36 AM  
 Glucose 98 07/26/2017 11:40 AM  
 BUN 31 (H) 07/26/2017 11:40 AM  
 Creatinine 1.62 (H) 07/26/2017 11:40 AM  
 BUN/Creatinine ratio 19 07/26/2017 11:40 AM  
 GFR est AA 56 (L) 07/26/2017 11:40 AM  
 GFR est non-AA 48 (L) 07/26/2017 11:40 AM  
 Calcium 10.1 07/26/2017 11:40 AM  
 Bilirubin, total 0.4 07/26/2017 11:40 AM  
 AST (SGOT) 22 07/26/2017 11:40 AM  
 Alk. phosphatase 81 07/26/2017 11:40 AM  
 Protein, total 7.5 07/26/2017 11:40 AM  
 Albumin 4.5 07/26/2017 11:40 AM  
 Globulin 4.7 (H) 02/27/2017 03:36 AM  
 A-G Ratio 1.5 07/26/2017 11:40 AM  
 ALT (SGPT) 21 07/26/2017 11:40 AM  
  
 
 
 Assessment: 
 
 Assessment:  
 
Diagnoses and all orders for this visit: 1. Essential hypertension -     AMB POC EKG ROUTINE W/ 12 LEADS, INTER & REP 
 
2. S/P ascending aortic replacement 3. Dissection of thoracic aorta ([address] ICD-10-CM ICD-9-CM 1. Essential hypertension I10 401.9 AMB POC EKG ROUTINE W/ 12 LEADS, INTER & REP  
2. S/P ascending aortic replacement Z95.828 V43.4 3. Dissection of thoracic aorta (HCC) I71.01 441.01 Orders Placed This Encounter  AMB POC EKG ROUTINE W/ 12 LEADS, INTER & REP Order Specific Question:   Reason for Exam: Answer:   ROUTINE Plan:  
 
Patient is greater than 1 year post repair of ascending aortic aneurysm  with graft Hypertension: Controlled, followed by CT surgery recent cardiac MRI stable continue current therapy Chronic kidney disease: Followed by nephrology Dr. Noah Roberson Follow-up with cardiology in 6 months Delaney Mitchell MD 
 
This note was created using voice recognition software. Despite editing, there may be syntax errors.

## 2018-09-12 NOTE — PROGRESS NOTES
1. Have you been to the ER, urgent care clinic since your last visit? Hospitalized since your last visit? NO 
 
2. Have you seen or consulted any other health care providers outside of the [address] [place] [address] since your last visit? Include any pap smears or colon screening. NO 
 
6 MONTH FOLLOW UP. C/O SLIGHT SWELLING IN BLE.

## 2018-09-12 NOTE — MR AVS SNAPSHOT
Adrienne Paredes [address] [facility] 
[phone] Patient: Taylor Alfred. MRN: [id number] QDA:2/0/5316 Visit Information Date & Time Provider Department Dept. Phone Encounter #  
 9/12/2018  9:15 AM Nanette Love [facility] [id number] Your Appointments 3/5/2019  9:00 AM  
ESTABLISHED PATIENT with Aury Stewart MD  
[facility] [address] [place] [address]) Appt Note: 6 month f/u 9/12/18 kb  
 [address]  
[phone] [address] Upcoming Health Maintenance Date Due Hepatitis C Screening 1965 DTaP/Tdap/Td series (1 - Tdap) 9/9/1985 FOBT Q 1 YEAR AGE 50-75 9/9/2014 Influenza Age 5 to Adult 8/1/2018 Allergies as of 9/12/2018  Review Complete On: 9/12/2018 By: Gayle Alfaro LPN No Known Allergies Current Immunizations  Reviewed on 2/17/2017 No immunizations on file. Not reviewed this visit You Were Diagnosed With   
  
 Codes Comments Essential hypertension    -  Primary ICD-10-CM: I10 
ICD-9-CM: 401.9 S/P ascending aortic replacement     ICD-10-CM: N28.843 ICD-9-CM: V43.4 Dissection of thoracic aorta (HCC)     ICD-10-CM: I71.01 
ICD-9-CM: 441.01 Vitals BP Pulse Resp Height(growth percentile) Weight(growth percentile) SpO2  
 120/70 (BP 1 Location: Right arm, BP Patient Position: Sitting) (!) 58 20 6' (1.829 m) 269 lb 14.4 oz (122.4 kg) 94% BMI Smoking Status 36.61 kg/m2 Former Smoker Vitals History BMI and BSA Data Body Mass Index Body Surface Area  
 36.61 kg/m 2 2.49 m 2 Preferred Pharmacy Pharmacy Name Phone [facility] [phone] Your Updated Medication List  
  
   
 This list is accurate as of 9/12/18 10:11 AM.  Always use your most recent med list. amLODIPine 10 mg tablet Commonly known as:  [place] TAKE 1 TABLET EVERY DAY FOR BLOOD PRESSURE  
  
 aspirin 81 mg chewable tablet Take 1 Tab by mouth daily. atorvastatin 20 mg tablet Commonly known as:  LIPITOR Take 1 Tab by mouth every evening. bumetanide 1 mg tablet Commonly known as:  [place] Take 3 mg by mouth two (2) times a day. CATAPRES-TTS-3 0.3 mg/24 hr  
Generic drug:  cloNIDine APPLY 1 PATCH EVERY 7 DAYS  
  
 hydrALAZINE 50 mg tablet Commonly known as:  APRESOLINE  
TAKE 2 TABLETS FOUR TIMES DAILY  
  
 linagliptin 5 mg tablet Commonly known as:  Ulis Rist Take 1 Tab by mouth Daily (before breakfast). metOLazone 2.5 mg tablet Commonly known as:  ZAROXOLYN  
  
 metoprolol tartrate 100 mg IR tablet Commonly known as:  LOPRESSOR Take 1 Tab by mouth two (2) times a day. minoxidil 10 mg tablet Commonly known as:  LONITEN  
TAKE 1 TABLET EVERY DAY  
  
 potassium chloride 20 mEq tablet Commonly known as:  K-DUR, KLOR-CON Take 1 Tab by mouth two (2) times a day. We Performed the Following AMB POC EKG ROUTINE W/ 12 LEADS, INTER & REP [94324 CPT(R)] Introducing [facility]! Evon Walker introduces [employer] patient portal. Now you can access parts of your medical record, email your doctor's office, and request medication refills online. 1. In your internet browser, go to [email or web address] 2. Click on the First Time User? Click Here link in the Sign In box. You will see the New Member Sign Up page. 3. Enter your [employer] Access Code exactly as it appears below. You will not need to use this code after youve completed the sign-up process. If you do not sign up before the expiration date, you must request a new code. · [employer] Access Code: [email or web address] Expires: 10/1/2018 11:21 AM 
 
 4. Enter the last four digits of your Social Security Number (xxxx) and Date of Birth (mm/dd/yyyy) as indicated and click Submit. You will be taken to the next sign-up page. 5. Create a [employer] ID. This will be your [employer] login ID and cannot be changed, so think of one that is secure and easy to remember. 6. Create a [employer] password. You can change your password at any time. 7. Enter your Password Reset Question and Answer. This can be used at a later time if you forget your password. 8. Enter your e-mail address. You will receive e-mail notification when new information is available in [address] 9. Click Sign Up. You can now view and download portions of your medical record. 10. Click the Download Summary menu link to download a portable copy of your medical information. If you have questions, please visit the Frequently Asked Questions section of the [employer] website. Remember, [employer] is NOT to be used for urgent needs. For medical emergencies, dial 911. Now available from your iPhone and Android! Please provide this summary of care documentation to your next provider. Your primary care clinician is listed as Travis Delgado. If you have any questions after today's visit, please call [phone].

## 2018-09-17 ENCOUNTER — TELEPHONE (OUTPATIENT)
Dept: CARDIOLOGY CLINIC | Age: 53
End: 2018-09-17

## 2018-09-17 NOTE — TELEPHONE ENCOUNTER
Pt came in the office today and needs a letter from our office indicating that he cannot take an OTC stimulant or prescribed medication due to current meds. There are complaints at work that he is dosing off. Dr. Desmond Etienne knows about his sleep apnea. Pt retires in December. Please advise.

## 2018-09-18 ENCOUNTER — TELEPHONE (OUTPATIENT)
Dept: CARDIOLOGY CLINIC | Age: 53
End: 2018-09-18

## 2018-09-18 NOTE — TELEPHONE ENCOUNTER
Made an attempt to contact patient regarding work letter, but was unable to reach him. Left message for patient to contact office to discuss.

## 2018-09-18 NOTE — TELEPHONE ENCOUNTER
Relayed message to pt's mom that there is a letter that needs to be picked up at the  in the cardiology office.

## 2018-10-04 ENCOUNTER — TELEPHONE (OUTPATIENT)
Dept: CARDIOLOGY CLINIC | Age: 53
End: 2018-10-04

## 2018-10-04 NOTE — TELEPHONE ENCOUNTER
Spoke with patient  Verified patient with 2 patient identifiers    Pt concerned about increased BP readings. Was checked by nurse at work today and it was 158/78    Informed will check with MD and advise.

## 2018-10-04 NOTE — TELEPHONE ENCOUNTER
Julio Alvarez NP   You 23 minutes ago (12:33 PM)                 Have patient take a extra 1/2 tab of his Lopressor at lunch time daily. Call Monday with BP and HR readings. (Routing comment)           Spoke with patient  Verified patient with 2 patient identifiers    Pt made aware need take metoprolol extra 1/2 tab at lunchtime per Nicola Martino NP.   Patient verbalized understanding, will call back Monday with BP reading and HR

## 2018-10-10 ENCOUNTER — TELEPHONE (OUTPATIENT)
Dept: CARDIOLOGY CLINIC | Age: 53
End: 2018-10-10

## 2018-10-10 NOTE — TELEPHONE ENCOUNTER
Called patient to follow up on  HR and BP , patient not available. Left message on voicemail to return call. Also called workph# as instructed previously, was informed patient off until next Tuesday.

## 2018-10-12 RX ORDER — HYDRALAZINE HYDROCHLORIDE 50 MG/1
TABLET, FILM COATED ORAL
Qty: 240 TAB | Refills: 1 | Status: SHIPPED | OUTPATIENT
Start: 2018-10-12 | End: 2019-01-17 | Stop reason: SDUPTHER

## 2018-11-19 RX ORDER — AMLODIPINE BESYLATE 10 MG/1
TABLET ORAL
Qty: 30 TAB | Refills: 2 | Status: SHIPPED | OUTPATIENT
Start: 2018-11-19 | End: 2019-02-19 | Stop reason: SDUPTHER

## 2019-01-01 ENCOUNTER — TELEPHONE (OUTPATIENT)
Dept: CARDIOLOGY CLINIC | Age: 54
End: 2019-01-01

## 2019-01-01 ENCOUNTER — HOSPITAL ENCOUNTER (OUTPATIENT)
Dept: MRI IMAGING | Age: 54
Discharge: HOME | End: 2019-08-01
Attending: NURSE PRACTITIONER
Payer: COMMERCIAL

## 2019-01-01 ENCOUNTER — OFFICE VISIT (OUTPATIENT)
Dept: CARDIOLOGY CLINIC | Age: 54
End: 2019-01-01

## 2019-01-01 VITALS
RESPIRATION RATE: 16 BRPM | WEIGHT: 241.4 LBS | HEART RATE: 66 BPM | SYSTOLIC BLOOD PRESSURE: 130 MMHG | OXYGEN SATURATION: 96 % | BODY MASS INDEX: 32.7 KG/M2 | HEIGHT: 72 IN | DIASTOLIC BLOOD PRESSURE: 62 MMHG

## 2019-01-01 DIAGNOSIS — N18.9 CHRONIC KIDNEY DISEASE, UNSPECIFIED CKD STAGE: ICD-10-CM

## 2019-01-01 DIAGNOSIS — I10 ESSENTIAL HYPERTENSION: Primary | ICD-10-CM

## 2019-01-01 DIAGNOSIS — Z95.828 S/P ASCENDING AORTIC REPLACEMENT: Primary | ICD-10-CM

## 2019-01-01 DIAGNOSIS — Z95.828 S/P ASCENDING AORTIC REPLACEMENT: ICD-10-CM

## 2019-01-01 PROCEDURE — 71550 MRI CHEST W/O DYE: CPT

## 2019-01-01 RX ORDER — AMLODIPINE BESYLATE 10 MG/1
TABLET ORAL
Qty: 30 TAB | Refills: 2 | Status: SHIPPED | OUTPATIENT
Start: 2019-01-01 | End: 2019-01-01 | Stop reason: SDUPTHER

## 2019-01-01 RX ORDER — HYDRALAZINE HYDROCHLORIDE 50 MG/1
TABLET, FILM COATED ORAL
Qty: 240 TAB | Refills: 1 | Status: SHIPPED | OUTPATIENT
Start: 2019-01-01 | End: 2019-01-01 | Stop reason: SDUPTHER

## 2019-01-01 RX ORDER — MINOXIDIL 10 MG/1
TABLET ORAL
Qty: 30 TAB | Refills: 0 | Status: SHIPPED | OUTPATIENT
Start: 2019-01-01 | End: 2019-01-01 | Stop reason: SDUPTHER

## 2019-01-01 RX ORDER — AMLODIPINE BESYLATE 10 MG/1
TABLET ORAL
Qty: 30 TAB | Refills: 2 | Status: SHIPPED | OUTPATIENT
Start: 2019-01-01 | End: 2020-01-01

## 2019-01-01 RX ORDER — HYDRALAZINE HYDROCHLORIDE 50 MG/1
TABLET, FILM COATED ORAL
Qty: 240 TAB | Refills: 1 | Status: SHIPPED | OUTPATIENT
Start: 2019-01-01 | End: 2020-01-01

## 2019-01-17 RX ORDER — HYDRALAZINE HYDROCHLORIDE 50 MG/1
TABLET, FILM COATED ORAL
Qty: 240 TAB | Refills: 1 | Status: SHIPPED | OUTPATIENT
Start: 2019-01-17 | End: 2019-04-19 | Stop reason: SDUPTHER

## 2019-02-19 RX ORDER — METOPROLOL TARTRATE 100 MG/1
TABLET ORAL
Qty: 60 TAB | Refills: 12 | Status: SHIPPED | OUTPATIENT
Start: 2019-02-19 | End: 2020-01-01

## 2019-02-19 RX ORDER — AMLODIPINE BESYLATE 10 MG/1
TABLET ORAL
Qty: 30 TAB | Refills: 2 | Status: SHIPPED | OUTPATIENT
Start: 2019-02-19 | End: 2019-01-01 | Stop reason: SDUPTHER

## 2019-02-27 RX ORDER — CLONIDINE 0.3 MG/D
PATCH TRANSDERMAL
Qty: 4 PATCH | Refills: 12 | Status: SHIPPED | OUTPATIENT
Start: 2019-02-27 | End: 2020-01-01

## 2019-03-18 ENCOUNTER — OFFICE VISIT (OUTPATIENT)
Dept: CARDIOLOGY CLINIC | Age: 54
End: 2019-03-18

## 2019-03-18 VITALS
WEIGHT: 262.4 LBS | RESPIRATION RATE: 16 BRPM | HEIGHT: 72 IN | BODY MASS INDEX: 35.54 KG/M2 | SYSTOLIC BLOOD PRESSURE: 120 MMHG | OXYGEN SATURATION: 96 % | HEART RATE: 67 BPM | DIASTOLIC BLOOD PRESSURE: 70 MMHG

## 2019-03-18 DIAGNOSIS — Z95.828 S/P ASCENDING AORTIC REPLACEMENT: ICD-10-CM

## 2019-03-18 DIAGNOSIS — I10 ESSENTIAL HYPERTENSION: ICD-10-CM

## 2019-03-18 DIAGNOSIS — N18.9 CHRONIC KIDNEY DISEASE, UNSPECIFIED CKD STAGE: ICD-10-CM

## 2019-03-18 DIAGNOSIS — I71.019 DISSECTION OF THORACIC AORTA: Primary | ICD-10-CM

## 2019-03-18 NOTE — PROGRESS NOTES
1. Have you been to the ER, urgent care clinic since your last visit? Hospitalized since your last visit? No      2. Have you seen or consulted any other health care providers outside of the [address] [place] [address] since your last visit? Include any pap smears or colon screening. Yes Dr Dilip Westbrook    Chief Complaint   Patient presents with    Follow-up     Aortic dissection       Patient has no cardiac complaints.

## 2019-03-18 NOTE — PROGRESS NOTES
Rosalind Howard DNP, ANP-BC  Subjective/HPI:     Lorrie Matias. is a 46 y.o. male is here for routine f/u. The patient denies chest pain/ shortness of breath, orthopnea, PND, LE edema, palpitations, syncope, presyncope or fatigue. Dx Type A ascending AA, repair in 2017. BP has been stable. PCP Provider  Fito Max MD  Past Medical History:   Diagnosis Date    Ill-defined condition     sleep apnea      Past Surgical History:   Procedure Laterality Date    FLEXIBLE SIGMOIDOSCOPY N/A 2/5/2017    SIGMOIDOSCOPY FLEXIBLE performed by Gamaliel Roman MD at [facility] ENDOSCOPY     No Known Allergies   History reviewed. No pertinent family history. Current Outpatient Medications   Medication Sig    CATAPRES-TTS-3 0.3 mg/24 hr APPLY 1 PATCH EVERY 7 DAYS    metoprolol tartrate (LOPRESSOR) 100 mg IR tablet TAKE 1 TABLET BY MOUTH TWO (2) TIMES A DAY. EVERY 12 HOURS FOR HEART AND BLOOD PRESSURE    amLODIPine (NORVASC) 10 mg tablet TAKE 1 TABLET EVERY DAY FOR BLOOD PRESSURE    hydrALAZINE (APRESOLINE) 50 mg tablet TAKE 2 TABLETS FOUR TIMES DAILY (Patient taking differently: 2 tabs three times daily)    minoxidil (LONITEN) 10 mg tablet TAKE 1 TABLET EVERY DAY    bumetanide (BUMEX) 1 mg tablet Take 2 mg by mouth two (2) times a day.  potassium chloride (K-DUR, KLOR-CON) 20 mEq tablet Take 1 Tab by mouth two (2) times a day. (Patient taking differently: Take 40 mEq by mouth two (2) times a day.)    aspirin 81 mg chewable tablet Take 1 Tab by mouth daily. (Patient taking differently: Take 325 mg by mouth daily.)    atorvastatin (LIPITOR) 20 mg tablet Take 1 Tab by mouth every evening.  linagliptin (TRADJENTA) 5 mg tablet Take 1 Tab by mouth Daily (before breakfast).  metOLazone (ZAROXOLYN) 2.5 mg tablet      No current facility-administered medications for this visit.        Vitals:    03/18/19 1518   BP: 120/70   Pulse: 67   Resp: 16   SpO2: 96%   Weight: 262 lb 6.4 oz (119 kg)   Height: 6' (1.829 m)     Social History     Socioeconomic History    Marital status: SINGLE     Spouse name: Not on file    Number of children: Not on file    Years of education: Not on file    Highest education level: Not on file   Social Needs    Financial resource strain: Not on file    Food insecurity - worry: Not on file    Food insecurity - inability: Not on file    Transportation needs - medical: Not on file   [employer] needs - non-medical: Not on file   Occupational History    Not on file   Tobacco Use    Smoking status: Former Smoker    Smokeless tobacco: Former User   Substance and Sexual Activity    Alcohol use: No    Drug use: No    Sexual activity: Not on file   Other Topics Concern    Not on file   Social History Narrative    Not on file       I have reviewed the nurses notes, vitals, problem list, allergy list, medical history, family, social history and medications. Review of Symptoms:    General: Pt denies excessive weight gain or loss. Pt is able to conduct ADL's  HEENT: Denies blurred vision, headaches, epistaxis and difficulty swallowing. Respiratory: Denies shortness of breath, TORREZ, wheezing or stridor. Cardiovascular: Denies precordial pain, palpitations, edema or PND  Gastrointestinal: Denies poor appetite, indigestion, abdominal pain or blood in stool  Musculoskeletal: Denies pain or swelling from muscles or joints  Neurologic: Denies tremor, paresthesias, or sensory motor disturbance  Skin: Denies rash, itching or texture change. Physical Exam:      General: Well developed, in no acute distress, cooperative and alert  HEENT: No carotid bruits, no JVD, trach is midline. Neck Supple, PEERL, EOM intact. Heart:  Normal S1/S2 negative S3 or S4. Regular, no murmur, gallop or rub.   Respiratory: Clear bilaterally x 4, no wheezing or rales  Abdomen:   Soft, non-tender, no masses, bowel sounds are active.   Extremities:  No edema, normal cap refill, no cyanosis, atraumatic. Neuro: A&Ox3, speech clear, gait stable. Skin: Skin color is normal. No rashes or lesions. Non diaphoretic  Vascular: 2+ pulses symmetric in all extremities    Cardiographics    ECG: NSR   Results for orders placed or performed during the hospital encounter of 02/02/17   EKG, 12 LEAD, INITIAL   Result Value Ref Range    Ventricular Rate 68 BPM    Atrial Rate 68 BPM    P-R Interval 178 ms    QRS Duration 100 ms    Q-T Interval 418 ms    QTC Calculation (Bezet) 444 ms    Calculated P Axis 16 degrees    Calculated R Axis -21 degrees    Calculated T Axis 9 degrees    Diagnosis       Normal sinus rhythm  Voltage criteria for left ventricular hypertrophy  No previous ECGs available  Confirmed by Remberto Morton ([id number]) on 2/2/2017 12:08:14 PM           Cardiology Labs:  Lab Results   Component Value Date/Time    Cholesterol, total 181 07/26/2017 11:40 AM    HDL Cholesterol 34 (L) 07/26/2017 11:40 AM    LDL, calculated 113 (H) 07/26/2017 11:40 AM    Triglyceride 172 (H) 07/26/2017 11:40 AM       Lab Results   Component Value Date/Time    Sodium 143 07/26/2017 11:40 AM    Potassium 3.8 07/26/2017 11:40 AM    Chloride 98 07/26/2017 11:40 AM    CO2 29 07/26/2017 11:40 AM    Anion gap 10 02/27/2017 03:36 AM    Glucose 98 07/26/2017 11:40 AM    BUN 31 (H) 07/26/2017 11:40 AM    Creatinine 1.62 (H) 07/26/2017 11:40 AM    BUN/Creatinine ratio 19 07/26/2017 11:40 AM    GFR est AA 56 (L) 07/26/2017 11:40 AM    GFR est non-AA 48 (L) 07/26/2017 11:40 AM    Calcium 10.1 07/26/2017 11:40 AM    Bilirubin, total 0.4 07/26/2017 11:40 AM    AST (SGOT) 22 07/26/2017 11:40 AM    Alk. phosphatase 81 07/26/2017 11:40 AM    Protein, total 7.5 07/26/2017 11:40 AM    Albumin 4.5 07/26/2017 11:40 AM    Globulin 4.7 (H) 02/27/2017 03:36 AM    A-G Ratio 1.5 07/26/2017 11:40 AM    ALT (SGPT) 21 07/26/2017 11:40 AM           Assessment:     Assessment:     Diagnoses and all orders for this visit:    1.  Dissection of thoracic aorta (HCC)  - AMB POC EKG ROUTINE W/ 12 LEADS, INTER & REP    2. Essential hypertension    3. Chronic kidney disease, unspecified CKD stage    4. S/P ascending aortic replacement        ICD-10-CM ICD-9-CM    1. Dissection of thoracic aorta (HCC) I71.01 441.01 AMB POC EKG ROUTINE W/ 12 LEADS, INTER & REP   2. Essential hypertension I10 401.9    3. Chronic kidney disease, unspecified CKD stage N18.9 585.9    4. S/P ascending aortic replacement Z95.828 V43.4      Orders Placed This Encounter    AMB POC EKG ROUTINE W/ 12 LEADS, INTER & REP     Order Specific Question:   Reason for Exam:     Answer:   routine        Plan:.   1.  History of ascending thoracic aortic dissection: Repair 2017 has yearly follow-up in June with CT surgery. Imaged with MRI as he has CKD. 2.  Hypertension: Controlled 120/70 continue current regimen  Stable from cardiac perspective follow-up with cardiology in 1 year. Cristina Simental MD    This note was created using voice recognition software. Despite editing, there may be syntax errors.

## 2019-04-19 RX ORDER — HYDRALAZINE HYDROCHLORIDE 50 MG/1
TABLET, FILM COATED ORAL
Qty: 240 TAB | Refills: 1 | Status: SHIPPED | OUTPATIENT
Start: 2019-04-19 | End: 2019-01-01 | Stop reason: SDUPTHER

## 2019-06-04 NOTE — PROGRESS NOTES
Problem: Mobility Impaired (Adult and Pediatric)  Goal: *Acute Goals and Plan of Care (Insert Text)  Physical Therapy Goals  Goals reviewed and remain appropriate 2/19/17  Goals reviewed and remain appropriate 2/10/17  Initiated 2/3/2017  1. Patient will move from supine to sit and sit to supine , scoot up and down and roll side to side in bed with modified independence within 7 days. 2. Patient will perform sit to/from stand with modified independence within 7 days. 3. Patient will ambulate 250 feet with least restrictive assistive device and modified independence within 7 days. 4. Patient will ascend/descend 5 stairs with 1 handrail(s) with modified independence within 7 days. 5. Patient will perform cardiac exercises per protocol with independence within 7 days. 6. Patient will verbally and functionally recall 3/3 sternal precautions within 7 days. PHYSICAL THERAPY TREATMENT  Patient: Katharine Babcock (71 y.o. male)  Date: 2/24/2017  Diagnosis: aneursym  Aortic dissection (HCC)  melana <principal problem not specified>  Procedure(s) (LRB):  ESOPHAGOGASTRODUODENOSCOPY (EGD) (N/A)  SIGMOIDOSCOPY FLEXIBLE (N/A)  ESOPHAGOGASTRODUODENAL (EGD) BIOPSY (N/A) 19 Days Post-Op  Precautions: Sternal      ASSESSMENT:  Pt continues to do well with mobility ( SBA )/exercise ( independent ) . Motivated & cooperative, RA VSS. Continues to diurese steadily. .  Progression toward goals:  [ ]    Improving appropriately and progressing toward goals  [X]    Improving slowly and progressing toward goals  [ ]    Not making progress toward goals and plan of care will be adjusted       PLAN:  Patient continues to benefit from skilled intervention to address the above impairments. Continue treatment per established plan of care. Discharge Recommendations:  Rehab  Further Equipment Recommendations for Discharge:  None anticipated       SUBJECTIVE:   Patient stated The swelling is slowly going down.       OBJECTIVE DATA SUMMARY: Critical Behavior:  Neurologic State: Alert, Appropriate for age  Orientation Level: Appropriate for age  Cognition: Follows commands  Safety/Judgement: Awareness of environment, Fall prevention, Insight into deficits  Functional Mobility Training:  Bed Mobility:         Up in chair           Transfers:  Sit to Stand: Independent  Stand to Sit: Independent                             Balance:  Sitting: Intact  Standing: Intact  Ambulation/Gait Training:  Distance (ft): 200 Feet (ft)  Assistive Device: Gait belt  Ambulation - Level of Assistance: Stand-by asssistance                 Base of Support: Widened        Pain:  Pain Scale 1: Numeric (0 - 10)  Pain Intensity 1: 0              Activity Tolerance:   Fair  Please refer to the flowsheet for vital signs taken during this treatment.   After treatment:   [X]    Patient left in no apparent distress sitting up in chair  [ ]    Patient left in no apparent distress in bed  [ ]    Call bell left within reach  [X]    Nursing notified  [ ]    Caregiver present  [ ]    Bed alarm activated      COMMUNICATION/COLLABORATION:   The patients plan of care was discussed with: Registered Nurse     Bonnie Roth, PT   Time Calculation: 15 mins PROBLEM DIAGNOSES  Problem: Osteomyelitis  Assessment and Plan: Left Foot Distal Amputation    Problem: H/O laparoscopic adjustable gastric banding  Assessment and Plan: sent to [facility] (6-4-19) for Deflation of Lap Band      R/O PROBLEM DIAGNOSES  Problem: Type 2 diabetes mellitus  Assessment and Plan: Continue monitoring. Fingerstick glucose preop day of surgery     Problem: HTN (hypertension)  Assessment and Plan: Continue meds

## 2019-09-18 NOTE — PROGRESS NOTES
Chief Complaint   Patient presents with    Hypertension     6 month follow up     1. Have you been to the ER, urgent care clinic since your last visit? Hospitalized since your last visit? No  2. Have you seen or consulted any other health care providers outside of the [address] [place] [address] since your last visit? Include any pap smears or colon screening.  No

## 2019-09-18 NOTE — PROGRESS NOTES
Popeye Kerns DNP, ANP-BC  Subjective/HPI:     Hope King. is a 53 y.o. male is here for routine f/u. The patient denies chest pain/ shortness of breath, orthopnea, PND, LE edema, palpitations, syncope, presyncope or fatigue. To recall patient has a history of type a aortic ascending aneurysm dissection surgically repaired 2017. Recent MRI from CT surgery performed as reported below. Per patient is now on a every 2 year MRI protocol from CT surgery for surveillance. IMPRESSION  IMPRESSION:   1. Slight increase in 4.2 cm aneurysm of the dissected aortic arch. Slight  increase in 3.7 cm aneurysm of the descending thoracic aorta. 2. Unchanged postsurgical ascending thoracic aorta. No pericardial effusion. 3. 0.8 cm AP diameter of the true lumen of the descending thoracic aorta. 4. Medial right lower lobe airspace opacity is new but partially evaluated with  MRI. If there is clinical evidence of pneumonia, recommend antibiotic treatment  then CT chest without contrast in 8 weeks. If there is no clinical evidence of  pneumonia, recommend CT chest without contrast in the near future. 23X    PCP Provider  None  Past Medical History:   Diagnosis Date    Ill-defined condition     sleep apnea      Past Surgical History:   Procedure Laterality Date    FLEXIBLE SIGMOIDOSCOPY N/A 2/5/2017    SIGMOIDOSCOPY FLEXIBLE performed by Nida Maher MD at [facility] ENDOSCOPY     No Known Allergies   History reviewed. No pertinent family history. Current Outpatient Medications   Medication Sig    minoxidil (LONITEN) 10 mg tablet TAKE 1 TABLET EVERY DAY    CATAPRES-TTS-3 0.3 mg/24 hr APPLY 1 PATCH EVERY 7 DAYS    metoprolol tartrate (LOPRESSOR) 100 mg IR tablet TAKE 1 TABLET BY MOUTH TWO (2) TIMES A DAY. EVERY 12 HOURS FOR HEART AND BLOOD PRESSURE    bumetanide (BUMEX) 1 mg tablet Take 2 mg by mouth two (2) times a day.     potassium chloride (K-DUR, KLOR-CON) 20 mEq tablet Take 1 Tab by mouth two (2) times a day. (Patient taking differently: Take 40 mEq by mouth two (2) times a day.)    aspirin 81 mg chewable tablet Take 1 Tab by mouth daily. (Patient taking differently: Take 325 mg by mouth daily.)    amLODIPine (NORVASC) 10 mg tablet TAKE 1 TABLET EVERY DAY FOR BLOOD PRESSURE    hydrALAZINE (APRESOLINE) 50 mg tablet TAKE 2 TABLETS BY MOUTH THREE TIMES DAILY    atorvastatin (LIPITOR) 20 mg tablet Take 1 Tab by mouth every evening.  linagliptin (TRADJENTA) 5 mg tablet Take 1 Tab by mouth Daily (before breakfast). No current facility-administered medications for this visit.        Vitals:    09/18/19 0854 09/18/19 0906   BP: 142/60 130/62   Pulse: 66    Resp: 16    SpO2: 96%    Weight: 241 lb 6.4 oz (109.5 kg)    Height: 6' (1.829 m)      Social History     Socioeconomic History    Marital status: SINGLE     Spouse name: Not on file    Number of children: Not on file    Years of education: Not on file    Highest education level: Not on file   Occupational History    Not on file   Social Needs    Financial resource strain: Not on file    Food insecurity:     Worry: Not on file     Inability: Not on file    Transportation needs:     Medical: Not on file     Non-medical: Not on file   Tobacco Use    Smoking status: Former Smoker    Smokeless tobacco: Former User   Substance and Sexual Activity    Alcohol use: No    Drug use: No    Sexual activity: Not on file   Lifestyle    Physical activity:     Days per week: Not on file     Minutes per session: Not on file    Stress: Not on file   Relationships    Social connections:     Talks on phone: Not on file     Gets together: Not on file     Attends [employer] service: Not on file     Active member of club or organization: Not on file     Attends meetings of clubs or organizations: Not on file     Relationship status: Not on file    Intimate partner violence:     Fear of current or ex partner: Not on file     Emotionally abused: Not on file Physically abused: Not on file     Forced sexual activity: Not on file   Other Topics Concern    Not on file   Social History Narrative    Not on file       I have reviewed the nurses notes, vitals, problem list, allergy list, medical history, family, social history and medications. Review of Symptoms:    General: Pt denies excessive weight gain or loss. Pt is able to conduct ADL's  HEENT: Denies blurred vision, headaches, epistaxis and difficulty swallowing. Respiratory: Denies shortness of breath, TORREZ, wheezing or stridor. Cardiovascular: Denies precordial pain, palpitations, edema or PND  Gastrointestinal: Denies poor appetite, indigestion, abdominal pain or blood in stool  Musculoskeletal: Denies pain or swelling from muscles or joints  Neurologic: Denies tremor, paresthesias, or sensory motor disturbance  Skin: Denies rash, itching or texture change. Physical Exam:      General: Well developed, in no acute distress, cooperative and alert  HEENT: No carotid bruits, no JVD, trach is midline. Neck Supple, PERRL, EOM intact. Heart:  Normal S1/S2 negative S3 or S4. Regular, no murmur, gallop or rub. Respiratory: Clear bilaterally x 4, no wheezing or rales  Abdomen:   Soft, non-tender, no masses, bowel sounds are active. Extremities: Lymphedema right lower extremity, trace edema in the left ankle,, normal cap refill, no cyanosis, atraumatic. Neuro: A&Ox3, speech clear, gait stable. Skin: Skin color is normal. No rashes or lesions.  Non diaphoretic  Vascular: 2+ pulses symmetric in all extremities    Cardiographics    ECG: Sinus  Results for orders placed or performed during the hospital encounter of 02/02/17   EKG, 12 LEAD, INITIAL   Result Value Ref Range    Ventricular Rate 68 BPM    Atrial Rate 68 BPM    P-R Interval 178 ms    QRS Duration 100 ms    Q-T Interval 418 ms    QTC Calculation (Bezet) 444 ms    Calculated P Axis 16 degrees    Calculated R Axis -21 degrees    Calculated T Axis 9 degrees    Diagnosis       Normal sinus rhythm  Voltage criteria for left ventricular hypertrophy  No previous ECGs available  Confirmed by Lamin Colón ([id number]) on 2/2/2017 12:08:14 PM           Cardiology Labs:  Lab Results   Component Value Date/Time    Cholesterol, total 181 07/26/2017 11:40 AM    HDL Cholesterol 34 (L) 07/26/2017 11:40 AM    LDL, calculated 113 (H) 07/26/2017 11:40 AM    Triglyceride 172 (H) 07/26/2017 11:40 AM       Lab Results   Component Value Date/Time    Sodium 143 07/26/2017 11:40 AM    Potassium 3.8 07/26/2017 11:40 AM    Chloride 98 07/26/2017 11:40 AM    CO2 29 07/26/2017 11:40 AM    Anion gap 10 02/27/2017 03:36 AM    Glucose 98 07/26/2017 11:40 AM    BUN 31 (H) 07/26/2017 11:40 AM    Creatinine 1.62 (H) 07/26/2017 11:40 AM    BUN/Creatinine ratio 19 07/26/2017 11:40 AM    GFR est AA 56 (L) 07/26/2017 11:40 AM    GFR est non-AA 48 (L) 07/26/2017 11:40 AM    Calcium 10.1 07/26/2017 11:40 AM    Bilirubin, total 0.4 07/26/2017 11:40 AM    AST (SGOT) 22 07/26/2017 11:40 AM    Alk. phosphatase 81 07/26/2017 11:40 AM    Protein, total 7.5 07/26/2017 11:40 AM    Albumin 4.5 07/26/2017 11:40 AM    Globulin 4.7 (H) 02/27/2017 03:36 AM    A-G Ratio 1.5 07/26/2017 11:40 AM    ALT (SGPT) 21 07/26/2017 11:40 AM           Assessment:     Assessment:     Diagnoses and all orders for this visit:    1. Essential hypertension  -     AMB POC EKG ROUTINE W/ 12 LEADS, INTER & REP    2. Chronic kidney disease, unspecified CKD stage    3. S/P ascending aortic replacement        ICD-10-CM ICD-9-CM    1. Essential hypertension I10 401.9 AMB POC EKG ROUTINE W/ 12 LEADS, INTER & REP   2. Chronic kidney disease, unspecified CKD stage N18.9 585.9    3. S/P ascending aortic replacement Z95.828 V43.4      Orders Placed This Encounter    AMB POC EKG ROUTINE W/ 12 LEADS, INTER & REP     Order Specific Question:   Reason for Exam:     Answer:   routine        Plan:     1.   History of asending aortic aneurysm: Surgical repair 2017 as posted above stable. Discussed with patient pulmonary findings, denies coughing wheezing fever and denies dyspnea. 2.  Hypertension: Controlled 130/62 continue current medications  Labs followed by primary care. Stable from cardiac perspective follow-up in 6 months      Lnin Peña MD    This note was created using voice recognition software. Despite editing, there may be syntax errors.

## 2020-01-01 ENCOUNTER — HOSPITAL ENCOUNTER (OUTPATIENT)
Dept: WOUND CARE | Age: 55
Discharge: HOME | End: 2020-03-11
Payer: COMMERCIAL

## 2020-01-01 ENCOUNTER — VIRTUAL VISIT (OUTPATIENT)
Dept: CARDIOLOGY CLINIC | Age: 55
End: 2020-01-01

## 2020-01-01 ENCOUNTER — HOSPITAL ENCOUNTER (OUTPATIENT)
Dept: WOUND CARE | Age: 55
Discharge: HOME | End: 2020-02-12
Payer: COMMERCIAL

## 2020-01-01 ENCOUNTER — HOSPITAL ENCOUNTER (OUTPATIENT)
Dept: WOUND CARE | Age: 55
Discharge: HOME | End: 2020-01-29
Payer: COMMERCIAL

## 2020-01-01 ENCOUNTER — TELEPHONE (OUTPATIENT)
Dept: WOUND CARE | Age: 55
End: 2020-01-01

## 2020-01-01 ENCOUNTER — HOSPITAL ENCOUNTER (OUTPATIENT)
Dept: WOUND CARE | Age: 55
Discharge: HOME | End: 2020-02-19
Payer: COMMERCIAL

## 2020-01-01 ENCOUNTER — APPOINTMENT (OUTPATIENT)
Dept: GENERAL RADIOLOGY | Age: 55
End: 2020-01-01
Attending: EMERGENCY MEDICINE
Payer: COMMERCIAL

## 2020-01-01 ENCOUNTER — HOSPITAL ENCOUNTER (OUTPATIENT)
Dept: WOUND CARE | Age: 55
Discharge: HOME | End: 2020-02-26
Payer: COMMERCIAL

## 2020-01-01 ENCOUNTER — HOSPITAL ENCOUNTER (OUTPATIENT)
Dept: WOUND CARE | Age: 55
Discharge: HOME | End: 2020-02-04
Payer: COMMERCIAL

## 2020-01-01 ENCOUNTER — HOSPITAL ENCOUNTER (OUTPATIENT)
Dept: WOUND CARE | Age: 55
Discharge: HOME | End: 2020-03-04
Payer: COMMERCIAL

## 2020-01-01 ENCOUNTER — HOSPITAL ENCOUNTER (OUTPATIENT)
Dept: WOUND CARE | Age: 55
Discharge: HOME | End: 2020-01-22
Payer: COMMERCIAL

## 2020-01-01 ENCOUNTER — HOSPITAL ENCOUNTER (EMERGENCY)
Age: 55
Discharge: HOME | End: 2020-01-04
Attending: EMERGENCY MEDICINE
Payer: COMMERCIAL

## 2020-01-01 ENCOUNTER — HOSPITAL ENCOUNTER (OUTPATIENT)
Dept: WOUND CARE | Age: 55
Discharge: HOME | End: 2020-01-13
Payer: COMMERCIAL

## 2020-01-01 VITALS
RESPIRATION RATE: 16 BRPM | HEART RATE: 74 BPM | SYSTOLIC BLOOD PRESSURE: 129 MMHG | DIASTOLIC BLOOD PRESSURE: 60 MMHG | TEMPERATURE: 96.3 F

## 2020-01-01 VITALS
HEIGHT: 71 IN | HEART RATE: 85 BPM | OXYGEN SATURATION: 98 % | RESPIRATION RATE: 18 BRPM | WEIGHT: 227.74 LBS | TEMPERATURE: 98.2 F | DIASTOLIC BLOOD PRESSURE: 56 MMHG | SYSTOLIC BLOOD PRESSURE: 140 MMHG | BODY MASS INDEX: 31.88 KG/M2

## 2020-01-01 VITALS
SYSTOLIC BLOOD PRESSURE: 119 MMHG | RESPIRATION RATE: 16 BRPM | DIASTOLIC BLOOD PRESSURE: 59 MMHG | HEART RATE: 69 BPM | TEMPERATURE: 96.7 F

## 2020-01-01 VITALS
DIASTOLIC BLOOD PRESSURE: 61 MMHG | HEART RATE: 69 BPM | RESPIRATION RATE: 16 BRPM | SYSTOLIC BLOOD PRESSURE: 129 MMHG | TEMPERATURE: 96.3 F

## 2020-01-01 VITALS
TEMPERATURE: 97.7 F | HEART RATE: 81 BPM | DIASTOLIC BLOOD PRESSURE: 57 MMHG | SYSTOLIC BLOOD PRESSURE: 123 MMHG | RESPIRATION RATE: 18 BRPM

## 2020-01-01 VITALS
DIASTOLIC BLOOD PRESSURE: 67 MMHG | TEMPERATURE: 96.4 F | SYSTOLIC BLOOD PRESSURE: 144 MMHG | HEART RATE: 67 BPM | RESPIRATION RATE: 18 BRPM

## 2020-01-01 VITALS
TEMPERATURE: 96.3 F | HEART RATE: 67 BPM | DIASTOLIC BLOOD PRESSURE: 55 MMHG | RESPIRATION RATE: 16 BRPM | SYSTOLIC BLOOD PRESSURE: 113 MMHG

## 2020-01-01 VITALS — TEMPERATURE: 97.8 F | RESPIRATION RATE: 16 BRPM | SYSTOLIC BLOOD PRESSURE: 125 MMHG | DIASTOLIC BLOOD PRESSURE: 61 MMHG

## 2020-01-01 VITALS
RESPIRATION RATE: 18 BRPM | SYSTOLIC BLOOD PRESSURE: 154 MMHG | TEMPERATURE: 96.8 F | HEART RATE: 74 BPM | DIASTOLIC BLOOD PRESSURE: 70 MMHG

## 2020-01-01 VITALS
SYSTOLIC BLOOD PRESSURE: 113 MMHG | HEART RATE: 76 BPM | RESPIRATION RATE: 18 BRPM | DIASTOLIC BLOOD PRESSURE: 56 MMHG | TEMPERATURE: 98.6 F

## 2020-01-01 VITALS — SYSTOLIC BLOOD PRESSURE: 116 MMHG | DIASTOLIC BLOOD PRESSURE: 59 MMHG

## 2020-01-01 DIAGNOSIS — R60.0 BILATERAL LOWER EXTREMITY EDEMA: ICD-10-CM

## 2020-01-01 DIAGNOSIS — L89.40: ICD-10-CM

## 2020-01-01 DIAGNOSIS — Z95.828 S/P ASCENDING AORTIC REPLACEMENT: ICD-10-CM

## 2020-01-01 DIAGNOSIS — Z86.79 HISTORY OF AORTIC DISSECTION: Primary | ICD-10-CM

## 2020-01-01 DIAGNOSIS — S81.802A WOUND OF LEFT LOWER EXTREMITY, INITIAL ENCOUNTER: ICD-10-CM

## 2020-01-01 DIAGNOSIS — I10 ESSENTIAL HYPERTENSION: Primary | ICD-10-CM

## 2020-01-01 DIAGNOSIS — I71.019 DISSECTION OF THORACIC AORTA: ICD-10-CM

## 2020-01-01 LAB
ALBUMIN SERPL-MCNC: 3 G/DL (ref 3.5–5)
ALBUMIN/GLOB SERPL: 0.6 {RATIO} (ref 1.1–2.2)
ALP SERPL-CCNC: 57 U/L (ref 45–117)
ALT SERPL-CCNC: 30 U/L (ref 12–78)
ANION GAP SERPL CALC-SCNC: 6 MMOL/L (ref 5–15)
AST SERPL-CCNC: 47 U/L (ref 15–37)
ATRIAL RATE: 88 BPM
BACTERIA SPEC CULT: ABNORMAL
BACTERIA SPEC CULT: NORMAL
BACTERIA SPEC CULT: NORMAL
BASOPHILS # BLD: 0 K/UL (ref 0–0.1)
BASOPHILS NFR BLD: 0 % (ref 0–1)
BILIRUB SERPL-MCNC: 0.4 MG/DL (ref 0.2–1)
BUN SERPL-MCNC: 26 MG/DL (ref 6–20)
BUN/CREAT SERPL: 18 (ref 12–20)
CALCIUM SERPL-MCNC: 8.7 MG/DL (ref 8.5–10.1)
CALCULATED P AXIS, ECG09: -7 DEGREES
CALCULATED R AXIS, ECG10: -14 DEGREES
CALCULATED T AXIS, ECG11: 92 DEGREES
CHLORIDE SERPL-SCNC: 102 MMOL/L (ref 97–108)
CO2 SERPL-SCNC: 30 MMOL/L (ref 21–32)
CREAT SERPL-MCNC: 1.48 MG/DL (ref 0.7–1.3)
CRP SERPL HS-MCNC: >9.5 MG/L
DIAGNOSIS, 93000: NORMAL
DIFFERENTIAL METHOD BLD: ABNORMAL
EOSINOPHIL # BLD: 0.1 K/UL (ref 0–0.4)
EOSINOPHIL NFR BLD: 1 % (ref 0–7)
ERYTHROCYTE [DISTWIDTH] IN BLOOD BY AUTOMATED COUNT: 17.4 % (ref 11.5–14.5)
ERYTHROCYTE [SEDIMENTATION RATE] IN BLOOD: 66 MM/HR (ref 0–20)
GLOBULIN SER CALC-MCNC: 4.7 G/DL (ref 2–4)
GLUCOSE SERPL-MCNC: 117 MG/DL (ref 65–100)
GRAM STN SPEC: ABNORMAL
HCT VFR BLD AUTO: 25.7 % (ref 36.6–50.3)
HGB BLD-MCNC: 8 G/DL (ref 12.1–17)
IMM GRANULOCYTES # BLD AUTO: 0.1 K/UL (ref 0–0.04)
IMM GRANULOCYTES NFR BLD AUTO: 2 % (ref 0–0.5)
LACTATE BLD-SCNC: 0.96 MMOL/L (ref 0.4–2)
LYMPHOCYTES # BLD: 0.6 K/UL (ref 0.8–3.5)
LYMPHOCYTES NFR BLD: 8 % (ref 12–49)
MCH RBC QN AUTO: 25.1 PG (ref 26–34)
MCHC RBC AUTO-ENTMCNC: 31.1 G/DL (ref 30–36.5)
MCV RBC AUTO: 80.6 FL (ref 80–99)
MONOCYTES # BLD: 0.1 K/UL (ref 0–1)
MONOCYTES NFR BLD: 2 % (ref 5–13)
NEUTS SEG # BLD: 6.5 K/UL (ref 1.8–8)
NEUTS SEG NFR BLD: 87 % (ref 32–75)
NRBC # BLD: 0 K/UL (ref 0–0.01)
NRBC BLD-RTO: 0 PER 100 WBC
P-R INTERVAL, ECG05: 128 MS
PLATELET # BLD AUTO: 294 K/UL (ref 150–400)
PMV BLD AUTO: 9.8 FL (ref 8.9–12.9)
POTASSIUM SERPL-SCNC: 3.8 MMOL/L (ref 3.5–5.1)
PROT SERPL-MCNC: 7.7 G/DL (ref 6.4–8.2)
Q-T INTERVAL, ECG07: 384 MS
QRS DURATION, ECG06: 94 MS
QTC CALCULATION (BEZET), ECG08: 464 MS
RBC # BLD AUTO: 3.19 M/UL (ref 4.1–5.7)
RBC MORPH BLD: ABNORMAL
SERVICE CMNT-IMP: ABNORMAL
SERVICE CMNT-IMP: ABNORMAL
SERVICE CMNT-IMP: NORMAL
SERVICE CMNT-IMP: NORMAL
SODIUM SERPL-SCNC: 138 MMOL/L (ref 136–145)
VENTRICULAR RATE, ECG03: 88 BPM
WBC # BLD AUTO: 7.4 K/UL (ref 4.1–11.1)

## 2020-01-01 PROCEDURE — 87077 CULTURE AEROBIC IDENTIFY: CPT

## 2020-01-01 PROCEDURE — 96365 THER/PROPH/DIAG IV INF INIT: CPT

## 2020-01-01 PROCEDURE — 99212 OFFICE O/P EST SF 10 MIN: CPT

## 2020-01-01 PROCEDURE — 87205 SMEAR GRAM STAIN: CPT

## 2020-01-01 PROCEDURE — 74011250636 HC RX REV CODE- 250/636: Performed by: EMERGENCY MEDICINE

## 2020-01-01 PROCEDURE — 36415 COLL VENOUS BLD VENIPUNCTURE: CPT

## 2020-01-01 PROCEDURE — 87186 SC STD MICRODIL/AGAR DIL: CPT

## 2020-01-01 PROCEDURE — 29581 APPL MULTLAYER CMPRN SYS LEG: CPT

## 2020-01-01 PROCEDURE — 87040 BLOOD CULTURE FOR BACTERIA: CPT

## 2020-01-01 PROCEDURE — 29580 STRAPPING UNNA BOOT: CPT

## 2020-01-01 PROCEDURE — 87147 CULTURE TYPE IMMUNOLOGIC: CPT

## 2020-01-01 PROCEDURE — 71045 X-RAY EXAM CHEST 1 VIEW: CPT

## 2020-01-01 PROCEDURE — 99213 OFFICE O/P EST LOW 20 MIN: CPT

## 2020-01-01 PROCEDURE — 85652 RBC SED RATE AUTOMATED: CPT

## 2020-01-01 PROCEDURE — 17250 CHEM CAUT OF GRANLTJ TISSUE: CPT

## 2020-01-01 PROCEDURE — 97597 DBRDMT OPN WND 1ST 20 CM/<: CPT

## 2020-01-01 PROCEDURE — 85025 COMPLETE CBC W/AUTO DIFF WBC: CPT

## 2020-01-01 PROCEDURE — 96366 THER/PROPH/DIAG IV INF ADDON: CPT

## 2020-01-01 PROCEDURE — 11042 DBRDMT SUBQ TIS 1ST 20SQCM/<: CPT

## 2020-01-01 PROCEDURE — 93005 ELECTROCARDIOGRAM TRACING: CPT

## 2020-01-01 PROCEDURE — 80053 COMPREHEN METABOLIC PANEL: CPT

## 2020-01-01 PROCEDURE — 86141 C-REACTIVE PROTEIN HS: CPT

## 2020-01-01 PROCEDURE — 83605 ASSAY OF LACTIC ACID: CPT

## 2020-01-01 PROCEDURE — 74011000258 HC RX REV CODE- 258: Performed by: EMERGENCY MEDICINE

## 2020-01-01 PROCEDURE — 99285 EMERGENCY DEPT VISIT HI MDM: CPT

## 2020-01-01 RX ORDER — SULFAMETHOXAZOLE AND TRIMETHOPRIM 400; 80 MG/1; MG/1
1 TABLET ORAL 2 TIMES DAILY
Qty: 20 TAB | Refills: 0 | Status: SHIPPED | OUTPATIENT
Start: 2020-01-01 | End: 2020-01-01

## 2020-01-01 RX ORDER — CLONIDINE 0.3 MG/D
PATCH TRANSDERMAL
Qty: 4 PATCH | Refills: 11 | Status: SHIPPED | OUTPATIENT
Start: 2020-01-01

## 2020-01-01 RX ORDER — SODIUM CHLORIDE 0.9 % (FLUSH) 0.9 %
5-10 SYRINGE (ML) INJECTION AS NEEDED
Status: DISCONTINUED | OUTPATIENT
Start: 2020-01-01 | End: 2020-01-01 | Stop reason: HOSPADM

## 2020-01-01 RX ORDER — LEVOFLOXACIN 750 MG/1
TABLET ORAL
Qty: 5 TAB | Refills: 0 | Status: SHIPPED | OUTPATIENT
Start: 2020-01-01 | End: 2020-01-01

## 2020-01-01 RX ORDER — AMLODIPINE BESYLATE 10 MG/1
TABLET ORAL
Qty: 30 TAB | Refills: 1 | Status: SHIPPED | OUTPATIENT
Start: 2020-01-01

## 2020-01-01 RX ORDER — HYDRALAZINE HYDROCHLORIDE 50 MG/1
TABLET, FILM COATED ORAL
Qty: 240 TAB | Refills: 1 | Status: SHIPPED | OUTPATIENT
Start: 2020-01-01 | End: 2020-01-01

## 2020-01-01 RX ORDER — HYDRALAZINE HYDROCHLORIDE 50 MG/1
TABLET, FILM COATED ORAL
Qty: 240 TAB | Refills: 0 | Status: SHIPPED | OUTPATIENT
Start: 2020-01-01

## 2020-01-01 RX ORDER — METOPROLOL TARTRATE 100 MG/1
TABLET ORAL
Qty: 60 TAB | Refills: 0 | Status: SHIPPED | OUTPATIENT
Start: 2020-01-01

## 2020-01-01 RX ADMIN — VANCOMYCIN HYDROCHLORIDE 2000 MG: 10 INJECTION, POWDER, LYOPHILIZED, FOR SOLUTION INTRAVENOUS at 17:56

## 2020-01-01 RX ADMIN — CEFEPIME 2 G: 2 INJECTION, POWDER, FOR SOLUTION INTRAVENOUS at 17:57

## 2020-01-04 NOTE — ED PROVIDER NOTES
EMERGENCY DEPARTMENT HISTORY AND PHYSICAL EXAM      Date: 1/4/2020  Patient Name: Jade Botello. History of Presenting Illness     Chief Complaint   Patient presents with    Skin Problem     Ambulatory into the ED with c/o rash and swelling to LLE x several months. History Provided By: Patient    HPI: Jade Botello., 53 y.o. male with PMHx significant for CKD, aortic dissection, poor lower extremity circulation. Patient presents to the emergency department with complaints of left lower extremity wound. He does state that his around his left leg area distally and he has been having some increasing weeping drainage that he has noted out of the area. He does state that he has not had any significant pain however he does have sensations to lower extremity. He has noted something that he thought was initially a scab and has buttock area that has been increasing in size. Wanted to have it evaluated. Denies any fevers, chest pain, abdominal pain, flank pain or any  symptoms at this time. Please note for examination and HPI were completed I did introduce myself as the physician assistant. Please note that this dictation was completed with [employer], the [employer] voice recognition software. Quite often unanticipated grammatical, syntax, homophones, and other interpretive errors are inadvertently transcribed by the computer software. Please disregard these errors. Please excuse any errors that have escaped final proofreading. For further clarification on any chart please contact myself. Thank you. There are no other complaints, changes, or physical findings at this time. PCP: None    No current facility-administered medications on file prior to encounter.       Current Outpatient Medications on File Prior to Encounter   Medication Sig Dispense Refill    amLODIPine (NORVASC) 10 mg tablet TAKE 1 TABLET EVERY DAY FOR BLOOD PRESSURE 30 Tab 2    hydrALAZINE (APRESOLINE) 50 mg tablet TAKE 2 TABLETS BY MOUTH THREE TIMES DAILY 240 Tab 1    minoxidil (LONITEN) 10 mg tablet TAKE 1 TABLET EVERY DAY 90 Tab 3    CATAPRES-TTS-3 0.3 mg/24 hr APPLY 1 PATCH EVERY 7 DAYS 4 Patch 12    metoprolol tartrate (LOPRESSOR) 100 mg IR tablet TAKE 1 TABLET BY MOUTH TWO (2) TIMES A DAY. EVERY 12 HOURS FOR HEART AND BLOOD PRESSURE 60 Tab 12    bumetanide (BUMEX) 1 mg tablet Take 2 mg by mouth two (2) times a day.  potassium chloride (K-DUR, KLOR-CON) 20 mEq tablet Take 1 Tab by mouth two (2) times a day. (Patient taking differently: Take 40 mEq by mouth two (2) times a day.) 90 Tab 0    aspirin 81 mg chewable tablet Take 1 Tab by mouth daily. (Patient taking differently: Take 325 mg by mouth daily.) 365 Tab 0    atorvastatin (LIPITOR) 20 mg tablet Take 1 Tab by mouth every evening. 30 Tab 1    linagliptin (TRADJENTA) 5 mg tablet Take 1 Tab by mouth Daily (before breakfast). 30 Tab 1       Past History     Past Medical History:  Past Medical History:   Diagnosis Date    Ill-defined condition     sleep apnea       Past Surgical History:  Past Surgical History:   Procedure Laterality Date    FLEXIBLE SIGMOIDOSCOPY N/A 2/5/2017    SIGMOIDOSCOPY FLEXIBLE performed by Kandice Faulkner MD at [facility] END[facility]COPY       Family History:  No family history on file. Social History:  Social History     Tobacco Use    Smoking status: Former Smoker    Smokeless tobacco: Former User   Substance Use Topics    Alcohol use: No    Drug use: No       Allergies:  No Known Allergies      Review of Systems   Review of Systems   Skin: Positive for wound. All other systems reviewed and are negative. Physical Exam   Physical Exam  Vitals signs and nursing note reviewed. Exam conducted with a chaperone present. Constitutional:       Appearance: He is well-developed. HENT:      Head: Normocephalic and atraumatic. Eyes:      Conjunctiva/sclera: Conjunctivae normal.      Pupils: Pupils are equal, round, and reactive to light. Neck:      Musculoskeletal: Normal range of motion and neck supple. Thyroid: No thyromegaly. Cardiovascular:      Rate and Rhythm: Normal rate and regular rhythm. Heart sounds: Normal heart sounds. No murmur. Pulmonary:      Effort: Pulmonary effort is normal. No respiratory distress. Breath sounds: Normal breath sounds. No stridor. No wheezing. Abdominal:      General: Bowel sounds are normal.      Palpations: Abdomen is soft. Tenderness: There is no tenderness. Genitourinary:     Comments: Female present as chaperone. We did inspect the patient's right buttocks area there is approximately a 4 cm circular open pressure ulcer to the right buttock area. Inside the wound it is dry questionable area of eschar. Discharge her noted at this time or tenderness to palpation. Musculoskeletal: Normal range of motion. General: No tenderness. Comments: Left lower extremity prolonged drainage appreciated and circumferentially present around the patient's lower extremity with purulent drainage noted. There is no tenderness to palpation of the calf or lower extremity. There is bilateral lower extremity 2+ pitting edema with palpable dorsalis pedis pulses equal bilaterally. There is no signs of lymphangitis at this time. There is some erythema that is present around the patient's lower extremity. Lymphadenopathy:      Cervical: No cervical adenopathy. Skin:     General: Skin is warm. Capillary Refill: Capillary refill takes less than 2 seconds. Neurological:      Mental Status: He is alert and oriented to person, place, and time. Deep Tendon Reflexes: Reflexes are normal and symmetric.    Psychiatric:         Judgment: Judgment normal.         Diagnostic Study Results     Labs -     Recent Results (from the past 12 hour(s))   METABOLIC PANEL, COMPREHENSIVE    Collection Time: 01/04/20  5:07 PM   Result Value Ref Range    Sodium 138 136 - 145 mmol/L    Potassium 3.8 3.5 - 5.1 mmol/L    Chloride 102 97 - 108 mmol/L    CO2 30 21 - 32 mmol/L    Anion gap 6 5 - 15 mmol/L    Glucose 117 (H) 65 - 100 mg/dL    BUN 26 (H) 6 - 20 MG/DL    Creatinine 1.48 (H) 0.70 - 1.30 MG/DL    BUN/Creatinine ratio 18 12 - 20      GFR est AA 60 (L) >60 ml/min/1.73m2    GFR est non-AA 49 (L) >60 ml/min/1.73m2    Calcium 8.7 8.5 - 10.1 MG/DL    Bilirubin, total 0.4 0.2 - 1.0 MG/DL    ALT (SGPT) 30 12 - 78 U/L    AST (SGOT) 47 (H) 15 - 37 U/L    Alk. phosphatase 57 45 - 117 U/L    Protein, total 7.7 6.4 - 8.2 g/dL    Albumin 3.0 (L) 3.5 - 5.0 g/dL    Globulin 4.7 (H) 2.0 - 4.0 g/dL    A-G Ratio 0.6 (L) 1.1 - 2.2     CBC WITH AUTOMATED DIFF    Collection Time: 01/04/20  5:07 PM   Result Value Ref Range    WBC 7.4 4.1 - 11.1 K/uL    RBC 3.19 (L) 4.10 - 5.70 M/uL    HGB 8.0 (L) 12.1 - 17.0 g/dL    HCT 25.7 (L) 36.6 - 50.3 %    MCV 80.6 80.0 - 99.0 FL    MCH 25.1 (L) 26.0 - 34.0 PG    MCHC 31.1 30.0 - 36.5 g/dL    RDW 17.4 (H) 11.5 - 14.5 %    PLATELET 382 498 - 852 K/uL    MPV 9.8 8.9 - 12.9 FL    NRBC 0.0 0  WBC    ABSOLUTE NRBC 0.00 0.00 - 0.01 K/uL    NEUTROPHILS 87 (H) 32 - 75 %    LYMPHOCYTES 8 (L) 12 - 49 %    MONOCYTES 2 (L) 5 - 13 %    EOSINOPHILS 1 0 - 7 %    BASOPHILS 0 0 - 1 %    IMMATURE GRANULOCYTES 2 (H) 0.0 - 0.5 %    ABS. NEUTROPHILS 6.5 1.8 - 8.0 K/UL    ABS. LYMPHOCYTES 0.6 (L) 0.8 - 3.5 K/UL    ABS. MONOCYTES 0.1 0.0 - 1.0 K/UL    ABS. EOSINOPHILS 0.1 0.0 - 0.4 K/UL    ABS. BASOPHILS 0.0 0.0 - 0.1 K/UL    ABS. IMM.  GRANS. 0.1 (H) 0.00 - 0.04 K/UL    DF SMEAR SCANNED      RBC COMMENTS ANISOCYTOSIS  HYPOCHROMIA       SED RATE (ESR)    Collection Time: 01/04/20  5:07 PM   Result Value Ref Range    Sed rate, automated 66 (H) 0 - 20 mm/hr   POC LACTIC ACID    Collection Time: 01/04/20  5:29 PM   Result Value Ref Range    Lactic Acid (POC) 0.96 0.40 - 2.00 mmol/L   EKG, 12 LEAD, INITIAL    Collection Time: 01/04/20  6:04 PM   Result Value Ref Range    Ventricular Rate 88 BPM    Atrial Rate 88 BPM    P-R Interval 128 ms    QRS Duration 94 ms    Q-T Interval 384 ms    QTC Calculation (Bezet) 464 ms    Calculated P Axis -7 degrees    Calculated R Axis -14 degrees    Calculated T Axis 92 degrees    Diagnosis       Sinus rhythm with premature atrial complexes  Minimal voltage criteria for LVH, may be normal variant  Cannot rule out Anterior infarct , age undetermined         Radiologic Studies -   XR CHEST PORT   Final Result   IMPRESSION:       Cardiomegaly. No acute process. CT Results  (Last 48 hours)    None        CXR Results  (Last 48 hours)               01/04/20 1723  XR CHEST PORT Final result    Impression:  IMPRESSION:        Cardiomegaly. No acute process. Narrative:  EXAM:  XR CHEST PORT       INDICATION:  meets SIRS criteria, rash and left lower extremity swelling       COMPARISON:  2/10/2017       FINDINGS:       A portable AP radiograph of the chest was obtained at 16:53 hours. The lungs are   clear. There is unchanged cardiomegaly. There is no vascular congestion or   pleural fluid. There has been prior median sternotomy. Medical Decision Making   I am the first provider for this patient. I reviewed the vital signs, available nursing notes, past medical history, past surgical history, family history and social history. Vital Signs-Reviewed the patient's vital signs. Patient Vitals for the past 12 hrs:   Temp Pulse Resp BP SpO2   01/04/20 1930  87  127/53 98 %   01/04/20 1925  87  129/51 98 %   01/04/20 1637 98.6 °F (37 °C) 89 16 128/60 96 %       Records Reviewed: Nursing Notes    Provider Notes (Medical Decision Making):   I did verbally discuss this case with attending physician Dr. Sheila Qureshi at this time we did order labs, ESR, CRP and CBC were all within normal limits. Wound culture results are currently pending blood culture results are currently pending.   Patient did have lactic acid ordered although he is vitally stable due to the nature of his wound and it was within normal limits. I did speak with hospitalist Pasha Haley and he did state that as patient does have low kidney function to go ahead and treat with single dose Bactrim twice a day, every other day Levaquin, and instructions to follow-up with wound care. As patient is currently patient is vitally stable and currently is not septic. Please note: Media pictures were taken and are currently located in patient's chart. ED Course:   Initial assessment performed. The patients presenting problems have been discussed, and they are in agreement with the care plan formulated and outlined with them. I have encouraged them to ask questions as they arise throughout their visit. Critical Care Time: None    Disposition:  Dispo     PLAN:  1. Current Discharge Medication List      START taking these medications    Details   trimethoprim-sulfamethoxazole (BACTRIM)  mg per tablet Take 1 Tab by mouth two (2) times a day. Qty: 20 Tab, Refills: 0      levoFLOXacin (LEVAQUIN) 750 mg tablet Take 1 tablet by mouth every other day. Qty: 5 Tab, Refills: 0         CONTINUE these medications which have NOT CHANGED    Details   amLODIPine (NORVASC) 10 mg tablet TAKE 1 TABLET EVERY DAY FOR BLOOD PRESSURE  Qty: 30 Tab, Refills: 2    Comments: Generic For:NORVASC 10MG(AMLODIPINE)      hydrALAZINE (APRESOLINE) 50 mg tablet TAKE 2 TABLETS BY MOUTH THREE TIMES DAILY  Qty: 240 Tab, Refills: 1      minoxidil (LONITEN) 10 mg tablet TAKE 1 TABLET EVERY DAY  Qty: 90 Tab, Refills: 3    Comments: Generic For:LONITEN 10MG      CATAPRES-TTS-3 0.3 mg/24 hr APPLY 1 PATCH EVERY 7 DAYS  Qty: 4 Patch, Refills: 12      metoprolol tartrate (LOPRESSOR) 100 mg IR tablet TAKE 1 TABLET BY MOUTH TWO (2) TIMES A DAY. EVERY 12 HOURS FOR HEART AND BLOOD PRESSURE  Qty: 60 Tab, Refills: 12    Comments: Generic For:LOPRESSOR 100MG      bumetanide (BUMEX) 1 mg tablet Take 2 mg by mouth two (2) times a day. Associated Diagnoses: Essential hypertension; Dissection of thoracic aorta (HCC); S/P ascending aortic replacement      potassium chloride (K-DUR, KLOR-CON) 20 mEq tablet Take 1 Tab by mouth two (2) times a day. Qty: 90 Tab, Refills: 0      aspirin 81 mg chewable tablet Take 1 Tab by mouth daily. Qty: 365 Tab, Refills: 0      atorvastatin (LIPITOR) 20 mg tablet Take 1 Tab by mouth every evening. Qty: 30 Tab, Refills: 1      linagliptin (TRADJENTA) 5 mg tablet Take 1 Tab by mouth Daily (before breakfast). Qty: 30 Tab, Refills: 1           2. Follow-up Information     Follow up With Specialties Details Why Contact Info    [facility] [address] [address] [phone]    [address] [place] [address]   [address] [place], [place] [address] [address] [phone]        Return to ED if worse     Diagnosis     Clinical Impression:   1. History of aortic dissection    2. Bilateral lower extremity edema    3. Wound of left lower extremity, initial encounter    4. Pressure injury of skin of contiguous region involving back, right buttock, and right hip, unspecified injury stage          Please note that this dictation was completed with [employer], the computer voice recognition software. Quite often unanticipated grammatical, syntax, homophones, and other interpretive errors are inadvertently transcribed by the computer software. Please disregards these errors. Please excuse any errors that have escaped final proofreading. This note will not be viewable in [address]

## 2020-01-04 NOTE — ED NOTES
Pt has large surface area of left leg with moisture associated skin break down. Redness and emaciated lower left foot. Large amount of drainage from leg wound daily. Pt also has stage 3 pressure ulcer to right sacrum above gluteal cleft. Packed wound wet to dry.

## 2020-01-05 NOTE — ED NOTES
Wrapped leg with wet to dry dressing and ace wrap for pt to go home with. Pt discharged by [facility] [place]. Pt provided with discharge instructions Rx and instructions on follow up care. Pt ambulated out of ED with no apparent difficulty. Pt refused wheelchair. Pt belongings (shoes, meds, cloths, wound care supplies)put in pt belonging bag and given to pt at d/c. No pt belongings noted in room after pt left.

## 2020-01-05 NOTE — ED NOTES
Bedside and Verbal shift change report given to Rexene Holter, RN (oncoming nurse) by Saba Cornejo RN (offgoing nurse). Report included the following information SBAR, ED Summary, MAR and Recent Results. Pt resting comfortably. VSS. Call bell in reach.

## 2020-01-13 PROBLEM — I89.0 LYMPHEDEMA: Status: ACTIVE | Noted: 2020-01-01

## 2020-01-13 PROBLEM — I83.022 VENOUS STASIS ULCER OF LEFT CALF WITH FAT LAYER EXPOSED WITH VARICOSE VEINS (HCC): Status: ACTIVE | Noted: 2020-01-01

## 2020-01-13 PROBLEM — L89.313 DECUBITUS ULCER OF RIGHT BUTTOCK, STAGE 3 (HCC): Status: ACTIVE | Noted: 2020-01-01

## 2020-01-13 PROBLEM — L97.222 VENOUS STASIS ULCER OF LEFT CALF WITH FAT LAYER EXPOSED WITH VARICOSE VEINS (HCC): Status: ACTIVE | Noted: 2020-01-01

## 2020-01-13 NOTE — WOUND CARE
01/13/20 1519   Wound Buttocks Right 1/13/20-Stage 4 (appears to be to muscle) 01/04/20   Date First Assessed/Time First Assessed: 01/04/20 1838   Present on Hospital Admission: Yes  Primary Wound Type: Pressure Injury  Location: Buttocks  Wound Location Orientation: Right  Wound Description: 1/13/20-Stage 4 (appears to be to muscle)  Date. .. Dressing Type Open to air; Other (Comment)  (Wears Incontience brief to absorb drainage.)   Wound Length (cm) 3.9 cm   Wound Width (cm) 2.9 cm   Wound Depth (cm) 1.6 cm   Wound Surface Area (cm^2) 11.31 cm^2   Wound Volume (cm^3) 18.1 cm^3   Condition of Base Pink;Slough   Drainage Color Serosanguinous   Wound Odor None   Grisel-wound Assessment Intact   Cleansing and Cleansing Agents  Normal saline   Wound Leg Left; Lower Circumferential, Scattered wounds (Clustered wound measurement) 01/04/20   Date First Assessed/Time First Assessed: (c) 01/04/20 1841   Present on Hospital Admission: Yes  Primary Wound Type: Venous  Location: Leg  Wound Location Orientation: (c) Left; Lower  Wound Description: Circumferential, Scattered wounds (Clustered wou. .. Dressing Status Removed   Dressing Type Gauze;Gauze wrap (ant); Other (Comment); Compression dressing  (Dry gauze, roll gauze, Ace Wrap from ankle to below knee.)   Wound Length (cm) 18 cm  (Circumferential cluster of numerous small wounds.)   Wound Width (cm) 52 cm  (Circumferential cluster of numerous small wounds.)   Wound Depth (cm) 0.1 cm   Wound Surface Area (cm^2) 936 cm^2   Wound Volume (cm^3) 93.6 cm^3   Condition of Base Pink   Condition of Edges Open   Tissue Type Percent Pink 100   Drainage Amount Moderate   Drainage Color Serous   Wound Odor None   Grisel-wound Assessment Other (Comment)  (Dry, Scaly. LLE with pitting edema.)   Cleansing and Cleansing Agents  Normal saline       Bilateral LEs with pitting edema.

## 2020-01-13 NOTE — WOUND CARE
01/13/20 1625   Wound Buttocks Right 01/04/20   Date First Assessed/Time First Assessed: 01/04/20 1838   Present on Hospital Admission: Yes  Primary Wound Type: Pressure Injury  Location: Buttocks  Wound Location Orientation: Right  Date of First Observation: (c) 01/04/20   Dressing Type Applied   (ACAG, border foam)   Wound Leg Left; Lower Circumferential, Scattered wounds (Clustered wound measurement) 01/04/20   Date First Assessed/Time First Assessed: (c) 01/04/20 1841   Present on Hospital Admission: Yes  Primary Wound Type: Venous  Location: Leg  Wound Location Orientation: (c) Left; Lower  Wound Description: Circumferential, Scattered wounds (Clustered wou. .. Dressing Type Applied   (xeroform, exudry, RG, double TUBI  size G)   Discharge Condition: Stable     Pain: 0    Ambulatory Status: Walking    Discharge Destination: Home     Transportation: Car    Accompanied by: Self     Discharge instructions reviewed with Patient and copy or written instructions have been provided. All questions/concerns have been addressed at this time. Has follow up appointment to see MD next week on Wednesday Jan 22, 2020.

## 2020-01-13 NOTE — H&P
Wound Center  History and Physical    Date of Service: 20     Date of Initial Visit for Current Problem:  20    Subjective:     Chief Complaint:  Sp Norman is a 53 y.o.  male who presents with L lower leg wounds of several months duration. He also has a right buttock pressure ulcer, despite the fact that he states that he works on his feet 9 hours a day. He is S/P surgery for a dissecting aortic aneurysm, and has kidney failure. Referred by:  Dr. Amber Alaniz    HPI:     Wound caused by: chronic pressure/irritation (buttocks)  Current wound care:  Offloading wound: no  Appetite: good  Wound associated pain: mild  Diabetic: Maybe  Smoker: No    Past Medical History:   Diagnosis Date    Chronic kidney disease     Diabetes ([facility] [address]     Denies Hx Diabetes    Hypertension     Ill-defined condition     Patient uses CPAP    Ill-defined condition 2017    Aortic Disection      Past Surgical History:   Procedure Laterality Date    CARDIAC SURG PROCEDURE UNLIST  2017    Asending Aortic Replacement    FLEXIBLE SIGMOIDOSCOPY N/A 2017    SIGMOIDOSCOPY FLEXIBLE performed by Anita Spaulding MD at [facility] ENDOSCOPY     No family history on file. Social History     Tobacco Use    Smoking status: Former Smoker     Packs/day: 0.25     Years: 5.00     Pack years: 1.25     Last attempt to quit: 2012     Years since quittin.7    Smokeless tobacco: Former User   Substance Use Topics    Alcohol use: No       Prior to Admission medications    Medication Sig Start Date End Date Taking? Authorizing Provider   hydrALAZINE (APRESOLINE) 50 mg tablet TAKE 2 TABLETS BY MOUTH THREE TIMES DAILY 20  Yes Carlos Johns MD   trimethoprim-sulfamethoxazole (BACTRIM)  mg per tablet Take 1 Tab by mouth two (2) times a day. 20  Yes Doron Willis PA-C   levoFLOXacin (LEVAQUIN) 750 mg tablet Take 1 tablet by mouth every other day.  20  Yes Doron Willis PA-C   amLODIPine (NORVASC) 10 mg tablet TAKE 1 TABLET EVERY DAY FOR BLOOD PRESSURE 10/28/19  Yes Anne-Marie Herrera NP   minoxidil (LONITEN) 10 mg tablet TAKE 1 TABLET EVERY DAY 8/29/19  Yes Radha Real MD   CATAPRES-TTS-3 0.3 mg/24 hr APPLY 1 PATCH EVERY 7 DAYS 2/27/19  Yes Radha Real MD   metoprolol tartrate (LOPRESSOR) 100 mg IR tablet TAKE 1 TABLET BY MOUTH TWO (2) TIMES A DAY. EVERY 12 HOURS FOR HEART AND BLOOD PRESSURE 2/19/19  Yes Radha Real MD   bumetanide (BUMEX) 1 mg tablet Take 2 mg by mouth two (2) times a day. 7/23/17  Yes Provider, Historical   potassium chloride (K-DUR, KLOR-CON) 20 mEq tablet Take 1 Tab by mouth two (2) times a day. 3/22/17  Yes Pushpa Singh NP   aspirin 81 mg chewable tablet Take 1 Tab by mouth daily. Patient taking differently: Take 325 mg by mouth daily. 2/27/17  Yes GAETANO Walters     No Known Allergies     Review of Systems:  A comprehensive review of systems was negative except for that written in the History of Present Illness. Objective:     Physical Exam:     Visit Vitals  /59 (BP 1 Location: Left arm)   Pulse 69   Temp 96.7 °F (35.9 °C)   Resp 16     General: well developed, well nourished, pleasant , NAD. Hygiene good  Psych: cooperative. Pleasant. No anxiety or depression. Normal mood and affect. Neuro: alert and oriented to person/place/situation. Otherwise nonfocal.  HEENT: Normocephalic, atraumatic. EOMI. Conjunctiva clear. No scleral icterus. Chest: Respirations nonlabored. Abdomen:  Nondistended. .     Focused Lower Extremity Exam:  Good pulses, bilateral lower extremity edema. Ulcer Location: Buttocks R gluteal   Etiology: combined  Wound: 3.9 x 2.9 x 1.0 cm  Ulcer bed: Mixed Granular/Fibrotic    Periwound: Normal  Exudate: Scant/small amount Serous exudate  Depth of Wound:  To Fat Layer     Ulcer Location: Left lower leg anterior   Etiology: venous stasis  Wound:  18 x 52 x 0.1 x cm  Ulcer bed: Granular/Healthy  Mixed Granular/Fibrotic    Periwound: Normal  Exudate: Scant/small amount Serous exudate  Depth of Wound: To Fat Layer     Assessment:     53 y.o. male with Right buttocks decubitus and left leg venous stasis wounds. He is also obese, with chronic kidney disease. Plan:   Ulcer Debridement    Ulcer Number 1; Right Buttock To Fat Layer;  Pressure     Character of Ulcer: New     Indication for Debridement: Abnormal wound edge and base. Pre debridement measurements: See above    Risks of procedure were discussed with patient. Consent has been signed. Anesthetic: Lidocaine 4% topical cream     Level of Debridement: Subctaneous Tissue     Tissue and/or Material removed: Subcutaneous    Pre-debridement severity: Fat Layer Exposed     Post debridement severity: Fat Layer exposed    Instrument(s) used: Curette    Bleeding controlled with: Silver Nitrate    Estimated blood loss: Minimal    Post debridement measurements: 0.1 cm deeper and wider    Post procedural pain: none    Patient tolerated procedure well. Dressing: Xeroform, Exudry to leg, which was not debrided. ACAG, border foam to buttocks. Frequency: every other day    Patient understood and agrees with plan. Questions answered. Weekly visits and serial debridements also discussed.   Follow up with me in 1 week    Signed By: Hans Moise MD     January 13, 2020

## 2020-01-13 NOTE — WOUND CARE
01/13/20 1519   Wound Buttocks Right 01/04/20   Date First Assessed/Time First Assessed: 01/04/20 1838   Present on Hospital Admission: Yes  Primary Wound Type: Pressure Injury  Location: Buttocks  Wound Location Orientation: Right  Date of First Observation: (c) 01/04/20   Dressing Type Open to air; Other (Comment)  (Wears Incontience brief to absorb drainage.)   Wound Length (cm) 3.9 cm   Wound Width (cm) 2.9 cm   Wound Depth (cm) 1.6 cm   Wound Surface Area (cm^2) 11.31 cm^2   Wound Volume (cm^3) 18.1 cm^3   Condition of Base Pink;Slough   Drainage Color Serosanguinous   Wound Odor None   Grisel-wound Assessment Intact   Cleansing and Cleansing Agents  Normal saline   Wound Leg Left; Lower Circumferential, Scattered wounds (Clustered wound measurement) 01/04/20   Date First Assessed/Time First Assessed: (c) 01/04/20 1841   Present on Hospital Admission: Yes  Primary Wound Type: Venous  Location: Leg  Wound Location Orientation: (c) Left; Lower  Wound Description: Circumferential, Scattered wounds (Clustered wou. .. Dressing Status Removed   Dressing Type Gauze;Gauze wrap (ant); Other (Comment); Compression dressing  (Dry gauze, roll gauze, Ace Wrap from ankle to below knee.)   Wound Length (cm) 18 cm  (Circumferential cluster of numerous small wounds.)   Wound Width (cm) 52 cm  (Circumferential cluster of numerous small wounds.)   Wound Depth (cm) 0.1 cm   Wound Surface Area (cm^2) 936 cm^2   Wound Volume (cm^3) 93.6 cm^3   Condition of Base Pink   Condition of Edges Open   Tissue Type Percent Pink 100   Drainage Amount Moderate   Drainage Color Serous   Wound Odor None   Grisel-wound Assessment Other (Comment)  (Dry, Scaly. LLE with pitting edema.)   Cleansing and Cleansing Agents  Normal saline     Right buttock wound depth measurement of 1.6 cm was measured w/ patient in standing position.   Depth measured while side lying=1 cm.

## 2020-01-13 NOTE — WOUND CARE
Nutritional Screen   NAME:  Reginald Shaw. YOB: 1965 GENDER: male  MEDICAL RECORD NUMBER:  [id number]   DATE:  1/13/2020     Has food intake declined over the past 3 months due to loss of appetite, digestive problems, chewing or swallowing difficulties? Points   Severe decrease in food intake  []   0   Moderate decrease in food intake  []   1   No decrease in food intake  [x]   2     Weight loss during the last 3 months    Points   Weight loss greater than 3 kg (6.6 lbs)  []   0   Does not know []   1   Weight loss between 1 and 3 kg (2.2 and 6.6 lbs)  []   2   o weight loss  [x]   3     Mobility    Points   Bed or chair bound  []   0   Able to get out of bed / chair but does not go out  []   1   Goes out [x]   2     Has suffered psychological stress or acute disease in the past 3 months? Points   Yes []   0   No [x]   2     Neuropsychological problems    Points   Severe dementia or depression  []   0   Mild dementia  []   1   No psychological problems  [x]   2    []   3     F1 Body Mass Index (BMI) (weight in kg) / (height in m)2    Points   BMI less then 19  []   0   BMI 19 to less than 21  []   1   BMI 21 to less than 23  []   2   BMI greater than 23  []   3     Wt Readings from Last 1 Encounters:   01/04/20 103.3 kg (227 lb 11.8 oz)       IF BMI IS NOT AVAILABLE, REPLACE QUESTION F1 WITH QUESTION F2.   DO NOT ANSWER QUESTION F2 IF QUESTION F1 IS ALREADY COMPLETED.          F2 Calf circumference (CC) in cm    Points   Calf circumference less than 31  []   0   Calf circumference 31or greater [x]   3     Screening Score     Points 12-14    [x] Normal nutritional status    Points  8-11   [] At risk of malnutrition    Points  0-7   [] Malnourished      Electronically signed by Charla Langley RN on 1/13/2020 at 3:52 PM

## 2020-01-13 NOTE — DISCHARGE INSTRUCTIONS
Discharge Instructions for  [place] [id number] [facility], [address] [facility]  Telephone: [phone]    NAME:  Saray Almazan. YOB: 1965  MEDICAL RECORD NUMBER:  [id number]  DATE:  1/13/2020    Wound Cleansing:   Do not scrub or use excessive force. Cleanse wound prior to applying a clean dressing with:  [] Normal Saline [] Keep Wound Dry in Shower    [] Wound Cleanser   [x] Cleanse wound with Mild Soap & Water  [] May Shower at Discharge   [] Other:      Topical Treatments:  Do not apply lotions, creams, or ointments to wound bed unless directed. [] Apply moisturizing lotion to skin surrounding the wound prior to dressing change.  [] Apply antifungal ointment to skin surrounding the wound prior to dressing change.  [] Apply thin film of moisture barrier ointment to skin immediately around wound. [] Other:       Dressings:           Wound Location Right buttock  [x] Apply Primary Dressing:       [] MediHoney Gel [x] Alginate with Silver [] Alginate   [] Collagen [] Collagen with Silver   [] Santyl with Moisten saline gauze     [] Hydrocolloid   [] MediHoney Alginate [] Foam with Silver   [] Foam   [] Hydrofera Blue    [] Mepilex Border    [] Moisten with Saline [] Hydrogel [] Mepitel     [] Bactroban/Mupirocin [] Polysporin  [] Other:    [] Pack wound loosely with  [] Iodoform   [] Plain Packing  [] Other   [x] Cover and Secure with:     [] Gauze [] Yari [] Kerlix   [] Ace Wrap [] Cover Roll Tape [] ABD     [x] Other: Border foam   Avoid contact of tape with skin.   [x] Change dressing: [] Daily    [] Every Other Day [x] Three times per week   [] Once a week [] Do Not Change Dressing   [] Other:     Dressings:           Wound Location Left lower leg  [x] Apply Primary Dressing:       [] MediHoney Gel [] Alginate with Silver [] Alginate   [] Collagen [] Collagen with Silver   [] Santyl with Moisten saline gauze     [] Hydrocolloid   [] MediHoney Alginate [] Foam with Silver   [] Foam   [] Hydrofera Blue    [] Mepilex Border    [] Moisten with Saline [] Hydrogel [] Mepitel     [] Bactroban/Mupirocin [] Polysporin  [x] Other:  Xeroform  [] Pack wound loosely with  [] Iodoform   [] Plain Packing  [] Other   [x] Cover and Secure with:     [] Gauze [x] Yari [] Kerlix   [] Ace Wrap [] Cover Roll Tape [] ABD     [x] Other: Exudry   Avoid contact of tape with skin. [x] Change dressing: [x] Daily    [] Every Other Day [] Three times per week   [] Once a week [] Do Not Change Dressing   [] Other:      Pressure Relief:  [x] When sitting, shift position or do seat lifts every 15 minutes.  [] Wheelchair cushion [] Specialty Bed/Mattress  [x] Turn every 2 hours when in bed. Avoid position directing pressure on wound site. Limit side lying to 30 degree tilt. Limit HOB elevation to 30 degrees. Edema Control:  Apply: [] Compression Stocking []Right Leg []Left Leg   [x] Tubigrip [x]Right Leg Double Layer [x]Left Leg Double Layer  Tubigrip size G       []Right Leg Single Layer []Left Leg Single Layer   [] SpandaGrip []Right Leg  []Left Leg      []Low compression 5-10 mm/Hg      []Medium compression 10-20 mm/Hg     []High compression  20-30 mm/Hg   every morning immediately when getting up should be applied to affected leg(s) from mid foot to knee making sure to cover the heel. Remove every night before going to bed. [x] Elevate leg(s) above the level of the heart when sitting. [x] Avoid prolonged standing in one place. [] Elevate arm/hand above the level of the heart []RightArm []LeftArm     Compression:  Apply: [] Multilayer Compression Wrap Applied in Clinic []RightLeg []Left Leg   [] Multi-layer compression. Do not get leg(s) with wrap wet. If wraps become too tight call the center or completely remove the wrap. [] Elevate leg(s) above the level of the heart when sitting. [] Avoid prolonged standing in one place.       Dietary:  [x] Diet as tolerated: [] Calorie Diabetic Diet: [x] No Added Salt:  [] Increase Protein: [] Other:   Activity:  [x] Activity as tolerated:  [] Patient has no activity restrictions     [] Strict Bedrest: [] Remain off Work:     [] May return to full duty work:                                   [] Return to work with restrictions:             Return Appointment:  [] Wound and dressing supply provider:   [] ECF or Home Healthcare:  [] Wound Assessment: [] Physician or NP scheduled for Wound Assessment:   [x] Return Appointment: With Dr. Jack Cavazos  in 9 days 01/22/20 Week(s)  [] Ordered tests:      Electronically signed Mika Castorena RN on 1/13/2020 at [address]: Should you experience any significant changes in your wound(s) or have questions about your wound care, please contact the [address] [place] [address] at [address] [place] [address] 8:00 am - 4:30. If you need help with your wound outside these hours and cannot wait until we are again available, contact your PCP or go to the hospital emergency room. PLEASE NOTE: IF YOU ARE UNABLE TO OBTAIN WOUND SUPPLIES, CONTINUE TO USE THE SUPPLIES YOU HAVE AVAILABLE UNTIL YOU ARE ABLE TO REACH US. IT IS MOST IMPORTANT TO KEEP THE WOUND COVERED AT ALL TIMES.      Physician Signature:_______________________    Date: ___________ Time:  ____________

## 2020-01-22 NOTE — PROGRESS NOTES
Wound Center Progress Note 
  
Date of Service: 20  
  
Date of Initial Visit for Current Problem:  20 
  
Subjective:  
  
Chief Complaint: 
Andrew Lauren is a 53 y.o.  male who presents with L lower leg wounds of several months duration. He also has a right buttock pressure ulcer, despite the fact that he states that he works on his feet 9 hours a day. He is S/P surgery for a dissecting aortic aneurysm, and has kidney failure. 
  
Referred by:  Dr. Gurpreet Magana 
  
HPI:    
Wound caused by: chronic pressure/irritation (buttocks) Current wound care: 
Offloading wound: no Appetite: good Wound associated pain: mild Diabetic: Maybe Smoker: No 
  
    
Past Medical History:  
Diagnosis Date  Chronic kidney disease    
 Diabetes ([facility] [address]    
  Denies Hx Diabetes  Hypertension    
 Ill-defined condition    
  Patient uses CPAP  
 Ill-defined condition 2017  
  Aortic Disection Past Surgical History:  
Procedure Laterality Date  CARDIAC SURG PROCEDURE UNLIST   2017  
  Asending Aortic Replacement  FLEXIBLE SIGMOIDOSCOPY N/A 2017  
  SIGMOIDOSCOPY FLEXIBLE performed by Cristela Connell MD at [facility] ENDOSCOPY  
  
No family history on file. Social History  
  
     
Tobacco Use  Smoking status: Former Smoker  
    Packs/day: 0.25  
    Years: 5.00  
    Pack years: 1.25  
    Last attempt to quit: 2012  
    Years since quittin.7  Smokeless tobacco: Former User Substance Use Topics  Alcohol use: No  
   
       
Prior to Admission medications Medication Sig Start Date End Date Taking? Authorizing Provider  
hydrALAZINE (APRESOLINE) 50 mg tablet TAKE 2 TABLETS BY MOUTH THREE TIMES DAILY 20   Yes Santhosh Aguilera MD  
trimethoprim-sulfamethoxazole (BACTRIM)  mg per tablet Take 1 Tab by mouth two (2) times a day.  20   Yes Doron Willis PA-C  
levoFLOXacin (LEVAQUIN) 750 mg tablet Take 1 tablet by mouth every other day. 1/4/20   Yes Doron Willis PA-C  
amLODIPine (NORVASC) 10 mg tablet TAKE 1 TABLET EVERY DAY FOR BLOOD PRESSURE 10/28/19   Yes Janet Arechiga NP  
minoxidil (LONITEN) 10 mg tablet TAKE 1 TABLET EVERY DAY 8/29/19   Yes Fred Murcia MD  
WMJTCJTW-LNG-2 0.3 mg/24 hr APPLY 1 PATCH EVERY 7 DAYS 2/27/19   Yes Fred Murcia MD  
metoprolol tartrate (LOPRESSOR) 100 mg IR tablet TAKE 1 TABLET BY MOUTH TWO (2) TIMES A DAY. EVERY 12 HOURS FOR HEART AND BLOOD PRESSURE 2/19/19   Yes Fred Murcia MD  
bumetanide (BUMEX) 1 mg tablet Take 2 mg by mouth two (2) times a day. 7/23/17   Yes Osiris Duncan  
potassium chloride (K-DUR, KLOR-CON) 20 mEq tablet Take 1 Tab by mouth two (2) times a day. 3/22/17   Yes Dimas Nuñez NP  
aspirin 81 mg chewable tablet Take 1 Tab by mouth daily. Patient taking differently: Take 325 mg by mouth daily. 2/27/17   Yes GAETANO Flynn  
  
No Known Allergies  
  
Review of Systems: A comprehensive review of systems was negative except for that written in the History of Present Illness.  
  
Objective:  
  
Physical Exam:  
  
Visit Vitals: VSS, Afebrile 
   
   
   
   
  
General: well developed, well nourished, pleasant , NAD. Hygiene good Psych: cooperative. Pleasant. Neuro: alert and oriented to person/place/situation. Otherwise nonfocal. 
HEENT: Normocephalic, atraumatic. EOMI. Conjunctiva clear. No scleral icterus. Chest: Respirations nonlabored. Abdomen:  Nondistended. 
  
.  
  
Focused Lower Extremity Exam: 
Good pulses, bilateral lower extremity edema. 
  
Ulcer Location: Buttocks R gluteal  
Etiology: combined Wound: 3.9 x 2.0 x 1.0 cm Ulcer bed: Mixed Granular/Fibrotic Periwound: Normal 
Exudate: Scant/small amount Serous exudate Depth of Wound: To Fat Layer  
  
Ulcer Location: Left lower leg anterior Etiology: venous stasis Wound:  9 x 3 x 0.1 x cm Ulcer bed: Granular/Healthy Periwound: Normal 
 Exudate: Scant/small amount Serous exudate Depth of Wound: To Fat Layer  
  
Assessment:  
  
53 y.o. male with Right buttocks decubitus and left leg venous stasis wounds, improving rapidly. He is also obese, with chronic kidney disease. Plan:  
  
Dressing: Xeroform, Exudry to leg, which was not debrided. ACAG, border foam to buttocks, also not debrided. Frequency: every other day 
  
Patient understood and agrees with plan. Questions answered. 
  
Weekly visits and serial debridements also discussed.  
Follow up with me in 1 week 
  
Signed By: Maximo Terry MD

## 2020-01-22 NOTE — WOUND CARE
01/22/20 0188 Wound Leg Left; Lower Circumferential, Scattered wounds (Clustered wound measurement) 01/04/20 Date First Assessed/Time First Assessed: (c) 01/04/20 1841   Present on Hospital Admission: Yes  Primary Wound Type: Venous  Location: Leg  Wound Location Orientation: (c) Left; Lower  Wound Description: Circumferential, Scattered wounds (Clustered wou. .. Dressing Status Removed Dressing Type  
(gauze, RG) Wound Length (cm) 9 cm Wound Width (cm) 3 cm Wound Depth (cm) 0.1 cm Wound Surface Area (cm^2) 27 cm^2 Wound Volume (cm^3) 2.7 cm^3 Condition of Base [place];Slough Drainage Amount Small Drainage Color Serous Wound Odor None Grisel-wound Assessment Intact Cleansing and Cleansing Agents  Normal saline; Soap and water Visit Vitals /70 (BP 1 Location: Left arm) Pulse 74 Temp 96.8 °F (36 °C) Resp 18

## 2020-01-22 NOTE — DISCHARGE INSTRUCTIONS
Discharge Instructions for  [place] [address] [facility], [address] [facility]  Telephone: [phone]    NAME:  Jw Machado. YOB: 1965  MEDICAL RECORD NUMBER:  [id number]  DATE:  1/22/2020    Wound Cleansing:   Do not scrub or use excessive force. Cleanse wound prior to applying a clean dressing with:  [x] Normal Saline [] Keep Wound Dry in Shower    [] Wound Cleanser   [] Cleanse wound with Mild Soap & Water  [] May Shower at Discharge   [] Other:      Topical Treatments:  Do not apply lotions, creams, or ointments to wound bed unless directed. [x] Apply moisturizing lotion to skin surrounding the wound prior to dressing change.  [] Apply antifungal ointment to skin surrounding the wound prior to dressing change.  [] Apply thin film of moisture barrier ointment to skin immediately around wound. [] Other:       Dressings:           Wound Location Buttock wound   [x] Apply Primary Dressing:       [] MediHoney Gel [x] Alginate with Silver [] Alginate   [] Collagen [] Collagen with Silver   [] Santyl with Moisten saline gauze     [] Hydrocolloid   [] MediHoney Alginate [] Foam with Silver   [] Foam   [] Hydrofera Blue    [] Mepilex Border    [] Moisten with Saline [] Hydrogel [] Mepitel     [] Bactroban/Mupirocin [] Polysporin  [] Other:    [] Pack wound loosely with  [] Iodoform   [] Plain Packing  [] Other   [] Cover and Secure with:     [] Gauze [] Yari [] Kerlix   [] Ace Wrap [] Cover Roll Tape [] ABD     [x] Other: Border foam   Avoid contact of tape with skin.   [x] Change dressing: [] Daily    [] Every Other Day [x] Three times per week   [] Once a week [] Do Not Change Dressing   [] Other:  Dressings:           Wound Location Left Lower leg  [x] Apply Primary Dressing:       [] MediHoney Gel [] Alginate with Silver [] Alginate   [] Collagen [] Collagen with Silver   [] Santyl with Moisten saline gauze     [] Hydrocolloid   [] MediHoney Alginate [] Foam with Silver   [] Foam   [] Hydrofera Blue    [] Mepilex Border    [] Moisten with Saline [] Hydrogel [] Mepitel     [] Bactroban/Mupirocin [] Polysporin  [x] Other:  Xeroform  [] Pack wound loosely with  [] Iodoform   [] Plain Packing  [] Other   [] Cover and Secure with:     [x] Gauze [x] Yari [] Kerlix   [] Ace Wrap [x]  Tape [] ABD     [] Other: Border foam   Avoid contact of tape with skin. [x] Change dressing: [] Daily    [x] Every Other Day [] Three times per week   [] Once a week [] Do Not Change Dressing   [] Other:       Pressure Relief:  [x] When sitting, shift position or do seat lifts every 15 minutes.  [] Wheelchair cushion [] Specialty Bed/Mattress  [] Turn every 2 hours when in bed. Avoid position directing pressure on wound site. Limit side lying to 30 degree tilt. Limit HOB elevation to 30 degrees. Edema Control:  Apply: [] Compression Stocking []Right Leg []Left Leg   [x] Tubigrip []Right Leg Double Layer [x]Left Leg Double Layer       []Right Leg Single Layer []Left Leg Single Layer   [] SpandaGrip []Right Leg  []Left Leg      []Low compression 5-10 mm/Hg      []Medium compression 10-20 mm/Hg     []High compression  20-30 mm/Hg   every morning immediately when getting up should be applied to affected leg(s) from mid foot to knee making sure to cover the heel. Remove every night before going to bed. [x] Elevate leg(s) above the level of the heart when sitting. [x] Avoid prolonged standing in one place.    [] Elevate arm/hand above the level of the heart []RightArm []LeftArm         Dietary:  [x] Diet as tolerated: [] Calorie Diabetic Diet: [x] No Added Salt:  [] Increase Protein: [] Other:   Activity:  [x] Activity as tolerated:  [] Patient has no activity restrictions     [] Strict Bedrest: [] Remain off Work:     [] May return to full duty work:                                   [] Return to work with restrictions:             Return Appointment:  [] Wound and dressing supply provider:   [] ECF or Home Healthcare:  [] Wound Assessment: [] Physician or NP scheduled for Wound Assessment:   [x] Return Appointment: With DR Karely Colmenares  in  [address] [place] [address])  [] Ordered tests:      Electronically signed Riana Ruiz RN on 1/22/2020 at 8:09 AM     [address]: Should you experience any significant changes in your wound(s) or have questions about your wound care, please contact the [facility] at [address] [place] [address] 8:00 am - 4:30. If you need help with your wound outside these hours and cannot wait until we are again available, contact your PCP or go to the hospital emergency room. PLEASE NOTE: IF YOU ARE UNABLE TO OBTAIN WOUND SUPPLIES, CONTINUE TO USE THE SUPPLIES YOU HAVE AVAILABLE UNTIL YOU ARE ABLE TO REACH US. IT IS MOST IMPORTANT TO KEEP THE WOUND COVERED AT ALL TIMES.      Physician Signature:_______________________    Date: ___________ Time:  ____________

## 2020-01-22 NOTE — WOUND CARE
01/22/20 5125 Wound Leg Left; Lower Circumferential, Scattered wounds (Clustered wound measurement) 01/04/20 Date First Assessed/Time First Assessed: (c) 01/04/20 1841   Present on Hospital Admission: Yes  Primary Wound Type: Venous  Location: Leg  Wound Location Orientation: (c) Left; Lower  Wound Description: Circumferential, Scattered wounds (Clustered wou. .. Dressing Status Removed Dressing Type  
(gauze, RG) Wound Length (cm) 9 cm Wound Width (cm) 3 cm Wound Depth (cm) 0.1 cm Wound Surface Area (cm^2) 27 cm^2 Wound Volume (cm^3) 2.7 cm^3 Condition of Base [place];Slough Drainage Amount Small Drainage Color Serous Wound Odor None Grisel-wound Assessment Intact Cleansing and Cleansing Agents  Normal saline; Soap and water Dressing Changed Changed/New 
(Pt. preferred to use compression socks instead of tubi ) Dressing Type Applied Xeroform;Gauze wrap (ant) (Exudry; used patient's compression socks as per Dr. Renata Pena ) Discharge Condition: Stable Pain: 0 Ambulatory Status: Walking Discharge Destination: Home Transportation: Car Accompanied by: Self Discharge instructions reviewed with Patient and copy or written instructions have been provided. All questions/concerns have been addressed at this time.

## 2020-01-22 NOTE — WOUND CARE
Clinic Level of Care Assessment NAME:  Mika Andrade. YOB: 1965 GENDER: male MEDICAL RECORD NUMBER:  [id number] DATE:  1/22/2020 Wound Count Document in [address] [place] [address] Number of Wounds Assessed Points No Wounds/Ulcers []   0 Less than Three Wounds/Ulcers [x]   1  
3-6 Wounds/Ulcers []   2 Greater than 6 Wounds/Ulcers []   3 Ambulation Status Document in Coord/JENNIFER/Mobility tab Status Definition Points Independent Independently able to ambulate. Fully able (without any assistance) to get on/off exam table/chair. [x]   0 Minimal Physical Assistance Requires physical assistance of one person to ambulate and/or position patient to be examined. Includes necessary physical assistance to position lower extremities on/off stool. []   1 Moderate Physical Assistance Requires at least one staff member to physically assist patient in ambulating into treatment room, and on/off exam table. []   2 Full Assistance Requires assistance of at least two staff members to transfer patient into treatment room and/or on/off exam table/chair. \"Total Transfer\". []   3 Dressing Complexity Document in [address] [place] [address] and Write Appropriate Order Complexity Definition Points No Dressing  []   0 Simple Minimal, simple dressing. i.e. Band-aid, gauze, simple wrap. []   1 Intermediate Moderately complicated requiring licensed personnel to apply i.e. collagen matrix, ointments, gels, alginates. [x]   2 Complex Complicated requiring licensed personnel to apply dressings 6 or more wounds. []   3 Teaching Effort Document in Education Tab Effort Definition Points No Teaching  []   0 Simple Reinforce two or less topics. Document in Education navigator. [x]   1 Intermediate Reinforce three to five topics and/or one additional  
new topic. Document in Education navigator. []   2 Complex Teach more than one new topic. New patient information  
packet reviewed and/or reinforce more than three topics. Document in Education navigator. HBO initial instruction. []   3 Patient Assessment and Planning Planning Definition Points Simple Multiple System Simple: Simple follow-up with routine assessment and planning. If Discharged, instructions and long term/follow-up care given to patient/caregiver. Discharged, instructions and/or After Visit Summary given to patient/caregiver and instructions completed. [x]   1 Intermediate Multiple System Intermediate: Contact with outside resources; i.e. Telephone calls to home health, [facility]. May include filling out forms and writing letters, arranging transportation, communication with insurance , vendors, etc.  Discharged, instructions and/or After Visit Summary given to patient/caregiver and instructions completed. []   2 Complex Multiple System Complex: Full, comprehensive assessment and planning. Follow the entire navigator under Wound Visit charting filling out each tab which includes OP Adm Database Screening, Education and CarePlan  HBO risk assessment completed. Discharged, instructions and/or After Visit Summary given to patient/caregiver and instructions completed. []   3 Is this the Patient's First Visit to the [facility] No 
 
 
Is this Patient Established @ [facility] 
Yes Clinical Level of Care Points  0-2  Level 1 [] Points  3-5  Level 2 [x] Points  6-9  Level 3 [] Points  10-12  Level 4 [] Points  13-15  Level 5 [] Electronically signed by Larry Fu RN on 1/22/2020 at 8:51 AM

## 2020-01-29 NOTE — WOUND CARE
Clinic Level of Care Assessment NAME:  Saray Almazan. YOB: 1965 GENDER: male MEDICAL RECORD NUMBER:  [id number] DATE:  1/29/2020 Wound Count Document in [facility] Number of Wounds Assessed Points No Wounds/Ulcers []   0 Less than Three Wounds/Ulcers [x]   1  
3-6 Wounds/Ulcers []   2 Greater than 6 Wounds/Ulcers []   3 Ambulation Status Document in Coord/JENNIFER/Mobility tab Status Definition Points Independent Independently able to ambulate. Fully able (without any assistance) to get on/off exam table/chair. [x]   0 Minimal Physical Assistance Requires physical assistance of one person to ambulate and/or position patient to be examined. Includes necessary physical assistance to position lower extremities on/off stool. []   1 Moderate Physical Assistance Requires at least one staff member to physically assist patient in ambulating into treatment room, and on/off exam table. []   2 Full Assistance Requires assistance of at least two staff members to transfer patient into treatment room and/or on/off exam table/chair. \"Total Transfer\". []   3 Dressing Complexity Document in [facility] and Write Appropriate Order Complexity Definition Points No Dressing  []   0 Simple Minimal, simple dressing. i.e. Band-aid, gauze, simple wrap. []   1 Intermediate Moderately complicated requiring licensed personnel to apply i.e. collagen matrix, ointments, gels, alginates. [x]   2 Complex Complicated requiring licensed personnel to apply dressings 6 or more wounds. []   3 Teaching Effort Document in Education Tab Effort Definition Points No Teaching  []   0 Simple Reinforce two or less topics. Document in Education navigator. [x]   1 Intermediate Reinforce three to five topics and/or one additional  
new topic. Document in Education navigator. []   2 Complex Teach more than one new topic. New patient information  
packet reviewed and/or reinforce more than three topics. Document in Education navigator. HBO initial instruction. []   3 Patient Assessment and Planning Planning Definition Points Simple Multiple System Simple: Simple follow-up with routine assessment and planning. If Discharged, instructions and long term/follow-up care given to patient/caregiver. Discharged, instructions and/or After Visit Summary given to patient/caregiver and instructions completed. [x]   1 Intermediate Multiple System Intermediate: Contact with outside resources; i.e. Telephone calls to home health, [facility] [place], [place]. May include filling out forms and writing letters, arranging transportation, communication with insurance , vendors, etc.  Discharged, instructions and/or After Visit Summary given to patient/caregiver and instructions completed. []   2 Complex Multiple System Complex: Full, comprehensive assessment and planning. Follow the entire navigator under Wound Visit charting filling out each tab which includes OP Adm Database Screening, Education and CarePlan  HBO risk assessment completed. Discharged, instructions and/or After Visit Summary given to patient/caregiver and instructions completed. []   3 Is this the Patient's First Visit to the [facility] No 
 
 
Is this Patient Established @ [facility] 
Yes Clinical Level of Care Points  0-2  Level 1 [] Points  3-5  Level 2 [x] Points  6-9  Level 3 [] Points  10-12  Level 4 [] Points  13-15  Level 5 [] Electronically signed by Shun Mayfield RN on 1/29/2020 at 9:15 AM

## 2020-01-29 NOTE — WOUND CARE
01/29/20 8576 Wound Buttocks Right 01/04/20 Date First Assessed/Time First Assessed: 01/04/20 1838   Present on Hospital Admission: Yes  Primary Wound Type: Pressure Injury  Location: Buttocks  Wound Location Orientation: Right  Date of First Observation: (c) 01/04/20 Dressing Status (open to air) Dressing Type Open to air Wound Length (cm) 3.1 cm 
(noted rash  mostly on left buttock area) Wound Width (cm) 1.8 cm Wound Depth (cm) 0.4 cm Wound Surface Area (cm^2) 5.58 cm^2 Wound Volume (cm^3) 2.23 cm^3 Condition of Base Pink;Slough; Hypergranulation Condition of Edges Open Drainage Amount Small Drainage Color Serous Wound Odor None Grisel-wound Assessment Intact Cleansing and Cleansing Agents  Normal saline Visit Vitals /67 (BP 1 Location: Right arm) Pulse 67 Temp 96.4 °F (35.8 °C) Resp 18 LLE Peripheral Vascular Capillary Refill: Less than/equal to 3 seconds (01/29/20 0829) Color: Pink; Appropriate for race (01/29/20 4611) Temperature: Warm (01/29/20 0829) Sensation: Present (01/29/20 1523) Pedal Pulse: Palpable (01/29/20 0829) Circumference of Calf (cm): 47.5 cm (01/29/20 0829) Location of Measurement (Calf): Mid  (01/29/20 0829) Circumference of Ankle (cm): 28 cm (01/29/20 0829) Location of Measurement (Ankle): Upper  (01/29/20 0829) RLE Peripheral Vascular Capillary Refill: Less than/equal to 3 seconds (01/29/20 0829) Color: Appropriate for race (01/29/20 4268) Temperature: Warm (01/29/20 0829) Sensation: Present (01/29/20 2405) Pedal Pulse: Palpable (01/29/20 0829) Circumference of Calf (cm): 49.5 cm (01/29/20 0829) Location of Measurement (Calf): Mid  (01/29/20 0829) Circumference of Ankle (cm): 26.5 cm (01/29/20 0829) Location of Measurement (Ankle): Upper  (01/29/20 0829)

## 2020-01-29 NOTE — DISCHARGE INSTRUCTIONS
Discharge Instructions for  [facility]  [address], [address] [facility]  Telephone: [phone]    NAME:  Katharine Babcock. YOB: 1965  MEDICAL RECORD NUMBER:  [id number]  DATE:  1/29/2020    Wound Cleansing:   Do not scrub or use excessive force. Cleanse wound prior to applying a clean dressing with:  [x] Normal Saline [] Keep Wound Dry in Shower    [] Wound Cleanser   [] Cleanse wound with Mild Soap & Water  [] May Shower at Discharge   [] Other:      Topical Treatments:  Do not apply lotions, creams, or ointments to wound bed unless directed. [x] Apply moisturizing lotion to skin surrounding the wound prior to dressing change.  [] Apply antifungal ointment to skin surrounding the wound prior to dressing change.  [] Apply thin film of moisture barrier ointment to skin immediately around wound. [] Other:       Dressings:           Wound Location sarum   [x] Apply Primary Dressing:       [] MediHoney Gel [x] Alginate with Silver [] Alginate   [] Collagen [] Collagen with Silver   [] Santyl with Moisten saline gauze     [] Hydrocolloid   [] MediHoney Alginate [] Foam with Silver   [] Foam   [] Hydrofera Blue    [] Mepilex Border    [] Moisten with Saline [] Hydrogel [] Mepitel     [] Bactroban/Mupirocin [] Polysporin  [] Other:    [] Pack wound loosely with  [] Iodoform   [] Plain Packing  [] Other   [x] Cover and Secure with:     [x] Gauze or [] Yari [] Kerlix   [] Ace Wrap [x]Tape [x] ABD  or    [x] Other: Border foam   Avoid contact of tape with skin. [x] Change dressing: [x] Daily    [] Every Other Day [] Three times per week   [] Once a week [] Do Not Change Dressing   [] Other:    Pressure Relief:  [x] When sitting, shift position or do seat lifts every 15 minutes.  [] Wheelchair cushion [] Specialty Bed/Mattress  [x] Turn every 2 hours when in bed. Avoid position directing pressure on wound site.   Limit side lying to 30 degree tilt.  Limit HOB elevation to 30 degrees. Edema Control:  Apply: [x] Compression Stocking [x]Right Leg [x]Left Leg   [] Tubigrip []Right Leg Double Layer []Left Leg Double Layer       []Right Leg Single Layer []Left Leg Single Layer   [] SpandaGrip []Right Leg  []Left Leg      []Low compression 5-10 mm/Hg      []Medium compression 10-20 mm/Hg     []High compression  20-30 mm/Hg   every morning immediately when getting up should be applied to affected leg(s) from mid foot to knee making sure to cover the heel. Remove every night before going to bed. [x] Elevate leg(s) above the level of the heart when sitting. [x] Avoid prolonged standing in one place.    [] Elevate arm/hand above the level of the heart []RightArm []LeftArm         Off-Loading:   [x] Off-loading when [] walking  [x] in bed [x] sitting  [] Total non-weight bearing  [] Right Leg  [] Left Leg   [x] Assistive Device [] Walker [] Cane  [] Wheelchair  [] Crutches   [] Surgical shoe    [] Podus Boot(s)   [] Foam Boot(s)  [] Roll About    [] Cast Boot [] CROW Boot  [x] Other: Donut cushion    Dietary:  [x] Diet as tolerated: [] Calorie Diabetic Diet: [x] No Added Salt:  [] Increase Protein: [] Other:   Activity:  [x] Activity as tolerated:  [] Patient has no activity restrictions     [] Strict Bedrest: [] Remain off Work:     [] May return to full duty work:                                   [] Return to work with restrictions:             Return Appointment:  [] Wound and dressing supply provider:   [] ECF or Home Healthcare:  [] Wound Assessment: [] Physician or NP scheduled for Wound Assessment:   [x] Return Appointment: With DR Karely Colmenares  in  1 Week(s)  [] Ordered tests:      Electronically signed Riana Ruiz RN on 1/29/2020 at [facility]: Should you experience any significant changes in your wound(s) or have questions about your wound care, please contact the [address] at [address] [place] [address] 8:00 am - 4:30. If you need help with your wound outside these hours and cannot wait until we are again available, contact your PCP or go to the hospital emergency room. PLEASE NOTE: IF YOU ARE UNABLE TO OBTAIN WOUND SUPPLIES, CONTINUE TO USE THE SUPPLIES YOU HAVE AVAILABLE UNTIL YOU ARE ABLE TO REACH US. IT IS MOST IMPORTANT TO KEEP THE WOUND COVERED AT ALL TIMES.      Physician Signature:_______________________    Date: ___________ Time:  ____________

## 2020-01-29 NOTE — PROGRESS NOTES
Wound Center Progress Note 
  
Date of Service: 20  
  
Date of Initial Visit for Current Problem:  20 
  
Subjective:  
  
Chief Complaint: 
Yasmin Rojas Jr. is a 53 y.o.  male who presents with L lower leg wounds of several months duration. Kishor Roe also has a right buttock pressure ulcer, despite the fact that he states that he works on his feet 9 hours a day. Kishor Roe is S/P surgery for a dissecting aortic aneurysm, and has kidney failure. 
  
Referred by:  Dr. Amber Alaniz 
  
HPI:    
Wound caused by: chronic pressure/irritation (buttocks) Current wound care: 
Offloading wound: no Appetite: good Wound associated pain: mild Diabetic: Maybe Smoker: No 
  
       
Past Medical History:  
Diagnosis Date  Chronic kidney disease    
 Diabetes ([facility] [address] [address]    
  Denies Hx Diabetes  Hypertension    
 Ill-defined condition    
  Patient uses CPAP  
 Ill-defined condition 2017  
  Aortic Disection  
  
         
Past Surgical History:  
Procedure Laterality Date  CARDIAC SURG PROCEDURE UNLIST   2017  
  Asending Aortic Replacement  FLEXIBLE SIGMOIDOSCOPY N/A 2017  
  SIGMOIDOSCOPY FLEXIBLE performed by Anita Spaulding MD at [facility] ENDOSCOPY  
  
No family history on file.  
Social History  
  
         
Tobacco Use  Smoking status: Former Smoker  
    Packs/day: 0.25  
    Years: 5.00  
    Pack years: 1.25  
    Last attempt to quit: 2012  
    Years since quittin.7  Smokeless tobacco: Former User Substance Use Topics  Alcohol use: No  
  
             
Prior to Admission medications Medication Sig Start Date End Date Taking? Authorizing Provider  
hydrALAZINE (APRESOLINE) 50 mg tablet TAKE 2 TABLETS BY MOUTH THREE TIMES DAILY 20   Yes Carlos Johns MD  
trimethoprim-sulfamethoxazole (BACTRIM)  mg per tablet Take 1 Tab by mouth two (2) times a day.  20   Yes Doron Willis PA-C  
levoFLOXacin (LEVAQUIN) 750 mg tablet Take 1 tablet by mouth every other day. 1/4/20   Yes Doron Willis PA-C  
amLODIPine (NORVASC) 10 mg tablet TAKE 1 TABLET EVERY DAY FOR BLOOD PRESSURE 10/28/19   Yes La Streeter NP  
minoxidil (LONITEN) 10 mg tablet TAKE 1 TABLET EVERY DAY 8/29/19   Yes Moy Gilliland MD  
CATAPRES-TTS-3 0.3 mg/24 hr APPLY 1 PATCH EVERY 7 DAYS 2/27/19   Yes Moy Gilliland MD  
metoprolol tartrate (LOPRESSOR) 100 mg IR tablet TAKE 1 TABLET BY MOUTH TWO (2) TIMES A DAY. EVERY 12 HOURS FOR HEART AND BLOOD PRESSURE 2/19/19   Yes Moy Gilliland MD  
bumetanide (BUMEX) 1 mg tablet Take 2 mg by mouth two (2) times a day. 7/23/17   Yes Provider, Osiris  
potassium chloride (K-DUR, KLOR-CON) 20 mEq tablet Take 1 Tab by mouth two (2) times a day. 3/22/17   Yes Anya Piedra NP  
aspirin 81 mg chewable tablet Take 1 Tab by mouth daily. Patient taking differently: Take 325 mg by mouth daily. 2/27/17   Yes GAETANO Arvizu  
  
No Known Allergies  
  
Review of Systems: A comprehensive review of systems was negative except for that written in the History of Present Illness.  
  
Objective:  
  
Physical Exam:  
  
Visit Vitals: VSS, Afebrile 
     
     
     
     
  
General: well developed, well nourished, pleasant , NAD. Hygiene good Psych: cooperative. Pleasant. Neuro: alert and oriented to person/place/situation. Otherwise nonfocal. 
HEENT: Normocephalic, atraumatic. EOMI. Conjunctiva clear. No scleral icterus. Chest: Respirations nonlabored. Abdomen:  Nondistended. 
  
.  
  
Focused Lower Extremity Exam: 
Good pulses, bilateral lower extremity edema. 
  
Ulcer Location: Buttocks R gluteal  
Etiology: combined Wound: 3.1 x 1.8 x 0.4 cm Ulcer bed: Mixed Granular/Fibrotic   
Periwound: Erythematous, macerated.l Exudate: Scant/small amount Serous exudate Depth of Wound: To Fat Layer  
  
Ulcer Location: Left lower leg anterior  
Etiology: venous stasis Wound:  Healed! 
  
Assessment:  
  
 53 y.o. male with Right buttocks decubitus, improving rapidly. He is also obese, with chronic kidney disease. Plan:  
  
Dressing: ACAG, Exudry, border foam to buttocks, not debrided. Frequency: every day. Barrier cream to periwound. 
  
Patient understood and agrees with plan. Questions answered. 
  
Weekly visits and serial debridements also discussed. Follow up in 1 week He should bring/wear his compression stockings. 
  
Signed By: Jared Singletary MD

## 2020-01-29 NOTE — WOUND CARE
01/29/20 8763 Wound Buttocks Right 01/04/20 Date First Assessed/Time First Assessed: 01/04/20 1838   Present on Hospital Admission: Yes  Primary Wound Type: Pressure Injury  Location: Buttocks  Wound Location Orientation: Right  Date of First Observation: (c) 01/04/20 Dressing Status (open to air) Dressing Type Open to air Wound Length (cm) 3.1 cm 
(noted rash  mostly on left buttock area) Wound Width (cm) 1.8 cm Wound Depth (cm) 0.4 cm Wound Surface Area (cm^2) 5.58 cm^2 Wound Volume (cm^3) 2.23 cm^3 Condition of Base Pink;Slough; Hypergranulation Condition of Edges Open Drainage Amount Small Drainage Color Serous Wound Odor None Grisel-wound Assessment Intact Cleansing and Cleansing Agents  Normal saline Wound Leg Left; Lower Circumferential, Scattered wounds (Clustered wound measurement) 01/04/20 Date First Assessed/Time First Assessed: (c) 01/04/20 1841   Present on Hospital Admission: Yes  Primary Wound Type: Venous  Location: Leg  Wound Location Orientation: (c) Left; Lower  Wound Description: Circumferential, Scattered wounds (Clustered wou. .. Dressing Type Applied  
(double tubi size G) Double tubi size G to bilateral LE, Calmoseptine to reddened area on buttock. Has follow up appointment to return next Tuesday 2/4/20 and will see Dr Mira Correia.

## 2020-02-04 NOTE — WOUND CARE
02/04/20 5569 Wound Leg Left; Lower Circumferential, Scattered wounds (Clustered wound measurement) 01/04/20 Date First Assessed/Time First Assessed: (c) 01/04/20 1841   Present on Hospital Admission: Yes  Primary Wound Type: Venous  Location: Leg  Wound Location Orientation: (c) Left; Lower  Wound Description: Circumferential, Scattered wounds (Clustered wou. .. Dressing Status Removed Dressing Type Gauze 
(tubi ) Wound Length (cm) 0.7 cm Wound Width (cm) 1 cm Wound Depth (cm) 0.1 cm Wound Surface Area (cm^2) 0.7 cm^2 Wound Volume (cm^3) 0.07 cm^3 Condition of Base Pink Condition of Edges Open Drainage Amount Small Drainage Color Serous Wound Odor None Grisel-wound Assessment Intact Cleansing and Cleansing Agents  Normal saline Wound Buttocks Right 01/04/20 Date First Assessed/Time First Assessed: 01/04/20 1838   Present on Hospital Admission: Yes  Primary Wound Type: Pressure Injury  Location: Buttocks  Wound Location Orientation: Right  Date of First Observation: (c) 01/04/20 Dressing Status Removed Dressing Type (Aquacel Ag; bandaid) Wound Length (cm) 2.6 cm Wound Width (cm) 1.1 cm Wound Depth (cm) 0.2 cm Wound Surface Area (cm^2) 2.86 cm^2 Wound Volume (cm^3) 0.57 cm^3 Condition of Base Pink Condition of Edges Open Drainage Amount Moderate Drainage Color Serous Wound Odor None Grisel-wound Assessment Blanchable erythema Cleansing and Cleansing Agents  Normal saline Visit Vitals /56 (BP 1 Location: Right arm) Pulse 76 Temp 98.6 °F (37 °C) Resp 18 LLE Peripheral Vascular Capillary Refill: Less than/equal to 3 seconds (02/04/20 0841) Color: Pink; Appropriate for race (02/04/20 0841) Temperature: Warm (02/04/20 0841) Sensation: Present (02/04/20 0841) Pedal Pulse: Palpable (02/04/20 0841) Circumference of Calf (cm): 49 cm (02/04/20 0841) Location of Measurement (Calf): Mid  (02/04/20 0841) Circumference of Ankle (cm): 27.5 cm (02/04/20 0841) Location of Measurement (Ankle): Upper  (02/04/20 0841) RLE Peripheral Vascular Capillary Refill: Less than/equal to 3 seconds (02/04/20 0841) Color: Appropriate for race (02/04/20 0841) Temperature: Warm (02/04/20 0841) Sensation: Present (02/04/20 0841) Pedal Pulse: Palpable (02/04/20 0841) Circumference of Calf (cm): 49 cm (02/04/20 0841) Location of Measurement (Calf): Mid  (02/04/20 0841) Circumference of Ankle (cm): 29.5 cm (02/04/20 0841) Location of Measurement (Ankle): Upper  (02/04/20 0841)

## 2020-02-04 NOTE — DISCHARGE INSTRUCTIONS
Discharge Instructions for  [facility]  [address], [address] [place] [address]  Telephone: [phone]    NAME:  Niharika Bernabe. YOB: 1965  MEDICAL RECORD NUMBER:  [id number]  DATE:  2/4/2020    Wound Cleansing:   Do not scrub or use excessive force. Cleanse wound prior to applying a clean dressing with:  [x] Normal Saline [] Keep Wound Dry in Shower    [] Wound Cleanser   [x] Cleanse wound with Mild Soap & Water  [] May Shower at Discharge   [x] Other:  May cleanse w/Normal saline or Mild soap and water         Dressings:           Wound Location - Left Lower Leg & Right Buttock  [] Apply Primary Dressing: Left Lower Leg-Xeroform Gauze, Cover with ABD pad or Exudry pad             Right Buttock- Silver alginate,  Cover w/Bordered foam dressing     [x]  Secure with:     [x] Yari/Roll Gauze         Avoid contact of tape with skin. [x] Change dressing:     [x] Left lower leg - Every Other Day [x] Right Buttock wound-Three times per week         Edema Control:  Apply: [] Compression Stocking []Right Leg []Left Leg   [x] Tubigrip [x]Right Leg Double Layer, Size G  [x]Left Leg Double Layer, Size G       Apply tubi  every morning immediately after getting out of bed. Remove every night before going to bed. [x] Elevate leg(s) above the level of the heart when sitting. [x] Avoid prolonged standing in one place.       Dietary:  [] Diet as tolerated: [x]  Low Carbohydrate [x] No Added Salt:  [] Increase Protein: [] Other:   Activity:  [x] Activity as tolerated:  [] Patient has no activity restrictions     [] Strict Bedrest: [] Remain off Work:     [] May return to full duty work:                                   [] Return to work with restrictions:             Return Appointment:  [] Wound and dressing supply provider:   [] ECF or Home Healthcare:  [] Wound Assessment: [] Physician or NP scheduled for Wound Assessment:   [x] Return Appointment: With Dr. Regino Ardon  in [facility])  [] Ordered tests:      Electronically signed Pura Hernandez RN on 2/4/2020 at 9:13 AM     [address]: Should you experience any significant changes in your wound(s) or have questions about your wound care, please contact the [facility] Main at [address] [place] [address] 8:00 am - 4:30. If you need help with your wound outside these hours and cannot wait until we are again available, contact your PCP or go to the hospital emergency room. PLEASE NOTE: IF YOU ARE UNABLE TO OBTAIN WOUND SUPPLIES, CONTINUE TO USE THE SUPPLIES YOU HAVE AVAILABLE UNTIL YOU ARE ABLE TO REACH US.  IT IS MOST IMPORTANT TO KEEP THE WOUND COVERED AT ALL

## 2020-02-04 NOTE — WOUND CARE
02/04/20 4398 Wound Leg Left; Lower Circumferential, Scattered wounds (Clustered wound measurement) 01/04/20 Date First Assessed/Time First Assessed: (c) 01/04/20 1841   Present on Hospital Admission: Yes  Primary Wound Type: Venous  Location: Leg  Wound Location Orientation: (c) Left; Lower  Wound Description: Circumferential, Scattered wounds (Clustered wou. .. Dressing Status Removed Dressing Type Gauze 
(tubi ) Wound Length (cm) 0.7 cm Wound Width (cm) 1 cm Wound Depth (cm) 0.1 cm Wound Surface Area (cm^2) 0.7 cm^2 Wound Volume (cm^3) 0.07 cm^3 Condition of Base Pink Condition of Edges Open Drainage Amount Small Drainage Color Serous Wound Odor None Grisel-wound Assessment Intact Cleansing and Cleansing Agents  Normal saline Dressing Changed Changed/New Dressing Type Applied Xeroform;Gauze;Gauze wrap (ant); Special tape (comment) (double tubi  size G) Wound Buttocks Right 01/04/20 Date First Assessed/Time First Assessed: 01/04/20 1838   Present on Hospital Admission: Yes  Primary Wound Type: Pressure Injury  Location: Buttocks  Wound Location Orientation: Right  Date of First Observation: (c) 01/04/20 Dressing Status Removed Dressing Type (Aquacel Ag; bandaid) Wound Length (cm) 2.6 cm Wound Width (cm) 1.1 cm Wound Depth (cm) 0.2 cm Wound Surface Area (cm^2) 2.86 cm^2 Wound Volume (cm^3) 0.57 cm^3 Condition of Base Pink Condition of Edges Open Drainage Amount Moderate Drainage Color Serous Wound Odor None Grisel-wound Assessment Blanchable erythema Cleansing and Cleansing Agents  Normal saline Dressing Changed Changed/New Dressing Type Applied (Aquacel Ag; Bordered foam after cleansing with Normal Saline) Discharge Condition: Stable Pain: 0 Ambulatory Status: Walking Discharge Destination: Home Transportation: Car Accompanied by: Self Discharge instructions reviewed with Patient and copy or written instructions have been provided. All questions/concerns have been addressed at this time.

## 2020-02-12 NOTE — DISCHARGE INSTRUCTIONS
Discharge Instructions for  [place] [id number] [facility], [address] [facility]  Telephone: [phone]    NAME:  Ramos Miguel  YOB: 1965  MEDICAL RECORD NUMBER:  [id number]  DATE:  2/12/2020      WOUND CARE ORDERS:  Left lower leg wounds - cleanse with saline, apply xeroform, abd pad and unna boot to Tomas Lower leg. Do not remove or wet the dressing. Right buttocks wound - cleanse with saline, apply aquacel AG, cover with bordered foam dressing, change dressing 3 x week. Refer lymphedema clinic. F/U in 1week    TREATMENT ORDERS:    Elevate leg(s) above the level of the heart when sitting. Avoid prolonged standing in one place. Do no get dressing/wrap wet. Follow Diet as prescribed:   [x] Diet as tolerated: [] Calorie Diabetic Diet: [x] No Added Salt:  [] Increase Protein: [] Other:             Return Appointment:  [x] Return Appointment: With DR Chad Fitch  in  1 [facility])  [] Nurse Visit :   [] Ordered tests:      Electronically signed Marguerite Toro RN on 2/12/2020 at 8:37 AM     Mary [place] Calzada 281: Should you experience any significant changes in your wound(s) or have questions about your wound care, please contact the [address] [place] [address] at [address] [place] [address] 8:00 am - 4:30. If you need help with your wound outside these hours and cannot wait until we are again available, contact your PCP or go to the hospital emergency room. PLEASE NOTE: IF YOU ARE UNABLE TO OBTAIN WOUND SUPPLIES, CONTINUE TO USE THE SUPPLIES YOU HAVE AVAILABLE UNTIL YOU ARE ABLE TO REACH US. IT IS MOST IMPORTANT TO KEEP THE WOUND COVERED AT ALL TIMES.      Physician Signature:_______________________    Date: ___________ Time:  ____________

## 2020-02-12 NOTE — PROGRESS NOTES
Wound Center Progress Note 
  
Date of Service: 20  
  
Date of Initial Visit for Current Problem:  20 
  
Subjective:  
  
Chief Complaint: 
Marilee Felipe Jr. is a 53 y.o.  male who presents with L lower leg wounds of several months duration. Mamadou Crespo also has a right buttock pressure ulcer, despite the fact that he states that he works on his feet 9 hours a day. Mamadou Crespo is S/P surgery for a dissecting aortic aneurysm, and has kidney failure. 
  
Referred by:  Dr. China Zamora 
  
HPI:    
Wound caused by: chronic pressure/irritation (buttocks) Current wound care: 
Offloading wound: no Appetite: good Wound associated pain: mild Diabetic: Maybe Smoker: No 
  
       
Past Medical History:  
Diagnosis Date  Chronic kidney disease    
 Diabetes ([facility] [address]    
  Denies Hx Diabetes  Hypertension    
 Ill-defined condition    
  Patient uses CPAP  
 Ill-defined condition 2017  
  Aortic Disection  
  
         
Past Surgical History:  
Procedure Laterality Date  CARDIAC SURG PROCEDURE UNLIST   2017  
  Asending Aortic Replacement  FLEXIBLE SIGMOIDOSCOPY N/A 2017  
  SIGMOIDOSCOPY FLEXIBLE performed by Rodolfo Howard MD at [facility] ENDOSCOPY  
  
No family history on file.  
Social History  
  
         
Tobacco Use  Smoking status: Former Smoker  
    Packs/day: 0.25  
    Years: 5.00  
    Pack years: 1.25  
    Last attempt to quit: 2012  
    Years since quittin.7  Smokeless tobacco: Former User Substance Use Topics  Alcohol use: No  
  
             
Prior to Admission medications Medication Sig Start Date End Date Taking? Authorizing Provider  
hydrALAZINE (APRESOLINE) 50 mg tablet TAKE 2 TABLETS BY MOUTH THREE TIMES DAILY 20   Yes Violet Coello MD  
trimethoprim-sulfamethoxazole (BACTRIM)  mg per tablet Take 1 Tab by mouth two (2) times a day.  20   Yes Doron Willis PA-C  
levoFLOXacin (LEVAQUIN) 750 mg tablet Take 1 tablet by mouth every other day. 1/4/20   Yes Doron Willis PA-C  
amLODIPine (NORVASC) 10 mg tablet TAKE 1 TABLET EVERY DAY FOR BLOOD PRESSURE 10/28/19   Yes Yessenia Calderon NP  
minoxidil (LONITEN) 10 mg tablet TAKE 1 TABLET EVERY DAY 8/29/19   Yes Raymond Brown MD  
HQMVAUQL-IPY-1 0.3 mg/24 hr APPLY 1 PATCH EVERY 7 DAYS 2/27/19   Yes Raymond Brown MD  
metoprolol tartrate (LOPRESSOR) 100 mg IR tablet TAKE 1 TABLET BY MOUTH TWO (2) TIMES A DAY. EVERY 12 HOURS FOR HEART AND BLOOD PRESSURE 2/19/19   Yes Raymond Brown MD  
bumetanide (BUMEX) 1 mg tablet Take 2 mg by mouth two (2) times a day. 7/23/17   Yes Provider, Osiris  
potassium chloride (K-DUR, KLOR-CON) 20 mEq tablet Take 1 Tab by mouth two (2) times a day. 3/22/17   Yes Mathew Aguilera NP  
aspirin 81 mg chewable tablet Take 1 Tab by mouth daily. Patient taking differently: Take 325 mg by mouth daily. 2/27/17   Yes GAETANO Manuel  
  
No Known Allergies  
  
Review of Systems: A comprehensive review of systems was negative except for that written in the History of Present Illness.  
  
Objective:  
  
Physical Exam:  
  
Visit Vitals: VSS, Afebrile 
     
     
     
     
  
General: well developed, well nourished, pleasant , NAD. Hygiene good Psych: cooperative. Pleasant. Neuro: alert and oriented to person/place/situation. Otherwise nonfocal. 
HEENT: Normocephalic, atraumatic. EOMI. Conjunctiva clear. No scleral icterus. Chest: Respirations nonlabored. Abdomen:  Nondistended. 
  
.  
  
Focused Lower Extremity Exam: 
Good pulses, bilateral lower extremity edema. 
  
Ulcer Location: Buttocks R gluteal  
Etiology: combined Wound: 2.3 x 0.7 x 0.2 cm - smaller! Ulcer bed: Mixed Granular/Fibrotic   
Periwound: Erythematous, macerated.l Exudate: Scant/small amount Serous exudate Depth of Wound: To Fat Layer  
  
Ulcer Location: Left lower leg posterior proximal 
Etiology: venous stasis Wound:  1.6 x 1.5 x 0.1 cm 
  
 Ulcer Location: Left lower leg anterior Etiology: venous stasis Wound:  0.5 x 1.2 x 0.2 cm 
  
 
Assessment:  
  
53 y. o. male with Right buttocks decubitus, improving rapidly. He is also obese, with chronic kidney disease. He has chronic lymphedema of both lower extremities. (I89.0) Plan:  
Xeroform, Unna boot, to bilateral lower extremities. Dressing: ACAG, Exudry, border foam to buttocks, not debrided. Frequency: every day. Barrier cream to periwound. Referral to lymphedema clinic. 
  
Patient understood and agrees with plan. Questions answered. 
  
Weekly visits and serial debridements also discussed. Follow up in 1 week He should bring/wear his compression stockings. 
  
Signed By: Geraline Phalen, MD

## 2020-02-12 NOTE — WOUND CARE
02/12/20 0714 Wound Buttocks Right 01/04/20 Date First Assessed/Time First Assessed: 01/04/20 1838   Present on Hospital Admission: Yes  Primary Wound Type: Pressure Injury  Location: Buttocks  Wound Location Orientation: Right  Date of First Observation: (c) 01/04/20 Dressing Status Removed Dressing Type Aquacel 
(border gauze) Wound Length (cm) 2.3 cm Wound Width (cm) 0.7 cm Wound Depth (cm) 0.2 cm Wound Surface Area (cm^2) 1.61 cm^2 Wound Volume (cm^3) 0.32 cm^3 Condition of Base Pink Condition of Edges Open Drainage Amount Moderate Drainage Color Serosanguinous Wound Odor None Grisel-wound Assessment Blanchable erythema Cleansing and Cleansing Agents  Normal saline

## 2020-02-19 NOTE — PROGRESS NOTES
Wound Center Progress Note 
  
Date of Service: 20  
  
Date of Initial Visit for Current Problem:  20 
  
Subjective:  
  
Chief Complaint: 
Belkis Rodriguez Jr. is a 53 y.o.  male who presents with L lower leg wounds of several months duration. Kalie Sneed also has a right buttock pressure ulcer, despite the fact that he states that he works on his feet 9 hours a day. Kalie Sneed is S/P surgery for a dissecting aortic aneurysm, and has kidney failure. 
  
Referred by:  Dr. Junior Barfield 
  
HPI:    
Wound caused by: chronic pressure/irritation (buttocks) Current wound care: 
Offloading wound: no Appetite: good Wound associated pain: mild Diabetic: Maybe Smoker: No 
  
       
Past Medical History:  
Diagnosis Date  Chronic kidney disease    
 Diabetes ([facility] [address]    
  Denies Hx Diabetes  Hypertension    
 Ill-defined condition    
  Patient uses CPAP  
 Ill-defined condition 2017  
  Aortic Disection  
  
         
Past Surgical History:  
Procedure Laterality Date  CARDIAC SURG PROCEDURE UNLIST   2017  
  Asending Aortic Replacement  FLEXIBLE SIGMOIDOSCOPY N/A 2017  
  SIGMOIDOSCOPY FLEXIBLE performed by Larose Curling, MD at [facility] ENDOSCOPY  
  
No family history on file.  
Social History  
  
         
Tobacco Use  Smoking status: Former Smoker  
    Packs/day: 0.25  
    Years: 5.00  
    Pack years: 1.25  
    Last attempt to quit: 2012  
    Years since quittin.7  Smokeless tobacco: Former User Substance Use Topics  Alcohol use: No  
  
             
Prior to Admission medications Medication Sig Start Date End Date Taking? Authorizing Provider  
hydrALAZINE (APRESOLINE) 50 mg tablet TAKE 2 TABLETS BY MOUTH THREE TIMES DAILY 20   Yes Cassy Martinez MD  
trimethoprim-sulfamethoxazole (BACTRIM)  mg per tablet Take 1 Tab by mouth two (2) times a day.  20   Yes Doron Willis PA-C  
levoFLOXacin (LEVAQUIN) 750 mg tablet Take 1 tablet by mouth every other day. 1/4/20   Yes Doron Willis PA-C  
amLODIPine (NORVASC) 10 mg tablet TAKE 1 TABLET EVERY DAY FOR BLOOD PRESSURE 10/28/19   Yes Lenora Dickson NP  
minoxidil (LONITEN) 10 mg tablet TAKE 1 TABLET EVERY DAY 8/29/19   Yes Swapnil Wooten MD  
CATAPRES-TTS-3 0.3 mg/24 hr APPLY 1 PATCH EVERY 7 DAYS 2/27/19   Yes Swapnil Wooten MD  
metoprolol tartrate (LOPRESSOR) 100 mg IR tablet TAKE 1 TABLET BY MOUTH TWO (2) TIMES A DAY. EVERY 12 HOURS FOR HEART AND BLOOD PRESSURE 2/19/19   Yes Swapnil Wooten MD  
bumetanide (BUMEX) 1 mg tablet Take 2 mg by mouth two (2) times a day. 7/23/17   Yes Provider, Osiris  
potassium chloride (K-DUR, KLOR-CON) 20 mEq tablet Take 1 Tab by mouth two (2) times a day. 3/22/17   Yes Katlyn Mcwilliams NP  
aspirin 81 mg chewable tablet Take 1 Tab by mouth daily. Patient taking differently: Take 325 mg by mouth daily. 2/27/17   Yes GAETANO Sepulveda  
  
No Known Allergies  
  
Review of Systems: A comprehensive review of systems was negative except for that written in the History of Present Illness.  
  
Objective:  
  
Physical Exam:  
  
Visit Vitals: VSS, Afebrile 
     
     
     
     
  
General: well developed, well nourished, pleasant , NAD. Hygiene good Psych: cooperative. Pleasant. Neuro: alert and oriented to person/place/situation. Otherwise nonfocal. 
HEENT: Normocephalic, atraumatic. EOMI. Conjunctiva clear. No scleral icterus. Chest: Respirations nonlabored. Abdomen:  Nondistended. 
  
.  
  
Focused Lower Extremity Exam: 
Good pulses, bilateral lower extremity edema. 
  
Ulcer Location: Buttocks R gluteal  
Etiology: combined Wound: 1.8 x 0.4 x 0.1 cm - smaller! Ulcer bed: Mixed Granular/Fibrotic   
Periwound: Erythematous, macerated.l Exudate: Scant/small amount Serous exudate Depth of Wound: To Fat Layer  
  
Ulcer Location: Left lower leg posterior cluster Etiology: venous stasis Wound:  14.5 x 13.5 x 0.1 cm - larger, more small wounds, and cellulitic. 
  
Assessment:  
  
53 y. o. male with Right buttocks decubitus, improving rapidly. He is also obese, with chronic kidney disease. 
  
He has chronic lymphedema of both lower extremities. (I89.0). The Unna boot seems to have been attacked by something (scratching?) but he claims nothing happened. He has new puncture wounds and cellulitis. Plan:  
Aquacell Ag, double tubigrip to bilateral lower extremities. Dressing: ACAG, Exudry, border foam to buttocks, not debrided. Frequency: every other day. Barrier cream to periwound. Prescription for Keflex 500 mg qid for a week given. 
  
Referral to lymphedema clinic done. 
  
Patient understood and agrees with plan. Questions answered. 
  
Weekly visits and serial debridements also discussed. Follow up in 1 week He should bring/wear his compression stockings. 
  
Signed By: Yanely Gold MD

## 2020-02-19 NOTE — DISCHARGE INSTRUCTIONS
Discharge Instructions for  [facility]  [address] [facility], [address] [facility]  Telephone: [phone]    NAME:  Maria T Garay  YOB: 1965  MEDICAL RECORD NUMBER:  [id number]  DATE:  2/19/2020      WOUND CARE ORDERS:  Left lower leg wounds - cleanse with saline, apply aquacel Ag, abd pad and Roll gauze. Double tubi to Tomas Lower leg. Change 3 X week.       Right buttocks wound - cleanse with saline, apply aquacel AG, cover with bordered foam dressing, change dressing 3 x week. Refer lymphedema clinic. F/U in 1week    TREATMENT ORDERS:    Elevate leg(s) above the level of the heart when sitting. Avoid prolonged standing in one place. Do no get dressing/wrap wet. Follow Diet as prescribed:   [x] Diet as tolerated: [] Calorie Diabetic Diet: [x] No Added Salt:  [] Increase Protein: [] Other:                 Return Appointment:  [x] Return Appointment: With DR Priyank Collado  in  1 [facility])  [] Nurse Visit :   [] Ordered tests:      Electronically signed Shun Mayfield RN on 2/19/2020 at 9:28 AM     Mary Kate Calzada 281: Should you experience any significant changes in your wound(s) or have questions about your wound care, please contact the [address] [place] [address] at [address] [place] [address] 8:00 am - 4:30. If you need help with your wound outside these hours and cannot wait until we are again available, contact your PCP or go to the hospital emergency room. PLEASE NOTE: IF YOU ARE UNABLE TO OBTAIN WOUND SUPPLIES, CONTINUE TO USE THE SUPPLIES YOU HAVE AVAILABLE UNTIL YOU ARE ABLE TO REACH US. IT IS MOST IMPORTANT TO KEEP THE WOUND COVERED AT ALL TIMES.      Physician Signature:_______________________    Date: ___________ Time:  ____________

## 2020-02-26 NOTE — WOUND CARE
Patient was 1 hour and 15 minutes late (second late arrival) he was informed that in future if he is greater than 15 minutes late appointment will have to be rescheduled. He verbalized understanding.

## 2020-02-26 NOTE — PROGRESS NOTES
Wound Center Progress Note 
  
Date of Service: 20  
  
Date of Initial Visit for Current Problem:  20 
  
Subjective:  
  
Chief Complaint: 
Marilee Felipe Jr. is a 53 y.o.  male who presents with L lower leg wounds of several months duration. Mamadou Crespo also has a right buttock pressure ulcer, despite the fact that he states that he works on his feet 9 hours a day. Mamadou Crespo is S/P surgery for a dissecting aortic aneurysm, and has kidney failure. He is constantly late for his appointments. 
  
Referred by:  Dr. China Zamora 
  
HPI:    
Wound caused by: chronic pressure/irritation (buttocks) Current wound care: 
Offloading wound: no Appetite: good Wound associated pain: mild Diabetic: Maybe Smoker: No 
  
       
Past Medical History:  
Diagnosis Date  Chronic kidney disease    
 Diabetes ([facility] [address]    
  Denies Hx Diabetes  Hypertension    
 Ill-defined condition    
  Patient uses CPAP  
 Ill-defined condition 2017  
  Aortic Disection  
  
         
Past Surgical History:  
Procedure Laterality Date  CARDIAC SURG PROCEDURE UNLIST   2017  
  Asending Aortic Replacement  FLEXIBLE SIGMOIDOSCOPY N/A 2017  
  SIGMOIDOSCOPY FLEXIBLE performed by Rodolfo Howard MD at [facility] ENDOSCOPY  
  
No family history on file.  
Social History  
  
         
Tobacco Use  Smoking status: Former Smoker  
    Packs/day: 0.25  
    Years: 5.00  
    Pack years: 1.25  
    Last attempt to quit: 2012  
    Years since quittin.7  Smokeless tobacco: Former User Substance Use Topics  Alcohol use: No  
  
             
Prior to Admission medications Medication Sig Start Date End Date Taking? Authorizing Provider  
hydrALAZINE (APRESOLINE) 50 mg tablet TAKE 2 TABLETS BY MOUTH THREE TIMES DAILY 20   Yes Violet Coello MD  
trimethoprim-sulfamethoxazole (BACTRIM)  mg per tablet Take 1 Tab by mouth two (2) times a day.  20   Yes Jose Dodd PA-C  
 levoFLOXacin (LEVAQUIN) 750 mg tablet Take 1 tablet by mouth every other day. 1/4/20   Yes Doron Willis PA-C  
amLODIPine (NORVASC) 10 mg tablet TAKE 1 TABLET EVERY DAY FOR BLOOD PRESSURE 10/28/19   Yes Kathrine Menendez NP  
minoxidil (LONITEN) 10 mg tablet TAKE 1 TABLET EVERY DAY 8/29/19   Yes Nena Sadler MD  
GTFBKYHB-MUK-0 0.3 mg/24 hr APPLY 1 PATCH EVERY 7 DAYS 2/27/19   Yes Nena Sadler MD  
metoprolol tartrate (LOPRESSOR) 100 mg IR tablet TAKE 1 TABLET BY MOUTH TWO (2) TIMES A DAY. EVERY 12 HOURS FOR HEART AND BLOOD PRESSURE 2/19/19   Yes Nena Sadler MD  
bumetanide (BUMEX) 1 mg tablet Take 2 mg by mouth two (2) times a day. 7/23/17   Yes Provider, Osiris  
potassium chloride (K-DUR, KLOR-CON) 20 mEq tablet Take 1 Tab by mouth two (2) times a day. 3/22/17   Yes Cem Guadarrama NP  
aspirin 81 mg chewable tablet Take 1 Tab by mouth daily. Patient taking differently: Take 325 mg by mouth daily. 2/27/17   Yes GAETANO Guzman  
  
No Known Allergies  
  
Review of Systems: A comprehensive review of systems was negative except for that written in the History of Present Illness.  
  
Objective:  
  
Physical Exam:  
  
Visit Vitals: VSS, Afebrile 
     
     
     
     
  
General: well developed, well nourished, pleasant , NAD. Hygiene good Psych: cooperative. Pleasant. Neuro: alert and oriented to person/place/situation. Otherwise nonfocal. 
HEENT: Normocephalic, atraumatic. EOMI. Conjunctiva clear. No scleral icterus. Chest: Respirations nonlabored. Abdomen:  Nondistended. 
  
.  
  
Focused Lower Extremity Exam: 
Good pulses, bilateral lower extremity edema. 
  
Ulcer Location: Buttocks R gluteal  
Etiology: combined Wound: 0.9 x 0.4 x 0.1 cm - smaller! Ulcer bed: Mixed Granular/Fibrotic   
Periwound: Erythematous, macerated.l Exudate: Scant/small amount Serous exudate Depth of Wound: To Fat Layer  
  
Ulcer Location: Left lower leg posterior cluster Etiology: venous stasis Wound:  10.4 x 8.2 x 0.1 cm - larger, more small wounds, and cellulitic. 
  
Assessment:  
  
53 y. o. male with Right buttocks decubitus, improving rapidly. He is also obese, with chronic kidney disease. 
  
He has chronic lymphedema of both lower extremities. (I89.0). The Unna boot seems to have been attacked by something (scratching?) but he claims nothing happened. He has new puncture wounds and cellulitis. 
  
Plan:  
Silver nitrate applied to all wounds on left lower extremity to trim back hypertrophic granulation tissue. Tolerated well. Aquacell Ag, double tubigrip to bilateral lower extremities. Dressing: ACAG, Exudry, border foam to buttocks, not debrided. Frequency: every other day. Barrier cream to periwound. 
  
Referral to lymphedema clinic done. 
  
Patient understood and agrees with plan. Questions answered. 
  
Weekly visits and serial debridements also discussed. Follow up in 1 week for nurse visit, 2 weeks with me.  
He should bring/wear his compression stockings. 
  
Signed By: To Pacheco MD

## 2020-02-26 NOTE — DISCHARGE INSTRUCTIONS
Discharge Instructions for  [facility]  [address] [facility]  Telephone: [phone]    NAME:  Isa Ruano  YOB: 1965  MEDICAL RECORD NUMBER:  [id number]  DATE:  2/26/2020      WOUND CARE ORDERS:  Left lower leg wounds - cleanse with saline, apply aquacel Ag, and 3 layer. Do not wet or Change for a week.    Right buttocks wound - cleanse with saline, apply aquacel AG, cover with bordered foam dressing, change dressing 3 x week. F/U in 2 week. NV 1 week      TREATMENT ORDERS:    Elevate leg(s) above the level of the heart when sitting. Avoid prolonged standing in one place. Do no get dressing/wrap wet. Follow Diet as prescribed:   [x] Diet as tolerated: [] Calorie Diabetic Diet: [x] No Added Salt:  [] Increase Protein: [] Other:                 Return Appointment:  [x] Return Appointment: With DR Ginny Morales  in  2 [facility])  [x] Nurse Visit : 1 week  [] Ordered tests:      Electronically signed Carmen Gaston RN on 2/26/2020 at 10:35 AM     [address] [place] [address] Information: Should you experience any significant changes in your wound(s) or have questions about your wound care, please contact the [address] [place] [address] at [address] [place] [address] 8:00 am - 4:30. If you need help with your wound outside these hours and cannot wait until we are again available, contact your PCP or go to the hospital emergency room. PLEASE NOTE: IF YOU ARE UNABLE TO OBTAIN WOUND SUPPLIES, CONTINUE TO USE THE SUPPLIES YOU HAVE AVAILABLE UNTIL YOU ARE ABLE TO REACH US. IT IS MOST IMPORTANT TO KEEP THE WOUND COVERED AT ALL TIMES.      Physician Signature:_______________________    Date: ___________ Time:  ____________

## 2020-02-26 NOTE — WOUND CARE
02/26/20 1012 Wound Buttocks Right 01/04/20 Date First Assessed/Time First Assessed: 01/04/20 1838   Present on Hospital Admission: Yes  Primary Wound Type: Pressure Injury  Location: Buttocks  Wound Location Orientation: Right  Date of First Observation: (c) 01/04/20 Dressing Status Removed Dressing Type  
(aquacel AG, foam border) Wound Length (cm) 0.9 cm Wound Width (cm) 0.4 cm Wound Depth (cm) 0.1 cm Wound Surface Area (cm^2) 0.36 cm^2 Wound Volume (cm^3) 0.04 cm^3 Condition of Base Pink Condition of Edges Open Drainage Amount Moderate Drainage Color Serosanguinous Wound Odor None Grisel-wound Assessment Intact Cleansing and Cleansing Agents  Normal saline Wound Leg lower Anterior; Left Date First Assessed/Time First Assessed: 02/12/20 0848   Present on Hospital Admission: Yes  Wound Approximate Age at First Assessment (Weeks): 1 weeks  Primary Wound Type: Venous  Location: Leg lower  Wound Location Orientation: Anterior; Left Dressing Status Removed Dressing Type (Agaucel AG, Gauze, roll gauze, tubigrip) Non-staged Wound Description Full thickness Wound Length (cm) 10.4 cm Wound Width (cm) 8.2 cm Wound Depth (cm) 0.1 cm Wound Surface Area (cm^2) 85.28 cm^2 Wound Volume (cm^3) 8.53 cm^3 Condition of Base Pink;Slough Condition of Edges Open Drainage Amount Moderate Drainage Color Serosanguinous Wound Odor None Grisel-wound Assessment Intact Cleansing and Cleansing Agents  Normal saline; Soap and water Visit Vitals /61 (BP 1 Location: Left arm, BP Patient Position: At rest;Sitting) Temp 97.8 °F (36.6 °C) Resp 16

## 2020-03-04 NOTE — WOUND CARE
03/04/20 1009 Wound Leg lower Left;Lateral #3 Date First Assessed/Time First Assessed: 02/19/20 1018   Present on Hospital Admission: Yes  Wound Approximate Age at First Assessment (Weeks): 1 weeks  Primary Wound Type: Vascular  Location: Leg lower  Wound Location Orientation: Left;Lateral  Wound. .. Dressing Status Removed Dressing Type Aquacel;Gauze; Compression Wrap/Venous Stasis Condition of Base Pink Condition of Edges Open Drainage Amount Moderate Drainage Color Serosanguinous Wound Odor None Grisel-wound Assessment Intact Cleansing and Cleansing Agents  Normal saline; Soap and water Dressing Type Applied Alginate;Silver products;Gauze; Compression Wrap/Venous Stasis 
(3 layer) Wound Leg lower Anterior; Left Date First Assessed/Time First Assessed: 02/12/20 0848   Present on Hospital Admission: Yes  Wound Approximate Age at First Assessment (Weeks): 1 weeks  Primary Wound Type: Venous  Location: Leg lower  Wound Location Orientation: Anterior; Left Dressing Status Removed Dressing Type Aquacel Condition of Base Pink Condition of Edges Open Drainage Amount Moderate Drainage Color Serosanguinous Wound Odor None Grisel-wound Assessment Intact Cleansing and Cleansing Agents  Normal saline Dressing Type Applied Alginate;Silver products Wound Buttocks Right 01/04/20 Date First Assessed/Time First Assessed: 01/04/20 1838   Present on Hospital Admission: Yes  Primary Wound Type: Pressure Injury  Location: Buttocks  Wound Location Orientation: Right  Date of First Observation: (c) 01/04/20 Dressing Status Removed Dressing Type Aquacel; Foam  
Condition of Base (Scab) Condition of Edges Open Drainage Amount None Wound Odor None Cleansing and Cleansing Agents  Normal saline Dressing Type Applied Alginate;Silver products; Foam 
(Border foam) Multilayer Compression Wrap 
 (Not Unna) Below the Knee NAME:  Juliette Iqbal 
YOB: 1965 MEDICAL RECORD NUMBER:  [id number] DATE:  3/4/2020 Removed old Multilayer wrap if indicated and wash leg with mild soap/water. Applied moisturizing agent to dry skin as needed. Applied primary and secondary dressing as ordered. Applied multilayered dressing below the knee to right lower leg. Applied multilayered dressing below the knee to left lower leg. Instructed patient/caregiver not to remove dressing and to keep it clean and dry. Instructed patient/caregiver on complications to report to provider, such as pain, numbness in toes, heavy drainage, and slippage of dressing. Instructed patient on purpose of compression dressing and on activity and exercise recommendations.  
 
 
Electronically signed by Larry Fu RN on 3/4/2020 at 10:14 AM

## 2020-03-11 NOTE — WOUND CARE
03/11/20 1004 Wound Leg lower Left;Lateral #3 Date First Assessed/Time First Assessed: 02/19/20 1018   Present on Hospital Admission: Yes  Wound Approximate Age at First Assessment (Weeks): 1 weeks  Primary Wound Type: Vascular  Location: Leg lower  Wound Location Orientation: Left;Lateral  Wound. .. Dressing Status Removed Dressing Type Gauze; Compression Wrap/Venous Stasis (Aquacel Ag) Non-staged Wound Description  
(scabs) Wound Length (cm) 0.1 cm Wound Width (cm) 0.1 cm Wound Depth (cm) 0.1 cm Wound Surface Area (cm^2) 0.01 cm^2 Wound Volume (cm^3) 0 cm^3 Change in Wound Size % 97.92 Condition of Base Pink (dry scabs) Drainage Amount Small Drainage Color Serous Wound Odor None Grisel-wound Assessment Intact Cleansing and Cleansing Agents  Normal saline Wound Leg lower Anterior; Left Date First Assessed/Time First Assessed: 02/12/20 0848   Present on Hospital Admission: Yes  Wound Approximate Age at First Assessment (Weeks): 1 weeks  Primary Wound Type: Venous  Location: Leg lower  Wound Location Orientation: Anterior; Left Dressing Status Removed Dressing Type Gauze; Compression Wrap/Venous Stasis (Aquacel Ag) Wound Length (cm) 0.1 cm Wound Width (cm) 0.1 cm Wound Depth (cm) 0.1 cm Wound Surface Area (cm^2) 0.01 cm^2 Wound Volume (cm^3) 0 cm^3 Change in Wound Size % 98.33 Condition of Base Pink 
(scabs) Drainage Amount Small Drainage Color Serous Wound Odor None Grisel-wound Assessment Intact Cleansing and Cleansing Agents  Normal saline Wound Buttocks Right 01/04/20 Date First Assessed/Time First Assessed: 01/04/20 1838   Present on Hospital Admission: Yes  Primary Wound Type: Pressure Injury  Location: Buttocks  Wound Location Orientation: Right  Date of First Observation: (c) 01/04/20 Dressing Status Removed Dressing Type Foam 
(Aquacel Ag) Wound Length (cm) 0.1 cm Wound Width (cm) 0.1 cm Wound Depth (cm) 0.1 cm  
 Wound Surface Area (cm^2) 0.01 cm^2 Wound Volume (cm^3) 0 cm^3 Change in Wound Size % 99.91 Condition of Base  
(scab) Drainage Amount None Cleansing and Cleansing Agents  Normal saline Visit Vitals /61 (BP 1 Location: Left arm, BP Patient Position: At rest;Sitting) Pulse 69 Temp 96.3 °F (35.7 °C) Resp 16 LLE Peripheral Vascular Capillary Refill: Less than/equal to 3 seconds (03/11/20 1003) Color: Appropriate for race (03/11/20 1003) Temperature: Warm (03/11/20 1003) Sensation: Present (03/11/20 1003) Pedal Pulse: Palpable (03/11/20 1003) Circumference of Calf (cm): 39.5 cm (03/11/20 1003) Location of Measurement (Calf): Mid  (03/11/20 1003) Circumference of Ankle (cm): 26.2 cm (03/11/20 1003) Location of Measurement (Ankle): Upper  (03/11/20 1003) RLE Peripheral Vascular Capillary Refill: Less than/equal to 3 seconds (03/11/20 1003) Color: Appropriate for race (03/11/20 1003) Temperature: Warm (03/11/20 1003) Sensation: Present (03/11/20 1003) Pedal Pulse: Palpable (03/11/20 1003) Circumference of Calf (cm): 38.3 cm (03/11/20 1003) Location of Measurement (Calf): Mid  (03/11/20 1003) Circumference of Ankle (cm): 24.6 cm (03/11/20 1003) Location of Measurement (Ankle): Upper  (03/11/20 1003)

## 2020-03-11 NOTE — DISCHARGE INSTRUCTIONS
Discharge Instructions for  [facility]  [address], [address] [facility]  Telephone: [phone]    NAME:  Gianfranco Michelle  YOB: 1965  MEDICAL RECORD NUMBER:  [id number]  DATE:  3/11/2020      WOUND CARE ORDERS:  NO wound. Patient to wear compression sock at all time in clinic double tubi to BLE . Patient d/rod from clinic. TREATMENT ORDERS:    Elevate leg(s) above the level of the heart when sitting. Avoid prolonged standing in one place. Do no get dressing/wrap wet. Follow Diet as prescribed:   [x] Diet as tolerated: [] Calorie Diabetic Diet: [x] No Added Salt:  [] Increase Protein: [] Other:                 Return Appointment:  [x] Return Appointment: With DR Freddy Seo  in  2 [facility])  [] Nurse Visit :   [] Ordered tests:      Electronically signed Mary Reid RN on 3/11/2020 at [address] [place] [address]: Should you experience any significant changes in your wound(s) or have questions about your wound care, please contact the [address] [place] [address] at [address] [place] [address] 8:00 am - 4:30. If you need help with your wound outside these hours and cannot wait until we are again available, contact your PCP or go to the hospital emergency room. PLEASE NOTE: IF YOU ARE UNABLE TO OBTAIN WOUND SUPPLIES, CONTINUE TO USE THE SUPPLIES YOU HAVE AVAILABLE UNTIL YOU ARE ABLE TO REACH US. IT IS MOST IMPORTANT TO KEEP THE WOUND COVERED AT ALL TIMES.      Physician Signature:_______________________    Date: ___________ Time:  ____________

## 2020-03-16 NOTE — PROGRESS NOTES
Wound Center Progress Note 
  
Date of Service: 3/11/20  
  
Date of Initial Visit for Current Problem:  20 
  
Subjective:  
  
Chief Complaint: 
Miriam Bradshaw Jr. is a 53 y.o.  male who presents with L lower leg wounds of several months duration. Ramon Goldstein also has a right buttock pressure ulcer, despite the fact that he states that he works on his feet 9 hours a day. Ramon Goldstein is S/P surgery for a dissecting aortic aneurysm, and has kidney failure. He is constantly late for his appointments. 
  
Referred by:  Dr. Blood Senior 
  
HPI:    
Wound caused by: chronic pressure/irritation (buttocks) Current wound care: 
Offloading wound: no Appetite: good Wound associated pain: mild Diabetic: Maybe Smoker: No 
  
       
Past Medical History:  
Diagnosis Date  Chronic kidney disease    
 Diabetes ([facility] [address]    
  Denies Hx Diabetes  Hypertension    
 Ill-defined condition    
  Patient uses CPAP  
 Ill-defined condition 2017  
  Aortic Disection  
  
         
Past Surgical History:  
Procedure Laterality Date  CARDIAC SURG PROCEDURE UNLIST   2017  
  Asending Aortic Replacement  FLEXIBLE SIGMOIDOSCOPY N/A 2017  
  SIGMOIDOSCOPY FLEXIBLE performed by Damaris Colorado MD at [facility] ENDOSCOPY  
  
No family history on file.  
Social History  
  
         
Tobacco Use  Smoking status: Former Smoker  
    Packs/day: 0.25  
    Years: 5.00  
    Pack years: 1.25  
    Last attempt to quit: 2012  
    Years since quittin.7  Smokeless tobacco: Former User Substance Use Topics  Alcohol use: No  
  
             
Prior to Admission medications Medication Sig Start Date End Date Taking? Authorizing Provider  
hydrALAZINE (APRESOLINE) 50 mg tablet TAKE 2 TABLETS BY MOUTH THREE TIMES DAILY 20   Yes Fred Murcia MD  
trimethoprim-sulfamethoxazole (BACTRIM)  mg per tablet Take 1 Tab by mouth two (2) times a day.  20   Yes Lory Pickering PA-C  
 levoFLOXacin (LEVAQUIN) 750 mg tablet Take 1 tablet by mouth every other day. 1/4/20   Yes Doron Willis PA-C  
amLODIPine (NORVASC) 10 mg tablet TAKE 1 TABLET EVERY DAY FOR BLOOD PRESSURE 10/28/19   Yes Guero Golden NP  
minoxidil (LONITEN) 10 mg tablet TAKE 1 TABLET EVERY DAY 8/29/19   Yes Cassy Martinez MD  
LRDJVSSI-ROO-2 0.3 mg/24 hr APPLY 1 PATCH EVERY 7 DAYS 2/27/19   Yes Cassy Martinez MD  
metoprolol tartrate (LOPRESSOR) 100 mg IR tablet TAKE 1 TABLET BY MOUTH TWO (2) TIMES A DAY. EVERY 12 HOURS FOR HEART AND BLOOD PRESSURE 2/19/19   Yes Cassy Martinez MD  
bumetanide (BUMEX) 1 mg tablet Take 2 mg by mouth two (2) times a day. 7/23/17   Yes Osiris Duncan  
potassium chloride (K-DUR, KLOR-CON) 20 mEq tablet Take 1 Tab by mouth two (2) times a day. 3/22/17   Yes Brian Middleton NP  
aspirin 81 mg chewable tablet Take 1 Tab by mouth daily. Patient taking differently: Take 325 mg by mouth daily. 2/27/17   Yes GAETANO Carmen  
  
No Known Allergies  
  
Review of Systems: A comprehensive review of systems was negative except for that written in the History of Present Illness.  
  
Objective:  
  
Physical Exam:  
  
Visit Vitals: VSS, Afebrile 
     
     
     
     
  
General: well developed, well nourished, pleasant , NAD. Hygiene good Psych: cooperative. Pleasant. Neuro: alert and oriented to person/place/situation. Otherwise nonfocal. 
HEENT: Normocephalic, atraumatic. EOMI. Conjunctiva clear. No scleral icterus. Chest: Respirations nonlabored. Abdomen:  Nondistended. 
  
.  
  
Focused Lower Extremity Exam: 
Good pulses, bilateral lower extremity edema. 
  
Ulcer Location: Buttocks R gluteal  
Etiology: combined Wound: Healed.  
Ulcer Location: Left lower leg posterior cluster Etiology: venous stasis Wound:  Healed! 
  
Assessment:  
  
53 y. o. male with Right buttocks decubitus, healed.  He is also obese, with chronic kidney disease. 
  
 He has chronic lymphedema of both lower extremities. (I89.0).   
Plan:  
  
Referral to lymphedema clinic done. 
  
Patient understood and agrees with plan.  Questions answered. 
  
He should wear his compression stockings. 
  
Signed By: Yousuf Zuluaga MD

## 2022-01-28 NOTE — PROGRESS NOTES
0745 Bedside report given to [address] [facility], [address] by Ashley Newby RN. Patient sitting up in recliner. No complaints except that of feeling drowsy from sleeping pill last night.   0905 Dr Favio David and Attila SALTER in to see patient. Plan of care discussed. 1045 Physical Therapy in to see patient. Patient walked in hallway with walker. Back to room to recliner. 1258 1mg Bumex IV given as ordered. Patient remains up in recliner chair. 1515 Patient ambulated in hallway with walker and minimal assist. Tolerated well. Back to recliner. 1615 Blood glucose 66. Patient drank oj and ate crackers. Will recheck  [id number] Blood glucose 92.   1730 Patient eating dinner visiting with family. 1920 Bedside report given to [address] [place] [address] by Haleigh Moore Patient ambulated in hallway with walker. Tolerated well no complaints at this time. [FreeTextEntry1] : Davonte presented to the office today for a cardiovascular evaluation. He was last seen in the office 2 months ago. \par \par He is now 65 years old, with a past medical history including gastroesophageal reflux. He did not have any known structural heart disease. He was admitted to the hospital with an upper respiratory infection, with his course complicated by paroxysmal atrial fibrillation. In the hospital, he was tachycardic, and was treated with escalating doses of metoprolol and diltiazem. He remained in atrial fibrillation on his day of discharge. Given his low risk of cardioembolic stroke, he was discharged on aspirin.\par \par Over time, his medications were reduced, and he has remained in sinus rhythm.\par \par He presents to the office feeling well. He reports no symptoms of angina or arrhythmia. His blood pressure has been reproducibly well-controlled at home. He no longer has any meaningful edema.  He has a tendency toward nuisance of bruisability, but no significant bleeding. He is planned for elbow surgery next week.

## (undated) DEVICE — STERILE POLYISOPRENE POWDER-FREE SURGICAL GLOVES: Brand: PROTEXIS

## (undated) DEVICE — Device

## (undated) DEVICE — AORTIC PERFUSION CANNULA: Brand: EDWARDS LIFESCIENCES AORTIC PERFUSION CANNULA

## (undated) DEVICE — STERNUM BLADE, OFFSET (31.7 X 0.64 X 6.3MM)

## (undated) DEVICE — TRAY CATHETER 16FR F INCLUDE LUB URIN M TEMP SENS 2 STATLOK STBL

## (undated) DEVICE — DRAPE SLUSH DISC W44XL66IN ST FOR RND BSIN HUSH SLUSH SYS

## (undated) DEVICE — SET ADMIN 16ML TBNG L100IN 2 Y INJ SITE IV PIGGY BK DISP

## (undated) DEVICE — CATHETER URETH 18FR RED RUB INTMIT ALL PURP

## (undated) DEVICE — SUTURE MCRYL SZ 3-0 L27IN ABSRB UD L24MM PS-1 3/8 CIR PRIM Y936H

## (undated) DEVICE — TRANSFER PK BLD PROD 300ML --

## (undated) DEVICE — BLANKET WRM W25XL64IN NONWOVEN SFT LTWT PLIABLE HYPR

## (undated) DEVICE — KIT PERF CANN 21FR L18CM FEM ART PERC 3/8IN VENT CONN

## (undated) DEVICE — SUTURE ETHBND EXCEL SZ 3-0 L36IN NONABSORBABLE GRN BB L17MM X588H

## (undated) DEVICE — CAUTERY BTTRY HI FN -- ACCU-TEMP

## (undated) DEVICE — RETROGRADE CARDIOPLEGIA CATHETER: Brand: EDWARDS LIFESCIENCES RETROGRADE CARDIOPLEGIA CATHETER

## (undated) DEVICE — SUTURE N ABSRB MONOFILAMENT 5-0 T-10 2N 30 IN BLU DEKLENE D7174K

## (undated) DEVICE — PRESSURE MONITORING SET: Brand: TRUWAVE

## (undated) DEVICE — SOLIDIFIER MEDC 1200ML -- CONVERT TO 356117

## (undated) DEVICE — SOLUTION IV 500ML 0.9% SOD CHL FLX CONT

## (undated) DEVICE — UNDERGLOVE SURG SZ 7.5 FNGR THK0.21MIL GRN LTX BEAD CUF

## (undated) DEVICE — CONTAINER SPEC 20 ML LID NEUT BUFF FORMALIN 10 % POLYPR STS

## (undated) DEVICE — SOLUTION IV 1000ML 0.9% SOD CHL

## (undated) DEVICE — DEVON™ KNEE AND BODY STRAP 60" X 3" (1.5 M X 7.6 CM): Brand: DEVON

## (undated) DEVICE — MEDI-VAC NON-CONDUCTIVE SUCTION TUBING: Brand: CARDINAL HEALTH

## (undated) DEVICE — BLOCK BITE ENDOSCP AD 21 MM W/ DIL BLU LF DISP

## (undated) DEVICE — GOWN,SIRUS,NONRNF,SETINSLV,XL,20/CS: Brand: MEDLINE

## (undated) DEVICE — DUAL LUMEN STOMACH TUBE MULTI-FUNCTIONAL PORT: Brand: SALEM SUMP

## (undated) DEVICE — TOWEL 4 PLY TISS 19X30 SUE WHT

## (undated) DEVICE — DRESSING FOAM W7 3 10XL95IN SIL POLYUR HEEL SELF ADH W BORD

## (undated) DEVICE — SUTURE NONABSORBABLE POLYPR 4-0 / AT-2 L36 IN MFIL DEKLENE D7065K

## (undated) DEVICE — SYR 3ML LL TIP 1/10ML GRAD --

## (undated) DEVICE — STOPCOCK IV 3W LUER

## (undated) DEVICE — PLEDGET SUT SFT OVL 3 8X5 16IN

## (undated) DEVICE — SUTURE POLYPR SZ 4-0 LEN 36IN X 4 STRANDS C-2 NDL 1/2 CIR D7041K

## (undated) DEVICE — YANKAUER BULB TIP, NO VENT: Brand: ARGYLE

## (undated) DEVICE — (D)PREP SKN CHLRAPRP APPL 26ML -- CONVERT TO ITEM 371833

## (undated) DEVICE — ADAPTER CATH WHT DISP FOR ANES

## (undated) DEVICE — 6 FOOT DISPOSABLE EXTENSION CABLE WITH SAFE CONNECT / SCREW-DOWN

## (undated) DEVICE — GLUE TISS 10ML PLAS STD SPRD TIP SYR PLUNG PREFIL SKIN CLSR 5PK

## (undated) DEVICE — REM POLYHESIVE ADULT PATIENT RETURN ELECTRODE: Brand: VALLEYLAB

## (undated) DEVICE — SUTURE POLYPR SZ 4 0 LEN 36IN C 13 NDL 1 2 CIR DEKLENE MAXX D7126K

## (undated) DEVICE — DERMABOND SKIN ADH 0.7ML -- DERMABOND ADVANCED 12/BX

## (undated) DEVICE — FORCEPS BX L160CM DIA8MM GRSP DISECT CUP TIP NONLOCKING ROT

## (undated) DEVICE — GOWN,SIRUS,FABRNF,XL,20/CS: Brand: MEDLINE

## (undated) DEVICE — SYR LR LCK 1ML GRAD NSAF 30ML --

## (undated) DEVICE — DUAL STAGE VENOUS RETURN CANNULA: Brand: THIN-FLEX DUAL STAGE VENOUS DRAINAGE CANNULA

## (undated) DEVICE — 6 INCH MONITORING EXTENSION SET: Brand: ICU MEDICAL

## (undated) DEVICE — BAG RED 3PLY 2MIL 30X40 IN

## (undated) DEVICE — Device: Brand: MEDEX

## (undated) DEVICE — CATH IV AUTOGRD BC BLU 22GA 25 -- INSYTE

## (undated) DEVICE — NEEDLE HYPO 18GA L1.5IN PNK S STL HUB POLYPR SHLD REG BVL

## (undated) DEVICE — BLADE ASSEMB CLP HAIR FINE --

## (undated) DEVICE — BASIN EMESIS 500CC ROSE 250/CS 60/PLT: Brand: MEDEGEN MEDICAL PRODUCTS, LLC

## (undated) DEVICE — 1200 GUARD II KIT W/5MM TUBE W/O VAC TUBE: Brand: GUARDIAN

## (undated) DEVICE — SOLUTION IRRIG 1000ML H2O STRL BLT

## (undated) DEVICE — KENDALL RADIOLUCENT FOAM MONITORING ELECTRODE RECTANGULAR SHAPE: Brand: KENDALL

## (undated) DEVICE — SUT ETHBND 2-0 30IN V5 GRN-WHT --

## (undated) DEVICE — SOLUTION IV 1000ML PH 7.4 INJ NRMSOL R

## (undated) DEVICE — DRAPE FLD WRM W44XL66IN C6L FOR INTRATEMP SYS THERMABASIN

## (undated) DEVICE — SS SUTURE, 3 PER SLEEVE: Brand: MYO/WIRE II

## (undated) DEVICE — SYR 50ML LR LCK 1ML GRAD NSAF --

## (undated) DEVICE — MEDI-VAC YANK SUCT HNDL W/TPRD BULBOUS TIP: Brand: CARDINAL HEALTH

## (undated) DEVICE — DRESSING AQUACEL ADVANTAGE ALG W4XL5IN RECT GREATER ABSRB HYDRFBR TECHNOLOGY

## (undated) DEVICE — (D)SYR 10ML 1/5ML GRAD NSAF -- PKGING CHANGE USE ITEM 338027

## (undated) DEVICE — BAG SPEC BIOHZRD 10 X 10 IN --

## (undated) DEVICE — HANDLE LT SNAP ON ULT DURABLE LENS FOR TRUMPF ALC DISPOSABLE